# Patient Record
Sex: FEMALE | Race: BLACK OR AFRICAN AMERICAN | Employment: OTHER | URBAN - METROPOLITAN AREA
[De-identification: names, ages, dates, MRNs, and addresses within clinical notes are randomized per-mention and may not be internally consistent; named-entity substitution may affect disease eponyms.]

---

## 2017-05-19 ENCOUNTER — HOSPITAL ENCOUNTER (EMERGENCY)
Age: 77
Discharge: HOME | End: 2017-05-19
Attending: EMERGENCY MEDICINE
Payer: MEDICARE

## 2017-05-19 ENCOUNTER — APPOINTMENT (OUTPATIENT)
Dept: GENERAL RADIOLOGY | Age: 77
End: 2017-05-19
Attending: EMERGENCY MEDICINE
Payer: MEDICARE

## 2017-05-19 VITALS
WEIGHT: 227 LBS | BODY MASS INDEX: 37.82 KG/M2 | OXYGEN SATURATION: 97 % | HEIGHT: 65 IN | SYSTOLIC BLOOD PRESSURE: 180 MMHG | DIASTOLIC BLOOD PRESSURE: 80 MMHG | HEART RATE: 89 BPM | RESPIRATION RATE: 16 BRPM | TEMPERATURE: 98.1 F

## 2017-05-19 DIAGNOSIS — M54.31 SCIATICA OF RIGHT SIDE: Primary | ICD-10-CM

## 2017-05-19 PROCEDURE — 73502 X-RAY EXAM HIP UNI 2-3 VIEWS: CPT

## 2017-05-19 PROCEDURE — 74011250637 HC RX REV CODE- 250/637: Performed by: EMERGENCY MEDICINE

## 2017-05-19 PROCEDURE — 72100 X-RAY EXAM L-S SPINE 2/3 VWS: CPT

## 2017-05-19 PROCEDURE — 99282 EMERGENCY DEPT VISIT SF MDM: CPT

## 2017-05-19 RX ORDER — TRAMADOL HYDROCHLORIDE 50 MG/1
50 TABLET ORAL
Qty: 20 TAB | Refills: 0 | Status: SHIPPED | OUTPATIENT
Start: 2017-05-19 | End: 2018-03-09

## 2017-05-19 RX ORDER — HYDROMORPHONE HYDROCHLORIDE 1 MG/ML
1 INJECTION, SOLUTION INTRAMUSCULAR; INTRAVENOUS; SUBCUTANEOUS
Status: DISCONTINUED | OUTPATIENT
Start: 2017-05-19 | End: 2017-05-19

## 2017-05-19 RX ORDER — OXYCODONE HYDROCHLORIDE 5 MG/1
5 TABLET ORAL
Status: COMPLETED | OUTPATIENT
Start: 2017-05-19 | End: 2017-05-19

## 2017-05-19 RX ADMIN — OXYCODONE HYDROCHLORIDE 5 MG: 5 TABLET ORAL at 20:03

## 2017-05-19 NOTE — ED PROVIDER NOTES
HPI Comments: 68 y.o. female with past medical history significant for DM and HTN who presents from home with chief complaint of hip pain. Pt reports 10/10 R hip pain for 2 days which radiates down her leg into her foot. She also c/o numbness in her R great toe. Pt notes she has been taking Tylenol with no relief. She also notes she previously had bilateral knee replacements. Pt denies previous hx of back problems. Pt denies recent falls, injuries, and heavy lifting. There are no other acute medical concerns at this time. PCP: Modesto Power MD    Note written by Iris Chatterjee, as dictated by Brenda Fontanez MD 6:45 PM    The history is provided by the patient. Past Medical History:   Diagnosis Date    Diabetes ([facility] [address]     Hypertension        Past Surgical History:   Procedure Laterality Date    HX KNEE REPLACEMENT Bilateral     HX ORTHOPAEDIC           History reviewed. No pertinent family history. Social History     Social History    Marital status:      Spouse name: N/A    Number of children: N/A    Years of education: N/A     Occupational History    Not on file. Social History Main Topics    Smoking status: Never Smoker    Smokeless tobacco: Not on file    Alcohol use No    Drug use: Not on file    Sexual activity: Not on file     Other Topics Concern    Not on file     Social History Narrative         ALLERGIES: Tylenol [acetaminophen]    Review of Systems   Musculoskeletal: Positive for arthralgias. Neurological: Positive for numbness. All other systems reviewed and are negative. Vitals:    05/19/17 1805   BP: 180/80   Pulse: 89   Resp: 16   Temp: 98.1 °F (36.7 °C)   SpO2: 97%   Weight: 103 kg (227 lb)   Height: 5' 5\" (1.651 m)            Physical Exam   Constitutional: She is oriented to person, place, and time. She appears well-developed and well-nourished. No distress. HENT:   Head: Normocephalic and atraumatic.    Eyes: Conjunctivae are normal. No scleral icterus. Neck: Neck supple. No tracheal deviation present. Cardiovascular: Normal rate, regular rhythm, normal heart sounds and intact distal pulses. Exam reveals no gallop and no friction rub. No murmur heard. Pulmonary/Chest: Effort normal and breath sounds normal. She has no wheezes. She has no rales. Abdominal: Soft. She exhibits no distension. There is no tenderness. There is no rebound and no guarding. Musculoskeletal: She exhibits no edema. No foot drop. Neurological: She is alert and oriented to person, place, and time. No motor or sensory deficits. (+) R straight leg raise at 15 degrees. DTRs 0/0 at the knees, 0/0 at ankles. Skin: Skin is warm and dry. No rash noted. Psychiatric: She has a normal mood and affect. Nursing note and vitals reviewed. Note written by Iris Hernandez, as dictated by Hernan Donovan MD 6:45 PM    [facility]  ED Course       Procedures      PROGRESS NOTE:  8:25 PM  Pt's XR reveals multilevel DJD. Pt's pain is likely sciatic pain. There are no hard neurological deficits. Will treat with tramadol with follow ups by PCP and spinal surgery.

## 2017-05-19 NOTE — ED TRIAGE NOTES
TRIAGE NOTE: \"I woke up and my right hip was bothering me. Maybe it was the way I was laying. \" Denies known injury. Ambulatory with cane; at baseline.

## 2017-05-20 NOTE — DISCHARGE INSTRUCTIONS
We hope that we have addressed all of your medical concerns. The examination and treatment you received in the Emergency Department were for an emergent problem and were not intended as complete care. It is important that you follow up with your healthcare provider(s) for ongoing care. If your symptoms worsen or do not improve as expected, and you are unable to reach your usual health care provider(s), you should return to the Emergency Department. Today's healthcare is undergoing tremendous change, and patient satisfaction surveys are one of the many tools to assess the quality of medical care. You may receive a survey from the [employer] regarding your experience in the Emergency Department. I hope that your experience has been completely positive, particularly the medical care that I provided. As such, please participate in the survey; anything less than excellent does not meet my expectations or intentions. [facility] and [employer] participate in nationally recognized quality of care measures. If your blood pressure is greater than 120/80, as reported below, we urge that you seek medical care to address the potential of high blood pressure, commonly known as hypertension. Hypertension can be hereditary or can be caused by certain medical conditions, pain, stress, or \"white coat syndrome. \"       Please make an appointment with your health care provider(s) for follow up of your Emergency Department visit. VITALS:   Patient Vitals for the past 8 hrs:   Temp Pulse Resp BP SpO2   05/19/17 1805 98.1 °F (36.7 °C) 89 16 180/80 97 %          Thank you for allowing us to provide you with medical care today. We realize that you have many choices for your emergency care needs. Please choose us in the future for any continued health care needs. Maryellen Amos, [address] [place] [address] [address]   Office: [phone]            No results found for this or any previous visit (from the past 24 hour(s)). Xr Spine Lumb 2 Or 3 V    Result Date: 5/19/2017  CLINICAL HISTORY: Low back pain INDICATION: Low back pain FINDINGS: Three views of the lumbar spine are obtained. Facet arthropathy multilevel. Multilevel disc desiccation. Vacuum disc phenomena. Canal and foraminal stenoses. SI joint degenerative changes well. Visualized osseous structures are without evidence of fracture-dislocation. There is no significant soft tissue abnormality identified. IMPRESSION: Degenerative changes. No fracture. Xr Hip Rt W Or Wo Pelv 2-3 Vws    Result Date: 5/19/2017  EXAM:  XR HIP RT W OR WO PELV 2-3 VWS INDICATION:   Right hip pain upon awakening. COMPARISON: None. FINDINGS: An AP view of the pelvis and a frogleg lateral view of the right hip demonstrate no fracture, dislocation or other acute abnormality. There are degenerative changes of lumbar spine and sacroiliac joints. IMPRESSION:  No acute abnormality. Learning About How to Have a Healthy Back  What causes back pain? Back pain is often caused by overuse, strain, or injury. For example, people often hurt their backs playing sports or working in the yard, being jolted in a car accident, or lifting something too heavy. Aging plays a part too. Your bones and muscles tend to lose strength as you age, which makes injury more likely. The spongy discs between the bones of the spine (vertebrae) may suffer from wear and tear and no longer provide enough cushion between the bones. A disc that bulges or breaks open (herniated disc) can press on nerves, causing back pain. In some people, back pain is the result of arthritis, broken vertebrae caused by bone loss (osteoporosis), illness, or a spine problem. Although most people have back pain at one time or another, there are steps you can take to make it less likely. How can you have a healthy back?   Reduce stress on your back through good posture  Slumping or slouching alone may not cause low back pain. But after the back has been strained or injured, bad posture can make pain worse. · Sleep in a position that maintains your back's normal curves and on a mattress that feels comfortable. Sleep on your side with a pillow between your knees, or sleep on your back with a pillow under your knees. These positions can reduce strain on your back. · Stand and sit up straight. \"Good posture\" generally means your ears, shoulders, and hips are in a straight line. · If you must stand for a long time, put one foot on a stool, ledge, or box. Switch feet every now and then. · Sit in a chair that is low enough to let you place both feet flat on the floor with both knees nearly level with your hips. If your chair or desk is too high, use a footrest to raise your knees. Place a small pillow, a rolled-up towel, or a lumbar roll in the curve of your back if you need extra support. · Try a kneeling chair, which helps tilt your hips forward. This takes pressure off your lower back. · Try sitting on an exercise ball. It can rock from side to side, which helps keep your back loose. · When driving, keep your knees nearly level with your hips. Sit straight, and drive with both hands on the steering wheel. Your arms should be in a slightly bent position. Reduce stress on your back through careful lifting  · Squat down, bending at the hips and knees only. If you need to, put one knee to the floor and extend your other knee in front of you, bent at a right angle (half kneeling). · Press your chest straight forward. This helps keep your upper back straight while keeping a slight arch in your low back. · Hold the load as close to your body as possible, at the level of your belly button (navel). · Use your feet to change direction, taking small steps. · Lead with your hips as you change direction.  Keep your shoulders in line with your hips as you move. · Set down your load carefully, squatting with your knees and hips only. Exercise and stretch your back  · Do some exercise on most days of the week, if your doctor says it is okay. You can walk, run, swim, or cycle. · Stretch your back muscles. Here are a few exercises to try:  [place] on your back, and gently pull one bent knee to your chest. Put that foot back on the floor, and then pull the other knee to your chest.  ¨ Do pelvic tilts. Lie on your back with your knees bent. Tighten your stomach muscles. Pull your belly button (navel) in and up toward your ribs. You should feel like your back is pressing to the floor and your hips and pelvis are slightly lifting off the floor. Hold for 6 seconds while breathing smoothly. ¨ Sit with your back flat against a wall. · Keep your core muscles strong. The muscles of your back, belly (abdomen), and buttocks support your spine. ¨ Pull in your belly and imagine pulling your navel toward your spine. Hold this for 6 seconds, then relax. Remember to keep breathing normally as you tense your muscles. ¨ Do curl-ups. Always do them with your knees bent. Keep your low back on the floor, and curl your shoulders toward your knees using a smooth, slow motion. Keep your arms folded across your chest. If this bothers your neck, try putting your hands behind your neck (not your head), with your elbows spread apart. ¨ Lie on your back with your knees bent and your feet flat on the floor. Tighten your belly muscles, and then push with your feet and raise your buttocks up a few inches. Hold this position 6 seconds as you continue to breathe normally, then lower yourself slowly to the floor. Repeat 8 to 12 times. ¨ If you like group exercise, try Pilates or yoga. These classes have poses that strengthen the core muscles. Lead a healthy lifestyle  · Stay at a healthy weight to avoid strain on your back. · Do not smoke.  Smoking increases the risk of osteoporosis, which weakens the spine. If you need help quitting, talk to your doctor about stop-smoking programs and medicines. These can increase your chances of quitting for good. Where can you learn more? Go to http://kenrick-oswaldo.info/. Enter L315 in the search box to learn more about \"Learning About How to Have a Healthy Back. \"  Current as of: May 23, 2016  Content Version: 11.2  © 6920-5648 [employer]. Care instructions adapted under license by [employer] (which disclaims liability or warranty for this information). If you have questions about a medical condition or this instruction, always ask your healthcare professional. [address] any warranty or liability for your use of this information.

## 2017-05-22 NOTE — CALL BACK NOTE
[facility] Emergency Department Follow Up Call Record    Discharged to : Home/Family Home/Home Health/Skilled Facility/Rehab/Assisted Living/Other__home _____  1) Did you receive your discharge instructions? Yes        2) Do you understand them? Yes     Mrs. Kallie Mcelroy reports she is feeling better, still has some pain in her hip when walking. She will get her pain medication prescription filled. 3) Are you able to follow them? Yes          If NO, what can I clarify for you? 4) Do you understand your diagnosis? Yes         5) Do you know which symptoms should prompt you to call the doctor? Yes     6) Were you able to fill and  any medications that were prescribed? No     7) You were prescribed __tramadol_________for ___sciatic pain_________________. Common side effects of this medication are____________________. This is not a complete list so please review the forms given from the pharmacy for a complete list.  8) Drowsiness, rash    Are there any questions about your medications? Yes Mrs. Maldonado asked if she could take  Tylenol or Aleve with           The Tramadol. This writer stated she take the    Tramadol and then she could alternate the Aleve every six hours. Reminded her that her chart states she has allergy to Tylenol. She    Have you scheduled any recommended doctors appointments (specialty, PCP)reported she does not. * Chart needs to be updated. If NO, what barriers are you encountering (transportation/lost contact info/cost/  didnt think necessary/no PCP  9) If discharged with Home Health, has the agency contacted you to schedule visit? Not applicable  10) Is there anyone available to help you at home (meals, errands, transportation    monitoring) (adult children, neighbors, private duty companions) Yes Reports she has family to assist her.    6) Are you on a special diet? No         If YES, do you understand the requirements for this diet? Education provided?   12) If presented with cough, bronchitis, COPD, asthma, is it ok to ask that the   respiratory disease management educator call you? Not applicable      13)  A) If presented with fall, were you issued an assistive device in the ED    Are you using? Yes  Uses cane  B) If given RX for device, have you obtained? Yes       If NO, barriers? C) Therapist recommended: NO   Are you able to implement the suggestions? Yes        If NO, barriers to implementation? D) Are you having any difficulties with mobility inside your home?     (steps, bed, tub)Not applicable   If YES, ask if the [facility] PT can contact patient and good time and number?  14)  At the end of your discharge instructions, there is information about accessing [facility], have you had a chance to review those? No         Do you have any questions about signing up for this service? NO   We encourage our patients to be active participants in their healthcare and this site is one of the ways to do that. It will allow you to access parts of your medical record, email your doctors office, schedule appointments, and request medications refills . 15) Are there any other questions that I can answer for you regarding    your Emergency department visit? NO further questions.               Estimated Call Time:____2:17 PM  _______________ Date/Time:_______________

## 2017-08-08 ENCOUNTER — HOSPITAL ENCOUNTER (EMERGENCY)
Age: 77
Discharge: HOME | End: 2017-08-08
Attending: EMERGENCY MEDICINE | Admitting: EMERGENCY MEDICINE
Payer: MEDICARE

## 2017-08-08 ENCOUNTER — APPOINTMENT (OUTPATIENT)
Dept: GENERAL RADIOLOGY | Age: 77
End: 2017-08-08
Attending: EMERGENCY MEDICINE
Payer: MEDICARE

## 2017-08-08 ENCOUNTER — APPOINTMENT (OUTPATIENT)
Dept: GENERAL RADIOLOGY | Age: 77
End: 2017-08-08
Attending: NURSE PRACTITIONER
Payer: MEDICARE

## 2017-08-08 VITALS
BODY MASS INDEX: 37.32 KG/M2 | RESPIRATION RATE: 16 BRPM | TEMPERATURE: 98.3 F | OXYGEN SATURATION: 100 % | WEIGHT: 224 LBS | HEIGHT: 65 IN | HEART RATE: 72 BPM | DIASTOLIC BLOOD PRESSURE: 46 MMHG | SYSTOLIC BLOOD PRESSURE: 113 MMHG
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
## 2017-08-08 LAB
GLUCOSE BLD STRIP.AUTO-MCNC: 156 MG/DL (ref 65–100)
SERVICE CMNT-IMP: ABNORMAL

## 2017-08-08 PROCEDURE — 73502 X-RAY EXAM HIP UNI 2-3 VIEWS: CPT

## 2017-08-08 PROCEDURE — 99283 EMERGENCY DEPT VISIT LOW MDM: CPT

## 2017-08-08 PROCEDURE — 82962 GLUCOSE BLOOD TEST: CPT

## 2017-08-08 PROCEDURE — 73552 X-RAY EXAM OF FEMUR 2/>: CPT

## 2017-08-08 PROCEDURE — 73080 X-RAY EXAM OF ELBOW: CPT

## 2017-08-08 RX ORDER — METHOCARBAMOL 750 MG/1
750 TABLET, FILM COATED ORAL
Qty: 20 TAB | Refills: 0 | Status: SHIPPED | OUTPATIENT
Start: 2017-08-08 | End: 2017-08-13

## 2017-08-08 NOTE — ED PROVIDER NOTES
HPI Comments: Adina Saldana is a 68 y.o. female who presents ambulatory in triage to the ED with  c/o right hip and right \"thigh bone\" pain. Patient drove herself to the emergency room. Patient states she fell yesterday while trying to get something out of the car and landed on her right hip. Patient denies hitting her head, denies loss of consciousness, denies syncopal events. Grandchildren helped her up at that time. She came to the emergency room today to \"get checked out. \" Pain 7/10 currently in right hip. Patient denies use of anticoagulation. PCP: Farzana Ramirez MD    PMHx significant for: Past Medical History:  No date: Diabetes ([facility] [address]  No date: Hypertension    PSHx significant for: Past Surgical History:  No date: HX KNEE REPLACEMENT Bilateral  No date: HX ORTHOPAEDIC    Social Hx: Tobacco: none EtOH: none Illicit drug use: none    There are no further complaints or symptoms at this time. The history is provided by the patient. Past Medical History:   Diagnosis Date    Diabetes ([facility] [address]     Hypertension        Past Surgical History:   Procedure Laterality Date    HX KNEE REPLACEMENT Bilateral     HX ORTHOPAEDIC           History reviewed. No pertinent family history. Social History     Social History    Marital status:      Spouse name: N/A    Number of children: N/A    Years of education: N/A     Occupational History    Not on file. Social History Main Topics    Smoking status: Never Smoker    Smokeless tobacco: Not on file    Alcohol use No    Drug use: Not on file    Sexual activity: Not on file     Other Topics Concern    Not on file     Social History Narrative         ALLERGIES: Tylenol [acetaminophen]    Review of Systems   Constitutional: Negative for activity change, appetite change, chills, diaphoresis, fatigue and fever. HENT: Negative for congestion, ear pain, sinus pressure, sore throat, trouble swallowing and voice change.     Eyes: Negative for photophobia, pain, redness and visual disturbance. Respiratory: Negative for chest tightness, shortness of breath and wheezing. Cardiovascular: Negative for chest pain, palpitations and leg swelling. Gastrointestinal: Negative for abdominal distention, abdominal pain, nausea and vomiting. Endocrine: Negative. Genitourinary: Negative for difficulty urinating, flank pain, frequency and urgency. Musculoskeletal: Negative for back pain, gait problem, joint swelling, neck pain and neck stiffness. Skin: Positive for color change (bruise to right elbow. Denies pain). Negative for pallor, rash and wound. Allergic/Immunologic: Negative. Neurological: Negative for dizziness, speech difficulty, weakness and headaches. Hematological: Does not bruise/bleed easily. Psychiatric/Behavioral: Negative for behavioral problems and suicidal ideas. The patient is not nervous/anxious. Vitals:    08/08/17 0950   BP: 130/67   Pulse: 66   Resp: 16   Temp: 98 °F (36.7 °C)   SpO2: 99%   Weight: 101.6 kg (224 lb)   Height: 5' 5\" (1.651 m)            Physical Exam   Constitutional: She is oriented to person, place, and time. She appears well-developed and well-nourished. No distress. HENT:   Head: Normocephalic and atraumatic. Right Ear: External ear normal.   Left Ear: External ear normal.   Nose: Nose normal.   Mouth/Throat: Oropharynx is clear and moist.   Eyes: Conjunctivae and EOM are normal. Pupils are equal, round, and reactive to light. Right eye exhibits no discharge. Left eye exhibits no discharge. Neck: Normal range of motion. Neck supple. No JVD present. No tracheal deviation present. No pain on palpation to cervical spine. No step offs, no deformities. Full range of motion. Cardiovascular: Normal rate, regular rhythm, normal heart sounds and intact distal pulses. Exam reveals no gallop. No murmur heard. Pulmonary/Chest: Effort normal and breath sounds normal. No respiratory distress. She has no wheezes. She has no rales. She exhibits no tenderness. Abdominal: Soft. Bowel sounds are normal. She exhibits no distension. There is no tenderness. There is no rebound and no guarding. Genitourinary:   Genitourinary Comments: Negative. Sensation to both bowel and bladder. Musculoskeletal: Normal range of motion. She exhibits tenderness (right hip and right femur pain, positive palpable pulses). She exhibits no edema. Neurological: She is alert and oriented to person, place, and time. Skin: Skin is warm and dry. No rash noted. No erythema. No pallor. Psychiatric: She has a normal mood and affect. Her behavior is normal. Judgment and thought content normal.   Nursing note and vitals reviewed. MDM  Number of Diagnoses or Management Options  Diagnosis management comments: Plan:      ED Course   1020: Initial assessment as documented. 1004: hip films reviewed and negative  1055: femur film and elbow films reviewed and negative  1153:   Pt has been reexamined. Pt has no new complaints, changes or physical findings. Care plan outlined and precautions discussed. All available results were reviewed with pt. All medications were reviewed with pt. All of pt's questions and concerns were addressed. Pt agrees to F/U as instructed and agrees to return to ED upon further deterioration. Pt is ready to go home.   Anette Otto NP    Procedures

## 2017-08-08 NOTE — SENIOR SERVICES NOTE
Physical Therapy Screening:  A referral to physical therapy order is indicated when the patient is medically stable. A screening was performed on this patient's chart based on their entrance into the emergency department and potential need for physical therapy identified. The patients chart was reviewed and the patient interviewed/screened and found to be appropriate for a skilled therapy evaluation. Please consult for physical therapy if you would like an evaluation to be completed. Thank you.     Sheree Tello PT, DPT

## 2017-08-08 NOTE — DISCHARGE INSTRUCTIONS
We hope that we have addressed all of your medical concerns. The examination and treatment you received in the Emergency Department were for an emergent problem and were not intended as complete care. It is important that you follow up with your healthcare provider(s) for ongoing care. If your symptoms worsen or do not improve as expected, and you are unable to reach your usual health care provider(s), you should return to the Emergency Department. Today's healthcare is undergoing tremendous change, and patient satisfaction surveys are one of the many tools to assess the quality of medical care. You may receive a survey from the [facility] regarding your experience in the Emergency Department. I hope that your experience has been completely positive, particularly the medical care that I provided. As such, please participate in the survey; anything less than excellent does not meet my expectations or intentions. [facility] and [address] [place] [address] participate in nationally recognized quality of care measures. If your blood pressure is greater than 120/80, as reported below, we urge that you seek medical care to address the potential of high blood pressure, commonly known as hypertension. Hypertension can be hereditary or can be caused by certain medical conditions, pain, stress, or \"white coat syndrome. \"       Please make an appointment with your health care provider(s) for follow up of your Emergency Department visit. VITALS:   Patient Vitals for the past 8 hrs:   Temp Pulse Resp BP SpO2   08/08/17 1030 - - - 129/51 96 %   08/08/17 0950 98 °F (36.7 °C) 66 16 130/67 99 %          Thank you for allowing us to provide you with medical care today. We realize that you have many choices for your emergency care needs. Please choose us in the future for any continued health care needs. Mayra PINK/ [facility] [place]: [phone]            Recent Results (from the past 24 hour(s))   GLUCOSE, POC    Collection Time: 08/08/17 10:12 AM   Result Value Ref Range    Glucose (POC) 156 (H) 65 - 100 mg/dL    Performed by Naveen Ornelas        Xr Elbow Rt Min 3 V    Result Date: 8/8/2017  EXAM:  XR ELBOW RT MIN 3 V INDICATION: Right elbow pain after fall. COMPARISON: None. FINDINGS: Three views of the right elbow demonstrate no fracture, dislocation, effusion or other acute abnormality. IMPRESSION: Normal right elbow. Xr Hip Rt W Or Wo Pelv 2-3 Vws    Result Date: 8/8/2017  EXAM:  XR HIP RT W OR WO PELV 2-3 VWS INDICATION:   Right hip pain after fall. COMPARISON: None. FINDINGS: An AP view of the pelvis and a frogleg lateral view of the right hip demonstrate no fracture, dislocation or other acute abnormality. There are degenerative changes of lumbar spine and sacroiliac joints. IMPRESSION:  No acute abnormality. Xr Femur Rt 2 Vs    Result Date: 8/8/2017  EXAM:  XR FEMUR RT 2 VS INDICATION: Pain after fall. COMPARISON: None. FINDINGS: Four images which comprise AP and lateral views of the right femur demonstrate a total knee prosthesis. There is no fracture or other acute osseous or articular abnormality. The soft tissues are within normal limits. IMPRESSION: Right total knee arthroplasty. No acute abnormality. Preventing Falls: Care Instructions  Your Care Instructions  Getting around your home safely can be a challenge if you have injuries or health problems that make it easy for you to fall. Loose rugs and furniture in walkways are among the dangers for many older people who have problems walking or who have poor eyesight. People who have conditions such as arthritis, osteoporosis, or dementia also have to be careful not to fall. You can make your home safer with a few simple measures. Follow-up care is a key part of your treatment and safety.  Be sure to make and go to all appointments, and call your doctor if you are having problems. It's also a good idea to know your test results and keep a list of the medicines you take. How can you care for yourself at home? Taking care of yourself  · You may get dizzy if you do not drink enough water. To prevent dehydration, drink plenty of fluids, enough so that your urine is light yellow or clear like water. Choose water and other caffeine-free clear liquids. If you have kidney, heart, or liver disease and have to limit fluids, talk with your doctor before you increase the amount of fluids you drink. · Exercise regularly to improve your strength, muscle tone, and balance. Walk if you can. Swimming may be a good choice if you cannot walk easily. · Have your vision and hearing checked each year or any time you notice a change. If you have trouble seeing and hearing, you might not be able to avoid objects and could lose your balance. · Know the side effects of the medicines you take. Ask your doctor or pharmacist whether the medicines you take can affect your balance. Sleeping pills or sedatives can affect your balance. · Limit the amount of alcohol you drink. Alcohol can impair your balance and other senses. · Ask your doctor whether calluses or corns on your feet need to be removed. If you wear loose-fitting shoes because of calluses or corns, you can lose your balance and fall. · Talk to your doctor if you have numbness in your feet. Preventing falls at home  · Remove raised doorway thresholds, throw rugs, and clutter. Repair loose carpet or raised areas in the floor. · Move furniture and electrical cords to keep them out of walking paths. · Use nonskid floor wax, and wipe up spills right away, especially on ceramic tile floors. · If you use a walker or cane, put rubber tips on it. If you use crutches, clean the bottoms of them regularly with an abrasive pad, such as steel wool.   · Keep your house well lit, especially stairways, porches, and outside walkways. Use night-lights in areas such as hallways and bathrooms. Add extra light switches or use remote switches (such as switches that go on or off when you clap your hands) to make it easier to turn lights on if you have to get up during the night. · Install sturdy handrails on stairways. · Move items in your cabinets so that the things you use a lot are on the lower shelves (about waist level). · Keep a cordless phone and a flashlight with new batteries by your bed. If possible, put a phone in each of the main rooms of your house, or carry a cell phone in case you fall and cannot reach a phone. Or, you can wear a device around your neck or wrist. You push a button that sends a signal for help. · Wear low-heeled shoes that fit well and give your feet good support. Use footwear with nonskid soles. Check the heels and soles of your shoes for wear. Repair or replace worn heels or soles. · Do not wear socks without shoes on wood floors. · Walk on the grass when the sidewalks are slippery. If you live in an area that gets snow and ice in the winter, sprinkle salt on slippery steps and sidewalks. Preventing falls in the bath  · Install grab bars and nonskid mats inside and outside your shower or tub and near the toilet and sinks. · Use shower chairs and bath benches. · Use a hand-held shower head that will allow you to sit while showering. · Get into a tub or shower by putting the weaker leg in first. Get out of a tub or shower with your strong side first.  · Repair loose toilet seats and consider installing a raised toilet seat to make getting on and off the toilet easier. · Keep your bathroom door unlocked while you are in the shower. Where can you learn more? Go to http://kenrick-oswaldo.info/. Enter 0476 [id number] in the search box to learn more about \"Preventing Falls: Care Instructions. \"  Current as of: August 4, 2016  Content Version: 11.3  © 3375-2839 [employer]. Care instructions adapted under license by [employer] (which disclaims liability or warranty for this information). If you have questions about a medical condition or this instruction, always ask your healthcare professional. [place] [address] any warranty or liability for your use of this information.

## 2017-08-08 NOTE — ED TRIAGE NOTES
Drove self to ER after falling yesterday while trying to get something out the car, fell onto right hip. Grandchildren helped her get up. Denies hitting head or LOC. Ambulating in triage with cane. Denies dizziness.   Also would like her BG checked

## 2018-03-09 ENCOUNTER — HOSPITAL ENCOUNTER (EMERGENCY)
Age: 78
Discharge: HOME | End: 2018-03-09
Attending: STUDENT IN AN ORGANIZED HEALTH CARE EDUCATION/TRAINING PROGRAM
Payer: MEDICARE

## 2018-03-09 VITALS
DIASTOLIC BLOOD PRESSURE: 68 MMHG | HEIGHT: 65 IN | HEART RATE: 87 BPM | RESPIRATION RATE: 16 BRPM | WEIGHT: 232.25 LBS | BODY MASS INDEX: 38.7 KG/M2 | SYSTOLIC BLOOD PRESSURE: 110 MMHG | TEMPERATURE: 97.4 F | OXYGEN SATURATION: 96 %

## 2018-03-09 DIAGNOSIS — S46.819A STRAIN OF TRAPEZIUS MUSCLE, UNSPECIFIED LATERALITY, INITIAL ENCOUNTER: ICD-10-CM

## 2018-03-09 DIAGNOSIS — M54.2 NECK PAIN: Primary | ICD-10-CM

## 2018-03-09 PROCEDURE — 99282 EMERGENCY DEPT VISIT SF MDM: CPT

## 2018-03-09 RX ORDER — TRAMADOL HYDROCHLORIDE 50 MG/1
50 TABLET ORAL
Qty: 20 TAB | Refills: 0 | Status: SHIPPED | OUTPATIENT
Start: 2018-03-09 | End: 2019-03-27

## 2018-03-09 NOTE — ED PROVIDER NOTES
HPI Comments: 65 y/o female with PMHx of DM and HTN, presenting with complaint of neck pain. She has been having intermittent neck pain for the past 3 years, that has been worse over the past 2 weeks. She denies any recent falls or other traumatic injuries. She states that sometimes the pain is unilateral and sometimes it is bilateral, and is often worse on the right on the left. The pain is severe, starts at her inferior occiput and radiates toward her shoulders, and is made worse by movement. She has tried tylenol with minimal relief. She denies temporal or frontal headache, vision changes, weakness, numbness, nausea, vomiting, dysequilibrium, fever or chills. The history is provided by the patient. Past Medical History:   Diagnosis Date    Diabetes ([facility] [address]     Hypertension        Past Surgical History:   Procedure Laterality Date    HX KNEE REPLACEMENT Bilateral     HX ORTHOPAEDIC           History reviewed. No pertinent family history. Social History     Social History    Marital status:      Spouse name: N/A    Number of children: N/A    Years of education: N/A     Occupational History    Not on file. Social History Main Topics    Smoking status: Never Smoker    Smokeless tobacco: Not on file    Alcohol use No    Drug use: Not on file    Sexual activity: Not on file     Other Topics Concern    Not on file     Social History Narrative         ALLERGIES: Tylenol [acetaminophen]    Review of Systems   Constitutional: Negative for chills and fever. Eyes: Negative for visual disturbance. Respiratory: Positive for shortness of breath (intermittent, none recently). Cardiovascular: Negative for chest pain. Gastrointestinal: Negative for abdominal pain, nausea and vomiting. Musculoskeletal: Positive for neck pain. Negative for back pain. Skin: Negative for wound. Neurological: Positive for headaches (upper neck pain).  Negative for syncope, weakness, light-headedness and numbness. All other systems reviewed and are negative. Vitals:    03/09/18 0942   BP: 110/68   Pulse: 87   Resp: 16   Temp: 97.4 °F (36.3 °C)   SpO2: 96%   Weight: 105.3 kg (232 lb 4 oz)   Height: 5' 5\" (1.651 m)            Physical Exam   Constitutional: She is oriented to person, place, and time. She appears well-developed and well-nourished. No distress. HENT:   Head: Normocephalic and atraumatic. Eyes: Conjunctivae and EOM are normal. Pupils are equal, round, and reactive to light. Neck: Normal range of motion. Neck supple. Cardiovascular: Normal rate and regular rhythm. Murmur heard. Pulmonary/Chest: Effort normal and breath sounds normal.   Musculoskeletal:   No midline tenderness of cervical, thoracic or lumbar spine. Bilateral trapezius and cervical paraspinal muscle tenderness to palpation. Neurological: She is alert and oriented to person, place, and time. CN 2-12 intact. 5/5 strength of bilateral upper and lower extremities, no sensory deficits. Skin: Skin is warm and dry. She is not diaphoretic. Nursing note and vitals reviewed. MDM  Number of Diagnoses or Management Options  Neck pain:   Strain of trapezius muscle, unspecified laterality, initial encounter:      Amount and/or Complexity of Data Reviewed  Discuss the patient with other providers: yes (Dr. Edilma Higginbotham, ED attending)    Patient Progress  Patient progress: stable        ED Course       Procedures      65 y/o female with PMHx of DM and HTN, presenting with complaint of neck pain. History and exam most consistent with muscular pain. Intermittent pain x 3 years without any neuro deficits, N/V or vision changes - unlikely to reflect CVA/TIA, intracranial mass or hemorrhage, or carotid/vertebral artery aneurysm/dissection/occlusion. Low clinical suspicion for ACS, fractures, ligamentous injury, epidural abscess or acute cord compression. No emergent imaging indicated.  Safe for discharge home with Rx for tramadol and stretching exercises to do at home. Recommended prompt PCP follow up after the weekend. Strict ED return precautions discussed and provided in writing at time of discharge. The patient verbalized understanding and agreement with this plan. Patient care and plan discussed and shared with Dr. [place].

## 2018-03-09 NOTE — ED TRIAGE NOTES
Right sided neck pain radiating to shoulder and sometimes has pain behind her left ear radiating down, denies injury,only hurts to turn her neck , pain x 2 weeks

## 2018-04-13 ENCOUNTER — APPOINTMENT (OUTPATIENT)
Dept: CT IMAGING | Age: 78
End: 2018-04-13
Attending: NURSE PRACTITIONER
Payer: MEDICARE

## 2018-04-13 ENCOUNTER — APPOINTMENT (OUTPATIENT)
Dept: GENERAL RADIOLOGY | Age: 78
End: 2018-04-13
Attending: NURSE PRACTITIONER
Payer: MEDICARE

## 2018-04-13 ENCOUNTER — HOSPITAL ENCOUNTER (EMERGENCY)
Age: 78
Discharge: HOME | End: 2018-04-13
Attending: EMERGENCY MEDICINE
Payer: MEDICARE

## 2018-04-13 VITALS
BODY MASS INDEX: 39.82 KG/M2 | DIASTOLIC BLOOD PRESSURE: 94 MMHG | HEIGHT: 65 IN | OXYGEN SATURATION: 96 % | SYSTOLIC BLOOD PRESSURE: 153 MMHG | TEMPERATURE: 97.9 F | WEIGHT: 239 LBS | RESPIRATION RATE: 16 BRPM | HEART RATE: 91 BPM

## 2018-04-13 DIAGNOSIS — R51.9 ACUTE NONINTRACTABLE HEADACHE, UNSPECIFIED HEADACHE TYPE: ICD-10-CM

## 2018-04-13 DIAGNOSIS — R05.9 COUGH: Primary | ICD-10-CM

## 2018-04-13 DIAGNOSIS — M54.2 NECK PAIN: ICD-10-CM

## 2018-04-13 LAB
ALBUMIN SERPL-MCNC: 3.2 G/DL (ref 3.5–5)
ALBUMIN/GLOB SERPL: 0.7 {RATIO} (ref 1.1–2.2)
ALP SERPL-CCNC: 73 U/L (ref 45–117)
ALT SERPL-CCNC: 26 U/L (ref 12–78)
ANION GAP SERPL CALC-SCNC: 5 MMOL/L (ref 5–15)
AST SERPL-CCNC: 22 U/L (ref 15–37)
BASOPHILS # BLD: 0.1 K/UL (ref 0–0.1)
BASOPHILS NFR BLD: 1 % (ref 0–1)
BILIRUB SERPL-MCNC: 0.5 MG/DL (ref 0.2–1)
BUN SERPL-MCNC: 17 MG/DL (ref 6–20)
BUN/CREAT SERPL: 16 (ref 12–20)
CALCIUM SERPL-MCNC: 10 MG/DL (ref 8.5–10.1)
CHLORIDE SERPL-SCNC: 109 MMOL/L (ref 97–108)
CO2 SERPL-SCNC: 29 MMOL/L (ref 21–32)
CREAT SERPL-MCNC: 1.08 MG/DL (ref 0.55–1.02)
DIFFERENTIAL METHOD BLD: ABNORMAL
EOSINOPHIL # BLD: 0.3 K/UL (ref 0–0.4)
EOSINOPHIL NFR BLD: 3 % (ref 0–7)
ERYTHROCYTE [DISTWIDTH] IN BLOOD BY AUTOMATED COUNT: 14.6 % (ref 11.5–14.5)
GLOBULIN SER CALC-MCNC: 4.6 G/DL (ref 2–4)
GLUCOSE SERPL-MCNC: 124 MG/DL (ref 65–100)
HCT VFR BLD AUTO: 34.1 % (ref 35–47)
HGB BLD-MCNC: 10.9 G/DL (ref 11.5–16)
IMM GRANULOCYTES # BLD: 0 K/UL (ref 0–0.04)
IMM GRANULOCYTES NFR BLD AUTO: 0 % (ref 0–0.5)
LYMPHOCYTES # BLD: 3 K/UL (ref 0.8–3.5)
LYMPHOCYTES NFR BLD: 31 % (ref 12–49)
MCH RBC QN AUTO: 28.2 PG (ref 26–34)
MCHC RBC AUTO-ENTMCNC: 32 G/DL (ref 30–36.5)
MCV RBC AUTO: 88.1 FL (ref 80–99)
MONOCYTES # BLD: 0.7 K/UL (ref 0–1)
MONOCYTES NFR BLD: 8 % (ref 5–13)
NEUTS SEG # BLD: 5.4 K/UL (ref 1.8–8)
NEUTS SEG NFR BLD: 57 % (ref 32–75)
NRBC # BLD: 0 K/UL (ref 0–0.01)
NRBC BLD-RTO: 0 PER 100 WBC
PLATELET # BLD AUTO: 281 K/UL (ref 150–400)
PMV BLD AUTO: 10.4 FL (ref 8.9–12.9)
POTASSIUM SERPL-SCNC: 4.6 MMOL/L (ref 3.5–5.1)
PROT SERPL-MCNC: 7.8 G/DL (ref 6.4–8.2)
RBC # BLD AUTO: 3.87 M/UL (ref 3.8–5.2)
SODIUM SERPL-SCNC: 143 MMOL/L (ref 136–145)
WBC # BLD AUTO: 9.5 K/UL (ref 3.6–11)

## 2018-04-13 PROCEDURE — 99281 EMR DPT VST MAYX REQ PHY/QHP: CPT

## 2018-04-13 PROCEDURE — 71046 X-RAY EXAM CHEST 2 VIEWS: CPT

## 2018-04-13 PROCEDURE — 72050 X-RAY EXAM NECK SPINE 4/5VWS: CPT

## 2018-04-13 PROCEDURE — 70450 CT HEAD/BRAIN W/O DYE: CPT

## 2018-04-13 PROCEDURE — 36415 COLL VENOUS BLD VENIPUNCTURE: CPT | Performed by: NURSE PRACTITIONER

## 2018-04-13 PROCEDURE — 80053 COMPREHEN METABOLIC PANEL: CPT | Performed by: NURSE PRACTITIONER

## 2018-04-13 PROCEDURE — 85025 COMPLETE CBC W/AUTO DIFF WBC: CPT | Performed by: NURSE PRACTITIONER

## 2018-04-13 RX ORDER — AZITHROMYCIN 250 MG/1
TABLET, FILM COATED ORAL
Qty: 6 TAB | Refills: 0 | Status: SHIPPED | OUTPATIENT
Start: 2018-04-13 | End: 2018-04-18

## 2018-04-13 RX ORDER — BENZONATATE 100 MG/1
100 CAPSULE ORAL
Qty: 30 CAP | Refills: 0 | Status: SHIPPED | OUTPATIENT
Start: 2018-04-13 | End: 2018-04-20

## 2018-04-13 NOTE — DISCHARGE INSTRUCTIONS
Cough: Care Instructions  Your Care Instructions    A cough is your body's response to something that bothers your throat or airways. Many things can cause a cough. You might cough because of a cold or the flu, bronchitis, or asthma. Smoking, postnasal drip, allergies, and stomach acid that backs up into your throat also can cause coughs. A cough is a symptom, not a disease. Most coughs stop when the cause, such as a cold, goes away. You can take a few steps at home to cough less and feel better. Follow-up care is a key part of your treatment and safety. Be sure to make and go to all appointments, and call your doctor if you are having problems. It's also a good idea to know your test results and keep a list of the medicines you take. How can you care for yourself at home? · Drink lots of water and other fluids. This helps thin the mucus and soothes a dry or sore throat. Honey or lemon juice in hot water or tea may ease a dry cough. · Take cough medicine as directed by your doctor. · Prop up your head on pillows to help you breathe and ease a dry cough. · Try cough drops to soothe a dry or sore throat. Cough drops don't stop a cough. Medicine-flavored cough drops are no better than candy-flavored drops or hard candy. · Do not smoke. Avoid secondhand smoke. If you need help quitting, talk to your doctor about stop-smoking programs and medicines. These can increase your chances of quitting for good. When should you call for help? Call 911 anytime you think you may need emergency care. For example, call if:  ? · You have severe trouble breathing. ?Call your doctor now or seek immediate medical care if:  ? · You cough up blood. ? · You have new or worse trouble breathing. ? · You have a new or higher fever. ? · You have a new rash. ? Watch closely for changes in your health, and be sure to contact your doctor if:  ? · You cough more deeply or more often, especially if you notice more mucus or a change in the color of your mucus. ? · You have new symptoms, such as a sore throat, an earache, or sinus pain. ? · You do not get better as expected. Where can you learn more? Go to http://kenrick-oswaldo.info/. Enter D279 in the search box to learn more about \"Cough: Care Instructions. \"  Current as of: May 12, 2017  Content Version: 11.4  © 3986-5834 [employer]. Care instructions adapted under license by [employer] (which disclaims liability or warranty for this information). If you have questions about a medical condition or this instruction, always ask your healthcare professional. [place] [address] any warranty or liability for your use of this information. Neck Pain: Care Instructions  Your Care Instructions    You can have neck pain anywhere from the bottom of your head to the top of your shoulders. It can spread to the upper back or arms. Injuries, painting a ceiling, sleeping with your neck twisted, staying in one position for too long, and many other activities can cause neck pain. Most neck pain gets better with home care. Your doctor may recommend medicine to relieve pain or relax your muscles. He or she may suggest exercise and physical therapy to increase flexibility and relieve stress. You may need to wear a special (cervical) collar to support your neck for a day or two. Follow-up care is a key part of your treatment and safety. Be sure to make and go to all appointments, and call your doctor if you are having problems. It's also a good idea to know your test results and keep a list of the medicines you take. How can you care for yourself at home? · Try using a heating pad on a low or medium setting for 15 to 20 minutes every 2 or 3 hours. Try a warm shower in place of one session with the heating pad. · You can also try an ice pack for 10 to 15 minutes every 2 to 3 hours.  Put a thin cloth between the ice and your skin.  · Take pain medicines exactly as directed. ¨ If the doctor gave you a prescription medicine for pain, take it as prescribed. ¨ If you are not taking a prescription pain medicine, ask your doctor if you can take an over-the-counter medicine. · If your doctor recommends a cervical collar, wear it exactly as directed. When should you call for help? Call your doctor now or seek immediate medical care if:  ? · You have new or worsening numbness in your arms, buttocks or legs. ? · You have new or worsening weakness in your arms or legs. (This could make it hard to stand up.)   ? · You lose control of your bladder or bowels. ? Watch closely for changes in your health, and be sure to contact your doctor if:  ? · Your neck pain is getting worse. ? · You are not getting better after 1 week. ? · You do not get better as expected. Where can you learn more? Go to http://kenrick-oswaldo.info/. Enter [id number] in the search box to learn more about \"Neck Pain: Care Instructions. \"  Current as of: March 21, 2017  Content Version: 11.4  © 5782-8928 [employer]. Care instructions adapted under license by [employer] (which disclaims liability or warranty for this information). If you have questions about a medical condition or this instruction, always ask your healthcare professional. [address] any warranty or liability for your use of this information.

## 2018-04-13 NOTE — ED PROVIDER NOTES
HPI Comments: The patient is a 66 y.o. female with past medical history significant for DM & HTN who presents from home ambulatory with chief complaint of persistent cold symptoms, cough, headache & neck pain. Patient states that she has had the symptoms for the last 3 weeks. She \"wants us to get rid of her cold symptoms\". She also complains of persistent neck pain and headache with a cough and shortness of breath. She \"wants my head x-rayed to see what is going on\". She was in the ED several weeks ago for the same. Patient denies: chills, nausea, vomiting, diarrhea, constipation, loss of bowel or bladder, chest pain. Nothing makes the symptoms better or worse. She has tried hot tea and diabetic cough medication without any help. Additional symptoms include subjective fevers and shortness of breath without dyspnea or orthopnea. There are no other acute medical concerns at this time. PCP: Kennith Halsted, MD      The history is provided by the patient. Past Medical History:   Diagnosis Date    Diabetes ([facility] [address]     Hypertension        Past Surgical History:   Procedure Laterality Date    HX KNEE REPLACEMENT Bilateral     HX ORTHOPAEDIC           History reviewed. No pertinent family history. Social History     Social History    Marital status:      Spouse name: N/A    Number of children: N/A    Years of education: N/A     Occupational History    Not on file. Social History Main Topics    Smoking status: Never Smoker    Smokeless tobacco: Not on file    Alcohol use No    Drug use: Not on file    Sexual activity: Not on file     Other Topics Concern    Not on file     Social History Narrative         ALLERGIES: Tylenol [acetaminophen]    Review of Systems   Constitutional: Positive for fever. Negative for appetite change and chills. HENT: Negative. Respiratory: Positive for cough and shortness of breath. Negative for wheezing.     Cardiovascular: Negative for chest pain, palpitations and leg swelling. Gastrointestinal: Negative for abdominal pain, constipation, diarrhea, nausea and vomiting. Genitourinary: Negative for dysuria and urgency. Musculoskeletal: Negative for back pain. Skin: Negative for color change and rash. Neurological: Positive for dizziness and headaches. Psychiatric/Behavioral: Negative. All other systems reviewed and are negative. Vitals:    04/13/18 1219   BP: 175/81   Pulse: 96   Resp: 16   Temp: 98 °F (36.7 °C)   SpO2: 96%   Weight: 108.4 kg (239 lb)   Height: 5' 5\" (1.651 m)            Physical Exam   Constitutional: She is oriented to person, place, and time. She appears well-developed and well-nourished. HENT:   Head: Normocephalic and atraumatic. Neck: Normal range of motion. Neck supple. Cardiovascular: Normal rate, regular rhythm, normal heart sounds and intact distal pulses. Pulses:       Radial pulses are 2+ on the right side, and 2+ on the left side. Pulmonary/Chest: Effort normal and breath sounds normal. No respiratory distress. She has no decreased breath sounds. She has no wheezes. She has no rhonchi. She has no rales. She exhibits no tenderness. Abdominal: Soft. Bowel sounds are normal. There is no tenderness. There is no guarding. Musculoskeletal: Normal range of motion. Neurological: She is alert and oriented to person, place, and time. Skin: Skin is warm and dry. No erythema. Psychiatric: She has a normal mood and affect. Her behavior is normal. Judgment and thought content normal.   Nursing note and vitals reviewed.        [facility]      ED Course     Assessment & Plan:     Orders Placed This Encounter    XR CHEST PA LAT    CT HEAD WO CONT    XR SPINE CERV 4 OR 5 V    CBC WITH AUTOMATED DIFF    METABOLIC PANEL, COMPREHENSIVE       Discussed with Niyah Aldana MD,ED Provider    Avel Montoya NP  04/13/18  12:22 PM    Head CT, CXR & Neck films negative for acute findings    Will Dc home with Azithromycin & tessalon Pearls for cough    Findings discussed with Dr. Neena Roman MD.     Procedures    4:05 PM  The patient has been reevaluated. The patient is ready for discharge. The patient's signs, symptoms, diagnosis, and discharge instructions have been discussed and the patient/ family has conveyed their understanding. The patient is to follow up as recommended or return to the ED should their symptoms worsen. Plan has been discussed and the patient is in agreement. LABORATORY TESTS:  Labs Reviewed   CBC WITH AUTOMATED DIFF - Abnormal; Notable for the following:        Result Value    HGB 10.9 (*)     HCT 34.1 (*)     RDW 14.6 (*)     All other components within normal limits   METABOLIC PANEL, COMPREHENSIVE - Abnormal; Notable for the following:     Chloride 109 (*)     Glucose 124 (*)     Creatinine 1.08 (*)     GFR est AA 59 (*)     GFR est non-AA 49 (*)     Albumin 3.2 (*)     Globulin 4.6 (*)     A-G Ratio 0.7 (*)     All other components within normal limits   SAMPLES BEING HELD       IMAGING RESULTS:  Xr Chest Pa Lat    Result Date: 4/13/2018  Exam:  2 view chest Indication: Persistent cough Comparison to 10/9/2012. PA and lateral views demonstrate normal heart size. There is no acute process in the lung fields. The osseous structures are unremarkable. Impression: No acute process or change compared to the prior exam.     Xr Spine Cerv 4 Or 5 V    Result Date: 4/13/2018  Indication: Right-sided neck pain Comparison to 11/17/2010 Five views of the cervical spine demonstrate normal alignment without evidence of acute fracture, subluxation, or prevertebral edema. Multilevel spondylosis is again noted, greatest at C5-6 and C6-7. Bilateral neural foraminal narrowing is noted. Impression: Multilevel spondylosis without acute abnormality. Ct Head Wo Cont    Result Date: 4/13/2018  EXAM:  CT HEAD WO CONT INDICATION:   headache and neck pain COMPARISON: 2016. CONTRAST:  None.  TECHNIQUE: Unenhanced CT of the head was performed using 5 mm images. Brain and bone windows were generated. CT dose reduction was achieved through use of a standardized protocol tailored for this examination and automatic exposure control for dose modulation. FINDINGS: The ventricles and sulci are normal in size, shape and configuration and midline. There is no significant white matter disease. There is no intracranial hemorrhage, extra-axial collection, mass, mass effect or midline shift. The basilar cisterns are open. No acute infarct is identified. The bone windows demonstrate no abnormalities. The visualized portions of the paranasal sinuses and mastoid air cells are clear. IMPRESSION: No acute abnormality identified. MEDICATIONS GIVEN:  Medications - No data to display    IMPRESSION:  1. Cough    2. Acute nonintractable headache, unspecified headache type    3. Neck pain        PLAN:  1. Current Discharge Medication List      START taking these medications    Details   azithromycin (ZITHROMAX Z-ROBERT) 250 mg tablet 2 tabs PO when you  the packet. Then 1 tab every 24 hours for remaining 4 days. Indications: bronchitis  Qty: 6 Tab, Refills: 0      benzonatate (TESSALON PERLES) 100 mg capsule Take 1 Cap by mouth three (3) times daily as needed for Cough for up to 7 days. Indications: Cough  Qty: 30 Cap, Refills: 0         CONTINUE these medications which have NOT CHANGED    Details   traMADol (ULTRAM) 50 mg tablet Take 1 Tab by mouth every eight (8) hours as needed for Pain. Max Daily Amount: 150 mg.  Qty: 20 Tab, Refills: 0    Associated Diagnoses: Neck pain      metFORMIN (GLUCOPHAGE) 500 mg tablet Take 500 mg by mouth two (2) times daily (with meals). glimepiride (AMARYL) 1 mg tablet Take 1 mg by mouth Daily (before breakfast). furosemide (LASIX) 40 mg tablet Take 40 mg by mouth daily. solifenacin (VESICARE) 10 mg tablet Take 10 mg by mouth daily.       LORazepam (ATIVAN) 0.5 mg tablet Take 0.5 mg by mouth every four (4) hours as needed for Anxiety. potassium chloride (K-DUR, KLOR-CON) 10 mEq tablet Take 10 mEq by mouth two (2) times a day. simvastatin (ZOCOR) 20 mg tablet Take 20 mg by mouth nightly. multivit-min-FA-lycopen-lutein 0.4-300-250 mg-mcg-mcg tab Take  by mouth daily. amLODIPine (NORVASC) 5 mg tablet Take 5 mg by mouth daily. calcitRIOL (ROCALTROL) 0.5 mcg capsule Take 0.5 mcg by mouth daily. escitalopram (LEXAPRO) 10 mg tablet Take 10 mg by mouth daily. aspirin 81 mg tablet Take 81 mg by mouth. 2.   Follow-up Information     Follow up With Details Comments Contact Luis F Mallory MD Schedule an appointment as soon as possible for a visit As needed [address] [facility]      [address], [facility] DEP  As needed, If symptoms worsen [address] [facility]  [phone]        3.      Return to ED for new or worsening symptoms       Abraham Cerrato NP

## 2018-04-13 NOTE — ED TRIAGE NOTES
C/o right posterior headache x 3 weeks and nasal congestion x 3 weeks. Bilateral neck pain x 2 weeks. Reports taking cough syrup with no relief.     Seen here on 3/9/18 for same complaints

## 2019-03-01 ENCOUNTER — APPOINTMENT (OUTPATIENT)
Dept: GENERAL RADIOLOGY | Age: 79
DRG: 871 | End: 2019-03-01
Attending: EMERGENCY MEDICINE
Payer: MEDICARE

## 2019-03-01 ENCOUNTER — HOSPITAL ENCOUNTER (INPATIENT)
Age: 79
LOS: 10 days | Discharge: HOME HEALTH | DRG: 871 | End: 2019-03-11
Attending: EMERGENCY MEDICINE | Admitting: INTERNAL MEDICINE
Payer: MEDICARE

## 2019-03-01 ENCOUNTER — APPOINTMENT (OUTPATIENT)
Dept: CT IMAGING | Age: 79
DRG: 871 | End: 2019-03-01
Attending: EMERGENCY MEDICINE
Payer: MEDICARE

## 2019-03-01 DIAGNOSIS — I21.4 NSTEMI (NON-ST ELEVATED MYOCARDIAL INFARCTION) (HCC): ICD-10-CM

## 2019-03-01 DIAGNOSIS — J18.9 COMMUNITY ACQUIRED PNEUMONIA, UNSPECIFIED LATERALITY: ICD-10-CM

## 2019-03-01 DIAGNOSIS — A41.9 SEPSIS, DUE TO UNSPECIFIED ORGANISM: Primary | ICD-10-CM

## 2019-03-01 DIAGNOSIS — R77.8 TROPONIN LEVEL ELEVATED: ICD-10-CM

## 2019-03-01 LAB
ALBUMIN SERPL-MCNC: 2.9 G/DL (ref 3.5–5)
ALBUMIN/GLOB SERPL: 0.6 {RATIO} (ref 1.1–2.2)
ALP SERPL-CCNC: 133 U/L (ref 45–117)
ALT SERPL-CCNC: 49 U/L (ref 12–78)
AMYLASE SERPL-CCNC: 29 U/L (ref 25–115)
ANION GAP SERPL CALC-SCNC: 9 MMOL/L (ref 5–15)
APPEARANCE UR: CLEAR
APTT PPP: 28.4 SEC (ref 22.1–32)
AST SERPL-CCNC: 62 U/L (ref 15–37)
ATRIAL RATE: 115 BPM
ATRIAL RATE: 89 BPM
BACTERIA URNS QL MICRO: NEGATIVE /HPF
BASOPHILS # BLD: 0.1 K/UL (ref 0–0.1)
BASOPHILS NFR BLD: 1 % (ref 0–1)
BILIRUB SERPL-MCNC: 1.2 MG/DL (ref 0.2–1)
BILIRUB UR QL: NEGATIVE
BNP SERPL-MCNC: 5214 PG/ML (ref 0–450)
BUN SERPL-MCNC: 11 MG/DL (ref 6–20)
BUN/CREAT SERPL: 10 (ref 12–20)
CALCIUM SERPL-MCNC: 8.6 MG/DL (ref 8.5–10.1)
CALCULATED P AXIS, ECG09: 80 DEGREES
CALCULATED P AXIS, ECG09: 84 DEGREES
CALCULATED R AXIS, ECG10: 31 DEGREES
CALCULATED R AXIS, ECG10: 47 DEGREES
CALCULATED T AXIS, ECG11: 37 DEGREES
CALCULATED T AXIS, ECG11: 9 DEGREES
CHLORIDE SERPL-SCNC: 100 MMOL/L (ref 97–108)
CK SERPL-CCNC: 85 U/L (ref 26–192)
CO2 SERPL-SCNC: 26 MMOL/L (ref 21–32)
COLOR UR: ABNORMAL
COMMENT, HOLDF: NORMAL
CREAT SERPL-MCNC: 1.05 MG/DL (ref 0.55–1.02)
DIAGNOSIS, 93000: NORMAL
DIAGNOSIS, 93000: NORMAL
DIFFERENTIAL METHOD BLD: ABNORMAL
EOSINOPHIL # BLD: 0.1 K/UL (ref 0–0.4)
EOSINOPHIL NFR BLD: 0 % (ref 0–7)
EPITH CASTS URNS QL MICRO: ABNORMAL /LPF
ERYTHROCYTE [DISTWIDTH] IN BLOOD BY AUTOMATED COUNT: 13.8 % (ref 11.5–14.5)
FLUAV AG NPH QL IA: NEGATIVE
FLUBV AG NOSE QL IA: NEGATIVE
GLOBULIN SER CALC-MCNC: 4.5 G/DL (ref 2–4)
GLUCOSE BLD STRIP.AUTO-MCNC: 198 MG/DL (ref 65–100)
GLUCOSE BLD STRIP.AUTO-MCNC: 266 MG/DL (ref 65–100)
GLUCOSE SERPL-MCNC: 218 MG/DL (ref 65–100)
GLUCOSE UR STRIP.AUTO-MCNC: 100 MG/DL
HCT VFR BLD AUTO: 35.3 % (ref 35–47)
HGB BLD-MCNC: 10.6 G/DL (ref 11.5–16)
HGB UR QL STRIP: NEGATIVE
HYALINE CASTS URNS QL MICRO: ABNORMAL /LPF (ref 0–5)
IMM GRANULOCYTES # BLD AUTO: 0.1 K/UL (ref 0–0.04)
IMM GRANULOCYTES NFR BLD AUTO: 1 % (ref 0–0.5)
KETONES UR QL STRIP.AUTO: 15 MG/DL
LACTATE BLD-SCNC: 1.97 MMOL/L (ref 0.4–2)
LEUKOCYTE ESTERASE UR QL STRIP.AUTO: NEGATIVE
LIPASE SERPL-CCNC: 37 U/L (ref 73–393)
LYMPHOCYTES # BLD: 2.7 K/UL (ref 0.8–3.5)
LYMPHOCYTES NFR BLD: 16 % (ref 12–49)
MAGNESIUM SERPL-MCNC: 1.3 MG/DL (ref 1.6–2.4)
MAGNESIUM SERPL-MCNC: 1.9 MG/DL (ref 1.6–2.4)
MCH RBC QN AUTO: 26.9 PG (ref 26–34)
MCHC RBC AUTO-ENTMCNC: 30 G/DL (ref 30–36.5)
MCV RBC AUTO: 89.6 FL (ref 80–99)
MONOCYTES # BLD: 1.4 K/UL (ref 0–1)
MONOCYTES NFR BLD: 9 % (ref 5–13)
NEUTS SEG # BLD: 12.5 K/UL (ref 1.8–8)
NEUTS SEG NFR BLD: 74 % (ref 32–75)
NITRITE UR QL STRIP.AUTO: NEGATIVE
NRBC # BLD: 0 K/UL (ref 0–0.01)
NRBC BLD-RTO: 0 PER 100 WBC
P-R INTERVAL, ECG05: 152 MS
P-R INTERVAL, ECG05: 160 MS
PH UR STRIP: 5 [PH] (ref 5–8)
PHOSPHATE SERPL-MCNC: 3.2 MG/DL (ref 2.6–4.7)
PLATELET # BLD AUTO: 258 K/UL (ref 150–400)
PMV BLD AUTO: 10.9 FL (ref 8.9–12.9)
POTASSIUM SERPL-SCNC: 4.4 MMOL/L (ref 3.5–5.1)
PROT SERPL-MCNC: 7.4 G/DL (ref 6.4–8.2)
PROT UR STRIP-MCNC: 30 MG/DL
Q-T INTERVAL, ECG07: 306 MS
Q-T INTERVAL, ECG07: 374 MS
QRS DURATION, ECG06: 66 MS
QRS DURATION, ECG06: 68 MS
QTC CALCULATION (BEZET), ECG08: 423 MS
QTC CALCULATION (BEZET), ECG08: 455 MS
RBC # BLD AUTO: 3.94 M/UL (ref 3.8–5.2)
RBC #/AREA URNS HPF: ABNORMAL /HPF (ref 0–5)
SAMPLES BEING HELD,HOLD: NORMAL
SERVICE CMNT-IMP: ABNORMAL
SERVICE CMNT-IMP: ABNORMAL
SODIUM SERPL-SCNC: 135 MMOL/L (ref 136–145)
SP GR UR REFRACTOMETRY: 1.02 (ref 1–1.03)
THERAPEUTIC RANGE,PTTT: NORMAL SECS (ref 58–77)
TROPONIN I SERPL-MCNC: 0.52 NG/ML
TROPONIN I SERPL-MCNC: 1.31 NG/ML
TSH SERPL DL<=0.05 MIU/L-ACNC: 0.74 UIU/ML (ref 0.36–3.74)
UR CULT HOLD, URHOLD: NORMAL
UROBILINOGEN UR QL STRIP.AUTO: 1 EU/DL (ref 0.2–1)
VENTRICULAR RATE, ECG03: 115 BPM
VENTRICULAR RATE, ECG03: 89 BPM
WBC # BLD AUTO: 16.9 K/UL (ref 3.6–11)
WBC URNS QL MICRO: ABNORMAL /HPF (ref 0–4)

## 2019-03-01 PROCEDURE — 82550 ASSAY OF CK (CPK): CPT

## 2019-03-01 PROCEDURE — 36415 COLL VENOUS BLD VENIPUNCTURE: CPT

## 2019-03-01 PROCEDURE — 83605 ASSAY OF LACTIC ACID: CPT

## 2019-03-01 PROCEDURE — 83735 ASSAY OF MAGNESIUM: CPT

## 2019-03-01 PROCEDURE — 74011000250 HC RX REV CODE- 250: Performed by: INTERNAL MEDICINE

## 2019-03-01 PROCEDURE — 74011000258 HC RX REV CODE- 258: Performed by: RADIOLOGY

## 2019-03-01 PROCEDURE — 83880 ASSAY OF NATRIURETIC PEPTIDE: CPT

## 2019-03-01 PROCEDURE — 82962 GLUCOSE BLOOD TEST: CPT

## 2019-03-01 PROCEDURE — 85730 THROMBOPLASTIN TIME PARTIAL: CPT

## 2019-03-01 PROCEDURE — 74011636637 HC RX REV CODE- 636/637: Performed by: INTERNAL MEDICINE

## 2019-03-01 PROCEDURE — 82150 ASSAY OF AMYLASE: CPT

## 2019-03-01 PROCEDURE — 77030038269 HC DRN EXT URIN PURWCK BARD -A

## 2019-03-01 PROCEDURE — 84100 ASSAY OF PHOSPHORUS: CPT

## 2019-03-01 PROCEDURE — 71275 CT ANGIOGRAPHY CHEST: CPT

## 2019-03-01 PROCEDURE — 96365 THER/PROPH/DIAG IV INF INIT: CPT

## 2019-03-01 PROCEDURE — 82803 BLOOD GASES ANY COMBINATION: CPT

## 2019-03-01 PROCEDURE — 85025 COMPLETE CBC W/AUTO DIFF WBC: CPT

## 2019-03-01 PROCEDURE — 81001 URINALYSIS AUTO W/SCOPE: CPT

## 2019-03-01 PROCEDURE — 71045 X-RAY EXAM CHEST 1 VIEW: CPT

## 2019-03-01 PROCEDURE — 74011250637 HC RX REV CODE- 250/637: Performed by: INTERNAL MEDICINE

## 2019-03-01 PROCEDURE — 84443 ASSAY THYROID STIM HORMONE: CPT

## 2019-03-01 PROCEDURE — 80053 COMPREHEN METABOLIC PANEL: CPT

## 2019-03-01 PROCEDURE — 93005 ELECTROCARDIOGRAM TRACING: CPT

## 2019-03-01 PROCEDURE — 74011000258 HC RX REV CODE- 258: Performed by: EMERGENCY MEDICINE

## 2019-03-01 PROCEDURE — 5A09357 ASSISTANCE WITH RESPIRATORY VENTILATION, LESS THAN 24 CONSECUTIVE HOURS, CONTINUOUS POSITIVE AIRWAY PRESSURE: ICD-10-PCS | Performed by: INTERNAL MEDICINE

## 2019-03-01 PROCEDURE — 84484 ASSAY OF TROPONIN QUANT: CPT

## 2019-03-01 PROCEDURE — 94760 N-INVAS EAR/PLS OXIMETRY 1: CPT

## 2019-03-01 PROCEDURE — 74011250637 HC RX REV CODE- 250/637: Performed by: EMERGENCY MEDICINE

## 2019-03-01 PROCEDURE — 96361 HYDRATE IV INFUSION ADD-ON: CPT

## 2019-03-01 PROCEDURE — 99285 EMERGENCY DEPT VISIT HI MDM: CPT

## 2019-03-01 PROCEDURE — 83690 ASSAY OF LIPASE: CPT

## 2019-03-01 PROCEDURE — 87040 BLOOD CULTURE FOR BACTERIA: CPT

## 2019-03-01 PROCEDURE — 74011000258 HC RX REV CODE- 258: Performed by: INTERNAL MEDICINE

## 2019-03-01 PROCEDURE — 74011250636 HC RX REV CODE- 250/636: Performed by: INTERNAL MEDICINE

## 2019-03-01 PROCEDURE — 74011636320 HC RX REV CODE- 636/320: Performed by: RADIOLOGY

## 2019-03-01 PROCEDURE — 65660000000 HC RM CCU STEPDOWN

## 2019-03-01 PROCEDURE — 87804 INFLUENZA ASSAY W/OPTIC: CPT

## 2019-03-01 PROCEDURE — 74011250636 HC RX REV CODE- 250/636: Performed by: EMERGENCY MEDICINE

## 2019-03-01 RX ORDER — FUROSEMIDE 10 MG/ML
80 INJECTION INTRAMUSCULAR; INTRAVENOUS ONCE
Status: COMPLETED | OUTPATIENT
Start: 2019-03-01 | End: 2019-03-01

## 2019-03-01 RX ORDER — VANCOMYCIN 2 GRAM/500 ML IN 0.9 % SODIUM CHLORIDE INTRAVENOUS
2000 ONCE
Status: COMPLETED | OUTPATIENT
Start: 2019-03-01 | End: 2019-03-01

## 2019-03-01 RX ORDER — SIMVASTATIN 20 MG/1
20 TABLET, FILM COATED ORAL
Status: DISCONTINUED | OUTPATIENT
Start: 2019-03-01 | End: 2019-03-02

## 2019-03-01 RX ORDER — METOPROLOL SUCCINATE 25 MG/1
12.5 TABLET, EXTENDED RELEASE ORAL DAILY
COMMUNITY
End: 2019-03-11

## 2019-03-01 RX ORDER — GUAIFENESIN 100 MG/5ML
81 LIQUID (ML) ORAL DAILY
Status: DISCONTINUED | OUTPATIENT
Start: 2019-03-02 | End: 2019-03-03

## 2019-03-01 RX ORDER — METOPROLOL SUCCINATE 25 MG/1
12.5 TABLET, EXTENDED RELEASE ORAL DAILY
Status: DISCONTINUED | OUTPATIENT
Start: 2019-03-02 | End: 2019-03-01

## 2019-03-01 RX ORDER — INSULIN LISPRO 100 [IU]/ML
INJECTION, SOLUTION INTRAVENOUS; SUBCUTANEOUS
Status: DISCONTINUED | OUTPATIENT
Start: 2019-03-01 | End: 2019-03-06 | Stop reason: SDUPTHER

## 2019-03-01 RX ORDER — FUROSEMIDE 40 MG/1
40 TABLET ORAL DAILY
Status: DISCONTINUED | OUTPATIENT
Start: 2019-03-02 | End: 2019-03-03

## 2019-03-01 RX ORDER — SODIUM CHLORIDE, SODIUM LACTATE, POTASSIUM CHLORIDE, CALCIUM CHLORIDE 600; 310; 30; 20 MG/100ML; MG/100ML; MG/100ML; MG/100ML
100 INJECTION, SOLUTION INTRAVENOUS CONTINUOUS
Status: DISCONTINUED | OUTPATIENT
Start: 2019-03-01 | End: 2019-03-01

## 2019-03-01 RX ORDER — OSELTAMIVIR PHOSPHATE 75 MG/1
75 CAPSULE ORAL
Status: COMPLETED | OUTPATIENT
Start: 2019-03-01 | End: 2019-03-01

## 2019-03-01 RX ORDER — VANCOMYCIN 1.75 GRAM/500 ML IN 0.9 % SODIUM CHLORIDE INTRAVENOUS
1750 EVERY 24 HOURS
Status: DISCONTINUED | OUTPATIENT
Start: 2019-03-02 | End: 2019-03-02

## 2019-03-01 RX ORDER — SODIUM CHLORIDE 0.9 % (FLUSH) 0.9 %
5-40 SYRINGE (ML) INJECTION EVERY 8 HOURS
Status: DISCONTINUED | OUTPATIENT
Start: 2019-03-01 | End: 2019-03-11 | Stop reason: HOSPADM

## 2019-03-01 RX ORDER — VENLAFAXINE 37.5 MG/1
37.5 TABLET ORAL 2 TIMES DAILY
COMMUNITY

## 2019-03-01 RX ORDER — MORPHINE SULFATE 2 MG/ML
4 INJECTION, SOLUTION INTRAMUSCULAR; INTRAVENOUS
Status: DISCONTINUED | OUTPATIENT
Start: 2019-03-01 | End: 2019-03-11 | Stop reason: HOSPADM

## 2019-03-01 RX ORDER — CARVEDILOL 6.25 MG/1
6.25 TABLET ORAL 2 TIMES DAILY WITH MEALS
Status: DISCONTINUED | OUTPATIENT
Start: 2019-03-01 | End: 2019-03-11 | Stop reason: HOSPADM

## 2019-03-01 RX ORDER — ACETAMINOPHEN 500 MG
1000 TABLET ORAL
Status: COMPLETED | OUTPATIENT
Start: 2019-03-01 | End: 2019-03-01

## 2019-03-01 RX ORDER — MAGNESIUM SULFATE 100 %
4 CRYSTALS MISCELLANEOUS AS NEEDED
Status: DISCONTINUED | OUTPATIENT
Start: 2019-03-01 | End: 2019-03-11 | Stop reason: HOSPADM

## 2019-03-01 RX ORDER — HEPARIN SODIUM 5000 [USP'U]/ML
4000 INJECTION, SOLUTION INTRAVENOUS; SUBCUTANEOUS ONCE
Status: COMPLETED | OUTPATIENT
Start: 2019-03-01 | End: 2019-03-01

## 2019-03-01 RX ORDER — OXYBUTYNIN CHLORIDE 15 MG/1
15 TABLET, EXTENDED RELEASE ORAL DAILY
COMMUNITY

## 2019-03-01 RX ORDER — METFORMIN HYDROCHLORIDE 1000 MG/1
1000 TABLET ORAL 2 TIMES DAILY WITH MEALS
COMMUNITY
End: 2019-03-11

## 2019-03-01 RX ORDER — OXYBUTYNIN CHLORIDE 5 MG/1
15 TABLET, EXTENDED RELEASE ORAL DAILY
Status: DISCONTINUED | OUTPATIENT
Start: 2019-03-02 | End: 2019-03-11 | Stop reason: HOSPADM

## 2019-03-01 RX ORDER — SODIUM CHLORIDE 0.9 % (FLUSH) 0.9 %
5-40 SYRINGE (ML) INJECTION AS NEEDED
Status: DISCONTINUED | OUTPATIENT
Start: 2019-03-01 | End: 2019-03-11 | Stop reason: HOSPADM

## 2019-03-01 RX ORDER — ENOXAPARIN SODIUM 100 MG/ML
40 INJECTION SUBCUTANEOUS EVERY 24 HOURS
Status: DISCONTINUED | OUTPATIENT
Start: 2019-03-01 | End: 2019-03-02

## 2019-03-01 RX ORDER — ONDANSETRON 2 MG/ML
4 INJECTION INTRAMUSCULAR; INTRAVENOUS
Status: DISCONTINUED | OUTPATIENT
Start: 2019-03-01 | End: 2019-03-11 | Stop reason: HOSPADM

## 2019-03-01 RX ORDER — GABAPENTIN 300 MG/1
300 CAPSULE ORAL
Status: DISCONTINUED | OUTPATIENT
Start: 2019-03-01 | End: 2019-03-11 | Stop reason: HOSPADM

## 2019-03-01 RX ORDER — TRAMADOL HYDROCHLORIDE 50 MG/1
50 TABLET ORAL
Status: DISCONTINUED | OUTPATIENT
Start: 2019-03-01 | End: 2019-03-11 | Stop reason: HOSPADM

## 2019-03-01 RX ORDER — VENLAFAXINE 37.5 MG/1
37.5 TABLET ORAL 2 TIMES DAILY
Status: DISCONTINUED | OUTPATIENT
Start: 2019-03-02 | End: 2019-03-11 | Stop reason: HOSPADM

## 2019-03-01 RX ORDER — CALCITRIOL 0.25 UG/1
0.5 CAPSULE ORAL DAILY
Status: DISCONTINUED | OUTPATIENT
Start: 2019-03-02 | End: 2019-03-09

## 2019-03-01 RX ORDER — SODIUM CHLORIDE 0.9 % (FLUSH) 0.9 %
10 SYRINGE (ML) INJECTION
Status: COMPLETED | OUTPATIENT
Start: 2019-03-01 | End: 2019-03-01

## 2019-03-01 RX ORDER — NITROGLYCERIN 20 MG/100ML
0-200 INJECTION INTRAVENOUS
Status: DISCONTINUED | OUTPATIENT
Start: 2019-03-01 | End: 2019-03-02

## 2019-03-01 RX ORDER — MAGNESIUM SULFATE HEPTAHYDRATE 40 MG/ML
2 INJECTION, SOLUTION INTRAVENOUS ONCE
Status: COMPLETED | OUTPATIENT
Start: 2019-03-01 | End: 2019-03-01

## 2019-03-01 RX ORDER — THERA TABS 400 MCG
1 TAB ORAL DAILY
COMMUNITY

## 2019-03-01 RX ORDER — ASPIRIN 325 MG
325 TABLET ORAL
Status: COMPLETED | OUTPATIENT
Start: 2019-03-01 | End: 2019-03-01

## 2019-03-01 RX ORDER — THERA TABS 400 MCG
1 TAB ORAL DAILY
Status: DISCONTINUED | OUTPATIENT
Start: 2019-03-02 | End: 2019-03-11 | Stop reason: HOSPADM

## 2019-03-01 RX ORDER — BISACODYL 5 MG
5 TABLET, DELAYED RELEASE (ENTERIC COATED) ORAL DAILY PRN
Status: DISCONTINUED | OUTPATIENT
Start: 2019-03-01 | End: 2019-03-11 | Stop reason: HOSPADM

## 2019-03-01 RX ORDER — LORAZEPAM 0.5 MG/1
0.5 TABLET ORAL
Status: DISCONTINUED | OUTPATIENT
Start: 2019-03-01 | End: 2019-03-11 | Stop reason: HOSPADM

## 2019-03-01 RX ORDER — DEXTROSE 50 % IN WATER (D50W) INTRAVENOUS SYRINGE
12.5-25 AS NEEDED
Status: DISCONTINUED | OUTPATIENT
Start: 2019-03-01 | End: 2019-03-11 | Stop reason: HOSPADM

## 2019-03-01 RX ORDER — MAGNESIUM SULFATE HEPTAHYDRATE 40 MG/ML
2 INJECTION, SOLUTION INTRAVENOUS ONCE
Status: ACTIVE | OUTPATIENT
Start: 2019-03-01 | End: 2019-03-02

## 2019-03-01 RX ORDER — HEPARIN SODIUM 10000 [USP'U]/100ML
8-25 INJECTION, SOLUTION INTRAVENOUS
Status: DISCONTINUED | OUTPATIENT
Start: 2019-03-01 | End: 2019-03-01

## 2019-03-01 RX ORDER — GABAPENTIN 300 MG/1
300 CAPSULE ORAL
COMMUNITY
End: 2019-03-27

## 2019-03-01 RX ADMIN — MAGNESIUM SULFATE HEPTAHYDRATE 2 G: 40 INJECTION, SOLUTION INTRAVENOUS at 10:36

## 2019-03-01 RX ADMIN — ASPIRIN 325 MG: 325 TABLET ORAL at 10:32

## 2019-03-01 RX ADMIN — INSULIN LISPRO 3 UNITS: 100 INJECTION, SOLUTION INTRAVENOUS; SUBCUTANEOUS at 22:19

## 2019-03-01 RX ADMIN — FUROSEMIDE 80 MG: 10 INJECTION, SOLUTION INTRAMUSCULAR; INTRAVENOUS at 18:43

## 2019-03-01 RX ADMIN — HEPARIN SODIUM AND DEXTROSE 8 UNITS/KG/HR: 10000; 5 INJECTION INTRAVENOUS at 15:38

## 2019-03-01 RX ADMIN — NITROGLYCERIN 1 INCH: 20 OINTMENT TOPICAL at 18:43

## 2019-03-01 RX ADMIN — VANCOMYCIN HYDROCHLORIDE 2000 MG: 10 INJECTION, POWDER, LYOPHILIZED, FOR SOLUTION INTRAVENOUS at 15:40

## 2019-03-01 RX ADMIN — SODIUM CHLORIDE, SODIUM LACTATE, POTASSIUM CHLORIDE, AND CALCIUM CHLORIDE 1000 ML: 600; 310; 30; 20 INJECTION, SOLUTION INTRAVENOUS at 09:29

## 2019-03-01 RX ADMIN — CARVEDILOL 6.25 MG: 6.25 TABLET, FILM COATED ORAL at 18:20

## 2019-03-01 RX ADMIN — SODIUM CHLORIDE 100 ML: 900 INJECTION, SOLUTION INTRAVENOUS at 11:25

## 2019-03-01 RX ADMIN — PIPERACILLIN SODIUM AND TAZOBACTAM SODIUM 3.38 G: 3; .375 INJECTION, POWDER, LYOPHILIZED, FOR SOLUTION INTRAVENOUS at 22:55

## 2019-03-01 RX ADMIN — IOPAMIDOL 80 ML: 755 INJECTION, SOLUTION INTRAVENOUS at 11:25

## 2019-03-01 RX ADMIN — MORPHINE SULFATE 2 MG: 2 INJECTION, SOLUTION INTRAMUSCULAR; INTRAVENOUS at 23:20

## 2019-03-01 RX ADMIN — Medication 10 ML: at 18:20

## 2019-03-01 RX ADMIN — HEPARIN SODIUM 4000 UNITS: 5000 INJECTION INTRAVENOUS; SUBCUTANEOUS at 15:37

## 2019-03-01 RX ADMIN — OSELTAMIVIR PHOSPHATE 75 MG: 75 CAPSULE ORAL at 10:36

## 2019-03-01 RX ADMIN — NITROGLYCERIN 10 MCG/MIN: 20 INJECTION INTRAVENOUS at 23:23

## 2019-03-01 RX ADMIN — CEFTRIAXONE 1 G: 1 INJECTION, POWDER, FOR SOLUTION INTRAMUSCULAR; INTRAVENOUS at 10:33

## 2019-03-01 RX ADMIN — AZITHROMYCIN MONOHYDRATE 500 MG: 500 INJECTION, POWDER, LYOPHILIZED, FOR SOLUTION INTRAVENOUS at 10:41

## 2019-03-01 RX ADMIN — PIPERACILLIN SODIUM AND TAZOBACTAM SODIUM 3.38 G: 3; .375 INJECTION, POWDER, LYOPHILIZED, FOR SOLUTION INTRAVENOUS at 14:41

## 2019-03-01 RX ADMIN — ENOXAPARIN SODIUM 40 MG: 40 INJECTION SUBCUTANEOUS at 19:03

## 2019-03-01 RX ADMIN — ACETAMINOPHEN 1000 MG: 500 TABLET ORAL at 09:29

## 2019-03-01 RX ADMIN — Medication 10 ML: at 11:25

## 2019-03-01 RX ADMIN — SODIUM CHLORIDE, SODIUM LACTATE, POTASSIUM CHLORIDE, AND CALCIUM CHLORIDE 100 ML/HR: 600; 310; 30; 20 INJECTION, SOLUTION INTRAVENOUS at 14:08

## 2019-03-01 NOTE — PROGRESS NOTES
TRANSFER - IN REPORT: 
 
Verbal report received from [address] Louise Vogt RN(name) on Albert Sampson  being received from ED(unit) for routine progression of care Report consisted of patients Situation, Background, Assessment and  
Recommendations(SBAR). Information from the following report(s) SBAR, Kardex, ED Summary, MAR, Recent Results and Cardiac Rhythm NSR was reviewed with the receiving nurse. Opportunity for questions and clarification was provided. Assessment completed upon patients arrival to unit and care assumed. Primary Nurse Evie Morel and Anna Peña RN performed a dual skin assessment on this patient No impairment noted 1930: Bedside shift change report given to Sheryl Faith RN (oncoming nurse) by Paul Salas RN (offgoing nurse). Report included the following information SBAR, Kardex, Intake/Output, MAR, Recent Results and Cardiac Rhythm NSR/ST.

## 2019-03-01 NOTE — Clinical Note
Sheath #all: Dressed using transparent dressing. Site: clean, dry, & intact, no bleeding and no hematoma.

## 2019-03-01 NOTE — PROGRESS NOTES
Pharmacist Note - Vancomycin Dosing Consult provided for this 66 y.o. female for indication of CAP. Antibiotic regimen(s): vanc + Zosyn Recent Labs 19 
2468 WBC 16.9*  
CREA 1.05* BUN 11 Frequency of BMP: daily through 3/4 Height: 165.1 cm Weight: 116.1 kg Est CrCl: 56.2 ml/min; UO: not documented Temp (24hrs), Av.9 °F (37.7 °C), Min:98.6 °F (37 °C), Max:101.1 °F (38.4 °C) Cultures: 
3/1 blood: pending Goal trough = 15 - 20 mcg/mL Therapy will be initiated with a loading dose of 2000 mg IV x 1 to be followed by a maintenance dose of 1750 mg IV every 24 hours. Pharmacy to follow patient daily and order levels / make dose adjustments as appropriate.

## 2019-03-01 NOTE — H&P
[address] HISTORY AND PHYSICAL Name:  Belinda Abebe 
MR#:  [id number] :  1940 ACCOUNT #:  [de-identified] ADMIT DATE:  2019 PRIMARY CARE PHYSICIAN:  Santino Banks MD 
 
SOURCE OF INFORMATION:  Patient. CHIEF COMPLAINT:  Shortness of breath. HISTORY OF PRESENT ILLNESS:  This is a 75-year-old woman with a past medical history significant for type 2 diabetes, hypertension, was in her usual state of health until early this morning when the patient woke up with sudden onset of shortness of breath. The shortness of breath is progressive associated with fever, diaphoresis as well as chest discomfort. The patient denies history of congestive heart failure or asthma or COPD. When EMS arrived at the scene, the patient's oxygen saturation was 89% on room air. The patient was treated with DuoNeb with improvement in oxygen saturation. The shortness of breath associated with cough which is nonproductive. The patient also stated that she has gained couple of pounds in the last couple of days. The patient traveled to [place] 3 months ago and stated that she has been sick since then. The patient is taking Lasix. It is not clear why the patient is taking Lasix. Stated that she has no history of congestive heart failure. No record of prior admission to this hospital.  When the patient arrived at the emergency room, the patient was found to have fever, leukocytosis, clinical presentation symptoms triggered the Code Sepsis. The Code Sepsis protocol was initiated. The patient received antibiotics. The chest x-ray did not show any evidence of pneumonia but shows evidence of vascular congestion. The patient was subsequently referred to the hospitalist service for evaluation for admission. PAST MEDICAL HISTORY:  Type 2 diabetes, hypertension. ALLERGIES:  THE PATIENT IS ALLERGIC TO TYLENOL. MEDICATIONS:  Aspirin 81 mg daily, Rocaltrol 0.5 mcg daily, Lasix 40 mg daily, Neurontin 300 mg 3 times daily as needed, Ativan 0.5 mg every 8 hours as needed for anxiety, Glucophage 1000 mg twice daily, Toprol XL 12.5 mg daily, Ditropan XL 15 mg daily, potassium chloride 10 mEq twice daily, Zocor 20 mg daily, Januvia 100 mg daily, multivitamin 1 tablet daily, tramadol 50 mg every 8 hours as needed for pain, Effexor 37.5 mg twice daily. FAMILY HISTORY:  This was reviewed. Mother had diabetes and hypertension. PAST SURGICAL HISTORY:  This is significant for bilateral knee replacements. SOCIAL HISTORY:  No history of alcohol or tobacco abuse. REVIEW OF SYSTEMS: 
HEAD, EYES, EARS, NOSE, AND THROAT:  No headache, no dizziness, no blurring of vision, no photophobia. RESPIRATORY SYSTEM:  This is positive for cough and shortness of breath. No hemoptysis. CARDIOVASCULAR SYSTEM:  No chest pain, no orthopnea, no palpitations. GASTROINTESTINAL SYSTEM:  No nausea or vomiting. No diarrhea. No constipation. GENITOURINARY SYSTEM:  No dysuria, no urgency, no frequency. All other systems are reviewed and they are negative. PHYSICAL EXAMINATION: 
GENERAL APPEARANCE:  The patient appeared ill, in moderate distress. VITAL SIGNS:  On arrival at the emergency room, temperature 98.6, but the rectal temperature was 101.1, pulse 124, respiratory rate 26, blood pressure 151/76, oxygen saturation 90% on room air. HEAD:  Normocephalic, atraumatic. EYES:  Normal eye movements. No redness, no drainage, no discharge. EARS:  Normal external ears with no evidence of drainage. NOSE:  No deformity, no drainage. MOUTH AND THROAT:  No visible oral lesion. Dry oral mucosa. NECK:  Neck is supple. No JVD. No thyromegaly. CHEST:  Few expiratory wheezing and bilateral basilar crackles. HEART:  Normal S1 and S2, regular. No clinically appreciable murmur. ABDOMEN:  Soft, obese, nontender. Normal bowel sounds. CNS:  Alert and oriented x3. No gross focal neurological deficit. EXTREMITIES:  No edema. Pulses 2+ bilaterally. MUSCULOSKELETAL SYSTEM:  No obvious joint deformity and swelling. SKIN:  No active skin lesions seen in the exposed part of the body. PSYCHIATRIC:  Normal mood and affect. LYMPHATIC SYSTEM:  No cervical lymphadenopathy. DIAGNOSTIC DATA:  Chest x-ray shows mild acute pulmonary edema with small right pleural effusion. EKG shows sinus tachycardia and nonspecific ST and T-wave abnormalities. LABORATORY DATA:  Hematology, WBC 16.9, hemoglobin 10.6, hematocrit 35.3, platelets 218. ProBNP 5214. Cardiac profile, troponin 0.52. Chemistry, sodium 135, potassium 4.4, chloride 100, CO2 of 26, glucose 218, BUN 11, creatinine 1.05, calcium 8.6, bilirubin total 1.2, ALT 49, AST 62, alkaline phosphatase 133, total protein 7.4, albumin level 2.9, globulin 4.5, magnesium 1.3. Lactic acid level 1.97. Influenza A antigen negative, influenza B antigen negative. ASSESSMENT: 
1. Suspected sepsis. 2.  Type 2 diabetes. 3.  Hypertension. 4.  Dyslipidemia. 5.  Suspected non-ST-elevation myocardial infarction. 6.  Hypomagnesemia. 7.  Elevated BNP level. PLAN: 
1. Suspected sepsis. We will admit the patient for further evaluation and treatment. The diagnosis of sepsis is supported by fever, tachycardia, leukocytosis. The source of the sepsis is not clear at this time. Pneumonia is suspected but the chest x-ray is negative. We will cover the patient with vancomycin and Zosyn. We will obtain CT scan of chest, abdomen, and pelvis to evaluate the patient for possible sources of sepsis. We will check urinalysis, amylase, and lipase level. We will await blood culture result if done in the emergency room. 2.  Type 2 diabetes. The patient will be placed on sliding scale with insulin coverage. We will check hemoglobin A1c level.   We will hold oral agents to the time of discharge from the hospital. 
 3.  Hypertension. We will continue with preadmission medication and monitor the patient's blood pressure closely. 4.  Dyslipidemia. We will resume home medication. 5.  Suspected non-ST-elevation myocardial infarction. We will start the patient on aspirin, beta-blocker, full-dose heparin. We will obtain echocardiogram.  Cardiology consult will be requested to assist in further evaluation and treatment of suspected non-ST-elevation myocardial infarction. We will trend troponin level. 6.  Hypomagnesemia. We will replace magnesium and repeat magnesium level. 7.  Elevated BNP level. We will await results of echocardiogram to determine whether this is due to congestive heart failure. 8.  Other issues. Code status, the patient is full code. The patient will be placed on full-dose heparin for treatment of non-ST-elevation myocardial infarction. Because of that there is no need for DVT prophylaxis. 9.  Functional status. Functional status prior to admission, the patient is ambulatory with a walker. Sepsis reassessment completed. The patient has normal capillary refill, normal cardiopulmonary examination and improved mucosal membrane. Cathy Upton MD 
 
 
[id number] 
D:  03/01/2019 11:09 
T:  03/01/2019 15:57 JOB #:  [id number] CC:  Moises Barboza MD

## 2019-03-01 NOTE — ED NOTES
PTT still remains in process at this time, after lab re-collect. External catheter placed. Patient placed in clean adult brief in addition to external cath. Patient informed of ordered meal tray. 3:30 PM 
Patient's Granddaughter, Gurpreet Cummings, updated on patient's condition and plan of care. Patient's sons on their way to hospital at this time. 3:41 PM 
Patient's sons at bedside. Heparin bolus administered, heparin drip begun. LR paused in order to administer Vancomycin due to shortage of IV pump channels in ER. Meal tray ordered and should arrive shortly after 1600. No signs of distress noted.

## 2019-03-01 NOTE — ROUTINE PROCESS
TRANSFER - OUT REPORT: 
 
Verbal report given to [employer] (name) on [address]  being transferred to [facility] (unit) for routine progression of care Report consisted of patients Situation, Background, Assessment and  
Recommendations(SBAR). Information from the following report(s) SBAR, ED Summary, MAR, Recent Results and Cardiac Rhythm NSR was reviewed with the receiving nurse. Lines:  
Peripheral IV 03/01/19 Left Antecubital (Active) Site Assessment Clean, dry, & intact 3/1/2019  8:03 AM  
Phlebitis Assessment 0 3/1/2019  8:03 AM  
Infiltration Assessment 0 3/1/2019  8:03 AM  
Dressing Status Clean, dry, & intact 3/1/2019  8:03 AM  
Dressing Type Transparent 3/1/2019  8:03 AM  
Hub Color/Line Status Green 3/1/2019  8:03 AM  
   
Peripheral IV 03/01/19 Right;Posterior Forearm (Active) Site Assessment Clean, dry, & intact 3/1/2019  8:25 AM  
Phlebitis Assessment 0 3/1/2019  8:25 AM  
Infiltration Assessment 0 3/1/2019  8:25 AM  
Dressing Status Clean, dry, & intact 3/1/2019  8:25 AM  
Dressing Type Transparent 3/1/2019  8:25 AM  
Hub Color/Line Status Pink 3/1/2019  8:25 AM  
   
Peripheral IV 03/01/19 Right Antecubital (Active) Opportunity for questions and clarification was provided. Patient transported with cardiac monitor and RN.

## 2019-03-01 NOTE — PROGRESS NOTES
Admission Medication Reconciliation: 
 
Information obtained from: Patient, RX bottles, RX query Significant PMH/Disease States:  
Past Medical History:  
Diagnosis Date  Diabetes ([facility] [address]  Hypertension Chief Complaint for this Admission:  SOB Allergies:  Tylenol [acetaminophen] Prior to Admission Medications:  
Prior to Admission Medications Prescriptions Last Dose Informant Patient Reported? Taking? LORazepam (ATIVAN) 0.5 mg tablet   Yes Yes Sig: Take 0.5 mg by mouth every eight (8) hours as needed for Anxiety. SITagliptin (JANUVIA) 100 mg tablet 2/28/2019  Yes Yes Sig: Take 100 mg by mouth daily. aspirin 81 mg tablet 2/28/2019  Yes Yes Sig: Take 81 mg by mouth daily. calcitRIOL (ROCALTROL) 0.5 mcg capsule 2/28/2019  Yes Yes Sig: Take 0.5 mcg by mouth daily. furosemide (LASIX) 40 mg tablet 2/28/2019  Yes Yes Sig: Take 40 mg by mouth daily. gabapentin (NEURONTIN) 300 mg capsule 2/28/2019  Yes Yes Sig: Take 300 mg by mouth three (3) times daily as needed. metFORMIN (GLUCOPHAGE) 1,000 mg tablet 2/28/2019  Yes Yes Sig: Take 1,000 mg by mouth two (2) times daily (with meals). metoprolol succinate (TOPROL XL) 25 mg XL tablet 2/28/2019  Yes Yes Sig: Take 12.5 mg by mouth daily. oxybutynin chloride XL (DITROPAN XL) 15 mg CR tablet 2/28/2019  Yes Yes Sig: Take 15 mg by mouth daily. potassium chloride (K-DUR, KLOR-CON) 10 mEq tablet 2/28/2019  Yes Yes Sig: Take 10 mEq by mouth two (2) times a day. simvastatin (ZOCOR) 20 mg tablet 2/28/2019  Yes Yes Sig: Take 20 mg by mouth nightly. therapeutic multivitamin [facility]) tablet 2/28/2019  Yes Yes Sig: Take 1 Tab by mouth daily. traMADol (ULTRAM) 50 mg tablet   No Yes Sig: Take 1 Tab by mouth every eight (8) hours as needed for Pain. Max Daily Amount: 150 mg.  
venlafaxine (EFFEXOR) 37.5 mg tablet 2/28/2019  Yes Yes Sig: Take 37.5 mg by mouth two (2) times a day. Facility-Administered Medications: None Comments/Recommendations: Patient was a good historian. 1. Removed amlodipine, glimepiride, Vesicare, Lexapro 2. Changed metformin 500mg BID to 1000mg BID 3. Added Toprol, gabapentin, oxybutynin, Januvia, Effexor

## 2019-03-01 NOTE — ED NOTES
Stool occurrence noted upon arrival. Adult brief saturated. Incontinence care performed, pericare performed. New adult brief and chux placed.

## 2019-03-01 NOTE — ED PROVIDER NOTES
66 y.o. female with past medical history significant for Diabetes and Hypertension who presents via EMS from private residence with chief complaint of SOB. Patient states onset upon waking this morning of SOB. Per EMS, en route to [facility] ED patient was febrile (101.9F) tympanic, diaphoretic, and pale with initial O2 sat 89% on RA, and was placed on duoneb which improved to 100%, had blood glucose 252,  bpm, blood pressure 210/120, and was given 500 cc NS. Patient presents to [facility] ED today with persisting SOB, fever, diaphoresis, and increased WOB. Patient reports accompanying intermittent nonproductive cough, chills, fatigue, and weight gain. Patient states she traveled to [place], [place] three months ago, and has been \"sick\" ever since. Patient endorses taking Lasix daily which she states she has been taking regularly with no missed doses. Patient ambulates with a walker at baseline. Pt denies congestion, chest pain, abdominal pain, nausea, vomiting, diarrhea, difficulty with urination or dysuria. There are no other acute medical concerns at this time. PCP: Charline Cheatham MD 
 
Note written by Iris Caba, as dictated by Aster England MD 8:10 AM 
 
 
 
The history is provided by the patient and the EMS personnel. Past Medical History:  
Diagnosis Date  Diabetes ([address]  Hypertension Past Surgical History:  
Procedure Laterality Date  HX KNEE REPLACEMENT Bilateral   
 HX ORTHOPAEDIC History reviewed. No pertinent family history. Social History Socioeconomic History  Marital status:  Spouse name: Not on file  Number of children: Not on file  Years of education: Not on file  Highest education level: Not on file Social Needs  Financial resource strain: Not on file  Food insecurity - worry: Not on file  Food insecurity - inability: Not on file  Transportation needs - medical: Not on file  Transportation needs - non-medical: Not on file Occupational History  Not on file Tobacco Use  Smoking status: Never Smoker Substance and Sexual Activity  Alcohol use: No  
 Drug use: Not on file  Sexual activity: Not on file Other Topics Concern  Not on file Social History Narrative  Not on file ALLERGIES: Tylenol [acetaminophen] Review of Systems Constitutional: Positive for chills, diaphoresis, fatigue, fever and unexpected weight change. HENT: Negative for congestion. Respiratory: Positive for cough and shortness of breath. Cardiovascular: Negative for chest pain. Gastrointestinal: Negative for abdominal pain, diarrhea, nausea and vomiting. Genitourinary: Negative for difficulty urinating and dysuria. Musculoskeletal: Negative for gait problem. Skin: Positive for pallor. All other systems reviewed and are negative. Vitals:  
 03/01/19 6295 03/01/19 0818 03/01/19 7118 BP:  151/76 Pulse:  (!) 124 Resp:  26 Temp: 98.6 °F (37 °C) (!) 101.1 °F (38.4 °C) SpO2:  90% 96% Physical Exam  
Constitutional: She appears well-developed and well-nourished. No distress. HENT:  
Head: Normocephalic and atraumatic. Eyes: Conjunctivae are normal.  
Neck: Neck supple. Cardiovascular: Regular rhythm. Tachycardia present. Pulmonary/Chest: Tachypnea noted. She has no wheezes. subcostal retractions, bibasilar rales, increased WOB, decreased air movement Abdominal: She exhibits no distension. Musculoskeletal: Normal range of motion. She exhibits no deformity. Neurological: She is alert. No cranial nerve deficit. Skin: Skin is warm. She is diaphoretic. Psychiatric: Her behavior is normal.  
Nursing note and vitals reviewed.  
Note written by Iris Graf, as dictated by Telma Lanier MD 8:10 AM  
 
MDM 
  
66 y.o. female presents with sudden onset of shortness of breath and fever with increased work of breathing on arrival. Diffuse body aches and lower extremity pain. CXR with mild edema, she had rectal temp of 101, tachycardia, tachypnea and WBC elevation as well as troponin elevation. Suspect she has influenza so I'm treating empirically with tamiflu and covering for CAP with secondary organ dysfunction. Gave her ASA to cover ACS and ordered CT for PE rule out. I had considered putting her on BiPap for work of breathing on arrival but on 3L of oxygen her WOB improved now resting comfortably. Her granddaughter tells me she was on a medication \"possibly for some kind of infection\" that is at her house. Hospitalist was consulted for admission and will see the patient in the emergency department. Procedures PROGRESS NOTE: 
8:46 AM Provider speaking with patient and family. Patient's daughters state the patient has been taking an unknown medication to treat an infection of some sort. Daughters state medications are currently at the patient's home and will attempt to figure out the name. ED EKG interpretation: 
Rhythm: sinus tachycardia with PACs; and regular . Rate (approx.): 115 bpm; ST/T wave: Nonspecific precordial ST abnormality. Note written by Iris Crum, as dictated by Agustin Bello MD 9:30 AM 
 
Hospitalist Demarcus for Admission 9:39 AM 
 
ED Room Number: YJ56/54 Patient Name and age:  Fadia Santos 66 y.o.  female Working Diagnosis: 1. Sepsis, due to unspecified organism ([facility] 2. Community acquired pneumonia, unspecified laterality 3. Troponin level elevated Readmission: no 
Isolation Requirements:  yes Recommended Level of Care:  step down Code Status:  Full CONSULT NOTE: 
9:39 AM Agustin Bello MD communicated with Merna Camargo for Hospitalist via [facility] Text. Discussed available diagnostic tests and clinical findings. Dr. Miriam Mendoza will evaluate patient for possible admission. 9:51 AM Dr. Miriam Mendoza will admit patient.

## 2019-03-01 NOTE — Clinical Note
6 FR PRECISION SHEATH REMOVED FROM RIGHT FEMORAL ARTERY OVER THE WIRE AND EXCHANGED FOR .0.038 6FR SHEATH

## 2019-03-01 NOTE — ED NOTES
Patient reporting sudden onset left sided CP. EKG done stat and shown to Denise Law MD. Denise Law MD informed of patient's CP. Cardiology consult called by ER . No new orders received at this time from Denise Law MD. 
 
Fransisco Darling in ER at time of troponin result. CK with CKMB added on at this time.

## 2019-03-01 NOTE — ED TRIAGE NOTES
Patient arrives via EMS from private residence with cc of SOB upon waking this AM.  EMS reports patient appeared very warm, diaphoretic, and pale upon arrival. 
 
RA spO2 89%, 100% on duoneb. Patient given 1 duoneb en route by EMS. EMS reports 101.9 tympanic temp, , 120HR sinus tach, /120. Patient able to ambulate with assistance, patient normally ambulates with walker.

## 2019-03-01 NOTE — Clinical Note
TRANSFER - OUT REPORT:  
 
Verbal report given to: John Gee. Report consisted of patient's Situation, Background, Assessment and  
Recommendations(SBAR). Opportunity for questions and clarification was provided. Patient transported with a Registered Nurse, [address] [place] [address] / Patient Care Tech, Monitor and Oxygen. Oxygen used for patient = nasal cannula, @ 2 - 6 Liters. Patient transported to: [address] [place] [address].

## 2019-03-02 ENCOUNTER — APPOINTMENT (OUTPATIENT)
Dept: NON INVASIVE DIAGNOSTICS | Age: 79
DRG: 871 | End: 2019-03-02
Attending: INTERNAL MEDICINE
Payer: MEDICARE

## 2019-03-02 PROBLEM — E11.42 DIABETIC PERIPHERAL NEUROPATHY (HCC): Status: ACTIVE | Noted: 2019-03-02

## 2019-03-02 PROBLEM — I21.4 NSTEMI (NON-ST ELEVATED MYOCARDIAL INFARCTION) (HCC): Status: ACTIVE | Noted: 2019-03-02

## 2019-03-02 PROBLEM — I10 BENIGN ESSENTIAL HYPERTENSION: Status: ACTIVE | Noted: 2019-03-02

## 2019-03-02 PROBLEM — E78.5 HYPERLIPIDEMIA: Status: ACTIVE | Noted: 2019-03-02

## 2019-03-02 PROBLEM — I50.9 CHF (CONGESTIVE HEART FAILURE) (HCC): Status: ACTIVE | Noted: 2019-03-02

## 2019-03-02 LAB
ALBUMIN SERPL-MCNC: 2.3 G/DL (ref 3.5–5)
ALBUMIN/GLOB SERPL: 0.5 {RATIO} (ref 1.1–2.2)
ALP SERPL-CCNC: 108 U/L (ref 45–117)
ALT SERPL-CCNC: 38 U/L (ref 12–78)
ANION GAP SERPL CALC-SCNC: 8 MMOL/L (ref 5–15)
AST SERPL-CCNC: 35 U/L (ref 15–37)
ATRIAL RATE: 337 BPM
ATRIAL RATE: 97 BPM
BASOPHILS # BLD: 0 K/UL (ref 0–0.1)
BASOPHILS NFR BLD: 0 % (ref 0–1)
BILIRUB SERPL-MCNC: 1.2 MG/DL (ref 0.2–1)
BUN SERPL-MCNC: 13 MG/DL (ref 6–20)
BUN/CREAT SERPL: 11 (ref 12–20)
CALCIUM SERPL-MCNC: 9 MG/DL (ref 8.5–10.1)
CALCULATED P AXIS, ECG09: 88 DEGREES
CALCULATED P AXIS, ECG09: 91 DEGREES
CALCULATED R AXIS, ECG10: 56 DEGREES
CALCULATED R AXIS, ECG10: 68 DEGREES
CALCULATED T AXIS, ECG11: 132 DEGREES
CALCULATED T AXIS, ECG11: 44 DEGREES
CHLORIDE SERPL-SCNC: 100 MMOL/L (ref 97–108)
CK MB CFR SERPL CALC: 2.1 % (ref 0–2.5)
CK MB SERPL-MCNC: 1.5 NG/ML (ref 5–25)
CK SERPL-CCNC: 73 U/L (ref 26–192)
CO2 SERPL-SCNC: 30 MMOL/L (ref 21–32)
CRD SYSTOLIC BP: 95
CREAT SERPL-MCNC: 1.22 MG/DL (ref 0.55–1.02)
DIAGNOSIS, 93000: NORMAL
DIAGNOSIS, 93000: NORMAL
DIFFERENTIAL METHOD BLD: ABNORMAL
ECHO AV AREA PEAK VELOCITY: 1 CM2
ECHO AV MEAN GRADIENT: 27.4 MMHG
ECHO AV PEAK GRADIENT: 28.4 MMHG
ECHO AV PEAK VELOCITY: 266.53 CM/S
ECHO AV VTI: 60.53 CM
ECHO EST RA PRESSURE: 5 MMHG
ECHO LA AREA 4C: 28.1 CM2
ECHO LA MAJOR AXIS: 4.53 CM
ECHO LA VOL 2C: 53.74 ML (ref 22–52)
ECHO LA VOL 4C: 97.91 ML (ref 22–52)
ECHO LA VOL BP: 82.72 ML (ref 22–52)
ECHO LA VOL/BSA BIPLANE: 39.63 ML/M2 (ref 16–28)
ECHO LA VOLUME INDEX A2C: 25.74 ML/M2 (ref 16–28)
ECHO LA VOLUME INDEX A4C: 46.9 ML/M2 (ref 16–28)
ECHO LV E' LATERAL VELOCITY: 6.63 CM/S
ECHO LV E' SEPTAL VELOCITY: 5.18 CM/S
ECHO LV INTERNAL DIMENSION DIASTOLIC: 4.35 CM (ref 3.9–5.3)
ECHO LV INTERNAL DIMENSION SYSTOLIC: 2.66 CM
ECHO LV IVSD: 1.2 CM (ref 0.6–0.9)
ECHO LV MASS 2D: 214.3 G (ref 67–162)
ECHO LV MASS INDEX 2D: 102.7 G/M2 (ref 43–95)
ECHO LV POSTERIOR WALL DIASTOLIC: 1.15 CM (ref 0.6–0.9)
ECHO LVOT DIAM: 1.84 CM
ECHO LVOT PEAK GRADIENT: 4.1 MMHG
ECHO LVOT PEAK VELOCITY: 101.39 CM/S
ECHO MV E VELOCITY: 1.39 CM/S
ECHO MV E/E' LATERAL: 0.21
ECHO MV E/E' RATIO (AVERAGED): 0.24
ECHO MV E/E' SEPTAL: 0.27
ECHO PULMONARY ARTERY SYSTOLIC PRESSURE (PASP): 33.6 MMHG
ECHO PV MAX VELOCITY: 104.4 CM/S
ECHO PV PEAK GRADIENT: 4.4 MMHG
ECHO RIGHT VENTRICULAR SYSTOLIC PRESSURE (RVSP): 33.6 MMHG
ECHO RV INTERNAL DIMENSION: 2.8 CM
ECHO TV REGURGITANT MAX VELOCITY: 267.48 CM/S
ECHO TV REGURGITANT PEAK GRADIENT: 28.6 MMHG
END DIASTOLIC PRESSURE: 19
EOSINOPHIL # BLD: 0 K/UL (ref 0–0.4)
EOSINOPHIL NFR BLD: 0 % (ref 0–7)
ERYTHROCYTE [DISTWIDTH] IN BLOOD BY AUTOMATED COUNT: 13.8 % (ref 11.5–14.5)
GLOBULIN SER CALC-MCNC: 4.7 G/DL (ref 2–4)
GLUCOSE BLD STRIP.AUTO-MCNC: 181 MG/DL (ref 65–100)
GLUCOSE BLD STRIP.AUTO-MCNC: 226 MG/DL (ref 65–100)
GLUCOSE BLD STRIP.AUTO-MCNC: 247 MG/DL (ref 65–100)
GLUCOSE BLD STRIP.AUTO-MCNC: 280 MG/DL (ref 65–100)
GLUCOSE SERPL-MCNC: 226 MG/DL (ref 65–100)
HCT VFR BLD AUTO: 27.8 % (ref 35–47)
HGB BLD-MCNC: 8.6 G/DL (ref 11.5–16)
IMM GRANULOCYTES # BLD AUTO: 0.1 K/UL (ref 0–0.04)
IMM GRANULOCYTES NFR BLD AUTO: 1 % (ref 0–0.5)
LYMPHOCYTES # BLD: 1.5 K/UL (ref 0.8–3.5)
LYMPHOCYTES NFR BLD: 9 % (ref 12–49)
MCH RBC QN AUTO: 26.9 PG (ref 26–34)
MCHC RBC AUTO-ENTMCNC: 30.9 G/DL (ref 30–36.5)
MCV RBC AUTO: 86.9 FL (ref 80–99)
MONOCYTES # BLD: 1.5 K/UL (ref 0–1)
MONOCYTES NFR BLD: 9 % (ref 5–13)
NEUTS SEG # BLD: 13.5 K/UL (ref 1.8–8)
NEUTS SEG NFR BLD: 82 % (ref 32–75)
NRBC # BLD: 0 K/UL (ref 0–0.01)
NRBC BLD-RTO: 0 PER 100 WBC
P-R INTERVAL, ECG05: 142 MS
PLATELET # BLD AUTO: 258 K/UL (ref 150–400)
PMV BLD AUTO: 11 FL (ref 8.9–12.9)
POTASSIUM SERPL-SCNC: 4.2 MMOL/L (ref 3.5–5.1)
PROT SERPL-MCNC: 7 G/DL (ref 6.4–8.2)
Q-T INTERVAL, ECG07: 300 MS
Q-T INTERVAL, ECG07: 352 MS
QRS DURATION, ECG06: 68 MS
QRS DURATION, ECG06: 74 MS
QTC CALCULATION (BEZET), ECG08: 422 MS
QTC CALCULATION (BEZET), ECG08: 447 MS
RBC # BLD AUTO: 3.2 M/UL (ref 3.8–5.2)
SERVICE CMNT-IMP: ABNORMAL
SODIUM SERPL-SCNC: 138 MMOL/L (ref 136–145)
TROPONIN I SERPL-MCNC: 0.72 NG/ML
VENTRICULAR RATE, ECG03: 119 BPM
VENTRICULAR RATE, ECG03: 97 BPM
WBC # BLD AUTO: 16.6 K/UL (ref 3.6–11)

## 2019-03-02 PROCEDURE — 4A023N8 MEASUREMENT OF CARDIAC SAMPLING AND PRESSURE, BILATERAL, PERCUTANEOUS APPROACH: ICD-10-PCS | Performed by: INTERNAL MEDICINE

## 2019-03-02 PROCEDURE — 74011250637 HC RX REV CODE- 250/637: Performed by: INTERNAL MEDICINE

## 2019-03-02 PROCEDURE — 74011250636 HC RX REV CODE- 250/636: Performed by: INTERNAL MEDICINE

## 2019-03-02 PROCEDURE — 77030013744: Performed by: INTERNAL MEDICINE

## 2019-03-02 PROCEDURE — 74011000258 HC RX REV CODE- 258: Performed by: INTERNAL MEDICINE

## 2019-03-02 PROCEDURE — 93306 TTE W/DOPPLER COMPLETE: CPT

## 2019-03-02 PROCEDURE — 84484 ASSAY OF TROPONIN QUANT: CPT

## 2019-03-02 PROCEDURE — C1751 CATH, INF, PER/CENT/MIDLINE: HCPCS | Performed by: INTERNAL MEDICINE

## 2019-03-02 PROCEDURE — 85025 COMPLETE CBC W/AUTO DIFF WBC: CPT

## 2019-03-02 PROCEDURE — 93005 ELECTROCARDIOGRAM TRACING: CPT

## 2019-03-02 PROCEDURE — 74011636637 HC RX REV CODE- 636/637: Performed by: INTERNAL MEDICINE

## 2019-03-02 PROCEDURE — 99152 MOD SED SAME PHYS/QHP 5/>YRS: CPT | Performed by: INTERNAL MEDICINE

## 2019-03-02 PROCEDURE — B2111ZZ FLUOROSCOPY OF MULTIPLE CORONARY ARTERIES USING LOW OSMOLAR CONTRAST: ICD-10-PCS | Performed by: INTERNAL MEDICINE

## 2019-03-02 PROCEDURE — 65660000000 HC RM CCU STEPDOWN

## 2019-03-02 PROCEDURE — C1769 GUIDE WIRE: HCPCS | Performed by: INTERNAL MEDICINE

## 2019-03-02 PROCEDURE — 74011000250 HC RX REV CODE- 250: Performed by: INTERNAL MEDICINE

## 2019-03-02 PROCEDURE — C1894 INTRO/SHEATH, NON-LASER: HCPCS | Performed by: INTERNAL MEDICINE

## 2019-03-02 PROCEDURE — 36415 COLL VENOUS BLD VENIPUNCTURE: CPT

## 2019-03-02 PROCEDURE — 99153 MOD SED SAME PHYS/QHP EA: CPT | Performed by: INTERNAL MEDICINE

## 2019-03-02 PROCEDURE — 82550 ASSAY OF CK (CPK): CPT

## 2019-03-02 PROCEDURE — 77030038269 HC DRN EXT URIN PURWCK BARD -A

## 2019-03-02 PROCEDURE — 80053 COMPREHEN METABOLIC PANEL: CPT

## 2019-03-02 PROCEDURE — 77030004533 HC CATH ANGI DX IMP BSC -B: Performed by: INTERNAL MEDICINE

## 2019-03-02 PROCEDURE — 94003 VENT MGMT INPAT SUBQ DAY: CPT

## 2019-03-02 PROCEDURE — 85347 COAGULATION TIME ACTIVATED: CPT

## 2019-03-02 PROCEDURE — 82962 GLUCOSE BLOOD TEST: CPT

## 2019-03-02 PROCEDURE — 74011250636 HC RX REV CODE- 250/636

## 2019-03-02 PROCEDURE — 74011636320 HC RX REV CODE- 636/320: Performed by: INTERNAL MEDICINE

## 2019-03-02 PROCEDURE — 93460 R&L HRT ART/VENTRICLE ANGIO: CPT | Performed by: INTERNAL MEDICINE

## 2019-03-02 RX ORDER — VANCOMYCIN/0.9 % SOD CHLORIDE 1.5G/250ML
1500 PLASTIC BAG, INJECTION (ML) INTRAVENOUS EVERY 24 HOURS
Status: DISCONTINUED | OUTPATIENT
Start: 2019-03-02 | End: 2019-03-03

## 2019-03-02 RX ORDER — FENTANYL CITRATE 50 UG/ML
INJECTION, SOLUTION INTRAMUSCULAR; INTRAVENOUS AS NEEDED
Status: DISCONTINUED | OUTPATIENT
Start: 2019-03-02 | End: 2019-03-02 | Stop reason: HOSPADM

## 2019-03-02 RX ORDER — MIDAZOLAM HYDROCHLORIDE 1 MG/ML
INJECTION, SOLUTION INTRAMUSCULAR; INTRAVENOUS AS NEEDED
Status: DISCONTINUED | OUTPATIENT
Start: 2019-03-02 | End: 2019-03-02 | Stop reason: HOSPADM

## 2019-03-02 RX ORDER — GUAIFENESIN 100 MG/5ML
81 LIQUID (ML) ORAL DAILY
Status: DISCONTINUED | OUTPATIENT
Start: 2019-03-03 | End: 2019-03-11 | Stop reason: HOSPADM

## 2019-03-02 RX ORDER — HEPARIN SODIUM 10000 [USP'U]/100ML
800 INJECTION, SOLUTION INTRAVENOUS CONTINUOUS
Status: DISCONTINUED | OUTPATIENT
Start: 2019-03-02 | End: 2019-03-02

## 2019-03-02 RX ORDER — PHENYLEPHRINE HCL IN 0.9% NACL 1 MG/10 ML
SYRINGE (ML) INTRAVENOUS AS NEEDED
Status: DISCONTINUED | OUTPATIENT
Start: 2019-03-02 | End: 2019-03-02 | Stop reason: HOSPADM

## 2019-03-02 RX ORDER — SODIUM CHLORIDE 9 MG/ML
1.5 INJECTION, SOLUTION INTRAVENOUS CONTINUOUS
Status: DISCONTINUED | OUTPATIENT
Start: 2019-03-02 | End: 2019-03-02 | Stop reason: HOSPADM

## 2019-03-02 RX ORDER — SODIUM CHLORIDE 9 MG/ML
3 INJECTION, SOLUTION INTRAVENOUS CONTINUOUS
Status: DISPENSED | OUTPATIENT
Start: 2019-03-02 | End: 2019-03-02

## 2019-03-02 RX ORDER — FUROSEMIDE 40 MG/1
40 TABLET ORAL DAILY
Status: DISCONTINUED | OUTPATIENT
Start: 2019-03-03 | End: 2019-03-03

## 2019-03-02 RX ORDER — POTASSIUM CHLORIDE 750 MG/1
10 TABLET, FILM COATED, EXTENDED RELEASE ORAL 2 TIMES DAILY
Status: DISCONTINUED | OUTPATIENT
Start: 2019-03-02 | End: 2019-03-03

## 2019-03-02 RX ORDER — SODIUM CHLORIDE 0.9 % (FLUSH) 0.9 %
5-40 SYRINGE (ML) INJECTION EVERY 8 HOURS
Status: DISCONTINUED | OUTPATIENT
Start: 2019-03-02 | End: 2019-03-03

## 2019-03-02 RX ORDER — SODIUM CHLORIDE 0.9 % (FLUSH) 0.9 %
5-40 SYRINGE (ML) INJECTION AS NEEDED
Status: DISCONTINUED | OUTPATIENT
Start: 2019-03-02 | End: 2019-03-11 | Stop reason: HOSPADM

## 2019-03-02 RX ORDER — METFORMIN HYDROCHLORIDE 500 MG/1
1000 TABLET ORAL 2 TIMES DAILY WITH MEALS
Status: DISCONTINUED | OUTPATIENT
Start: 2019-03-02 | End: 2019-03-02 | Stop reason: SDUPTHER

## 2019-03-02 RX ORDER — SODIUM CHLORIDE 9 MG/ML
50 INJECTION, SOLUTION INTRAVENOUS CONTINUOUS
Status: DISCONTINUED | OUTPATIENT
Start: 2019-03-02 | End: 2019-03-02

## 2019-03-02 RX ORDER — HEPARIN SODIUM 5000 [USP'U]/ML
3000 INJECTION, SOLUTION INTRAVENOUS; SUBCUTANEOUS ONCE
Status: COMPLETED | OUTPATIENT
Start: 2019-03-02 | End: 2019-03-02

## 2019-03-02 RX ORDER — METFORMIN HYDROCHLORIDE 500 MG/1
1000 TABLET ORAL 2 TIMES DAILY WITH MEALS
Status: DISCONTINUED | OUTPATIENT
Start: 2019-03-04 | End: 2019-03-03

## 2019-03-02 RX ORDER — LIDOCAINE HYDROCHLORIDE 10 MG/ML
INJECTION INFILTRATION; PERINEURAL AS NEEDED
Status: DISCONTINUED | OUTPATIENT
Start: 2019-03-02 | End: 2019-03-02 | Stop reason: HOSPADM

## 2019-03-02 RX ORDER — HEPARIN SODIUM 200 [USP'U]/100ML
INJECTION, SOLUTION INTRAVENOUS
Status: COMPLETED | OUTPATIENT
Start: 2019-03-02 | End: 2019-03-02

## 2019-03-02 RX ORDER — ENOXAPARIN SODIUM 100 MG/ML
40 INJECTION SUBCUTANEOUS EVERY 24 HOURS
Status: DISCONTINUED | OUTPATIENT
Start: 2019-03-02 | End: 2019-03-03

## 2019-03-02 RX ADMIN — CALCITRIOL 0.5 MCG: 0.25 CAPSULE ORAL at 09:08

## 2019-03-02 RX ADMIN — HEPARIN SODIUM 3000 UNITS: 5000 INJECTION INTRAVENOUS; SUBCUTANEOUS at 09:00

## 2019-03-02 RX ADMIN — INSULIN LISPRO 5 UNITS: 100 INJECTION, SOLUTION INTRAVENOUS; SUBCUTANEOUS at 16:50

## 2019-03-02 RX ADMIN — FUROSEMIDE 40 MG: 40 TABLET ORAL at 09:06

## 2019-03-02 RX ADMIN — GABAPENTIN 300 MG: 300 CAPSULE ORAL at 18:22

## 2019-03-02 RX ADMIN — SODIUM CHLORIDE 50 ML/HR: 900 INJECTION, SOLUTION INTRAVENOUS at 08:55

## 2019-03-02 RX ADMIN — Medication 10 ML: at 13:17

## 2019-03-02 RX ADMIN — POTASSIUM CHLORIDE 10 MEQ: 750 TABLET, EXTENDED RELEASE ORAL at 17:30

## 2019-03-02 RX ADMIN — Medication 10 ML: at 22:40

## 2019-03-02 RX ADMIN — INSULIN LISPRO 2 UNITS: 100 INJECTION, SOLUTION INTRAVENOUS; SUBCUTANEOUS at 13:15

## 2019-03-02 RX ADMIN — INSULIN LISPRO 3 UNITS: 100 INJECTION, SOLUTION INTRAVENOUS; SUBCUTANEOUS at 06:57

## 2019-03-02 RX ADMIN — VANCOMYCIN HYDROCHLORIDE 1500 MG: 10 INJECTION, POWDER, LYOPHILIZED, FOR SOLUTION INTRAVENOUS at 15:04

## 2019-03-02 RX ADMIN — INSULIN LISPRO 2 UNITS: 100 INJECTION, SOLUTION INTRAVENOUS; SUBCUTANEOUS at 22:00

## 2019-03-02 RX ADMIN — SODIUM CHLORIDE 3 ML/KG/HR: 900 INJECTION, SOLUTION INTRAVENOUS at 11:32

## 2019-03-02 RX ADMIN — PIPERACILLIN SODIUM AND TAZOBACTAM SODIUM 3.38 G: 3; .375 INJECTION, POWDER, LYOPHILIZED, FOR SOLUTION INTRAVENOUS at 22:17

## 2019-03-02 RX ADMIN — NITROGLYCERIN 1 INCH: 20 OINTMENT TOPICAL at 18:22

## 2019-03-02 RX ADMIN — SODIUM CHLORIDE 1.5 ML/KG/HR: 900 INJECTION, SOLUTION INTRAVENOUS at 12:32

## 2019-03-02 RX ADMIN — OXYBUTYNIN CHLORIDE 15 MG: 5 TABLET, EXTENDED RELEASE ORAL at 09:07

## 2019-03-02 RX ADMIN — DILTIAZEM HYDROCHLORIDE 7.5 MG/HR: 5 INJECTION, SOLUTION INTRAVENOUS at 20:07

## 2019-03-02 RX ADMIN — HEPARIN SODIUM AND DEXTROSE 800 UNITS/HR: 10000; 5 INJECTION INTRAVENOUS at 09:03

## 2019-03-02 RX ADMIN — ENOXAPARIN SODIUM 40 MG: 40 INJECTION SUBCUTANEOUS at 13:15

## 2019-03-02 RX ADMIN — NITROGLYCERIN 1 INCH: 20 OINTMENT TOPICAL at 13:15

## 2019-03-02 RX ADMIN — Medication 1 CAPSULE: at 09:09

## 2019-03-02 RX ADMIN — DILTIAZEM HYDROCHLORIDE 2.5 MG/HR: 5 INJECTION, SOLUTION INTRAVENOUS at 08:55

## 2019-03-02 RX ADMIN — PIPERACILLIN SODIUM AND TAZOBACTAM SODIUM 3.38 G: 3; .375 INJECTION, POWDER, LYOPHILIZED, FOR SOLUTION INTRAVENOUS at 06:50

## 2019-03-02 RX ADMIN — VENLAFAXINE 37.5 MG: 37.5 TABLET ORAL at 17:30

## 2019-03-02 RX ADMIN — TRAMADOL HYDROCHLORIDE 50 MG: 50 TABLET, FILM COATED ORAL at 14:56

## 2019-03-02 RX ADMIN — THERA TABS 1 TABLET: TAB at 09:09

## 2019-03-02 RX ADMIN — PIPERACILLIN SODIUM AND TAZOBACTAM SODIUM 3.38 G: 3; .375 INJECTION, POWDER, LYOPHILIZED, FOR SOLUTION INTRAVENOUS at 14:56

## 2019-03-02 RX ADMIN — CARVEDILOL 6.25 MG: 6.25 TABLET, FILM COATED ORAL at 16:50

## 2019-03-02 RX ADMIN — ASPIRIN 81 MG CHEWABLE TABLET 81 MG: 81 TABLET CHEWABLE at 09:06

## 2019-03-02 RX ADMIN — VENLAFAXINE 37.5 MG: 37.5 TABLET ORAL at 09:07

## 2019-03-02 RX ADMIN — MORPHINE SULFATE 2 MG: 2 INJECTION, SOLUTION INTRAMUSCULAR; INTRAVENOUS at 08:07

## 2019-03-02 RX ADMIN — CARVEDILOL 6.25 MG: 6.25 TABLET, FILM COATED ORAL at 09:06

## 2019-03-02 NOTE — PROGRESS NOTES
Problem: Falls - Risk of 
Goal: *Absence of Falls Document Jey Dust Fall Risk and appropriate interventions in the flowsheet. Outcome: Progressing Towards Goal 
Fall Risk Interventions: 
Mobility Interventions: Assess mobility with egress test, Bed/chair exit alarm Medication Interventions: Assess postural VS orthostatic hypotension Problem: Pressure Injury - Risk of 
Goal: *Prevention of pressure injury Document Mohan Scale and appropriate interventions in the flowsheet. Outcome: Progressing Towards Goal 
Pressure Injury Interventions: 
  
 
Moisture Interventions: Absorbent underpads Activity Interventions: Pressure redistribution bed/mattress(bed type) Mobility Interventions: Assess need for specialty bed Nutrition Interventions: Document food/fluid/supplement intake Problem: Heart Failure: Day 1 Goal: Activity/Safety Outcome: Progressing Towards Goal 
Bed rest 
Goal: Consults, if ordered Outcome: Progressing Towards Goal 
Cardiology consult Goal: Diagnostic Test/Procedures Outcome: Progressing Towards Goal 
Echo ordered

## 2019-03-02 NOTE — PROGRESS NOTES
Problem: Falls - Risk of 
Goal: *Absence of Falls Document Barbi End Fall Risk and appropriate interventions in the flowsheet. Fall Risk Interventions: 
Mobility Interventions: (P) Assess mobility with egress test, Communicate number of staff needed for ambulation/transfer Mentation Interventions: (P) Adequate sleep, hydration, pain control, Door open when patient unattended, Evaluate medications/consider consulting pharmacy Medication Interventions: (P) Assess postural VS orthostatic hypotension, Evaluate medications/consider consulting pharmacy, Patient to call before getting OOB Elimination Interventions: (P) Call light in reach, Patient to call for help with toileting needs Problem: Pressure Injury - Risk of 
Goal: *Prevention of pressure injury Document Mohan Scale and appropriate interventions in the flowsheet. Outcome: Progressing Towards Goal 
Pressure Injury Interventions: 
Sensory Interventions: (P) Assess changes in LOC, Assess need for specialty bed, Float heels Moisture Interventions: (P) Apply protective barrier, creams and emollients, Assess need for specialty bed Activity Interventions: (P) Assess need for specialty bed, Increase time out of bed, Pressure redistribution bed/mattress(bed type), PT/OT evaluation Mobility Interventions: (P) Assess need for specialty bed, Float heels, HOB 30 degrees or less, Pressure redistribution bed/mattress (bed type) Nutrition Interventions: (P) Document food/fluid/supplement intake, Discuss nutritional consult with provider Friction and Shear Interventions: (P) Apply protective barrier, creams and emollients, Foam dressings/transparent film/skin sealants, HOB 30 degrees or less

## 2019-03-02 NOTE — PROGRESS NOTES
2230 - Pt complaining of chest pain (10/10), increased SOB (not able to complete full sentences), wheezing, anxious, BP (166/81) and RR (38-50) elevated. Cardiology paged. Rapid Response called. Orders to transfer to CCU, EKG, morphine, nitro gtt, and continuous BiPAP. Patient Vitals for the past 4 hrs: 
 Temp Pulse Resp BP SpO2  
03/02/19 0115  86 21 116/68 98 % 03/02/19 0100 99 °F (37.2 °C) 85 27 105/89 98 % 03/02/19 0049 99 °F (37.2 °C)      
03/02/19 0015 97.6 °F (36.4 °C) 83 (!) 31 113/59 97 % 03/02/19 0000 97.8 °F (36.6 °C) 84 30 116/64   
03/01/19 2345 97.5 °F (36.4 °C) 85 (!) 35 116/67 97 % 03/01/19 2330 97.5 °F (36.4 °C) 89 (!) 39 130/62 98 % 03/01/19 2308  94 (!) 31  99 % 03/01/19 2235 97.6 °F (36.4 °C) 94 (!) 47 163/83 96 % TRANSFER - OUT REPORT: 
 
Verbal report given to Tobin Wu (name) on [facility]  being transferred to CCU(unit) for routine progression of care Report consisted of patients Situation, Background, Assessment and  
Recommendations(SBAR). Information from the following report(s) SBAR, Kardex, ED Summary, Procedure Summary, Intake/Output, MAR, Accordion and Recent Results was reviewed with the receiving nurse. Lines:  
Peripheral IV 03/01/19 Left Antecubital (Active) Site Assessment Clean, dry, & intact 3/2/2019  1:00 AM  
Phlebitis Assessment 0 3/2/2019  1:00 AM  
Infiltration Assessment 0 3/2/2019  1:00 AM  
Dressing Status Clean, dry, & intact 3/2/2019  1:00 AM  
Dressing Type Transparent 3/2/2019  1:00 AM  
Hub Color/Line Status Green 3/2/2019  1:00 AM  
Action Taken Open ports on tubing capped 3/2/2019  1:00 AM  
Alcohol Cap Used Yes 3/2/2019  1:00 AM  
   
Peripheral IV 03/01/19 Right;Posterior Forearm (Active) Site Assessment Clean, dry, & intact 3/2/2019  1:00 AM  
Phlebitis Assessment 0 3/2/2019  1:00 AM  
Infiltration Assessment 0 3/2/2019  1:00 AM  
Dressing Status Clean, dry, & intact 3/2/2019  1:00 AM  
 Dressing Type Transparent 3/2/2019  1:00 AM  
Hub Color/Line Status Pink 3/2/2019  1:00 AM  
Action Taken Open ports on tubing capped 3/2/2019  1:00 AM  
Alcohol Cap Used Yes 3/2/2019  1:00 AM  
   
Peripheral IV 03/01/19 Right Antecubital (Active) Site Assessment Clean, dry, & intact 3/2/2019  1:00 AM  
Phlebitis Assessment 0 3/2/2019  1:00 AM  
Infiltration Assessment 0 3/2/2019  1:00 AM  
Dressing Status Clean, dry, & intact 3/2/2019  1:00 AM  
Dressing Type Transparent 3/2/2019  1:00 AM  
Hub Color/Line Status Pink 3/2/2019  1:00 AM  
Action Taken Open ports on tubing capped 3/2/2019  1:00 AM  
Alcohol Cap Used Yes 3/2/2019  1:00 AM  
  
 
Opportunity for questions and clarification was provided. Patient transported with: 
 Monitor O2 @ BiPAP liters Registered Nurse

## 2019-03-02 NOTE — PROGRESS NOTES
1115: TRANSFER - IN REPORT: 
 
Verbal report received from [facility] (name) on [address]  being received from CCU (unit) for ordered procedure Report consisted of patients Situation, Background, Assessment and  
Recommendations(SBAR). Information from the following report(s) SBAR was reviewed with the receiving nurse. Opportunity for questions and clarification was provided. Assessment completed upon patients arrival to unit and care assumed.

## 2019-03-02 NOTE — INTERDISCIPLINARY ROUNDS
IDR/SLIDR Summary Patient: Kunal Rao MRN: [id number]    Age: 66 y.o. YOB: 1940 Room/Bed: [address] [place] [address] Admit Diagnosis: Sepsis ([facility] [A41.9]  Principal Diagnosis: Sepsis ([facility] Goals: cath Readmission: NO  Quality Measure: CHF VTE Prophylaxis: Chemical 
Influenza Vaccine screening completed? YES Pneumococcal Vaccine screening completed? YES Mobility needs: Yes   Nutrition plan:Yes 
Consults:P.T, O.T., Respiratory and Case Management Financial concerns:Yes  Escalated to CM? YES 
RRAT Score:    [address] Testing due for pt today? YES 
LOS: 1 days Expected length of stay 3 days Discharge plan: home   PCP: Ad Anderson MD 
Transportation needs: Yes Days before discharge:two or more days before discharge Discharge disposition: Home Signed:  
 
Taty Vivas RN 
3/2/2019 
1:29 AM

## 2019-03-02 NOTE — CONSULTS
[address]    Name:  Andrez Kendall  MR#:  [id number]  :  1940  ACCOUNT #:  [de-identified]  DATE OF SERVICE:  2019    CARDIOLOGY CONSULTATION    HISTORY OF PRESENT ILLNESS:  This 60-year-old woman awoke with sudden onset of shortness of breath this morning who came to ER for evaluation. She has had some chest discomfort. No fevers, chills, sweats. No abdominal pain. She has had increasing edema. Has not been doing well in general without any specific symptoms. Chest x-ray was consistent with pulmonary edema. She remains very short of breath and uncomfortable. We are about to give her 80 of IV Lasix. She is currently on antibiotics, heparin, and we are ostensibly asked to see her because her troponin was 0.52, went up to 1.31. Her CPK was normal at 85, lipase was normal at 37. There is no prior history of known heart disease or active heart disease. She has seen Dr. Kofi Cole in the remote past without recent followup. There are no acute EKG changes. PAST MEDICAL HISTORY:  1. Hypertension. 2.  Diabetes. 3.  Total knee replacement, bilateral.    MEDICINES AT PRESENTATION:  1. Aspirin 81 mg a day. 2.  Rocaltrol 0.5 daily. 3.  Lasix 40 mg a day. 4.  Neurontin 300 three times a day as needed. 5.  Lorazepam 0.5 q.8 p.r.n.  6.  Metformin 1000 twice daily. 7.  Metoprolol 25 mg one-half a day. 8.  Ditropan XR 15 mg a day. 9.  Potassium 10 mEq twice a day. 10.  Simvastatin 20 at night. 11.  Januvia 100 mg daily. 12.  Theragran daily. 13.  Tramadol 50 q.8 p.r.n. 14.  Effexor 37.5 twice a day. ALLERGIES:  TYLENOL. FAMILY HISTORY:  Noncontributory. SOCIAL HISTORY:  Never smoked. No alcohol. , lives alone, but has attentive family. REVIEW OF SYSTEMS:  As per HPI, otherwise negative. PHYSICAL EXAMINATION:  GENERAL:  Ill-appearing, tachypneic, clammy. VITAL SIGNS:  Blood pressure 165/80, pulse 96 and regular, afebrile.   HEENT: There is mild jugular venous distention. Carotids are full without bruits. Pupils are equal, round, reactive to light and accommodation. Extraocular movements were with nystagmus. No adenopathy. LUNGS:  Showed distant breath sounds and scant crackles. HEART:  Distant tones. Regular rate and rhythm. No murmur. ABDOMEN:  Soft, nontender without masses or organomegaly. EXTREMITIES:  1+/2 edema. Pedal pulses are intact. SKIN:  Intact. No skeletal deformities. NEUROLOGIC:  Nonfocal.    LABORATORY DATA:  Troponin and CPK as indicated. Magnesium 1.9. Sodium 135, potassium 4.4, chloride 100, CO2 26, glucose 218, creatinine 1.05. Hemoglobin 10.6, hematocrit 35.3, white count 16,900, platelets 477,171. EKG sinus rhythm, essentially normal.    PROBLEMS:  1. Acute left heart failure, further etiology unclear. 2.  Hypertension. 3.  Diabetes. 4.  Question of bronchitis versus pneumonia. PLAN:  Repeat CPK and troponin in the morning. The CPK is normal, slightly elevated troponin may reflect the heart failure. Diurese, add nitrates, keep up with beta blockers, get an echocardiogram, thyroid function. We will follow with you.       Hannah Friedman MD [id number]  D:  03/01/2019 18:58  T:  03/02/2019 1:20  JOB #:  [id number]

## 2019-03-02 NOTE — PROGRESS NOTES
Cardiology Progress Note 3/2/2019     Admit Date: 3/1/2019 Admit Diagnosis: Sepsis ([facility] [A41.9]  CC: none currently Assessment:  
Principal Problem: 
  Sepsis ([facility] [address] (3/1/2019) Plan:  
Continued CP and SOB. Troponin coming down and no acute EKG changes. Negative cath in 2012 and granddaughter indicates that CP is a chronic issue. Nevertheless, +troponin and CHF on CXR. Now has PAF with RVR. Adjust meds and cath today. Volume status:euvolemic Renal function: stable For other plans, see orders. Subjective: Edmar Baptiste reports Chest Pain:  []   none,  consistent with  []   non-cardiac   []   atypical   [x]   angina []   none now    [x]      on-going Dyspnea: []   none  []   at rest  []   with exertion     []   improved   [x]   unchanged   []   worsening PND:       [x]   none  []   overnight Orthopnea: [x]   none  []   improved  []   unchanged  []   worsening Presyncope: [x]   none   []   improved    []   unchanged    []   worsening Ambulated in hallway without symptoms  []   Yes Ambulated in room without symptoms  []   Yes Objective:  
 Physical Exam: 
Overall VSSAF;   
Visit Vitals BP 91/62 Pulse (!) 159 Temp 98.9 °F (37.2 °C) Resp 26 Ht 5' 5\" (1.651 m) Wt 103 kg (227 lb) SpO2 97% BMI 37.77 kg/m² Temp (24hrs), Av.2 °F (36.8 °C), Min:97.5 °F (36.4 °C), Max:99 °F (37.2 °C) Patient Vitals for the past 8 hrs: 
 Pulse 19 1000 (!) 159  
19 0955 (!) 153  
19 0944 (!) 126  
19 0926 (!) 150  
19 0900 (!) 157  
19 0800 (!) 119  
19 0700 95  
19 0600 87  
19 0500 88  
19 0400 85  
19 0300 86 Patient Vitals for the past 8 hrs: 
 Resp  
19 1000 26  
19 0955 (!) 32  
19 0944 (!) 31  
19 0900 30  
19 0800 26  
19 0700 21  
19 0600 26  
19 0500 26  
19 0400 26  
19 0300 29 Patient Vitals for the past 8 hrs: BP 03/02/19 1040 91/62  
03/02/19 1000 91/62  
03/02/19 0955 107/65  
03/02/19 0944 104/59  
03/02/19 0926 (!) 89/64  
03/02/19 0900 98/70  
03/02/19 0800 104/61  
03/02/19 0700 107/57  
03/02/19 0600 111/58  
03/02/19 0500 98/57  
03/02/19 0422 101/56  
03/02/19 0400 101/56  
03/02/19 0300 96/55 Intake/Output Summary (Last 24 hours) at 3/2/2019 1042 Last data filed at 3/2/2019 0600 Gross per 24 hour Intake 754.89 ml Output 1650 ml Net -895.11 ml General Appearance: SOB  and anxious. Ears/Nose/Mouth/Throat:   Normal MM; anicteric. JVP: WNL Resp:   Lungs clear to auscultation bilaterally. Nl resp effort. Cardiovascular:  irreg, S1, S2 normal, no new murmur. No gallop or rub. Abdomen:   Soft, non-tender, bowel sounds are present. Extremities: No edema bilaterally. Skin: 
Neuro: Warm and dry. A/O x3, grossly nonfocal  
 []      cath site intact w/o hematoma or bruit; distal pulse unchanged. Data Review:    
Telemetry independently reviewed : []   sinus  []   chronic afib   [x]   par afib  []      NSVT   
ECG independently reviewed:  [x] PAF with NSSTTw'es  
[]   no new ECG provided for review Lab results reviewed as noted below. Current medications reviewed as noted below. No results for input(s): PH, PCO2, PO2 in the last 72 hours. Recent Labs 03/02/19 
3195 03/01/19 
1638 03/01/19 
4585 CPK 73  --   --   
CKMB 1.5  --   --   
TROIQ 0.72* 1.31* 0.52* Recent Labs 03/02/19 
2627 03/01/19 
1638 03/01/19 
9391   --  135* K 4.2  --  4.4   --  100 CO2 30  --  26 BUN 13  --  11  
CREA 1.22*  --  1.05* *  --  218* PHOS  --  3.2  --   
CA 9.0  --  8.6 ALB 2.3*  --  2.9* WBC 16.6*  --  16.9* HGB 8.6*  --  10.6* HCT 27.8*  --  35.3   --  258 Recent Labs 03/02/19 
2962 03/01/19 
1638 03/01/19 
1892 SGOT 35  --  62* ALT 38  --  49  
  --  133* TBILI 1.2*  --  1.2* TP 7.0  --  7.4 ALB 2.3*  --  2.9*  
GLOB 4.7*  --  4.5* AML  --  29  --   
LPSE  --  37*  --   
 
Recent Labs 03/01/19 
1413 APTT 28.4 No results for input(s): FE, TIBC, PSAT, FERR in the last 72 hours. Lab Results Component Value Date/Time Glucose (POC) 226 (H) 03/02/2019 06:52 AM  
 Glucose (POC) 266 (H) 03/01/2019 09:45 PM  
 Glucose (POC) 198 (H) 03/01/2019 06:00 PM  
 Glucose (POC) 156 (H) 08/08/2017 10:12 AM  
 Glucose (POC) 126 (H) 08/02/2016 10:59 AM  
 
 
Current Facility-Administered Medications Medication Dose Route Frequency  vancomycin (VANCOCIN) 1500 mg in  ml infusion  1,500 mg IntraVENous Q24H  
 0.9% sodium chloride infusion  50 mL/hr IntraVENous CONTINUOUS  
 heparin 25,000 units in D5W 250 ml infusion  800 Units/hr IntraVENous CONTINUOUS  
 dilTIAZem (CARDIZEM) 125 mg in dextrose 5% 125 mL infusion  0-15 mg/hr IntraVENous TITRATE  aspirin chewable tablet 81 mg  81 mg Oral DAILY  carvedilol (COREG) tablet 6.25 mg  6.25 mg Oral BID WITH MEALS  calcitRIOL (ROCALTROL) capsule 0.5 mcg  0.5 mcg Oral DAILY  gabapentin (NEURONTIN) capsule 300 mg  300 mg Oral TID PRN  
 LORazepam (ATIVAN) tablet 0.5 mg  0.5 mg Oral Q8H PRN  
 oxybutynin chloride XL (DITROPAN XL) tablet 15 mg  15 mg Oral DAILY  simvastatin (ZOCOR) tablet 20 mg  20 mg Oral QHS  therapeutic multivitamin (THERAGRAN) tablet 1 Tab  1 Tab Oral DAILY  traMADol (ULTRAM) tablet 50 mg  50 mg Oral Q8H PRN  
 venlafaxine (EFFEXOR) tablet 37.5 mg  37.5 mg Oral BID  sodium chloride (NS) flush 5-40 mL  5-40 mL IntraVENous Q8H  
 sodium chloride (NS) flush 5-40 mL  5-40 mL IntraVENous PRN  
 ondansetron (ZOFRAN) injection 4 mg  4 mg IntraVENous Q4H PRN  
 bisacodyl (DULCOLAX) tablet 5 mg  5 mg Oral DAILY PRN  
 lactobac ac& pc-s.therm-b.anim (JUVE Q/RISAQUAD)  1 Cap Oral DAILY  insulin lispro (HUMALOG) injection   SubCUTAneous AC&HS  
 glucose chewable tablet 16 g  4 Tab Oral PRN  
  dextrose (D50W) injection syrg 12.5-25 g  12.5-25 g IntraVENous PRN  
 glucagon (GLUCAGEN) injection 1 mg  1 mg IntraMUSCular PRN  piperacillin-tazobactam (ZOSYN) 3.375 g in 0.9% sodium chloride (MBP/ADV) 100 mL  3.375 g IntraVENous Q8H  Vancomycin - pharmacy to dose   Other Rx Dosing/Monitoring  nitroglycerin (NITROBID) 2 % ointment 1 Inch  1 Inch Topical Q6H  
 enoxaparin (LOVENOX) injection 40 mg  40 mg SubCUTAneous Q24H  
 furosemide (LASIX) tablet 40 mg  40 mg Oral DAILY  nitroglycerin (Tridil) 200 mcg/ml infusion  0-200 mcg/min IntraVENous TITRATE  morphine injection 4 mg  4 mg IntraVENous Q2H PRN Galilea Irby MD

## 2019-03-02 NOTE — PROGRESS NOTES
TRANSFER - IN REPORT: 
 
Verbal report received from [address] on [address]  being received from [facility] for change in patient condition(chest pain) Report consisted of patients Situation, Background, Assessment and  
Recommendations(SBAR). Information from the following report(s) SBAR, Kardex, ED Summary, Procedure Summary, Intake/Output, MAR, Recent Results, Med Rec Status and Cardiac Rhythm nsr was reviewed with the receiving nurse. Opportunity for questions and clarification was provided. Assessment completed upon patients arrival to unit and care assumed. Primary Nurse Cristobal Johnson, RN and mora lan RN performed a dual skin assessment on this patient No impairment noted Mohan score is 19 
 
0100-patient to CCU 20. Given CHG bath. Now  with chest pain 5/10-increased nitro drip from comfort. Patient in bipap. 
0315-SBP in the 90's-nitro drip paused 0500-patient reports no chest pain 
0600-patient reports no chest pain 
0700-patient reports no chest pain 
0800-840-patient HR-113 to 180's EKG performed patient in Afib with chest pain. Spoke to Dr. Mariza Rust new orders for fluid, cardizem infusion, to start heparin drip and keep patient NPO. Bedside  shift change report given to Reina Bernla (oncoming nurse) by Ronda Lockwood (offgoing nurse). Report included the following information SBAR, Kardex, ED Summary, Procedure Summary, Intake/Output, MAR, Recent Results, Med Rec Status, Cardiac Rhythm NSR and Alarm Parameters .

## 2019-03-02 NOTE — PROGRESS NOTES
Day #2 of Vancomycin Indication:  CAP Current regimen:  1750 mg IV q24h Abx regimen:  Vanc and Zosyn Recent Labs 19 
6456 19 
7615 WBC 16.6* 16.9*  
CREA 1.22* 1.05* BUN 13 11 Est CrCl: 45 ml/min; UO: >1 ml/kg/hr Temp (24hrs), Av.3 °F (36.8 °C), Min:97.5 °F (36.4 °C), Max:101.1 °F (38.4 °C) Cultures:  
3/1 blood - NGTD Goal trough = 15 - 20 mcg/mL Recent trough history: 
none Plan: Changed to 1500 mg IV q24h for predicted level 16.9 mcg/ml. Scr up a little today.

## 2019-03-02 NOTE — PROGRESS NOTES
Hospitalist Progress Note NAME: Melissa Aggarwal :  1940 MRN:  [id number] Assessment / Plan: 
 
Principal Problem: 
  [facility]epsis ([facility] (3/1/2019) Will continue broad spectrum abx, cultures sent, source of sepsis still not clear. NSTEMI (non-ST elevated myocardial infarction) ([facility] (3/2/2019) No acute  changes in ECG, patient also  with Afib, RVR, therapeutic  dose of heparin,  Cardizem, Dr. Carol Landeros for cath Afib RVR; Heparin, Cardizem, cath today CHF (congestive heart failure) ([facility] (3/2/2019) Continue lasix Benign essential hypertension (3/2/2019) BP stable continue home meds Diabetic peripheral neuropathy ([facility] (3/2/2019) 
  asymptmatic today continue home meds Type II diabetes mellitus, uncontrolled ([facility] (3/2/2019) Sliding scale insulin if hypergycemic Hyperlipidemia (3/2/2019) Continue statin 30.0 - 39.9 Obese / Body mass index is 37.77 kg/m². Code status: Full Prophylaxis: Heparin Recommended Disposition: TBD Subjective: Chief Complaint / Reason for Physician Visit \" still has chest pain, palpitation \". Discussed with RN events overnight. Review of Systems: 
Symptom Y/N Comments  Symptom Y/N Comments Fever/Chills    Chest Pain y Poor Appetite    Edema Cough    Abdominal Pain n   
Sputum    Joint Pain SOB/MARTINEZ y   Pruritis/Rash Nausea/vomit    Tolerating PT/OT Diarrhea    Tolerating Diet Constipation    Other Could NOT obtain due to:   
 
Objective: VITALS:  
Last 24hrs VS reviewed since prior progress note. Most recent are: 
Patient Vitals for the past 24 hrs: 
 Temp Pulse Resp BP SpO2  
19 1430  96 26 95/60 97 % 19 1400  93 23 95/56 96 % 19 1330  87 21 90/51 93 % 19 1315  98 20 92/51 97 % 19 1300  (!) 103 26 (!) 86/59 95 % 19 1245  (!) 106 30 (!) 86/54 96 % 19 1240 97.9 °F (36.6 °C) 100 30 90/57 97 % 03/02/19 1224  94 17 102/58 94 % 03/02/19 1219  95 20 91/54 93 % 03/02/19 1212  72 22 (!) 83/59 94 % 03/02/19 1040    91/62   
03/02/19 1000  (!) 159 26 91/62 97 % 03/02/19 0955  (!) 153 (!) 32 107/65 97 % 03/02/19 0944  (!) 126 (!) 31 104/59 98 % 03/02/19 0926  (!) 150  (!) 89/64 97 % 03/02/19 0900  (!) 157 30 98/70 97 % 03/02/19 0800 98.9 °F (37.2 °C) (!) 119 26 104/61 97 % 03/02/19 0700  95 21 107/57 97 % 03/02/19 0600  87 26 111/58 98 % 03/02/19 0500  88 26 98/57 98 % 03/02/19 0422    101/56   
03/02/19 0400 99 °F (37.2 °C) 85 26 101/56 98 % 03/02/19 0300  86 29 96/55 98 % 03/02/19 0230  83 29 103/63 98 % 03/02/19 0200  88 26 104/61 98 % 03/02/19 0145  84 26 104/62 98 % 03/02/19 0130  84 26 109/64 98 % 03/02/19 0118  84 26  99 % 03/02/19 0115  86 21 116/68 98 % 03/02/19 0100 99 °F (37.2 °C) 85 27 105/89 98 % 03/02/19 0049 99 °F (37.2 °C)      
03/02/19 0015 97.6 °F (36.4 °C) 83 (!) 31 113/59 97 % 03/02/19 0000 97.8 °F (36.6 °C) 84 30 116/64   
03/01/19 2345 97.5 °F (36.4 °C) 85 (!) 35 116/67 97 % 03/01/19 2330 97.5 °F (36.4 °C) 89 (!) 39 130/62 98 % 03/01/19 2308  94 (!) 31  99 % 03/01/19 2235 97.6 °F (36.4 °C) 94 (!) 47 163/83 96 % 03/01/19 1820  99  165/80   
03/01/19 1756 97.5 °F (36.4 °C) (!) 103 (!) 31 177/81 97 % 03/01/19 1730 98.2 °F (36.8 °C) 93 24 127/82 99 % 03/01/19 1700  88 24 144/83 100 % 03/01/19 1645  92 29 135/76 98 % 03/01/19 1630 98.2 °F (36.8 °C) 87 20 128/70 98 % 03/01/19 1615  86 23 107/78 98 % 03/01/19 1600  87 20 113/63 98 % 03/01/19 1545  88 20 127/67 98 % 03/01/19 1530  87 23 130/72 97 % Intake/Output Summary (Last 24 hours) at 3/2/2019 1515 Last data filed at 3/2/2019 1240 Gross per 24 hour Intake 428.83 ml Output 1650 ml Net -1221.17 ml PHYSICAL EXAM: 
General: WD, WN. Alert, cooperative, no acute distress   
EENT:  EOMI. Anicteric sclerae. MMM Resp: CTA bilaterally, no wheezing or rales. No accessory muscle use CV:  Irregularly irregular   rhythm,  No edema GI:  Soft, Non distended, Non tender.  +Bowel sounds Neurologic:  Alert and oriented X 3, normal speech, Psych:   Good insight. Not anxious nor agitated Skin:  No rashes. No jaundice Reviewed most current lab test results and cultures  YES Reviewed most current radiology test results   YES Review and summation of old records today    NO Reviewed patient's current orders and MAR    YES 
PMH/SH reviewed - no change compared to H&P 
________________________________________________________________________ Care Plan discussed with: 
  Comments Patient x Family RN x Care Manager Consultant Multidiciplinary team rounds were held today with , nursing, pharmacist and clinical coordinator. Patient's plan of care was discussed; medications were reviewed and discharge planning was addressed. ________________________________________________________________________ Total NON critical care TIME:  35 Minutes Total CRITICAL CARE TIME Spent:   Minutes non procedure based Comments >50% of visit spent in counseling and coordination of care    
________________________________________________________________________ Brock Yost MD  
 
Procedures: see electronic medical records for all procedures/Xrays and details which were not copied into this note but were reviewed prior to creation of Plan. LABS: 
I reviewed today's most current labs and imaging studies. Pertinent labs include: 
Recent Labs 03/02/19 
9668 03/01/19 
6012 WBC 16.6* 16.9* HGB 8.6* 10.6* HCT 27.8* 35.3  258 Recent Labs 03/02/19 
1315 03/01/19 
1638 03/01/19 
5011   --  135* K 4.2  --  4.4   --  100 CO2 30  --  26  
*  --  218* BUN 13  --  11  
CREA 1.22*  --  1.05* CA 9.0  --  8.6 MG  --  1.9 1.3*  
 PHOS  --  3.2  --   
ALB 2.3*  --  2.9* TBILI 1.2*  --  1.2* SGOT 35  --  62* ALT 38  --  49 Signed: Teodoro Jurado MD

## 2019-03-03 ENCOUNTER — APPOINTMENT (OUTPATIENT)
Dept: GENERAL RADIOLOGY | Age: 79
DRG: 871 | End: 2019-03-03
Attending: INTERNAL MEDICINE
Payer: MEDICARE

## 2019-03-03 LAB
ANION GAP SERPL CALC-SCNC: 9 MMOL/L (ref 5–15)
BASOPHILS # BLD: 0.1 K/UL (ref 0–0.1)
BASOPHILS NFR BLD: 1 % (ref 0–1)
BUN SERPL-MCNC: 21 MG/DL (ref 6–20)
BUN/CREAT SERPL: 8 (ref 12–20)
CALCIUM SERPL-MCNC: 8.4 MG/DL (ref 8.5–10.1)
CHLORIDE SERPL-SCNC: 99 MMOL/L (ref 97–108)
CK MB CFR SERPL CALC: NORMAL % (ref 0–2.5)
CK MB SERPL-MCNC: <1 NG/ML (ref 5–25)
CK SERPL-CCNC: 58 U/L (ref 26–192)
CO2 SERPL-SCNC: 27 MMOL/L (ref 21–32)
CREAT SERPL-MCNC: 2.52 MG/DL (ref 0.55–1.02)
DIFFERENTIAL METHOD BLD: ABNORMAL
EOSINOPHIL # BLD: 0.2 K/UL (ref 0–0.4)
EOSINOPHIL NFR BLD: 1 % (ref 0–7)
ERYTHROCYTE [DISTWIDTH] IN BLOOD BY AUTOMATED COUNT: 14.3 % (ref 11.5–14.5)
GLUCOSE BLD STRIP.AUTO-MCNC: 206 MG/DL (ref 65–100)
GLUCOSE BLD STRIP.AUTO-MCNC: 215 MG/DL (ref 65–100)
GLUCOSE BLD STRIP.AUTO-MCNC: 233 MG/DL (ref 65–100)
GLUCOSE BLD STRIP.AUTO-MCNC: 328 MG/DL (ref 65–100)
GLUCOSE SERPL-MCNC: 184 MG/DL (ref 65–100)
HCT VFR BLD AUTO: 27.2 % (ref 35–47)
HGB BLD-MCNC: 8.2 G/DL (ref 11.5–16)
IMM GRANULOCYTES # BLD AUTO: 0.1 K/UL (ref 0–0.04)
IMM GRANULOCYTES NFR BLD AUTO: 1 % (ref 0–0.5)
LYMPHOCYTES # BLD: 2.4 K/UL (ref 0.8–3.5)
LYMPHOCYTES NFR BLD: 18 % (ref 12–49)
MAGNESIUM SERPL-MCNC: 1.8 MG/DL (ref 1.6–2.4)
MCH RBC QN AUTO: 26.5 PG (ref 26–34)
MCHC RBC AUTO-ENTMCNC: 30.1 G/DL (ref 30–36.5)
MCV RBC AUTO: 88 FL (ref 80–99)
MONOCYTES # BLD: 1.4 K/UL (ref 0–1)
MONOCYTES NFR BLD: 10 % (ref 5–13)
NEUTS SEG # BLD: 9.7 K/UL (ref 1.8–8)
NEUTS SEG NFR BLD: 70 % (ref 32–75)
NRBC # BLD: 0 K/UL (ref 0–0.01)
NRBC BLD-RTO: 0 PER 100 WBC
PLATELET # BLD AUTO: 263 K/UL (ref 150–400)
PMV BLD AUTO: 10.8 FL (ref 8.9–12.9)
POTASSIUM SERPL-SCNC: 4 MMOL/L (ref 3.5–5.1)
RBC # BLD AUTO: 3.09 M/UL (ref 3.8–5.2)
SERVICE CMNT-IMP: ABNORMAL
SODIUM SERPL-SCNC: 135 MMOL/L (ref 136–145)
TROPONIN I SERPL-MCNC: 0.36 NG/ML
WBC # BLD AUTO: 13.9 K/UL (ref 3.6–11)

## 2019-03-03 PROCEDURE — 94760 N-INVAS EAR/PLS OXIMETRY 1: CPT

## 2019-03-03 PROCEDURE — 74011250636 HC RX REV CODE- 250/636: Performed by: INTERNAL MEDICINE

## 2019-03-03 PROCEDURE — 74011250637 HC RX REV CODE- 250/637: Performed by: INTERNAL MEDICINE

## 2019-03-03 PROCEDURE — 85025 COMPLETE CBC W/AUTO DIFF WBC: CPT

## 2019-03-03 PROCEDURE — 74011000250 HC RX REV CODE- 250: Performed by: HOSPITALIST

## 2019-03-03 PROCEDURE — 94664 DEMO&/EVAL PT USE INHALER: CPT

## 2019-03-03 PROCEDURE — 82962 GLUCOSE BLOOD TEST: CPT

## 2019-03-03 PROCEDURE — 74011636637 HC RX REV CODE- 636/637: Performed by: INTERNAL MEDICINE

## 2019-03-03 PROCEDURE — 83735 ASSAY OF MAGNESIUM: CPT

## 2019-03-03 PROCEDURE — 71045 X-RAY EXAM CHEST 1 VIEW: CPT

## 2019-03-03 PROCEDURE — 82550 ASSAY OF CK (CPK): CPT

## 2019-03-03 PROCEDURE — 80048 BASIC METABOLIC PNL TOTAL CA: CPT

## 2019-03-03 PROCEDURE — 77010033678 HC OXYGEN DAILY

## 2019-03-03 PROCEDURE — 94640 AIRWAY INHALATION TREATMENT: CPT

## 2019-03-03 PROCEDURE — 84484 ASSAY OF TROPONIN QUANT: CPT

## 2019-03-03 PROCEDURE — 74011000258 HC RX REV CODE- 258: Performed by: INTERNAL MEDICINE

## 2019-03-03 PROCEDURE — 36415 COLL VENOUS BLD VENIPUNCTURE: CPT

## 2019-03-03 PROCEDURE — 65660000000 HC RM CCU STEPDOWN

## 2019-03-03 RX ORDER — SODIUM CHLORIDE 0.9 % (FLUSH) 0.9 %
5-40 SYRINGE (ML) INJECTION EVERY 8 HOURS
Status: DISCONTINUED | OUTPATIENT
Start: 2019-03-03 | End: 2019-03-05 | Stop reason: HOSPADM

## 2019-03-03 RX ORDER — SODIUM CHLORIDE 0.9 % (FLUSH) 0.9 %
5-40 SYRINGE (ML) INJECTION AS NEEDED
Status: DISCONTINUED | OUTPATIENT
Start: 2019-03-03 | End: 2019-03-05 | Stop reason: HOSPADM

## 2019-03-03 RX ORDER — ENOXAPARIN SODIUM 100 MG/ML
30 INJECTION SUBCUTANEOUS EVERY 24 HOURS
Status: DISCONTINUED | OUTPATIENT
Start: 2019-03-03 | End: 2019-03-05

## 2019-03-03 RX ORDER — ALBUTEROL SULFATE 0.83 MG/ML
2.5 SOLUTION RESPIRATORY (INHALATION)
Status: DISCONTINUED | OUTPATIENT
Start: 2019-03-03 | End: 2019-03-11 | Stop reason: HOSPADM

## 2019-03-03 RX ORDER — IPRATROPIUM BROMIDE AND ALBUTEROL SULFATE 2.5; .5 MG/3ML; MG/3ML
3 SOLUTION RESPIRATORY (INHALATION)
Status: DISCONTINUED | OUTPATIENT
Start: 2019-03-03 | End: 2019-03-05

## 2019-03-03 RX ADMIN — POTASSIUM CHLORIDE 10 MEQ: 750 TABLET, EXTENDED RELEASE ORAL at 09:18

## 2019-03-03 RX ADMIN — THERA TABS 1 TABLET: TAB at 09:19

## 2019-03-03 RX ADMIN — INSULIN LISPRO 2 UNITS: 100 INJECTION, SOLUTION INTRAVENOUS; SUBCUTANEOUS at 17:33

## 2019-03-03 RX ADMIN — Medication 10 ML: at 21:56

## 2019-03-03 RX ADMIN — CARVEDILOL 6.25 MG: 6.25 TABLET, FILM COATED ORAL at 09:19

## 2019-03-03 RX ADMIN — Medication 10 ML: at 17:05

## 2019-03-03 RX ADMIN — IPRATROPIUM BROMIDE AND ALBUTEROL SULFATE 3 ML: .5; 3 SOLUTION RESPIRATORY (INHALATION) at 21:49

## 2019-03-03 RX ADMIN — VENLAFAXINE 37.5 MG: 37.5 TABLET ORAL at 17:34

## 2019-03-03 RX ADMIN — Medication 10 ML: at 06:40

## 2019-03-03 RX ADMIN — PIPERACILLIN SODIUM AND TAZOBACTAM SODIUM 3.38 G: 3; .375 INJECTION, POWDER, LYOPHILIZED, FOR SOLUTION INTRAVENOUS at 06:40

## 2019-03-03 RX ADMIN — FUROSEMIDE 40 MG: 40 TABLET ORAL at 09:19

## 2019-03-03 RX ADMIN — ASPIRIN 81 MG CHEWABLE TABLET 81 MG: 81 TABLET CHEWABLE at 09:18

## 2019-03-03 RX ADMIN — PIPERACILLIN SODIUM AND TAZOBACTAM SODIUM 3.38 G: 3; .375 INJECTION, POWDER, LYOPHILIZED, FOR SOLUTION INTRAVENOUS at 22:59

## 2019-03-03 RX ADMIN — PIPERACILLIN SODIUM AND TAZOBACTAM SODIUM 3.38 G: 3; .375 INJECTION, POWDER, LYOPHILIZED, FOR SOLUTION INTRAVENOUS at 17:03

## 2019-03-03 RX ADMIN — GABAPENTIN 300 MG: 300 CAPSULE ORAL at 13:27

## 2019-03-03 RX ADMIN — SITAGLIPTIN 100 MG: 100 TABLET, FILM COATED ORAL at 09:19

## 2019-03-03 RX ADMIN — INSULIN LISPRO 3 UNITS: 100 INJECTION, SOLUTION INTRAVENOUS; SUBCUTANEOUS at 06:50

## 2019-03-03 RX ADMIN — CALCITRIOL 0.5 MCG: 0.25 CAPSULE ORAL at 09:18

## 2019-03-03 RX ADMIN — VENLAFAXINE 37.5 MG: 37.5 TABLET ORAL at 09:18

## 2019-03-03 RX ADMIN — INSULIN LISPRO 2 UNITS: 100 INJECTION, SOLUTION INTRAVENOUS; SUBCUTANEOUS at 22:01

## 2019-03-03 RX ADMIN — INSULIN LISPRO 7 UNITS: 100 INJECTION, SOLUTION INTRAVENOUS; SUBCUTANEOUS at 12:43

## 2019-03-03 RX ADMIN — OXYBUTYNIN CHLORIDE 15 MG: 5 TABLET, EXTENDED RELEASE ORAL at 09:19

## 2019-03-03 RX ADMIN — ENOXAPARIN SODIUM 30 MG: 40 INJECTION SUBCUTANEOUS at 13:21

## 2019-03-03 RX ADMIN — Medication 1 CAPSULE: at 09:19

## 2019-03-03 NOTE — PROGRESS NOTES
Hospitalist Progress Note NAME: Melissa Aggarwal :  1940 MRN:  [id number] Assessment / Plan: 
 
Principal Problem: 
  Sepsis ([facility] (3/1/2019) Will continue broad spectrum abx, cultures sent, source of sepsis still not clear. NSTEMI (non-ST elevated [facility] infarction) [facility] (3/2/2019) No acute  changes in ECG, patient also  with Afib, RVR, therapeutic  dose of heparin,  Cardizem,S/P cath: No significant CAD with Cx dominant,Normal LV size with moderate antrolateral and apical hypokinesis in setting of elevated biomarkere c/w Takotsubo cardiomyopthy. Elevated Rt an Lt ventricular filling pressures Afib RVR; Heparin, Cardizem, cath today CHF (congestive heart failure) ([facility] (3/2/2019) Continue lasix Benign essential hypertension (3/2/2019) BP stable continue home meds Diabetic peripheral neuropathy ([facility] (3/2/2019) 
  asymptmatic today continue home meds Type II diabetes mellitus, uncontrolled ([facility] (3/2/2019) Sliding scale insulin if hypergycemic Hyperlipidemia (3/2/2019) Continue statin Acute renal failure: possible reaction to contrast, Hold metformin, continue monitoring, nephrology if not resolved 30.0 - 39.9 Obese / Body mass index is 39.25 kg/m². Code status: Full Prophylaxis: Heparin Recommended Disposition: TBD Subjective: Chief Complaint / Reason for Physician Visit Felling better today, no CHEST PAIN  \". Discussed with RN events overnight. Review of Systems: 
Symptom Y/N Comments  Symptom Y/N Comments Fever/Chills    Chest Pain y Poor Appetite    Edema Cough    Abdominal Pain n   
Sputum    Joint Pain SOB/MARTINEZ y   Pruritis/Rash Nausea/vomit    Tolerating PT/OT Diarrhea    Tolerating Diet Constipation    Other Could NOT obtain due to:   
 
Objective: VITALS:  
Last 24hrs VS reviewed since prior progress note. Most recent are: 
Patient Vitals for the past 24 hrs: Temp Pulse Resp BP SpO2  
03/03/19 1235 98.2 °F (36.8 °C) 79 20 99/55 97 % 03/03/19 0749 98.3 °F (36.8 °C) 92 18 (!) 88/55 96 % 03/03/19 0326 98.8 °F (37.1 °C) 85 18 96/48 99 % 03/03/19 0140  88  (!) 88/48 96 % 03/03/19 0024  82  (!) 72/43 97 % 03/03/19 0005  87  (!) 80/47 96 % 03/02/19 2335 98.3 °F (36.8 °C) 86 18 (!) 79/44 98 % 03/02/19 2001 98.8 °F (37.1 °C) 88 20 105/49 97 % 03/02/19 1801 99 °F (37.2 °C) 76 22 105/48 98 % 03/02/19 1700  85 (!) 34 (!) 89/55 97 % Intake/Output Summary (Last 24 hours) at 3/3/2019 5935 Last data filed at 3/3/2019 1055 Gross per 24 hour Intake 1080 ml Output 100 ml Net 980 ml PHYSICAL EXAM: 
General: WD, WN. Alert, cooperative, no acute distress   
EENT:  EOMI. Anicteric sclerae. MMM Resp:  CTA bilaterally, no wheezing or rales. No accessory muscle use CV:  Irregularly irregular   rhythm,  No edema GI:  Soft, Non distended, Non tender.  +Bowel sounds Neurologic:  Alert and oriented X 3, normal speech, Psych:   Good insight. Not anxious nor agitated Skin:  No rashes. No jaundice Reviewed most current lab test results and cultures  YES Reviewed most current radiology test results   YES Review and summation of old records today    NO Reviewed patient's current orders and MAR    YES 
PMH/SH reviewed - no change compared to H&P 
________________________________________________________________________ Care Plan discussed with: 
  Comments Patient x Family RN x Care Manager Consultant Multidiciplinary team rounds were held today with , nursing, pharmacist and clinical coordinator. Patient's plan of care was discussed; medications were reviewed and discharge planning was addressed. ________________________________________________________________________ Total NON critical care TIME:  35 Minutes Total CRITICAL CARE TIME Spent:   Minutes non procedure based Comments >50% of visit spent in counseling and coordination of care    
________________________________________________________________________ Nano Mackey MD  
 
Procedures: see electronic medical records for all procedures/Xrays and details which were not copied into this note but were reviewed prior to creation of Plan. LABS: 
I reviewed today's most current labs and imaging studies. Pertinent labs include: 
Recent Labs 03/03/19 
0036 03/02/19 
2461 03/01/19 
5961 WBC 13.9* 16.6* 16.9* HGB 8.2* 8.6* 10.6* HCT 27.2* 27.8* 35.3  258 258 Recent Labs 03/03/19 
1339 03/03/19 
0036 03/02/19 
1272 03/01/19 
1638 03/01/19 
2739 NA  --  135* 138  --  135* K  --  4.0 4.2  --  4.4  
CL  --  99 100  --  100 CO2  --  27 30  --  26  
GLU  --  184* 226*  --  218* BUN  --  21* 13  --  11  
CREA  --  2.52* 1.22*  --  1.05* CA  --  8.4* 9.0  --  8.6 MG 1.8  --   --  1.9 1.3*  
PHOS  --   --   --  3.2  --   
ALB  --   --  2.3*  --  2.9* TBILI  --   --  1.2*  --  1.2* SGOT  --   --  35  --  62* ALT  --   --  38  --  49 Signed: Nano Mackey MD

## 2019-03-03 NOTE — PROGRESS NOTES
Cardiology Progress Note 3/3/2019     Admit Date: 3/1/2019 Admit Diagnosis: [facility]epsis [facility] [A41.9]  CC: none currently Assessment:  
Principal Problem: 
  Sepsis ([facility] (3/1/2019) Active Problems: 
  NSTEMI (non-ST elevated myocardial infarction) ([facility] (3/2/2019) CHF (congestive heart failure) ([facility] (3/2/2019) Benign essential hypertension (3/2/2019) Diabetic peripheral neuropathy ([facility] (3/2/2019) Type II diabetes mellitus, uncontrolled ([facility] (3/2/2019) Hyperlipidemia (3/2/2019) Plan:  
Feels much better. Minimal chest discomfort and not SOB. Enzymes coming down and in SR. But creatinine is elevated, likely due to contrast reaction. Adjust meds and ambulate. Follow labs. Volume status:euvolemic Renal function: stable For other plans, see orders. Subjective: Angella Lucia reports Chest Pain:  []   none,  consistent with  []   non-cardiac   [x]   atypical   []   angina [x]   none now    []      on-going Dyspnea: []   none  []   at rest  []   with exertion     [x]   improved   []   unchanged   []   worsening PND:       [x]   none  []   overnight Orthopnea: [x]   none  []   improved  []   unchanged  []   worsening Presyncope: [x]   none   []   improved    []   unchanged    []   worsening Ambulated in hallway without symptoms  []   Yes Ambulated in room without symptoms  []   Yes Objective:  
 Physical Exam: 
Overall VSSAF;   
Visit Vitals BP (!) 88/55 (BP 1 Location: Left arm, BP Patient Position: Sitting) Pulse 92 Temp 98.3 °F (36.8 °C) Resp 18 Ht 5' 5\" (1.651 m) Wt 107 kg (235 lb 14.3 oz) SpO2 96% BMI 39.25 kg/m² Temp (24hrs), Av.5 °F (36.9 °C), Min:97.9 °F (36.6 °C), Max:99 °F (37.2 °C) Patient Vitals for the past 8 hrs: 
 Pulse 19 0749 92 Patient Vitals for the past 8 hrs: 
 Resp  
19 0749 18 Patient Vitals for the past 8 hrs: 
 BP  
19 0749 (!) 88/55 Intake/Output Summary (Last 24 hours) at 3/3/2019 1235 Last data filed at 3/3/2019 4436 Gross per 24 hour Intake 1107.59 ml Output 100 ml Net 1007.59 ml General Appearance: Well developed, well nourished, no acute distress. Ears/Nose/Mouth/Throat:   Normal MM; anicteric. JVP: WNL Resp:   Lungs clear to auscultation bilaterally. Nl resp effort. Cardiovascular:  RRR, S1, S2 normal, no new murmur. No gallop or rub. Abdomen:   Soft, non-tender, bowel sounds are present. Extremities: No edema bilaterally. Skin: 
Neuro: Warm and dry. A/O x3, grossly nonfocal  
 []      cath site intact w/o hematoma or bruit; distal pulse unchanged. Data Review:    
Telemetry independently reviewed : [x]   sinus  []   chronic afib   []   par afib  []      NSVT   
ECG independently reviewed:  []   NSR   []   no significant changes 
[]   no new ECG provided for review Lab results reviewed as noted below. Current medications reviewed as noted below. No results for input(s): PH, PCO2, PO2 in the last 72 hours. Recent Labs 03/03/19 
0036 03/02/19 
0876 03/01/19 
1638 CPK 58 73  --   
CKMB <1.0 1.5  --   
TROIQ 0.36* 0.72* 1.31* Recent Labs 03/03/19 
0036 03/02/19 
5802 03/01/19 
1638 03/01/19 
7252 * 138  --  135* K 4.0 4.2  --  4.4 CL 99 100  --  100 CO2 27 30  --  26 BUN 21* 13  --  11  
CREA 2.52* 1.22*  --  1.05* * 226*  --  218* PHOS  --   --  3.2  --   
CA 8.4* 9.0  --  8.6 ALB  --  2.3*  --  2.9* WBC 13.9* 16.6*  --  16.9* HGB 8.2* 8.6*  --  10.6* HCT 27.2* 27.8*  --  35.3  258  --  258 Recent Labs 03/02/19 
6211 03/01/19 
1638 03/01/19 
8893 SGOT 35  --  62* ALT 38  --  49  
  --  133* TBILI 1.2*  --  1.2* TP 7.0  --  7.4 ALB 2.3*  --  2.9*  
GLOB 4.7*  --  4.5* AML  --  29  --   
LPSE  --  37*  --   
 
Recent Labs 03/01/19 
1413 APTT 28.4 No results for input(s): FE, TIBC, PSAT, FERR in the last 72 hours. Lab Results Component Value Date/Time Glucose (POC) 328 (H) 03/03/2019 12:08 PM  
 Glucose (POC) 215 (H) 03/03/2019 06:44 AM  
 Glucose (POC) 247 (H) 03/02/2019 10:16 PM  
 Glucose (POC) 280 (H) 03/02/2019 04:33 PM  
 Glucose (POC) 181 (H) 03/02/2019 01:09 PM  
 
 
Current Facility-Administered Medications Medication Dose Route Frequency  enoxaparin (LOVENOX) injection 30 mg  30 mg SubCUTAneous Q24H  
 aspirin chewable tablet 81 mg  81 mg Oral DAILY  SITagliptin (JANUVIA) tablet 100 mg  100 mg Oral DAILY  sodium chloride (NS) flush 5-40 mL  5-40 mL IntraVENous Q8H  
 sodium chloride (NS) flush 5-40 mL  5-40 mL IntraVENous PRN  
 carvedilol (COREG) tablet 6.25 mg  6.25 mg Oral BID WITH MEALS  calcitRIOL (ROCALTROL) capsule 0.5 mcg  0.5 mcg Oral DAILY  gabapentin (NEURONTIN) capsule 300 mg  300 mg Oral TID PRN  
 LORazepam (ATIVAN) tablet 0.5 mg  0.5 mg Oral Q8H PRN  
 oxybutynin chloride XL (DITROPAN XL) tablet 15 mg  15 mg Oral DAILY  therapeutic multivitamin (THERAGRAN) tablet 1 Tab  1 Tab Oral DAILY  traMADol (ULTRAM) tablet 50 mg  50 mg Oral Q8H PRN  
 venlafaxine (EFFEXOR) tablet 37.5 mg  37.5 mg Oral BID  sodium chloride (NS) flush 5-40 mL  5-40 mL IntraVENous Q8H  
 sodium chloride (NS) flush 5-40 mL  5-40 mL IntraVENous PRN  
 ondansetron (ZOFRAN) injection 4 mg  4 mg IntraVENous Q4H PRN  
 bisacodyl (DULCOLAX) tablet 5 mg  5 mg Oral DAILY PRN  
 lactobac ac& pc-s.therm-b.anim (JUVE Q/RISAQUAD)  1 Cap Oral DAILY  insulin lispro (HUMALOG) injection   SubCUTAneous AC&HS  
 glucose chewable tablet 16 g  4 Tab Oral PRN  
 dextrose (D50W) injection syrg 12.5-25 g  12.5-25 g IntraVENous PRN  
 glucagon (GLUCAGEN) injection 1 mg  1 mg IntraMUSCular PRN  piperacillin-tazobactam (ZOSYN) 3.375 g in 0.9% sodium chloride (MBP/ADV) 100 mL  3.375 g IntraVENous Q8H  
  morphine injection 4 mg  4 mg IntraVENous Q2H PRN Vickie Dumont MD

## 2019-03-03 NOTE — CDMP QUERY
#1 
 
Patient admitted with Sepsis, noted to have increased work of breathing If possible, please document in progress notes and d/c summary if you are evaluating and/or treating any of the following: 
 
=> Acute respiratory failure, unspecified whether with hypoxia or hypercapnia 
=> Acute Hypoxia 
=> Other explanation of clinical findings 
=> Clinically Undetermined (no explanation for clinical findings) The medical record reflects the following: 
   Risk Factors: sepsis Clinical Indicators: 3/2-nurses note 2230 - Pt complaining of chest pain (10/10), increased SOB (not able to complete full sentences), wheezing, anxious, BP (166/81) and RR (38-50) elevated. Cardiology paged. Rapid Response called. Orders to transfer to CCU, EKG, morphine, nitro gtt, and continuous BiPAP. Treatment: BiPap, lasix Thank you, 
       Ousmane Kraft [facility], [address] [facility]

## 2019-03-03 NOTE — CDMP QUERY
#2 
 
Patient admitted with Sepsis, noted to have elevated serum glucose noted as \"uncontrolled\". If possible, please document in progress notes and d/c summary if you are evaluating and/or treating any of the following: 
 
=> Type 2 diabetes mellitus with hyperglycemia 
=> Type 2 diabetes mellitus uncontrolled-unspecified 
=> Other explanation of clinical findings 
=> Clinically Undetermined (no explanation for clinical findings) The medical record reflects the following: 
   Risk Factors: DM/Sepsis Clinical Indicators: serum glucose 218--226--184 
3/2 progress note--Type II diabetes mellitus, uncontrolled ([facility] (3/2/2019) Treatment: sliding scale lispro insulin Thank you, 
       Magdaleno Freeze [address] [facility], [address] [facility]

## 2019-03-03 NOTE — PROGRESS NOTES
Patient with low urine output. Bladder scanned post void residual was 80 cc. Consulted nephrology and Dr. Mir Her called back.  He said he would refer her to his practice to be seen in am. Labs reviewed with MD

## 2019-03-03 NOTE — PROGRESS NOTES
Spiritual Care Partner Volunteer visited patient in Rm 466 on 3/3/19. Documented by: Chaplain Ledezma MDiv, MACE 
[phone] PRAY [phone])

## 2019-03-03 NOTE — PROGRESS NOTES
Lovenox Monitoring Indication: DVT Prophylaxis Recent Labs 03/03/19 
0036 03/02/19 
2689 03/01/19 
5931 HGB 8.2* 8.6* 10.6*  258 258 CREA 2.52* 1.22* 1.05* Current Weight: 107 kg Est. CrCl = 22 ml/min Current Dose: 40 mg subcutaneously every 24 hours. Plan: Change to 30 mg q24h per protocol

## 2019-03-04 ENCOUNTER — APPOINTMENT (OUTPATIENT)
Dept: ULTRASOUND IMAGING | Age: 79
DRG: 871 | End: 2019-03-04
Attending: INTERNAL MEDICINE
Payer: MEDICARE

## 2019-03-04 LAB
ACT BLD: 158 SECS (ref 79–138)
ANION GAP SERPL CALC-SCNC: 12 MMOL/L (ref 5–15)
BASOPHILS # BLD: 0.1 K/UL (ref 0–0.1)
BASOPHILS NFR BLD: 1 % (ref 0–1)
BUN SERPL-MCNC: 34 MG/DL (ref 6–20)
BUN/CREAT SERPL: 7 (ref 12–20)
CALCIUM SERPL-MCNC: 8.7 MG/DL (ref 8.5–10.1)
CHLORIDE SERPL-SCNC: 96 MMOL/L (ref 97–108)
CO2 SERPL-SCNC: 25 MMOL/L (ref 21–32)
CREAT SERPL-MCNC: 4.94 MG/DL (ref 0.55–1.02)
DIFFERENTIAL METHOD BLD: ABNORMAL
EOSINOPHIL # BLD: 0.5 K/UL (ref 0–0.4)
EOSINOPHIL NFR BLD: 4 % (ref 0–7)
ERYTHROCYTE [DISTWIDTH] IN BLOOD BY AUTOMATED COUNT: 14.5 % (ref 11.5–14.5)
GLUCOSE BLD STRIP.AUTO-MCNC: 190 MG/DL (ref 65–100)
GLUCOSE BLD STRIP.AUTO-MCNC: 207 MG/DL (ref 65–100)
GLUCOSE BLD STRIP.AUTO-MCNC: 267 MG/DL (ref 65–100)
GLUCOSE BLD STRIP.AUTO-MCNC: 268 MG/DL (ref 65–100)
GLUCOSE BLD STRIP.AUTO-MCNC: 300 MG/DL (ref 65–100)
GLUCOSE BLD STRIP.AUTO-MCNC: 571 MG/DL (ref 65–100)
GLUCOSE SERPL-MCNC: 194 MG/DL (ref 65–100)
HCT VFR BLD AUTO: 25.1 % (ref 35–47)
HGB BLD-MCNC: 7.7 G/DL (ref 11.5–16)
IMM GRANULOCYTES # BLD AUTO: 0.1 K/UL (ref 0–0.04)
IMM GRANULOCYTES NFR BLD AUTO: 1 % (ref 0–0.5)
LYMPHOCYTES # BLD: 2 K/UL (ref 0.8–3.5)
LYMPHOCYTES NFR BLD: 17 % (ref 12–49)
MCH RBC QN AUTO: 27 PG (ref 26–34)
MCHC RBC AUTO-ENTMCNC: 30.7 G/DL (ref 30–36.5)
MCV RBC AUTO: 88.1 FL (ref 80–99)
MONOCYTES # BLD: 1.3 K/UL (ref 0–1)
MONOCYTES NFR BLD: 11 % (ref 5–13)
NEUTS SEG # BLD: 8 K/UL (ref 1.8–8)
NEUTS SEG NFR BLD: 67 % (ref 32–75)
NRBC # BLD: 0 K/UL (ref 0–0.01)
NRBC BLD-RTO: 0 PER 100 WBC
PLATELET # BLD AUTO: 277 K/UL (ref 150–400)
PMV BLD AUTO: 10.9 FL (ref 8.9–12.9)
POTASSIUM SERPL-SCNC: 4.5 MMOL/L (ref 3.5–5.1)
RBC # BLD AUTO: 2.85 M/UL (ref 3.8–5.2)
SERVICE CMNT-IMP: ABNORMAL
SODIUM SERPL-SCNC: 133 MMOL/L (ref 136–145)
TROPONIN I SERPL-MCNC: 0.17 NG/ML
WBC # BLD AUTO: 11.9 K/UL (ref 3.6–11)

## 2019-03-04 PROCEDURE — 77030027138 HC INCENT SPIROMETER -A

## 2019-03-04 PROCEDURE — 74011250636 HC RX REV CODE- 250/636: Performed by: INTERNAL MEDICINE

## 2019-03-04 PROCEDURE — 65660000000 HC RM CCU STEPDOWN

## 2019-03-04 PROCEDURE — 94640 AIRWAY INHALATION TREATMENT: CPT

## 2019-03-04 PROCEDURE — 85025 COMPLETE CBC W/AUTO DIFF WBC: CPT

## 2019-03-04 PROCEDURE — 74011000258 HC RX REV CODE- 258: Performed by: INTERNAL MEDICINE

## 2019-03-04 PROCEDURE — 76770 US EXAM ABDO BACK WALL COMP: CPT

## 2019-03-04 PROCEDURE — 74011250637 HC RX REV CODE- 250/637: Performed by: INTERNAL MEDICINE

## 2019-03-04 PROCEDURE — 84484 ASSAY OF TROPONIN QUANT: CPT

## 2019-03-04 PROCEDURE — 74011000250 HC RX REV CODE- 250: Performed by: HOSPITALIST

## 2019-03-04 PROCEDURE — 74011636637 HC RX REV CODE- 636/637: Performed by: INTERNAL MEDICINE

## 2019-03-04 PROCEDURE — 77010033678 HC OXYGEN DAILY

## 2019-03-04 PROCEDURE — 80048 BASIC METABOLIC PNL TOTAL CA: CPT

## 2019-03-04 PROCEDURE — 82962 GLUCOSE BLOOD TEST: CPT

## 2019-03-04 PROCEDURE — 36415 COLL VENOUS BLD VENIPUNCTURE: CPT

## 2019-03-04 PROCEDURE — 94660 CPAP INITIATION&MGMT: CPT

## 2019-03-04 RX ORDER — SODIUM CHLORIDE 9 MG/ML
50 INJECTION, SOLUTION INTRAVENOUS CONTINUOUS
Status: DISCONTINUED | OUTPATIENT
Start: 2019-03-04 | End: 2019-03-05

## 2019-03-04 RX ADMIN — IPRATROPIUM BROMIDE AND ALBUTEROL SULFATE 3 ML: .5; 3 SOLUTION RESPIRATORY (INHALATION) at 07:37

## 2019-03-04 RX ADMIN — Medication 10 ML: at 21:51

## 2019-03-04 RX ADMIN — ENOXAPARIN SODIUM 30 MG: 40 INJECTION SUBCUTANEOUS at 14:30

## 2019-03-04 RX ADMIN — OXYBUTYNIN CHLORIDE 15 MG: 5 TABLET, EXTENDED RELEASE ORAL at 09:05

## 2019-03-04 RX ADMIN — CARVEDILOL 6.25 MG: 6.25 TABLET, FILM COATED ORAL at 09:05

## 2019-03-04 RX ADMIN — VENLAFAXINE 37.5 MG: 37.5 TABLET ORAL at 16:55

## 2019-03-04 RX ADMIN — SITAGLIPTIN 100 MG: 100 TABLET, FILM COATED ORAL at 09:05

## 2019-03-04 RX ADMIN — Medication 10 ML: at 06:00

## 2019-03-04 RX ADMIN — INSULIN LISPRO 5 UNITS: 100 INJECTION, SOLUTION INTRAVENOUS; SUBCUTANEOUS at 11:27

## 2019-03-04 RX ADMIN — IPRATROPIUM BROMIDE AND ALBUTEROL SULFATE 3 ML: .5; 3 SOLUTION RESPIRATORY (INHALATION) at 19:35

## 2019-03-04 RX ADMIN — IPRATROPIUM BROMIDE AND ALBUTEROL SULFATE 3 ML: .5; 3 SOLUTION RESPIRATORY (INHALATION) at 12:56

## 2019-03-04 RX ADMIN — Medication 1 CAPSULE: at 09:05

## 2019-03-04 RX ADMIN — THERA TABS 1 TABLET: TAB at 09:06

## 2019-03-04 RX ADMIN — SODIUM CHLORIDE 125 ML/HR: 900 INJECTION, SOLUTION INTRAVENOUS at 09:04

## 2019-03-04 RX ADMIN — INSULIN LISPRO 5 UNITS: 100 INJECTION, SOLUTION INTRAVENOUS; SUBCUTANEOUS at 18:07

## 2019-03-04 RX ADMIN — CALCITRIOL 0.5 MCG: 0.25 CAPSULE ORAL at 09:06

## 2019-03-04 RX ADMIN — Medication 10 ML: at 14:31

## 2019-03-04 RX ADMIN — PIPERACILLIN SODIUM AND TAZOBACTAM SODIUM 3.38 G: 3; .375 INJECTION, POWDER, LYOPHILIZED, FOR SOLUTION INTRAVENOUS at 07:40

## 2019-03-04 RX ADMIN — INSULIN LISPRO 3 UNITS: 100 INJECTION, SOLUTION INTRAVENOUS; SUBCUTANEOUS at 10:35

## 2019-03-04 RX ADMIN — VENLAFAXINE 37.5 MG: 37.5 TABLET ORAL at 09:05

## 2019-03-04 RX ADMIN — CARVEDILOL 6.25 MG: 6.25 TABLET, FILM COATED ORAL at 16:55

## 2019-03-04 RX ADMIN — ASPIRIN 81 MG CHEWABLE TABLET 81 MG: 81 TABLET CHEWABLE at 09:05

## 2019-03-04 RX ADMIN — Medication 10 ML: at 07:40

## 2019-03-04 RX ADMIN — SODIUM CHLORIDE 50 ML/HR: 900 INJECTION, SOLUTION INTRAVENOUS at 18:14

## 2019-03-04 NOTE — PROGRESS NOTES
SPEECH THERAPY SCREENING: 
SERVICES ARE NOT INDICATED AT THIS TIME [facility] for speech therapy based on results obtained during the nursing admission assessment. The patients chart was reviewed and the patient is not appropriate for a skilled therapy evaluation at this time. Please consult speech therapy if any therapy needs arise. Thank you. Edy Ty, SLP

## 2019-03-04 NOTE — NURSE NAVIGATOR
Chart reviewed by Heart Failure Nurse Navigator. Heart Failure database completed. EF:  61/65% ACEi/ARB/ARNi: not indicated BB: coreg 6.25 mg twice daily Aldosterone Antagonist: not indicated Obstructive Sleep Apnea Screening: STOP-BANG score: 
 Referred to Sleep Medicine: CRT not indicated (ef 61/76%). NYHA Functional Class documentation requested Heart Failure Teach Back in Patient Education. Heart Failure Avoiding Triggers on Discharge Instructions. Cardiologist: Dr. Susi Johnson ([facility]) Post discharge follow up phone call to be made within 48-72 hours of discharge.

## 2019-03-04 NOTE — PROGRESS NOTES
Day #4 of Zosyn Indication: sepsis Current regimen:  3.375 gm Q 8 hr Recent Labs 19 
6417 19 
0036 19 
4351 WBC 11.9* 13.9* 16.6*  
CREA 4.94* 2.52* 1.22* BUN 34* 21* 13  
 
Est CrCl:< 20  ml/min; Temp (24hrs), Av.1 °F (36.7 °C), Min:97.5 °F (36.4 °C), Max:99.1 °F (37.3 °C) Cultures: 3/1 blood NGTD Plan: Change to 3.375 Gm Q 12hr per renal dosing protocol

## 2019-03-04 NOTE — PROGRESS NOTES
Cardiology Progress Note 3/4/2019     Admit Date: 3/1/2019 Admit Diagnosis: Sepsis ([facility] [A41.9]  CC: none currently Assessment:  
Principal Problem: 
  Sepsis ([facility] (3/1/2019) Active Problems: 
  NSTEMI (non-ST elevated myocardial infarction) ([facility] (3/2/2019) CHF (congestive heart failure) ([facility] (3/2/2019) Benign essential hypertension (3/2/2019) Diabetic peripheral neuropathy ([facility] (3/2/2019) Type II diabetes mellitus, uncontrolled ([facility] (3/2/2019) Hyperlipidemia (3/2/2019) Plan:  
Feels OK and no CP/SOB. But creatinine is markedly elevated from baseline to 4.94. Suspect contrast reaction. CXR shows improved pulmonary edema. Renal consult and adjust meds. Volume status:euvolemic Renal function: stable For other plans, see orders. Subjective: Colin Hammans reports Chest Pain:  [x]   none,  consistent with  []   non-cardiac   []   atypical   []   angina [x]   none now    []      on-going Dyspnea: [x]   none  []   at rest  []   with exertion     []   improved   []   unchanged   []   worsening PND:       [x]   none  []   overnight Orthopnea: [x]   none  []   improved  []   unchanged  []   worsening Presyncope: [x]   none   []   improved    []   unchanged    []   worsening Ambulated in hallway without symptoms  []   Yes Ambulated in room without symptoms  []   Yes Objective:  
 Physical Exam: 
Overall VSSAF;   
Visit Vitals /70 Pulse 80 Temp 97.7 °F (36.5 °C) Resp 18 Ht 5' 5\" (1.651 m) Wt 108.4 kg (238 lb 15.7 oz) SpO2 93% BMI 39.77 kg/m² Temp (24hrs), Av.3 °F (36.8 °C), Min:97.5 °F (36.4 °C), Max:99.4 °F (37.4 °C) Patient Vitals for the past 8 hrs: 
 Pulse 19 1655 80  
19 1621 80  
19 1154 80 Patient Vitals for the past 8 hrs: 
 Resp  
19 1621 18  
19 1154 18 Patient Vitals for the past 8 hrs: 
 BP  
19 1655 143/70  
19 1621 143/70 03/04/19 1154 106/48 Intake/Output Summary (Last 24 hours) at 3/4/2019 1727 Last data filed at 3/4/2019 0142 Gross per 24 hour Intake 1000 ml Output 230 ml Net 770 ml General Appearance: Well developed, well nourished, no acute distress. Ears/Nose/Mouth/Throat:   Normal MM; anicteric. JVP: WNL Resp:   Lungs clear to auscultation bilaterally. Nl resp effort. Cardiovascular:  RRR, S1, S2 normal, no new murmur. No gallop or rub. Abdomen:   Soft, non-tender, bowel sounds are present. Extremities: No edema bilaterally. Skin: 
Neuro: Warm and dry. A/O x3, grossly nonfocal  
 []      cath site intact w/o hematoma or bruit; distal pulse unchanged. Recent Labs 03/04/19 
6742 03/03/19 
0036 03/02/19 
9275 CPK  --  58 73 CKMB  --  <1.0 1.5  
TROIQ 0.17* 0.36* 0.72* Recent Labs 03/04/19 
4278 03/03/19 
0036 03/02/19 
8503 * 135* 138  
K 4.5 4.0 4.2 CL 96* 99 100 CO2 25 27 30 BUN 34* 21* 13  
CREA 4.94* 2.52* 1.22* * 184* 226* CA 8.7 8.4* 9.0 ALB  --   --  2.3* WBC 11.9* 13.9* 16.6* HGB 7.7* 8.2* 8.6* HCT 25.1* 27.2* 27.8*  263 258 Recent Labs 03/02/19 
7391 SGOT 35 ALT 38  TBILI 1.2* TP 7.0 ALB 2.3*  
GLOB 4.7* No results for input(s): INR, PTP, APTT in the last 72 hours. No lab exists for component: INREXT No results for input(s): FE, TIBC, PSAT, FERR in the last 72 hours. Lab Results Component Value Date/Time Glucose (POC) 267 (H) 03/04/2019 11:14 AM  
 Glucose (POC) 207 (H) 03/04/2019 07:42 AM  
 Glucose (POC) 233 (H) 03/03/2019 09:55 PM  
 Glucose (POC) 206 (H) 03/03/2019 05:17 PM  
 Glucose (POC) 328 (H) 03/03/2019 12:08 PM  
 
 
Current Facility-Administered Medications Medication Dose Route Frequency  0.9% sodium chloride infusion  125 mL/hr IntraVENous CONTINUOUS  
 sodium chloride (NS) flush 5-40 mL  5-40 mL IntraVENous Q8H  
  sodium chloride (NS) flush 5-40 mL  5-40 mL IntraVENous PRN  
 enoxaparin (LOVENOX) injection 30 mg  30 mg SubCUTAneous Q24H  
 albuterol-ipratropium (DUO-NEB) 2.5 MG-0.5 MG/3 ML  3 mL Nebulization Q6H RT  
 albuterol (PROVENTIL VENTOLIN) nebulizer solution 2.5 mg  2.5 mg Nebulization Q4H PRN  
 aspirin chewable tablet 81 mg  81 mg Oral DAILY  SITagliptin (JANUVIA) tablet 100 mg  100 mg Oral DAILY  sodium chloride (NS) flush 5-40 mL  5-40 mL IntraVENous PRN  
 carvedilol (COREG) tablet 6.25 mg  6.25 mg Oral BID WITH MEALS  calcitRIOL (ROCALTROL) capsule 0.5 mcg  0.5 mcg Oral DAILY  gabapentin (NEURONTIN) capsule 300 mg  300 mg Oral TID PRN  
 LORazepam (ATIVAN) tablet 0.5 mg  0.5 mg Oral Q8H PRN  
 oxybutynin chloride XL (DITROPAN XL) tablet 15 mg  15 mg Oral DAILY  therapeutic multivitamin (THERAGRAN) tablet 1 Tab  1 Tab Oral DAILY  traMADol (ULTRAM) tablet 50 mg  50 mg Oral Q8H PRN  
 venlafaxine (EFFEXOR) tablet 37.5 mg  37.5 mg Oral BID  sodium chloride (NS) flush 5-40 mL  5-40 mL IntraVENous Q8H  
 sodium chloride (NS) flush 5-40 mL  5-40 mL IntraVENous PRN  
 ondansetron (ZOFRAN) injection 4 mg  4 mg IntraVENous Q4H PRN  
 bisacodyl (DULCOLAX) tablet 5 mg  5 mg Oral DAILY PRN  
 lactobac ac& pc-s.therm-b.anim (JUVE Q/RISAQUAD)  1 Cap Oral DAILY  insulin lispro (HUMALOG) injection   SubCUTAneous AC&HS  
 glucose chewable tablet 16 g  4 Tab Oral PRN  
 dextrose (D50W) injection syrg 12.5-25 g  12.5-25 g IntraVENous PRN  
 glucagon (GLUCAGEN) injection 1 mg  1 mg IntraMUSCular PRN  
 morphine injection 4 mg  4 mg IntraVENous Q2H PRN Rachele López MD

## 2019-03-04 NOTE — PROGRESS NOTES
Hospitalist Progress Note Amairani Looney MD 
Answering service: [phone] from in house phone Cell: [phone] Date of Service:  3/4/2019 NAME:  Pascual Arellano :  1940 MRN:  [id number] Admission Summary: This is a 55-year-old woman with a past medical history significant for type 2 diabetes, hypertension, was in her usual state of health until early this morning when the patient woke up with sudden onset of shortness of breath. The shortness of breath is progressive associated with fever, diaphoresis as well as chest discomfort. The patient denies history of congestive heart failure or asthma or COPD. When EMS arrived at the scene, the patient's oxygen saturation was 89% on room air. The patient was treated with DuoNeb with improvement in oxygen saturation. The shortness of breath associated with cough which is nonproductive. The patient also stated that she has gained couple of pounds in the last couple of days. The patient traveled to [place] 3 months ago and stated that she has been sick since then. The patient is taking Lasix. It is not clear why the patient is taking Lasix. Stated that she has no history of congestive heart failure. No record of prior admission to this hospital.  When the patient arrived at the emergency room, the patient was found to have fever, leukocytosis, clinical presentation symptoms triggered the Code Sepsis. The Code Sepsis protocol was initiated. The patient received antibiotics. The chest x-ray did not show any evidence of pneumonia but shows evidence of vascular congestion. The patient was subsequently referred to the hospitalist service for evaluation for admission. Interval history / Subjective:  
  F/u SOB Assessment & Plan:  
 
Sepsis ([facility] (3/1/2019) 
-source? 
-Urinalysis 3/1 unremarkable -CTA chest 3/1 No evidence of acute pulmonary thromboembolism. Mild pulmonary edema and small bilateral pleural effusions 
-On Zosyn, don't think the patient needs antibiotics. Also, in light of worsening renal function will stop 3/4 
-Follow cultures, no growth so far 
-Leukocytosis improving NSTEMI (non-ST elevated myocardial infarction) ([facility] (3/2/2019) -No acute  changes in ECG 
-S/P cath 3/2: No significant CAD with Cx dominant,Normal LV size with moderate antrolateral and apical hypokinesis in setting of elevated biomarkere c/w Takotsubo cardiomyopthy. -Elevated Rt an Lt ventricular filling pressures 
-Appreciate Cardiology Afib RVR 
-Echo with EF 61-65%. No RWMA 
-Now in normal sinus 
-Was on heparin/Cardizem, now off both 
-on Coreg, continue Pulmonary edema with pleural effusion 
-sec to volume overload 
-Was on lasix, currently on hold Probable CHF (congestive heart failure) -Not sure if the patient has CHF Anemia 
-likely chronic 
-Follow work up Benign essential hypertension (3/2/2019) -Continue home meds Diabetic peripheral neuropathy ([facility] (3/2/2019) -stable on home meds DM type 2 
-SSI ASUNCION 
-sec to likely dye load 
-US renal No hydronephrosis. A 2.9 cm simple cyst in the right kidney 
-Appreciate discussion with Nephrology, started on IV fluids -Monitor for volume overload Anxiety/Depression 
-on Effexor, continue Obesity 
-Counseled Cardiac diet Code status: Full Prophylaxis: Lovenox Care Plan discussed with: Patient/Family Disposition: TBD Hospital Problems  Date Reviewed: 3/2/2019 Codes Class Noted POA  
 NSTEMI (non-ST elevated myocardial infarction) ([facility] ICD-10-CM: I21.4 ICD-9-CM: 410.70  3/2/2019 Yes  
   
 CHF (congestive heart failure) (HCC) ICD-10-CM: I50.9 ICD-9-CM: 428.0  3/2/2019 Yes Benign essential hypertension ICD-10-CM: I10 
ICD-9-CM: 401.1  3/2/2019 Yes Diabetic peripheral neuropathy (HCC) ICD-10-CM: E11.42 
ICD-9-CM: 250.60, 357.2  3/2/2019 Yes Type II diabetes mellitus, uncontrolled ([facility] ICD-10-CM: E11.65 ICD-9-CM: 250.02  3/2/2019 Yes Hyperlipidemia ICD-10-CM: E78.5 ICD-9-CM: 272.4  3/2/2019 Yes * (Principal) Sepsis ([facility] ICD-10-CM: A41.9 ICD-9-CM: 038.9, 995.91  3/1/2019 Yes Review of Systems: A comprehensive review of systems was negative except for that written in the HPI. Vital Signs:  
 Last 24hrs VS reviewed since prior progress note. Most recent are: 
Visit Vitals /48 (BP 1 Location: Left arm, BP Patient Position: Sitting) Pulse 80 Temp 99.4 °F (37.4 °C) Resp 18 Ht 5' 5\" (1.651 m) Wt 108.4 kg (238 lb 15.7 oz) SpO2 98% BMI 39.77 kg/m² Intake/Output Summary (Last 24 hours) at 3/4/2019 1341 Last data filed at 3/4/2019 5579 Gross per 24 hour Intake 1000 ml Output 231 ml Net 769 ml Physical Examination:  
 
 
     
Constitutional:  No acute distress, cooperative, pleasant   
ENT:  Oral mucous moist, oropharynx benign. Neck supple, Resp:  CTA bilaterally. No wheezing/rhonchi/rales. No accessory muscle use CV:  Regular rhythm, normal rate, no murmurs, gallops, rubs GI:  Soft, non distended, non tender. normoactive bowel sounds, no hepatosplenomegaly Musculoskeletal:  No edema, warm, 2+ pulses throughout Neurologic:  Moves all extremities. AAOx3, CN II-XII reviewed Skin:  Good turgor, no rashes or ulcers Data Review:  
 Review and/or order of clinical lab test 
 
 
Labs:  
 
Recent Labs 03/04/19 
9120 03/03/19 
0036 WBC 11.9* 13.9* HGB 7.7* 8.2* HCT 25.1* 27.2*  
 263 Recent Labs 03/04/19 
9653 03/03/19 
1339 03/03/19 
0036 03/02/19 
0429 03/01/19 
1638 *  --  135* 138  --   
K 4.5  --  4.0 4.2  --   
CL 96*  --  99 100  --   
CO2 25  --  27 30  --   
BUN 34*  --  21* 13  --   
CREA 4.94*  --  2.52* 1.22*  --   
 *  --  184* 226*  --   
CA 8.7  --  8.4* 9.0  --   
MG  --  1.8  --   --  1.9 PHOS  --   --   --   --  3.2 Recent Labs 03/02/19 
0429 03/01/19 
1638 SGOT 35  --   
ALT 38  --   
  --   
TBILI 1.2*  --   
TP 7.0  --   
ALB 2.3*  --   
GLOB 4.7*  -- AML  --  29  
LPSE  --  37* Recent Labs 03/01/19 
1413 APTT 28.4 No results for input(s): FE, TIBC, PSAT, FERR in the last 72 hours. No results found for: FOL, RBCF No results for input(s): PH, PCO2, PO2 in the last 72 hours. Recent Labs 03/04/19 
7795 03/03/19 
0036 03/02/19 
2651 CPK  --  58 73 CKNDX  --  Cannot be calculated 2.1 TROIQ 0.17* 0.36* 0.72* No results found for: CHOL, CHOLX, CHLST, CHOLV, HDL, LDL, LDLC, DLDLP, TGLX, TRIGL, TRIGP, CHHD, CHHDX Lab Results Component Value Date/Time Glucose (POC) 267 (H) 03/04/2019 11:14 AM  
 Glucose (POC) 207 (H) 03/04/2019 07:42 AM  
 Glucose (POC) 233 (H) 03/03/2019 09:55 PM  
 Glucose (POC) 206 (H) 03/03/2019 05:17 PM  
 Glucose (POC) 328 (H) 03/03/2019 12:08 PM  
 
Lab Results Component Value Date/Time Color YELLOW/STRAW 03/01/2019 01:09 PM  
 Appearance CLEAR 03/01/2019 01:09 PM  
 Specific gravity 1.023 03/01/2019 01:09 PM  
 pH (UA) 5.0 03/01/2019 01:09 PM  
 Protein 30 (A) 03/01/2019 01:09 PM  
 Glucose 100 (A) 03/01/2019 01:09 PM  
 Ketone 15 (A) 03/01/2019 01:09 PM  
 Bilirubin NEGATIVE  03/01/2019 01:09 PM  
 Urobilinogen 1.0 03/01/2019 01:09 PM  
 Nitrites NEGATIVE  03/01/2019 01:09 PM  
 Leukocyte Esterase NEGATIVE  03/01/2019 01:09 PM  
 Epithelial cells FEW 03/01/2019 01:09 PM  
 Bacteria NEGATIVE  03/01/2019 01:09 PM  
 WBC 0-4 03/01/2019 01:09 PM  
 RBC 0-5 03/01/2019 01:09 PM  
 
 
 
Medications Reviewed:  
 
Current Facility-Administered Medications Medication Dose Route Frequency  0.9% sodium chloride infusion  125 mL/hr IntraVENous CONTINUOUS  piperacillin-tazobactam (ZOSYN) 3.375 g in 0.9% sodium chloride (MBP/ADV) 100 mL  3.375 g IntraVENous Q12H  
 sodium chloride (NS) flush 5-40 mL  5-40 mL IntraVENous Q8H  
 sodium chloride (NS) flush 5-40 mL  5-40 mL IntraVENous PRN  
 enoxaparin (LOVENOX) injection 30 mg  30 mg SubCUTAneous Q24H  
 albuterol-ipratropium (DUO-NEB) 2.5 MG-0.5 MG/3 ML  3 mL Nebulization Q6H RT  
 albuterol (PROVENTIL VENTOLIN) nebulizer solution 2.5 mg  2.5 mg Nebulization Q4H PRN  
 aspirin chewable tablet 81 mg  81 mg Oral DAILY  SITagliptin (JANUVIA) tablet 100 mg  100 mg Oral DAILY  sodium chloride (NS) flush 5-40 mL  5-40 mL IntraVENous PRN  
 carvedilol (COREG) tablet 6.25 mg  6.25 mg Oral BID WITH MEALS  calcitRIOL (ROCALTROL) capsule 0.5 mcg  0.5 mcg Oral DAILY  gabapentin (NEURONTIN) capsule 300 mg  300 mg Oral TID PRN  
 LORazepam (ATIVAN) tablet 0.5 mg  0.5 mg Oral Q8H PRN  
 oxybutynin chloride XL (DITROPAN XL) tablet 15 mg  15 mg Oral DAILY  therapeutic multivitamin (THERAGRAN) tablet 1 Tab  1 Tab Oral DAILY  traMADol (ULTRAM) tablet 50 mg  50 mg Oral Q8H PRN  
 venlafaxine (EFFEXOR) tablet 37.5 mg  37.5 mg Oral BID  sodium chloride (NS) flush 5-40 mL  5-40 mL IntraVENous Q8H  
 sodium chloride (NS) flush 5-40 mL  5-40 mL IntraVENous PRN  
 ondansetron (ZOFRAN) injection 4 mg  4 mg IntraVENous Q4H PRN  
 bisacodyl (DULCOLAX) tablet 5 mg  5 mg Oral DAILY PRN  
 lactobac ac& pc-s.therm-b.anim (JUVE Q/RISAQUAD)  1 Cap Oral DAILY  insulin lispro (HUMALOG) injection   SubCUTAneous AC&HS  
 glucose chewable tablet 16 g  4 Tab Oral PRN  
 dextrose (D50W) injection syrg 12.5-25 g  12.5-25 g IntraVENous PRN  
 glucagon (GLUCAGEN) injection 1 mg  1 mg IntraMUSCular PRN  
 morphine injection 4 mg  4 mg IntraVENous Q2H PRN  
 
______________________________________________________________________ EXPECTED LENGTH OF STAY: 4d 19h ACTUAL LENGTH OF STAY:          3 Colt Thomas MD

## 2019-03-04 NOTE — CONSULTS
[facility]   [address] [facility], [address] [place] [address], [facility]– [place]  Phone: [phone] NOTE     Patient: Fredi Davila MRN: [id number]  PCP: Mariza Horvath MD   :     1940  Age:   78 y.o. Sex:  female      Referring physician: Dar Quintana MD  Reason for consultation: 78 y.o. female with Sepsis ([address] [V10.4] complicated by ASUNCION   Admission Date: 3/1/2019  7:52 AM  LOS: 3 days      ASSESSMENT and PLAN :   ASUNCION on CKD :  · Contrast nephropathy ( 2 contrast loads in less than 24 hrs) in the setting of CHF w/ depressed EF (45%) , Metformin use, Hypotension , IV vancomycin loading , diuretic use   · Non oliguric w/ rapidly rising Creatinine   · Off vancomycin   · Consider stopping Zosyn as well. Check urine Eosinophil count   · Ordered stat renal US to r/o obstruction   · Hold all potential nephrotoxins   · Started IVF for Hypotension and watch for CHF  · Discussed the risks for needing dialysis in the next 24 hrs   · Serial labs     CKD Stage II :  · Baseline ~ 1mg/dl   · May have moderate CKD from DM, HTN     Hypotension    NSTEMI:   - Cath : Non occlusiVe CAD   - Suspected Takatsubos CMP w/ EF 45% on admission     DM-II       Care Plan discussed with:  pt       Thank you for consulting [facility] in the care of your patient. Subjective:   HPI: Fredi Davila is a 78 y.o.  female who has been admitted to the hospital for worsening SOB, was found to have elevated Trops w/ peak Troponin level of 1.31. She had Cardiac cath and non occlusive CAD was found. Earlier on the admission , she had CTA  And PE was ruled out. She was diuresed w/ several doses of lasix for a wedge pressure of 19. She was loaded w/ IV vancomycin and Zosyn for possible sepsis. She has remained hypotensive for last 36 hrs and there is a anika rise in Creatinine from 1.0 on admission to 4.5 in less than 3 days and reports frequent urination.  No prior renal issues     Past Medical Hx:   Past Medical History:   Diagnosis Date    Diabetes ([address]     Hypertension         Past Surgical Hx:     Past Surgical History:   Procedure Laterality Date    HX KNEE REPLACEMENT Bilateral     HX ORTHOPAEDIC           Allergies   Allergen Reactions    Tylenol [Acetaminophen] Other (comments)     Pt told by MD not to take, pt unsure why       Social Hx:  reports that  has never smoked. She does not have any smokeless tobacco history on file. She reports that she does not drink alcohol. History reviewed. No pertinent family history. Review of Systems:  A thorough twelve point review of system was performed today. Pertinent positives and negatives are mentioned in the HPI. The reminder of the ROS is negative and noncontributory. Objective:    Vitals:    Vitals:    03/04/19 0027 03/04/19 0318 03/04/19 0738 03/04/19 0746   BP: (!) 87/47 94/46  102/45   Pulse: 80 79  82   Resp: 22 20  20   Temp: 98.4 °F (36.9 °C) 97.5 °F (36.4 °C)  99.1 °F (37.3 °C)   SpO2: 100% 99% 92% 97%   Weight:       Height:         I&O's:  03/03 0701 - 03/04 0700  In: 1000 [P.O.:1000]  Out: 81 [Urine:81]  Visit Vitals  /45 (BP 1 Location: Right arm, BP Patient Position: At rest)   Pulse 82   Temp 99.1 °F (37.3 °C)   Resp 20   Ht 5' 5\" (1.651 m)   Wt 107 kg (235 lb 14.3 oz)   SpO2 97%   BMI 39.25 kg/m²       Physical Exam:  General:  No apparent Distress. Obese   HEENT: PERRL,  No Pallor , No Icterus  Neck: Supple,no mass palpable  Lungs : CTA  CVS: RRR, S1 S2 normal, No murmur   Abdomen: Soft, NT, BS +  Extremities: Edema  Skin: No rash or lesions.   MS: No joint swelling, erythema, warmth  Neurologic: non focal, AAO x 3  Psych: normal affect    Laboratory Results:    Recent Labs     03/04/19  0337 03/03/19  1339 03/03/19  0036 03/02/19  0429 03/01/19  1638 03/01/19  0822   *  --  135* 138  --  135*   K 4.5  --  4.0 4.2  --  4.4   CL 96*  --  99 100  --  100   CO2 25  --  27 30  -- 26   *  --  184* 226*  --  218*   BUN 34*  --  21* 13  --  11   CREA 4.94*  --  2.52* 1.22*  --  1.05*   CA 8.7  --  8.4* 9.0  --  8.6   MG  --  1.8  --   --  1.9 1.3*   PHOS  --   --   --   --  3.2  --    ALB  --   --   --  2.3*  --  2.9*   SGOT  --   --   --  35  --  62*   ALT  --   --   --  38  --  49     Recent Labs     03/04/19  0337 03/03/19  0036 03/02/19  0429   WBC 11.9* 13.9* 16.6*   HGB 7.7* 8.2* 8.6*   HCT 25.1* 27.2* 27.8*    263 258     No results found for: SDES  Lab Results   Component Value Date/Time    Culture result: NO GROWTH 3 DAYS 03/01/2019 08:22 AM     Recent Results (from the past 24 hour(s))   GLUCOSE, POC    Collection Time: 03/03/19 12:08 PM   Result Value Ref Range    Glucose (POC) 328 (H) 65 - 100 mg/dL    Performed by Dhruv Kimball (CON)    MAGNESIUM    Collection Time: 03/03/19  1:39 PM   Result Value Ref Range    Magnesium 1.8 1.6 - 2.4 mg/dL   GLUCOSE, POC    Collection Time: 03/03/19  5:17 PM   Result Value Ref Range    Glucose (POC) 206 (H) 65 - 100 mg/dL    Performed by Rohan Silva (CON)    GLUCOSE, POC    Collection Time: 03/03/19  9:55 PM   Result Value Ref Range    Glucose (POC) 233 (H) 65 - 100 mg/dL    Performed by Lilli Avila    TROPONIN I    Collection Time: 03/04/19  3:37 AM   Result Value Ref Range    Troponin-I, Qt. 0.17 (H) <0.05 ng/mL   CBC WITH AUTOMATED DIFF    Collection Time: 03/04/19  3:37 AM   Result Value Ref Range    WBC 11.9 (H) 3.6 - 11.0 K/uL    RBC 2.85 (L) 3.80 - 5.20 M/uL    HGB 7.7 (L) 11.5 - 16.0 g/dL    HCT 25.1 (L) 35.0 - 47.0 %    MCV 88.1 80.0 - 99.0 FL    MCH 27.0 26.0 - 34.0 PG    MCHC 30.7 30.0 - 36.5 g/dL    RDW 14.5 11.5 - 14.5 %    PLATELET 699 441 - 740 K/uL    MPV 10.9 8.9 - 12.9 FL    NRBC 0.0 0  WBC    ABSOLUTE NRBC 0.00 0.00 - 0.01 K/uL    NEUTROPHILS 67 32 - 75 %    LYMPHOCYTES 17 12 - 49 %    MONOCYTES 11 5 - 13 %    EOSINOPHILS 4 0 - 7 %    BASOPHILS 1 0 - 1 %    IMMATURE GRANULOCYTES 1 (H) 0.0 - 0.5 % ABS. NEUTROPHILS 8.0 1.8 - 8.0 K/UL    ABS. LYMPHOCYTES 2.0 0.8 - 3.5 K/UL    ABS. MONOCYTES 1.3 (H) 0.0 - 1.0 K/UL    ABS. EOSINOPHILS 0.5 (H) 0.0 - 0.4 K/UL    ABS. BASOPHILS 0.1 0.0 - 0.1 K/UL    ABS. IMM. GRANS. 0.1 (H) 0.00 - 0.04 K/UL    DF AUTOMATED     METABOLIC PANEL, BASIC    Collection Time: 03/04/19  3:37 AM   Result Value Ref Range    Sodium 133 (L) 136 - 145 mmol/L    Potassium 4.5 3.5 - 5.1 mmol/L    Chloride 96 (L) 97 - 108 mmol/L    CO2 25 21 - 32 mmol/L    Anion gap 12 5 - 15 mmol/L    Glucose 194 (H) 65 - 100 mg/dL    BUN 34 (H) 6 - 20 MG/DL    Creatinine 4.94 (H) 0.55 - 1.02 MG/DL    BUN/Creatinine ratio 7 (L) 12 - 20      GFR est AA 10 (L) >60 ml/min/1.73m2    GFR est non-AA 8 (L) >60 ml/min/1.73m2    Calcium 8.7 8.5 - 10.1 MG/DL   GLUCOSE, POC    Collection Time: 03/04/19  7:42 AM   Result Value Ref Range    Glucose (POC) 207 (H) 65 - 100 mg/dL    Performed by Gissel Morel          Urine dipstick:   Lab Results   Component Value Date/Time    Color YELLOW/STRAW 03/01/2019 01:09 PM    Appearance CLEAR 03/01/2019 01:09 PM    Specific gravity 1.023 03/01/2019 01:09 PM    pH (UA) 5.0 03/01/2019 01:09 PM    Protein 30 (A) 03/01/2019 01:09 PM    Glucose 100 (A) 03/01/2019 01:09 PM    Ketone 15 (A) 03/01/2019 01:09 PM    Bilirubin NEGATIVE  03/01/2019 01:09 PM    Urobilinogen 1.0 03/01/2019 01:09 PM    Nitrites NEGATIVE  03/01/2019 01:09 PM    Leukocyte Esterase NEGATIVE  03/01/2019 01:09 PM    Epithelial cells FEW 03/01/2019 01:09 PM    Bacteria NEGATIVE  03/01/2019 01:09 PM    WBC 0-4 03/01/2019 01:09 PM    RBC 0-5 03/01/2019 01:09 PM       I have reviewed the following: All pertinent labs, microbiology data, radiology imaging for my assessment     Medications list Personally Reviewed   [x]      Yes     []               No       Medications:  Prior to Admission medications    Medication Sig Start Date End Date Taking?  Authorizing Provider   oxybutynin chloride XL (DITROPAN XL) 15 mg CR tablet Take 15 mg by mouth daily. Yes Provider, Historical   metFORMIN (GLUCOPHAGE) 1,000 mg tablet Take 1,000 mg by mouth two (2) times daily (with meals). Yes Provider, Historical   therapeutic multivitamin (THERAGRAN) tablet Take 1 Tab by mouth daily. Yes Provider, Historical   gabapentin (NEURONTIN) 300 mg capsule Take 300 mg by mouth three (3) times daily as needed. Yes Provider, Historical   venlafaxine (EFFEXOR) 37.5 mg tablet Take 37.5 mg by mouth two (2) times a day. Yes Provider, Historical   metoprolol succinate (TOPROL XL) 25 mg XL tablet Take 12.5 mg by mouth daily. Yes Provider, Historical   SITagliptin (JANUVIA) 100 mg tablet Take 100 mg by mouth daily. Yes Provider, Historical   traMADol (ULTRAM) 50 mg tablet Take 1 Tab by mouth every eight (8) hours as needed for Pain. Max Daily Amount: 150 mg. 3/9/18  Yes Darwin Delatorre PA   furosemide (LASIX) 40 mg tablet Take 40 mg by mouth daily. Yes Provider, Historical   LORazepam (ATIVAN) 0.5 mg tablet Take 0.5 mg by mouth every eight (8) hours as needed for Anxiety. Yes Provider, Historical   potassium chloride (K-DUR, KLOR-CON) 10 mEq tablet Take 10 mEq by mouth two (2) times a day. Yes Provider, Historical   simvastatin (ZOCOR) 20 mg tablet Take 20 mg by mouth nightly. Yes Provider, Historical   calcitRIOL (ROCALTROL) 0.5 mcg capsule Take 0.5 mcg by mouth daily. Yes Provider, Historical   aspirin 81 mg tablet Take 81 mg by mouth daily. Yes Provider, Historical        Thank you for allowing us to participate in the care of this patient. We will follow patient. Please dont hesitate to call with any questions    Johnnie Willson MD  [facility]   [address] [facility] [place]  Phone - [phone]   Fax - [phone]  www. [email or web address]

## 2019-03-04 NOTE — PROGRESS NOTES
Physical Therapy Screening: An [facility] screening referral was triggered for physical therapy based on results obtained during the nursing admission assessment. The patients chart was reviewed and the patient is appropriate for a skilled therapy evaluation if there is a decline in functional mobility from baseline. Please order a consult for physical therapy if you are in agreement and would like an evaluation to be completed. Thank you.  
 
Daniela Wooten, PT

## 2019-03-04 NOTE — DIABETES MGMT
DTC Progress Note Recommendations/ Comments:Chart reviewed on [address] for hyperglycemia. Patient has required 12 units of correction in the last 24 hours. If appropriate, please consider: 1. Discontinuing Januvia due to renal status 2. Start Lantus 10 units Message sent to Dr Teena Solares regarding above Current hospital DM medication: Januvia, Lispro correction, normal sensitivity Patient is a 78 y.o. female with known Type 2 Diabetes on oral agents (dual therapy): metformin (generic), and Januvia at home. A1c:  
No results found for: HBA1C, HGBE8, [id number] Recent Glucose Results:  
Lab Results Component Value Date/Time  (H) 03/04/2019 03:37 AM  
 GLUCPOC 267 (H) 03/04/2019 11:14 AM  
 GLUCPOC 207 (H) 03/04/2019 07:42 AM  
 GLUCPOC 233 (H) 03/03/2019 09:55 PM  
  
 
Lab Results Component Value Date/Time Creatinine 4.94 (H) 03/04/2019 03:37 AM  
 
Estimated Creatinine Clearance: 11.3 mL/min (A) (based on SCr of 4.94 mg/dL (H)). Active Orders Diet DIET CARDIAC Regular; Consistent Carb 1500-1600kcal  
  
 
PO intake:  
Patient Vitals for the past 72 hrs: 
 % Diet Eaten 03/03/19 1733 100 % 03/03/19 1321 75 % 03/03/19 0907 80 % Will continue to follow as needed. Thank you Karena Ruiz MS, RN, CDE Time spent: 6 minutes

## 2019-03-04 NOTE — PROGRESS NOTES
Reason for Admission: The patient presented with shortness of breath, sepsis, CHF   
            
RRAT Score:  22 Resources/supports as identified by patient/family: Patient's son is present at bedside Top Challenges facing patient (as identified by patient/family and CM): Finances/Medication cost?  The patient denied having difficulty affording or accessing medications prior to hospitalizaiton Transportation? The patient's family will transport home upon discharge Support system or lack thereof? Patient's son is at bedside Living arrangements? Patient lives alone in own apartment Self-care/ADLs/Cognition? Patient is alert and oriented, endorses being independent with ADLs and mobilizes with cane at baseline Current Advanced Directive/Advance Care Plan: Full Code, none on file Plan for utilizing home health:  Patient may benefit from [facility] for CHF Likelihood of readmission: Moderate Transition of Care Plan: TBD The CM met with patient and son Osman García at bedside in order to introduce the role of CM and assess for patient needs. The patient lives at home alone in apartment- verified demographics and insurance information. The patient endorses being independent with ADLs prior to hospitalization, ambulates with cane at baseline. The patient denied difficulty affording or accessing medications prior to hospitalization. The patient may benefit from [facility] for CHF/NSTEMI upon discharge. The patient endorses her son will transport home, the patient denied use of DME in the home. CM will continue to follow as discharge needs arise. Terrilee Spatz, MSW Care Management Interventions PCP Verified by CM: Yes(Patient verified PCP as Dr. Julissa Ruiz) Mode of Transport at Discharge:  Other (see comment)(Family will transport home upon dishcarge) Transition of Care Consult (CM Consult): Discharge Planning MyChart Signup: No 
Discharge Durable Medical Equipment: No 
Health Maintenance Reviewed: Yes Physical Therapy Consult: No 
Occupational Therapy Consult: No 
Speech Therapy Consult: No 
Current Support Network: Own Home, Lives Alone, Family Lives [facility] Follow Up Transport: Family Plan discussed with Pt/Family/Caregiver: Yes [employer] Information Provided?: No 
Discharge Location Discharge Placement: Home

## 2019-03-05 ENCOUNTER — APPOINTMENT (OUTPATIENT)
Dept: INTERVENTIONAL RADIOLOGY/VASCULAR | Age: 79
DRG: 871 | End: 2019-03-05
Attending: INTERNAL MEDICINE
Payer: MEDICARE

## 2019-03-05 LAB
ANION GAP SERPL CALC-SCNC: 15 MMOL/L (ref 5–15)
BASOPHILS # BLD: 0.1 K/UL (ref 0–0.1)
BASOPHILS NFR BLD: 1 % (ref 0–1)
BUN SERPL-MCNC: 43 MG/DL (ref 6–20)
BUN/CREAT SERPL: 7 (ref 12–20)
CALCIUM SERPL-MCNC: 8.9 MG/DL (ref 8.5–10.1)
CHLORIDE SERPL-SCNC: 91 MMOL/L (ref 97–108)
CO2 SERPL-SCNC: 22 MMOL/L (ref 21–32)
CREAT SERPL-MCNC: 6.34 MG/DL (ref 0.55–1.02)
DIFFERENTIAL METHOD BLD: ABNORMAL
EOSINOPHIL # BLD: 0.5 K/UL (ref 0–0.4)
EOSINOPHIL NFR BLD: 5 % (ref 0–7)
ERYTHROCYTE [DISTWIDTH] IN BLOOD BY AUTOMATED COUNT: 14.6 % (ref 11.5–14.5)
EST. AVERAGE GLUCOSE BLD GHB EST-MCNC: 192 MG/DL
GLUCOSE BLD STRIP.AUTO-MCNC: 127 MG/DL (ref 65–100)
GLUCOSE BLD STRIP.AUTO-MCNC: 128 MG/DL (ref 65–100)
GLUCOSE BLD STRIP.AUTO-MCNC: 221 MG/DL (ref 65–100)
GLUCOSE BLD STRIP.AUTO-MCNC: 225 MG/DL (ref 65–100)
GLUCOSE SERPL-MCNC: 177 MG/DL (ref 65–100)
HBA1C MFR BLD: 8.3 % (ref 4.2–6.3)
HCT VFR BLD AUTO: 23.8 % (ref 35–47)
HGB BLD-MCNC: 7.3 G/DL (ref 11.5–16)
IMM GRANULOCYTES # BLD AUTO: 0.2 K/UL (ref 0–0.04)
IMM GRANULOCYTES NFR BLD AUTO: 2 % (ref 0–0.5)
IRON SATN MFR SERPL: 15 % (ref 20–50)
IRON SERPL-MCNC: 23 UG/DL (ref 35–150)
LYMPHOCYTES # BLD: 1.7 K/UL (ref 0.8–3.5)
LYMPHOCYTES NFR BLD: 17 % (ref 12–49)
MCH RBC QN AUTO: 26.4 PG (ref 26–34)
MCHC RBC AUTO-ENTMCNC: 30.7 G/DL (ref 30–36.5)
MCV RBC AUTO: 86.2 FL (ref 80–99)
MONOCYTES # BLD: 1.1 K/UL (ref 0–1)
MONOCYTES NFR BLD: 11 % (ref 5–13)
NEUTS SEG # BLD: 6.3 K/UL (ref 1.8–8)
NEUTS SEG NFR BLD: 64 % (ref 32–75)
NRBC # BLD: 0.02 K/UL (ref 0–0.01)
NRBC BLD-RTO: 0.2 PER 100 WBC
PLATELET # BLD AUTO: 280 K/UL (ref 150–400)
PMV BLD AUTO: 10.8 FL (ref 8.9–12.9)
POTASSIUM SERPL-SCNC: 4.7 MMOL/L (ref 3.5–5.1)
RBC # BLD AUTO: 2.76 M/UL (ref 3.8–5.2)
SERVICE CMNT-IMP: ABNORMAL
SODIUM SERPL-SCNC: 128 MMOL/L (ref 136–145)
TIBC SERPL-MCNC: 156 UG/DL (ref 250–450)
VIT B12 SERPL-MCNC: 1456 PG/ML (ref 193–986)
WBC # BLD AUTO: 9.9 K/UL (ref 3.6–11)

## 2019-03-05 PROCEDURE — 80074 ACUTE HEPATITIS PANEL: CPT

## 2019-03-05 PROCEDURE — 74011250636 HC RX REV CODE- 250/636: Performed by: INTERNAL MEDICINE

## 2019-03-05 PROCEDURE — 77010033678 HC OXYGEN DAILY

## 2019-03-05 PROCEDURE — 74011000250 HC RX REV CODE- 250: Performed by: HOSPITALIST

## 2019-03-05 PROCEDURE — 74011250637 HC RX REV CODE- 250/637: Performed by: INTERNAL MEDICINE

## 2019-03-05 PROCEDURE — 94760 N-INVAS EAR/PLS OXIMETRY 1: CPT

## 2019-03-05 PROCEDURE — 85025 COMPLETE CBC W/AUTO DIFF WBC: CPT

## 2019-03-05 PROCEDURE — 0JH63XZ INSERTION OF TUNNELED VASCULAR ACCESS DEVICE INTO CHEST SUBCUTANEOUS TISSUE AND FASCIA, PERCUTANEOUS APPROACH: ICD-10-PCS | Performed by: STUDENT IN AN ORGANIZED HEALTH CARE EDUCATION/TRAINING PROGRAM

## 2019-03-05 PROCEDURE — 36415 COLL VENOUS BLD VENIPUNCTURE: CPT

## 2019-03-05 PROCEDURE — 80048 BASIC METABOLIC PNL TOTAL CA: CPT

## 2019-03-05 PROCEDURE — 94660 CPAP INITIATION&MGMT: CPT

## 2019-03-05 PROCEDURE — 5A1D70Z PERFORMANCE OF URINARY FILTRATION, INTERMITTENT, LESS THAN 6 HOURS PER DAY: ICD-10-PCS | Performed by: INTERNAL MEDICINE

## 2019-03-05 PROCEDURE — 82607 VITAMIN B-12: CPT

## 2019-03-05 PROCEDURE — 74011636637 HC RX REV CODE- 636/637: Performed by: INTERNAL MEDICINE

## 2019-03-05 PROCEDURE — 02HV33Z INSERTION OF INFUSION DEVICE INTO SUPERIOR VENA CAVA, PERCUTANEOUS APPROACH: ICD-10-PCS | Performed by: STUDENT IN AN ORGANIZED HEALTH CARE EDUCATION/TRAINING PROGRAM

## 2019-03-05 PROCEDURE — 94640 AIRWAY INHALATION TREATMENT: CPT

## 2019-03-05 PROCEDURE — 83540 ASSAY OF IRON: CPT

## 2019-03-05 PROCEDURE — 82962 GLUCOSE BLOOD TEST: CPT

## 2019-03-05 PROCEDURE — 90935 HEMODIALYSIS ONE EVALUATION: CPT

## 2019-03-05 PROCEDURE — 74011250636 HC RX REV CODE- 250/636: Performed by: STUDENT IN AN ORGANIZED HEALTH CARE EDUCATION/TRAINING PROGRAM

## 2019-03-05 PROCEDURE — 36558 INSERT TUNNELED CV CATH: CPT

## 2019-03-05 PROCEDURE — 74011250636 HC RX REV CODE- 250/636

## 2019-03-05 PROCEDURE — 65660000000 HC RM CCU STEPDOWN

## 2019-03-05 PROCEDURE — 83036 HEMOGLOBIN GLYCOSYLATED A1C: CPT

## 2019-03-05 RX ORDER — IPRATROPIUM BROMIDE AND ALBUTEROL SULFATE 2.5; .5 MG/3ML; MG/3ML
3 SOLUTION RESPIRATORY (INHALATION)
Status: DISCONTINUED | OUTPATIENT
Start: 2019-03-06 | End: 2019-03-11 | Stop reason: HOSPADM

## 2019-03-05 RX ORDER — CEFAZOLIN SODIUM 1 G/3ML
INJECTION, POWDER, FOR SOLUTION INTRAMUSCULAR; INTRAVENOUS
Status: COMPLETED
Start: 2019-03-05 | End: 2019-03-05

## 2019-03-05 RX ORDER — CEFAZOLIN SODIUM 1 G/3ML
2 INJECTION, POWDER, FOR SOLUTION INTRAMUSCULAR; INTRAVENOUS ONCE
Status: COMPLETED | OUTPATIENT
Start: 2019-03-05 | End: 2019-03-05

## 2019-03-05 RX ORDER — CEFAZOLIN SODIUM/WATER 2 G/20 ML
2 SYRINGE (ML) INTRAVENOUS
Status: DISCONTINUED | OUTPATIENT
Start: 2019-03-05 | End: 2019-03-05

## 2019-03-05 RX ORDER — SODIUM CHLORIDE 9 MG/ML
25 INJECTION, SOLUTION INTRAVENOUS CONTINUOUS
Status: DISCONTINUED | OUTPATIENT
Start: 2019-03-05 | End: 2019-03-05 | Stop reason: HOSPADM

## 2019-03-05 RX ORDER — HEPARIN SODIUM 1000 [USP'U]/ML
4300 INJECTION, SOLUTION INTRAVENOUS; SUBCUTANEOUS AS NEEDED
Status: DISCONTINUED | OUTPATIENT
Start: 2019-03-05 | End: 2019-03-11 | Stop reason: HOSPADM

## 2019-03-05 RX ORDER — FENTANYL CITRATE 50 UG/ML
200 INJECTION, SOLUTION INTRAMUSCULAR; INTRAVENOUS
Status: DISCONTINUED | OUTPATIENT
Start: 2019-03-05 | End: 2019-03-05 | Stop reason: HOSPADM

## 2019-03-05 RX ORDER — HEPARIN SODIUM 1000 [USP'U]/ML
INJECTION, SOLUTION INTRAVENOUS; SUBCUTANEOUS
Status: COMPLETED
Start: 2019-03-05 | End: 2019-03-05

## 2019-03-05 RX ORDER — LIDOCAINE HYDROCHLORIDE 20 MG/ML
INJECTION, SOLUTION INFILTRATION; PERINEURAL
Status: COMPLETED
Start: 2019-03-05 | End: 2019-03-05

## 2019-03-05 RX ORDER — SODIUM CHLORIDE 9 MG/ML
INJECTION INTRAMUSCULAR; INTRAVENOUS; SUBCUTANEOUS
Status: DISPENSED
Start: 2019-03-05 | End: 2019-03-05

## 2019-03-05 RX ORDER — MIDAZOLAM HYDROCHLORIDE 1 MG/ML
5 INJECTION, SOLUTION INTRAMUSCULAR; INTRAVENOUS
Status: DISCONTINUED | OUTPATIENT
Start: 2019-03-05 | End: 2019-03-05 | Stop reason: HOSPADM

## 2019-03-05 RX ORDER — HEPARIN SODIUM 5000 [USP'U]/ML
5000 INJECTION, SOLUTION INTRAVENOUS; SUBCUTANEOUS EVERY 8 HOURS
Status: DISCONTINUED | OUTPATIENT
Start: 2019-03-05 | End: 2019-03-11 | Stop reason: HOSPADM

## 2019-03-05 RX ADMIN — CEFAZOLIN SODIUM 2 G: 1 INJECTION, POWDER, FOR SOLUTION INTRAMUSCULAR; INTRAVENOUS at 09:39

## 2019-03-05 RX ADMIN — IPRATROPIUM BROMIDE AND ALBUTEROL SULFATE 3 ML: .5; 3 SOLUTION RESPIRATORY (INHALATION) at 20:33

## 2019-03-05 RX ADMIN — IPRATROPIUM BROMIDE AND ALBUTEROL SULFATE 3 ML: .5; 3 SOLUTION RESPIRATORY (INHALATION) at 08:25

## 2019-03-05 RX ADMIN — MIDAZOLAM HYDROCHLORIDE 0.5 MG: 1 INJECTION, SOLUTION INTRAMUSCULAR; INTRAVENOUS at 09:51

## 2019-03-05 RX ADMIN — FENTANYL CITRATE 25 MCG: 50 INJECTION, SOLUTION INTRAMUSCULAR; INTRAVENOUS at 09:52

## 2019-03-05 RX ADMIN — THERA TABS 1 TABLET: TAB at 17:07

## 2019-03-05 RX ADMIN — Medication 10 ML: at 21:30

## 2019-03-05 RX ADMIN — OXYBUTYNIN CHLORIDE 15 MG: 5 TABLET, EXTENDED RELEASE ORAL at 17:12

## 2019-03-05 RX ADMIN — IPRATROPIUM BROMIDE AND ALBUTEROL SULFATE 3 ML: .5; 3 SOLUTION RESPIRATORY (INHALATION) at 13:39

## 2019-03-05 RX ADMIN — Medication 10 ML: at 14:00

## 2019-03-05 RX ADMIN — HEPARIN SODIUM 3000 UNITS: 1000 INJECTION INTRAVENOUS; SUBCUTANEOUS at 10:03

## 2019-03-05 RX ADMIN — Medication 1 CAPSULE: at 17:06

## 2019-03-05 RX ADMIN — CALCITRIOL 0.5 MCG: 0.25 CAPSULE ORAL at 17:06

## 2019-03-05 RX ADMIN — INSULIN LISPRO 3 UNITS: 100 INJECTION, SOLUTION INTRAVENOUS; SUBCUTANEOUS at 07:16

## 2019-03-05 RX ADMIN — VENLAFAXINE 37.5 MG: 37.5 TABLET ORAL at 17:06

## 2019-03-05 RX ADMIN — SODIUM CHLORIDE 25 ML/HR: 900 INJECTION, SOLUTION INTRAVENOUS at 09:00

## 2019-03-05 RX ADMIN — LIDOCAINE HYDROCHLORIDE: 20 INJECTION, SOLUTION INFILTRATION; PERINEURAL at 09:58

## 2019-03-05 RX ADMIN — INSULIN LISPRO 3 UNITS: 100 INJECTION, SOLUTION INTRAVENOUS; SUBCUTANEOUS at 12:08

## 2019-03-05 RX ADMIN — HEPARIN SODIUM 4300 UNITS: 1000 INJECTION INTRAVENOUS; SUBCUTANEOUS at 22:15

## 2019-03-05 RX ADMIN — MIDAZOLAM HYDROCHLORIDE 0.5 MG: 1 INJECTION, SOLUTION INTRAMUSCULAR; INTRAVENOUS at 09:54

## 2019-03-05 RX ADMIN — IRON SUCROSE 300 MG: 20 INJECTION, SOLUTION INTRAVENOUS at 07:17

## 2019-03-05 RX ADMIN — CARVEDILOL 6.25 MG: 6.25 TABLET, FILM COATED ORAL at 17:07

## 2019-03-05 RX ADMIN — SITAGLIPTIN 100 MG: 100 TABLET, FILM COATED ORAL at 17:05

## 2019-03-05 RX ADMIN — ASPIRIN 81 MG CHEWABLE TABLET 81 MG: 81 TABLET CHEWABLE at 17:08

## 2019-03-05 RX ADMIN — HEPARIN SODIUM 5000 UNITS: 5000 INJECTION INTRAVENOUS; SUBCUTANEOUS at 21:29

## 2019-03-05 RX ADMIN — CARVEDILOL 6.25 MG: 6.25 TABLET, FILM COATED ORAL at 07:17

## 2019-03-05 RX ADMIN — Medication 10 ML: at 07:17

## 2019-03-05 NOTE — DIABETES MGMT
DTC Progress Note    Recommendations/ Comments:Chart reviewed on [address] for hyperglycemia. If appropriate, please consider:  1. Discontinuing Januvia due to renal status  2. Start Lantus 10 units  3. Change lispro correction to high sensitivity given GFR of 6    Current hospital DM medication: Januvia, Lispro correction, normal sensitivity    Patient is a 78 y.o. female with known Type 2 Diabetes on oral agents (dual therapy): metformin (generic), and Januvia at home. A1c:   Lab Results   Component Value Date/Time    Hemoglobin A1c 8.3 (H) 03/05/2019 04:45 AM       Recent Glucose Results:   Lab Results   Component Value Date/Time     (H) 03/05/2019 04:45 AM    GLUCPOC 225 (H) 03/05/2019 11:27 AM    GLUCPOC 221 (H) 03/05/2019 06:04 AM    GLUCPOC 190 (H) 03/04/2019 09:30 PM        Lab Results   Component Value Date/Time    Creatinine 6.34 (H) 03/05/2019 04:45 AM     Estimated Creatinine Clearance: 9 mL/min (A) (based on SCr of 6.34 mg/dL (H)). Active Orders   Diet    DIET NPO Except Meds, With Sips of Clear Fluids        PO intake:   Patient Vitals for the past 72 hrs:   % Diet Eaten   03/04/19 1814 50 %   03/03/19 1733 100 %   03/03/19 1321 75 %   03/03/19 0907 80 %       Will continue to follow as needed.     Thank you  Lilli Amezquita RD     Time spent: 4 minutes

## 2019-03-05 NOTE — PROCEDURES
[facility]  [phone]    Vitals   Pre   Post   Assessment   Pre   Post     Temp  Temp: 98 °F (36.7 °C) (03/05/19 1330) 98.2 LOC  A & O X 3 A & O X 3   HR   Pulse (Heart Rate): 79 (03/05/19 1330) 82 Lungs   Dim/wheezing BI lat upper/lower lobes Dim bi lat bases   B/P   BP: 151/77 (03/05/19 1330) 127/80 Cardiac   Normal sinus rhyth Normal sinus rhythm   Resp   Resp Rate: 16 (03/05/19 1330) 16 Skin   Warm & dry  Warm & dry   Pain level  Pain Intensity 1: 0 (03/05/19 0443) 0 Edema  +1 lower ext     Trace lower ext   Orders:    Duration:   Start:  1330 Procedure Start Time: 9868 End:  8833 Procedure End Time: 1004 Total:   3hrs   Dialyzer:   Dialyzer/Set Up Inspection: Revaclear (03/05/19 1330)   K Bath:   Dialysate K (mEq/L): 3 (03/05/19 1330)   Ca Bath:   Dialysate CA (mEq/L): 2.5 (03/05/19 1330)   Na/Bicarb:   Dialysate NA (mEq/L): 140 (03/05/19 1330)   Target Fluid Removal:   Goal/Amount of Fluid to Remove (mL): 1500 mL (03/05/19 1330)   Access  Perm cath   Type & Location:    Right sub clav   Labs     Obtained/Reviewed   Critical Results Called   Date when labs were drawn-  Hgb-    HGB   Date Value Ref Range Status   03/05/2019 7.3 (L) 11.5 - 16.0 g/dL Final     K-    Potassium   Date Value Ref Range Status   03/05/2019 4.7 3.5 - 5.1 mmol/L Final     Ca-   Calcium   Date Value Ref Range Status   03/05/2019 8.9 8.5 - 10.1 MG/DL Final     Bun-   BUN   Date Value Ref Range Status   03/05/2019 43 (H) 6 - 20 MG/DL Final     Creat-   Creatinine   Date Value Ref Range Status   03/05/2019 6.34 (H) 0.55 - 1.02 MG/DL Final        Medications/ Blood Products Given     Name   Dose   Route and Time     Heparin 2.1  arterial   Heparin 2.3 venous        Blood Volume Processed (BVP):    59.7 Net Fluid   Removed:  1500ml   Comments   Time Out Done: 1300  Primary Nurse Rpt Pre:Ney Baugh RN  Primary Nurse Rpt Post:  Pt Education:Access care  Care Plan: Continue HD  Tx Summary:Right perm cath CVC: Dressing CDI. No s/s of infection. Both lumens aspirate & flush well. Running well at . Pt arrived to HD suite A&Ox3. Consent signed & on file. SBAR received from Primary RN. 1330: Both lumens of Archie/permcath disinfected with Alcohol per policy. Each lumen aspirated for blood return and flushed with Normal Saline per policy. Labs drawn per request/ order. VSS. Dialysis Tx initiated. 1330: Pt resting quietly. 1400: pt. Resting   1430: Pt. Resting  1500: Pt. Talking with [facility] memeber    1630: Tx ended. VSS. All possible blood returned to patient. Central line catheter flushed with normal saline per policy. Ports disinfected with Alcohol per policy and lines disconnected and capped using aseptic technique. Bed locked and in the lowest position, call bell and belongings in reach. SBAR given to Primary, RN. Patient is stable at time of their/ my departure. All Dialysis related medications have been reviewed. Admiting Diagnosis: Sepsis  Pt's previous clinic- N/A  Consent signed - Informed Consent Verified: Yes (03/05/19 1330)  [facility] Consent - Confrimed  Hepatitis Status-  Unknown    Machine #- Machine Number: [id number] (03/05/19 1330)  Telemetry status- None  Pre-dialysis wt. -

## 2019-03-05 NOTE — CONSULTS
Pt seen yesterday at 6 am   Please refer to consult note       [facility]  Office :[phone]  Fax: [phone]

## 2019-03-05 NOTE — PROGRESS NOTES
Cardiology Progress Note  3/5/2019     Admit Date: 3/1/2019  Admit Diagnosis: Sepsis ([facility] [A41.9]  CC: none currently    Assessment:   Principal Problem:    Sepsis ([facility] (3/1/2019)    Active Problems:    NSTEMI (non-ST elevated myocardial infarction) ([facility] (3/2/2019)      CHF (congestive heart failure) ([facility] (3/2/2019)      Benign essential hypertension (3/2/2019)      Diabetic peripheral neuropathy ([facility] (3/2/2019)      Type II diabetes mellitus, uncontrolled ([facility] (3/2/2019)      Hyperlipidemia (3/2/2019)      Plan:   Ist dialysis today. No CP or SOB. Initial troponin 1.05. Will follow peripherally. Volume status:euvolemic  Renal function: stable    For other plans, see orders.   Subjective: Cam Face reports   Chest Pain:  [x]   none,  consistent with  []   non-cardiac   []   atypical   []   angina             [x]   none now    []      on-going  Dyspnea: [x]   none  []   at rest  []   with exertion     []   improved   []   unchanged   []   worsening  PND:       [x]   none  []   overnight    Orthopnea: [x]   none  []   improved  []   unchanged  []   worsening  Presyncope: [x]   none   []   improved    []   unchanged    []   worsening  Ambulated in hallway without symptoms  []   Yes  Ambulated in room without symptoms  []   Yes    Objective:    Physical Exam:  Overall VSSAF;    Visit Vitals  /76 (BP 1 Location: Left arm, BP Patient Position: At rest)   Pulse 85   Temp 98.3 °F (36.8 °C)   Resp 14   Ht 5' 5\" (1.651 m)   Wt 112.9 kg (248 lb 12.8 oz)   SpO2 96%   BMI 41.40 kg/m²     Temp (24hrs), Av.9 °F (36.6 °C), Min:97.6 °F (36.4 °C), Max:98.3 °F (36.8 °C)    Patient Vitals for the past 8 hrs:   Pulse   19 1704 85   19 1630 81   19 1615 78   19 1600 78   19 1545 75   19 1530 77   19 1515 80   19 1500 79   19 1445 77   19 1430 76   19 1415 77   19 1330 79   19 1135 76   19 1055 74   19 1040 75 03/05/19 1023 76   03/05/19 1010 75   03/05/19 1005 77   03/05/19 1000 79   03/05/19 0955 81   03/05/19 0950 84    Patient Vitals for the past 8 hrs:   Resp   03/05/19 1704 14   03/05/19 1630 16   03/05/19 1615 16   03/05/19 1600 16   03/05/19 1545 18   03/05/19 1530 16   03/05/19 1515 16   03/05/19 1500 16   03/05/19 1445 16   03/05/19 1430 16   03/05/19 1415 16   03/05/19 1330 16   03/05/19 1135 12   03/05/19 1055 23   03/05/19 1040 24   03/05/19 1023 23   03/05/19 1010 24   03/05/19 1005 23   03/05/19 1000 22   03/05/19 0955 27   03/05/19 0950 (!) 32    Patient Vitals for the past 8 hrs:   BP   03/05/19 1704 159/76   03/05/19 1630 159/80   03/05/19 1615 147/72   03/05/19 1600 175/84   03/05/19 1545 152/78   03/05/19 1530 156/77   03/05/19 1515 158/70   03/05/19 1500 138/70   03/05/19 1445 153/67   03/05/19 1430 148/72   03/05/19 1415 144/78   03/05/19 1330 151/77   03/05/19 1135 144/80   03/05/19 1055 140/62   03/05/19 1040 131/59   03/05/19 1023 127/60   03/05/19 1010 116/65   03/05/19 1005 124/62   03/05/19 1000 117/76   03/05/19 0955 121/88   03/05/19 0950 139/83          Intake/Output Summary (Last 24 hours) at 3/5/2019 1723  Last data filed at 3/4/2019 2038  Gross per 24 hour   Intake 850 ml   Output    Net 850 ml       General Appearance: No acute distress. Ears/Nose/Mouth/Throat:   Normal MM; anicteric. JVP: WNL   Resp:   Lungs clear to auscultation bilaterally. Nl resp effort. Cardiovascular:  RRR, S1, S2 normal, no new murmur. No gallop or rub. Abdomen:   Soft, non-tender, bowel sounds are present. Extremities: No edema bilaterally. Skin:  Neuro: Warm and dry. A/O x3, grossly nonfocal    []      cath site intact w/o hematoma or bruit; distal pulse unchanged. DNo results for input(s): PH, PCO2, PO2 in the last 72 hours.   Recent Labs     03/04/19 0337 03/03/19 0036   CPK  --  58   CKMB  --  <1.0   TROIQ 0.17* 0.36*     Recent Labs     03/05/19  0445 03/04/19 0337 03/03/19  0036   NA 128* 133* 135*   K 4.7 4.5 4.0   CL 91* 96* 99   CO2 22 25 27   BUN 43* 34* 21*   CREA 6.34* 4.94* 2.52*   * 194* 184*   CA 8.9 8.7 8.4*   WBC 9.9 11.9* 13.9*   HGB 7.3* 7.7* 8.2*   HCT 23.8* 25.1* 27.2*    277 263     No results for input(s): SGOT, GPT, ALT, AP, TBIL, TBILI, TP, ALB, GLOB, GGT, AML, LPSE in the last 72 hours. No lab exists for component: AMYP, HLPSE  No results for input(s): INR, PTP, APTT in the last 72 hours.     No lab exists for component: INREXT   Recent Labs     03/05/19  0445   TIBC 156*   PSAT 15*      Lab Results   Component Value Date/Time    Glucose (POC) 225 (H) 03/05/2019 11:27 AM    Glucose (POC) 221 (H) 03/05/2019 06:04 AM    Glucose (POC) 190 (H) 03/04/2019 09:30 PM    Glucose (POC) 268 (H) 03/04/2019 06:01 PM    Glucose (POC) 300 (H) 03/04/2019 06:00 PM       Current Facility-Administered Medications   Medication Dose Route Frequency    iron sucrose (VENOFER) 300 mg in 0.9% sodium chloride 250 mL IVPB  300 mg IntraVENous Q24H    sodium chloride 0.9% injection        heparin (porcine) injection 5,000 Units  5,000 Units SubCUTAneous Q8H    heparin (porcine) 1,000 unit/mL injection 4,300 Units  4,300 Units InterCATHeter PRN    albuterol-ipratropium (DUO-NEB) 2.5 MG-0.5 MG/3 ML  3 mL Nebulization Q6H RT    albuterol (PROVENTIL VENTOLIN) nebulizer solution 2.5 mg  2.5 mg Nebulization Q4H PRN    aspirin chewable tablet 81 mg  81 mg Oral DAILY    SITagliptin (JANUVIA) tablet 100 mg  100 mg Oral DAILY    sodium chloride (NS) flush 5-40 mL  5-40 mL IntraVENous PRN    carvedilol (COREG) tablet 6.25 mg  6.25 mg Oral BID WITH MEALS    calcitRIOL (ROCALTROL) capsule 0.5 mcg  0.5 mcg Oral DAILY    gabapentin (NEURONTIN) capsule 300 mg  300 mg Oral TID PRN    LORazepam (ATIVAN) tablet 0.5 mg  0.5 mg Oral Q8H PRN    oxybutynin chloride XL (DITROPAN XL) tablet 15 mg  15 mg Oral DAILY    therapeutic multivitamin (THERAGRAN) tablet 1 Tab  1 Tab Oral DAILY    traMADol (ULTRAM) tablet 50 mg  50 mg Oral Q8H PRN    venlafaxine (EFFEXOR) tablet 37.5 mg  37.5 mg Oral BID    sodium chloride (NS) flush 5-40 mL  5-40 mL IntraVENous Q8H    sodium chloride (NS) flush 5-40 mL  5-40 mL IntraVENous PRN    ondansetron (ZOFRAN) injection 4 mg  4 mg IntraVENous Q4H PRN    bisacodyl (DULCOLAX) tablet 5 mg  5 mg Oral DAILY PRN    lactobac ac& pc-s.therm-b.anim (JUVE Q/RISAQUAD)  1 Cap Oral DAILY    insulin lispro (HUMALOG) injection   SubCUTAneous AC&HS    glucose chewable tablet 16 g  4 Tab Oral PRN    dextrose (D50W) injection syrg 12.5-25 g  12.5-25 g IntraVENous PRN    glucagon (GLUCAGEN) injection 1 mg  1 mg IntraMUSCular PRN    morphine injection 4 mg  4 mg IntraVENous Q2H PRN        Blake Mauro MD

## 2019-03-05 NOTE — PROGRESS NOTES
Updated son - Meghna Buitrago about ARF and need for dialysis   Consented for Dialysis access and HD       [facility]  Office :[phone]  Fax: [phone]

## 2019-03-05 NOTE — PROGRESS NOTES
Hospitalist Progress Note  Samantha Baeza MD  Answering service: [phone] OR 36 from in house phone  Cell: [phone]      Date of Service:  3/5/2019  NAME:  Adina Saldana  :  1940  MRN:  [id number]      Admission Summary: This is a 24-year-old woman with a past medical history significant for type 2 diabetes, hypertension, was in her usual state of health until early this morning when the patient woke up with sudden onset of shortness of breath. The shortness of breath is progressive associated with fever, diaphoresis as well as chest discomfort. The patient denies history of congestive heart failure or asthma or COPD. When EMS arrived at the scene, the patient's oxygen saturation was 89% on room air. The patient was treated with DuoNeb with improvement in oxygen saturation. The shortness of breath associated with cough which is nonproductive. The patient also stated that she has gained couple of pounds in the last couple of days. The patient traveled to [place] 3 months ago and stated that she has been sick since then. The patient is taking Lasix. It is not clear why the patient is taking Lasix. Stated that she has no history of congestive heart failure. No record of prior admission to this hospital.  When the patient arrived at the emergency room, the patient was found to have fever, leukocytosis, clinical presentation symptoms triggered the Code Sepsis. The Code Sepsis protocol was initiated. The patient received antibiotics. The chest x-ray did not show any evidence of pneumonia but shows evidence of vascular congestion. The patient was subsequently referred to the hospitalist service for evaluation for admission. Interval history / Subjective:       3/5/2019 :  Cr up to 6; for HD today.       Assessment & Plan:     Sepsis ([facility] (3/1/2019)  -source?  -Urinalysis 3/1 unremarkable  -CTA chest 3/1 No evidence of acute pulmonary thromboembolism. Mild pulmonary edema and small bilateral pleural effusions  -On Zosyn, don't think the patient needs antibiotics. Also, in light of worsening renal function will stop 3/4  -Follow cultures, no growth so far  -Leukocytosis improving  -resolved     NSTEMI (non-ST elevated myocardial infarction) ([facility] (3/2/2019)   -No acute  changes in ECG  -S/P cath 3/2: No significant CAD with Cx dominant,Normal LV size with moderate antrolateral and apical hypokinesis in setting of elevated biomarkere c/w Takotsubo cardiomyopthy. -Elevated Rt an Lt ventricular filling pressures       Afib RVR  -Echo with EF 61-65%. No RWMA  -Now in normal sinus  -Was on heparin/Cardizem, now off both  -on Coreg, continue    Pulmonary edema with pleural effusion  -sec to volume overload  -Was on lasix, currently on hold    Probable CHF (congestive heart failure)  -Not sure if the patient has CHF    Anemia  -likely chronic  -Follow work up   Lab Results   Component Value Date/Time    HGB 7.3 (L) 03/05/2019 04:45 AM        Benign essential hypertension (3/2/2019)  -Continue home meds   BP Readings from Last 1 Encounters:   03/05/19 144/80        Diabetic peripheral neuropathy ([facility] (3/2/2019)  -stable on home meds     DM type 2  -SSI  Lab Results   Component Value Date/Time    Glucose 177 (H) 03/05/2019 04:45 AM    Glucose (POC) 225 (H) 03/05/2019 11:27 AM        ASUNCION  -sec to likely dye load  -US renal No hydronephrosis. A 2.9 cm simple cyst in the right kidney  -Appreciate discussion with Nephrology, started on IV fluids  -Monitor for volume overload  Lab Results   Component Value Date/Time    Creatinine 6.34 (H) 03/05/2019 04:45 AM        Anxiety/Depression  -on Effexor, continue    Morbid obesity  -Counseled  Body mass index is 41.4 kg/m².      Cardiac diet    Code status: Full  Prophylaxis: Lovenox    Care Plan discussed with: Patient/Family  Disposition: TBD     Hospital Problems  Date Reviewed: 3/2/2019 Codes Class Noted POA    NSTEMI (non-ST elevated myocardial infarction) ([facility] ICD-10-CM: I21.4  ICD-9-CM: 410.70  3/2/2019 Yes        CHF (congestive heart failure) ([facility] ICD-10-CM: I50.9  ICD-9-CM: 428.0  3/2/2019 Yes        Benign essential hypertension ICD-10-CM: I10  ICD-9-CM: 401.1  3/2/2019 Yes        Diabetic peripheral neuropathy ([facility] ICD-10-CM: E11.42  ICD-9-CM: 250.60, 357.2  3/2/2019 Yes        Type II diabetes mellitus, uncontrolled ([facility] ICD-10-CM: E11.65  ICD-9-CM: 250.02  3/2/2019 Yes        Hyperlipidemia ICD-10-CM: E78.5  ICD-9-CM: 272.4  3/2/2019 Yes        * (Principal) Sepsis ([facility] ICD-10-CM: A41.9  ICD-9-CM: 038.9, 995.91  3/1/2019 Yes                Review of Systems:    no cp no sob no n/v/d/f/chills. Vital Signs:    Last 24hrs VS reviewed since prior progress note. Most recent are:  Visit Vitals  /80 (BP Patient Position: Head of bed elevated (Comment degrees))   Pulse 76   Temp 98 °F (36.7 °C)   Resp 12   Ht 5' 5\" (1.651 m)   Wt 112.9 kg (248 lb 12.8 oz)   SpO2 96%   BMI 41.40 kg/m²         Intake/Output Summary (Last 24 hours) at 3/5/2019 1203  Last data filed at 3/4/2019 2038  Gross per 24 hour   Intake 850 ml   Output    Net 850 ml        Physical Examination:             Constitutional:  No acute distress, cooperative, pleasant    ENT:  Oral mucous moist, oropharynx benign. Neck supple,    Resp:  CTA bilaterally. No wheezing/rhonchi/rales. No accessory muscle use   CV:  Regular rhythm, normal rate, no murmurs, gallops, rubs    GI:  Soft, non distended, non tender. normoactive bowel sounds, no hepatosplenomegaly     Musculoskeletal:  No edema, warm, 2+ pulses throughout    Neurologic:  Moves all extremities.   AAOx3, CN II-XII reviewed     Skin:  Good turgor, no rashes or ulcers       Data Review:    Review and/or order of clinical lab test      Labs:     Recent Labs     03/05/19  0445 03/04/19  0337   WBC 9.9 11.9*   HGB 7.3* 7.7*   HCT 23.8* 25.1*    277     Recent Labs     03/05/19  0445 03/04/19 0337 03/03/19  1339 03/03/19  0036   * 133*  --  135*   K 4.7 4.5  --  4.0   CL 91* 96*  --  99   CO2 22 25  --  27   BUN 43* 34*  --  21*   CREA 6.34* 4.94*  --  2.52*   * 194*  --  184*   CA 8.9 8.7  --  8.4*   MG  --   --  1.8  --      No results for input(s): SGOT, GPT, ALT, AP, TBIL, TBILI, TP, ALB, GLOB, GGT, AML, LPSE in the last 72 hours. No lab exists for component: AMYP, HLPSE  No results for input(s): INR, PTP, APTT in the last 72 hours. No lab exists for component: INREXT, INREXT   Recent Labs     03/05/19 0445   TIBC 156*   PSAT 15*      No results found for: FOL, RBCF   No results for input(s): PH, PCO2, PO2 in the last 72 hours.   Recent Labs     03/04/19 0337 03/03/19 0036   CPK  --  58   CKNDX  --  Cannot be calculated   TROIQ 0.17* 0.36*     No results found for: CHOL, CHOLX, CHLST, CHOLV, HDL, LDL, LDLC, DLDLP, TGLX, TRIGL, TRIGP, CHHD, CHHDX  Lab Results   Component Value Date/Time    Glucose (POC) 225 (H) 03/05/2019 11:27 AM    Glucose (POC) 221 (H) 03/05/2019 06:04 AM    Glucose (POC) 190 (H) 03/04/2019 09:30 PM    Glucose (POC) 268 (H) 03/04/2019 06:01 PM    Glucose (POC) 300 (H) 03/04/2019 06:00 PM     Lab Results   Component Value Date/Time    Color YELLOW/STRAW 03/01/2019 01:09 PM    Appearance CLEAR 03/01/2019 01:09 PM    Specific gravity 1.023 03/01/2019 01:09 PM    pH (UA) 5.0 03/01/2019 01:09 PM    Protein 30 (A) 03/01/2019 01:09 PM    Glucose 100 (A) 03/01/2019 01:09 PM    Ketone 15 (A) 03/01/2019 01:09 PM    Bilirubin NEGATIVE  03/01/2019 01:09 PM    Urobilinogen 1.0 03/01/2019 01:09 PM    Nitrites NEGATIVE  03/01/2019 01:09 PM    Leukocyte Esterase NEGATIVE  03/01/2019 01:09 PM    Epithelial cells FEW 03/01/2019 01:09 PM    Bacteria NEGATIVE  03/01/2019 01:09 PM    WBC 0-4 03/01/2019 01:09 PM    RBC 0-5 03/01/2019 01:09 PM         Medications Reviewed:     Current Facility-Administered Medications   Medication Dose Route Frequency    sodium chloride 0.9% injection        iron sucrose (VENOFER) 300 mg in 0.9% sodium chloride 250 mL IVPB  300 mg IntraVENous Q24H    enoxaparin (LOVENOX) injection 30 mg  30 mg SubCUTAneous Q24H    albuterol-ipratropium (DUO-NEB) 2.5 MG-0.5 MG/3 ML  3 mL Nebulization Q6H RT    albuterol (PROVENTIL VENTOLIN) nebulizer solution 2.5 mg  2.5 mg Nebulization Q4H PRN    aspirin chewable tablet 81 mg  81 mg Oral DAILY    SITagliptin (JANUVIA) tablet 100 mg  100 mg Oral DAILY    sodium chloride (NS) flush 5-40 mL  5-40 mL IntraVENous PRN    carvedilol (COREG) tablet 6.25 mg  6.25 mg Oral BID WITH MEALS    calcitRIOL (ROCALTROL) capsule 0.5 mcg  0.5 mcg Oral DAILY    gabapentin (NEURONTIN) capsule 300 mg  300 mg Oral TID PRN    LORazepam (ATIVAN) tablet 0.5 mg  0.5 mg Oral Q8H PRN    oxybutynin chloride XL (DITROPAN XL) tablet 15 mg  15 mg Oral DAILY    therapeutic multivitamin (THERAGRAN) tablet 1 Tab  1 Tab Oral DAILY    traMADol (ULTRAM) tablet 50 mg  50 mg Oral Q8H PRN    venlafaxine (EFFEXOR) tablet 37.5 mg  37.5 mg Oral BID    sodium chloride (NS) flush 5-40 mL  5-40 mL IntraVENous Q8H    sodium chloride (NS) flush 5-40 mL  5-40 mL IntraVENous PRN    ondansetron (ZOFRAN) injection 4 mg  4 mg IntraVENous Q4H PRN    bisacodyl (DULCOLAX) tablet 5 mg  5 mg Oral DAILY PRN    lactobac ac& pc-s.therm-b.anim (JUVE Q/RISAQUAD)  1 Cap Oral DAILY    insulin lispro (HUMALOG) injection   SubCUTAneous AC&HS    glucose chewable tablet 16 g  4 Tab Oral PRN    dextrose (D50W) injection syrg 12.5-25 g  12.5-25 g IntraVENous PRN    glucagon (GLUCAGEN) injection 1 mg  1 mg IntraMUSCular PRN    morphine injection 4 mg  4 mg IntraVENous Q2H PRN     ______________________________________________________________________  EXPECTED LENGTH OF STAY: 4d 19h  ACTUAL LENGTH OF STAY:          4                 Ej Guillaume MD

## 2019-03-05 NOTE — PROGRESS NOTES
Bedside shift change report given to Ceci Delgado (oncoming nurse) by Melany Hernandez (offgoing nurse). Report included the following information SBAR, Kardex, Intake/Output, MAR and Recent Results.

## 2019-03-05 NOTE — PROGRESS NOTES
TRANSFER - OUT REPORT:    Verbal report given to Ney(name) on [address]  being transferred to (unit) for routine progression of care       Report consisted of patients Situation, Background, Assessment and   Recommendations(SBAR). Information from the following report(s) Procedure Summary was reviewed with the receiving nurse. Lines:   Peripheral IV 03/01/19 Right;Posterior Forearm (Active)   Site Assessment Clean, dry, & intact 3/5/2019  4:43 AM   Phlebitis Assessment 0 3/5/2019  4:43 AM   Infiltration Assessment 0 3/5/2019  4:43 AM   Dressing Status Clean, dry, & intact 3/5/2019  4:43 AM   Dressing Type Transparent 3/5/2019  4:43 AM   Hub Color/Line Status Pink;Capped 3/5/2019  4:43 AM   Action Taken Open ports on tubing capped 3/4/2019  8:45 AM   Alcohol Cap Used Yes 3/5/2019  4:43 AM       Peripheral IV 03/01/19 Right Antecubital (Active)   Site Assessment Clean, dry, & intact 3/5/2019  4:43 AM   Phlebitis Assessment 0 3/5/2019  4:43 AM   Infiltration Assessment 0 3/5/2019  4:43 AM   Dressing Status Clean, dry, & intact 3/5/2019  4:43 AM   Dressing Type Transparent 3/5/2019  4:43 AM   Hub Color/Line Status Infusing 3/5/2019  4:43 AM   Action Taken Open ports on tubing capped 3/4/2019  8:45 AM   Alcohol Cap Used Yes 3/5/2019  4:43 AM        Opportunity for questions and clarification was provided.       Patient transported with:   Jonathan Raza

## 2019-03-05 NOTE — PROGRESS NOTES
[employer]   [address] [facility], [facility] [address] [facility], [facility]  Phone: [phone]   [phone]       Nephrology Progress Note  Day Valenzuela     1940     [id number]  Date of Admission : 3/1/2019  03/05/19    CC:  Follow up for ASUNCION       Assessment and Plan   ASUNCION on CKD :  · Multifactorial. Behaving like contrast Induced Nephropathy w/ ATN   · Oliguric w/ worsening fluid overload   · Discussed w/ pt and son(Chapo) at length this morning. Consented for dialysis catheter and HD. Anticipate needing HD for upto 2-3 weeks   · HD today and tomorrow      CKD Stage II :  · Baseline ~ 1mg/dl   · May have moderate CKD from DM, HTN   · Renal US : no obstruction, 2.9 cm Cyst noted on R Kidney      Hypotension  · Resolved     Iron def Anemia :  · Ordered IV venofer      NSTEMI:   - Cath : Non occlusiVe CAD   - Suspected Takatsubos CMP w/ EF 45% on admission      DM-II      Interval History:  Seen and examined   Very little UOP yesterday despite improvement in BP   Na down, Volume up, SOB about the same     Review of Systems: A comprehensive review of systems was negative.     Current Medications:   Current Facility-Administered Medications   Medication Dose Route Frequency    iron sucrose (VENOFER) 300 mg in 0.9% sodium chloride 250 mL IVPB  300 mg IntraVENous Q24H    sodium chloride (NS) flush 5-40 mL  5-40 mL IntraVENous Q8H    sodium chloride (NS) flush 5-40 mL  5-40 mL IntraVENous PRN    enoxaparin (LOVENOX) injection 30 mg  30 mg SubCUTAneous Q24H    albuterol-ipratropium (DUO-NEB) 2.5 MG-0.5 MG/3 ML  3 mL Nebulization Q6H RT    albuterol (PROVENTIL VENTOLIN) nebulizer solution 2.5 mg  2.5 mg Nebulization Q4H PRN    aspirin chewable tablet 81 mg  81 mg Oral DAILY    SITagliptin (JANUVIA) tablet 100 mg  100 mg Oral DAILY    sodium chloride (NS) flush 5-40 mL  5-40 mL IntraVENous PRN    carvedilol (COREG) tablet 6.25 mg  6.25 mg Oral BID WITH MEALS    calcitRIOL (ROCALTROL) capsule 0.5 mcg  0.5 mcg Oral DAILY    gabapentin (NEURONTIN) capsule 300 mg  300 mg Oral TID PRN    LORazepam (ATIVAN) tablet 0.5 mg  0.5 mg Oral Q8H PRN    oxybutynin chloride XL (DITROPAN XL) tablet 15 mg  15 mg Oral DAILY    therapeutic multivitamin (THERAGRAN) tablet 1 Tab  1 Tab Oral DAILY    traMADol (ULTRAM) tablet 50 mg  50 mg Oral Q8H PRN    venlafaxine (EFFEXOR) tablet 37.5 mg  37.5 mg Oral BID    sodium chloride (NS) flush 5-40 mL  5-40 mL IntraVENous Q8H    sodium chloride (NS) flush 5-40 mL  5-40 mL IntraVENous PRN    ondansetron (ZOFRAN) injection 4 mg  4 mg IntraVENous Q4H PRN    bisacodyl (DULCOLAX) tablet 5 mg  5 mg Oral DAILY PRN    lactobac ac& pc-s.therm-b.anim (JUVE Q/RISAQUAD)  1 Cap Oral DAILY    insulin lispro (HUMALOG) injection   SubCUTAneous AC&HS    glucose chewable tablet 16 g  4 Tab Oral PRN    dextrose (D50W) injection syrg 12.5-25 g  12.5-25 g IntraVENous PRN    glucagon (GLUCAGEN) injection 1 mg  1 mg IntraMUSCular PRN    morphine injection 4 mg  4 mg IntraVENous Q2H PRN      Allergies   Allergen Reactions    Tylenol [Acetaminophen] Other (comments)     Pt told by MD not to take, pt unsure why       Objective:  Vitals:    Vitals:    03/05/19 0024 03/05/19 0443 03/05/19 0716 03/05/19 0734   BP:  131/76 150/84    Pulse: 88 73 75    Resp: 18 18     Temp:  97.6 °F (36.4 °C)     SpO2: 97% 97% 96%    Weight:    112.9 kg (248 lb 12.8 oz)   Height:         Intake and Output:  No intake/output data recorded.   03/03 1901 - 03/05 0700  In: 1850 [P.O.:1850]  Out: 150 [Urine:150]    Physical Examination:    General: Obese,   Neck:  JVD +  Resp:  Rales B/L   CV:  RRR,  no murmur or rub, + LE edema  GI:  Soft, NT, + BS  Neurologic:  Non focal  Psych:             depressed  Skin:  No Rash  :  No cartagena     []    High complexity decision making was performed  []    Patient is at high-risk of decompensation with multiple organ involvement    Lab Data Personally Reviewed: I have reviewed all the pertinent labs, microbiology data and radiology studies during assessment.     Recent Labs     03/05/19 0445 03/04/19 0337 03/03/19 1339 03/03/19 0036   * 133*  --  135*   K 4.7 4.5  --  4.0   CL 91* 96*  --  99   CO2 22 25  --  27   * 194*  --  184*   BUN 43* 34*  --  21*   CREA 6.34* 4.94*  --  2.52*   CA 8.9 8.7  --  8.4*   MG  --   --  1.8  --      Recent Labs     03/05/19 0445 03/04/19 0337 03/03/19 0036   WBC 9.9 11.9* 13.9*   HGB 7.3* 7.7* 8.2*   HCT 23.8* 25.1* 27.2*    277 263     No results found for: SDES  Lab Results   Component Value Date/Time    Culture result: NO GROWTH 4 DAYS 03/01/2019 08:22 AM     Recent Results (from the past 24 hour(s))   GLUCOSE, POC    Collection Time: 03/04/19 11:14 AM   Result Value Ref Range    Glucose (POC) 267 (H) 65 - 100 mg/dL    Performed by HORTENCIA FELICIANO    GLUCOSE, POC    Collection Time: 03/04/19  5:55 PM   Result Value Ref Range    Glucose (POC) 571 (H) 65 - 100 mg/dL    Performed by Honor Barthel    GLUCOSE, POC    Collection Time: 03/04/19  6:00 PM   Result Value Ref Range    Glucose (POC) 300 (H) 65 - 100 mg/dL    Performed by Patrice, POC    Collection Time: 03/04/19  6:01 PM   Result Value Ref Range    Glucose (POC) 268 (H) 65 - 100 mg/dL    Performed by Patrice, POC    Collection Time: 03/04/19  9:30 PM   Result Value Ref Range    Glucose (POC) 190 (H) 65 - 100 mg/dL    Performed by Raymond Patel    HEMOGLOBIN A1C WITH EAG    Collection Time: 03/05/19  4:45 AM   Result Value Ref Range    Hemoglobin A1c 8.3 (H) 4.2 - 6.3 %    Est. average glucose 192 mg/dL   CBC WITH AUTOMATED DIFF    Collection Time: 03/05/19  4:45 AM   Result Value Ref Range    WBC 9.9 3.6 - 11.0 K/uL    RBC 2.76 (L) 3.80 - 5.20 M/uL    HGB 7.3 (L) 11.5 - 16.0 g/dL    HCT 23.8 (L) 35.0 - 47.0 %    MCV 86.2 80.0 - 99.0 FL    MCH 26.4 26.0 - 34.0 PG    MCHC 30.7 30.0 - 36.5 g/dL    RDW 14.6 (H) 11.5 - 14.5 % PLATELET 245 062 - 615 K/uL    MPV 10.8 8.9 - 12.9 FL    NRBC 0.2 (H) 0  WBC    ABSOLUTE NRBC 0.02 (H) 0.00 - 0.01 K/uL    NEUTROPHILS 64 32 - 75 %    LYMPHOCYTES 17 12 - 49 %    MONOCYTES 11 5 - 13 %    EOSINOPHILS 5 0 - 7 %    BASOPHILS 1 0 - 1 %    IMMATURE GRANULOCYTES 2 (H) 0.0 - 0.5 %    ABS. NEUTROPHILS 6.3 1.8 - 8.0 K/UL    ABS. LYMPHOCYTES 1.7 0.8 - 3.5 K/UL    ABS. MONOCYTES 1.1 (H) 0.0 - 1.0 K/UL    ABS. EOSINOPHILS 0.5 (H) 0.0 - 0.4 K/UL    ABS. BASOPHILS 0.1 0.0 - 0.1 K/UL    ABS. IMM. GRANS. 0.2 (H) 0.00 - 0.04 K/UL    DF AUTOMATED     METABOLIC PANEL, BASIC    Collection Time: 03/05/19  4:45 AM   Result Value Ref Range    Sodium 128 (L) 136 - 145 mmol/L    Potassium 4.7 3.5 - 5.1 mmol/L    Chloride 91 (L) 97 - 108 mmol/L    CO2 22 21 - 32 mmol/L    Anion gap 15 5 - 15 mmol/L    Glucose 177 (H) 65 - 100 mg/dL    BUN 43 (H) 6 - 20 MG/DL    Creatinine 6.34 (H) 0.55 - 1.02 MG/DL    BUN/Creatinine ratio 7 (L) 12 - 20      GFR est AA 8 (L) >60 ml/min/1.73m2    GFR est non-AA 6 (L) >60 ml/min/1.73m2    Calcium 8.9 8.5 - 10.1 MG/DL   IRON PROFILE    Collection Time: 03/05/19  4:45 AM   Result Value Ref Range    Iron 23 (L) 35 - 150 ug/dL    TIBC 156 (L) 250 - 450 ug/dL    Iron % saturation 15 (L) 20 - 50 %   VITAMIN B12    Collection Time: 03/05/19  4:45 AM   Result Value Ref Range    Vitamin B12 1,456 (H) 193 - 986 pg/mL   GLUCOSE, POC    Collection Time: 03/05/19  6:04 AM   Result Value Ref Range    Glucose (POC) 221 (H) 65 - 100 mg/dL    Performed by Lulu Go        I have reviewed the flowsheets. Chart and Pertinent Notes have been reviewed. No change in PMH ,family and social history from Consult note.       Austyn Bennett MD

## 2019-03-06 ENCOUNTER — HOME HEALTH ADMISSION (OUTPATIENT)
Dept: HOME HEALTH SERVICES | Facility: HOME HEALTH | Age: 79
End: 2019-03-06

## 2019-03-06 LAB
ALBUMIN SERPL-MCNC: 1.9 G/DL (ref 3.5–5)
ANION GAP SERPL CALC-SCNC: 7 MMOL/L (ref 5–15)
BACTERIA SPEC CULT: NORMAL
BASOPHILS # BLD: 0 K/UL (ref 0–0.1)
BASOPHILS NFR BLD: 0 % (ref 0–1)
BUN SERPL-MCNC: 19 MG/DL (ref 6–20)
BUN/CREAT SERPL: 6 (ref 12–20)
CALCIUM SERPL-MCNC: 8.8 MG/DL (ref 8.5–10.1)
CHLORIDE SERPL-SCNC: 100 MMOL/L (ref 97–108)
CO2 SERPL-SCNC: 27 MMOL/L (ref 21–32)
CREAT SERPL-MCNC: 3.11 MG/DL (ref 0.55–1.02)
DIFFERENTIAL METHOD BLD: ABNORMAL
EOSINOPHIL # BLD: 0.5 K/UL (ref 0–0.4)
EOSINOPHIL NFR BLD: 5 % (ref 0–7)
ERYTHROCYTE [DISTWIDTH] IN BLOOD BY AUTOMATED COUNT: 14.6 % (ref 11.5–14.5)
GLUCOSE BLD STRIP.AUTO-MCNC: 138 MG/DL (ref 65–100)
GLUCOSE BLD STRIP.AUTO-MCNC: 141 MG/DL (ref 65–100)
GLUCOSE BLD STRIP.AUTO-MCNC: 193 MG/DL (ref 65–100)
GLUCOSE BLD STRIP.AUTO-MCNC: 195 MG/DL (ref 65–100)
GLUCOSE SERPL-MCNC: 212 MG/DL (ref 65–100)
HCT VFR BLD AUTO: 24.9 % (ref 35–47)
HGB BLD-MCNC: 7.7 G/DL (ref 11.5–16)
IMM GRANULOCYTES # BLD AUTO: 0 K/UL
IMM GRANULOCYTES NFR BLD AUTO: 0 %
LYMPHOCYTES # BLD: 1 K/UL (ref 0.8–3.5)
LYMPHOCYTES NFR BLD: 10 % (ref 12–49)
MCH RBC QN AUTO: 26.3 PG (ref 26–34)
MCHC RBC AUTO-ENTMCNC: 30.9 G/DL (ref 30–36.5)
MCV RBC AUTO: 85 FL (ref 80–99)
MONOCYTES # BLD: 0.8 K/UL (ref 0–1)
MONOCYTES NFR BLD: 8 % (ref 5–13)
NEUTS SEG # BLD: 8.1 K/UL (ref 1.8–8)
NEUTS SEG NFR BLD: 77 % (ref 32–75)
NRBC # BLD: 0 K/UL (ref 0–0.01)
NRBC BLD-RTO: 0 PER 100 WBC
PHOSPHATE SERPL-MCNC: 3 MG/DL (ref 2.6–4.7)
PLATELET # BLD AUTO: 320 K/UL (ref 150–400)
PMV BLD AUTO: 10.6 FL (ref 8.9–12.9)
POTASSIUM SERPL-SCNC: 4 MMOL/L (ref 3.5–5.1)
RBC # BLD AUTO: 2.93 M/UL (ref 3.8–5.2)
RBC MORPH BLD: ABNORMAL
SERVICE CMNT-IMP: ABNORMAL
SERVICE CMNT-IMP: NORMAL
SODIUM SERPL-SCNC: 134 MMOL/L (ref 136–145)
WBC # BLD AUTO: 10.4 K/UL (ref 3.6–11)

## 2019-03-06 PROCEDURE — 74011250636 HC RX REV CODE- 250/636: Performed by: INTERNAL MEDICINE

## 2019-03-06 PROCEDURE — 82962 GLUCOSE BLOOD TEST: CPT

## 2019-03-06 PROCEDURE — 94640 AIRWAY INHALATION TREATMENT: CPT

## 2019-03-06 PROCEDURE — 90935 HEMODIALYSIS ONE EVALUATION: CPT

## 2019-03-06 PROCEDURE — 74011250637 HC RX REV CODE- 250/637: Performed by: INTERNAL MEDICINE

## 2019-03-06 PROCEDURE — 36415 COLL VENOUS BLD VENIPUNCTURE: CPT

## 2019-03-06 PROCEDURE — 74011000250 HC RX REV CODE- 250: Performed by: HOSPITALIST

## 2019-03-06 PROCEDURE — 74011636637 HC RX REV CODE- 636/637: Performed by: INTERNAL MEDICINE

## 2019-03-06 PROCEDURE — 80069 RENAL FUNCTION PANEL: CPT

## 2019-03-06 PROCEDURE — 65270000029 HC RM PRIVATE

## 2019-03-06 PROCEDURE — 85025 COMPLETE CBC W/AUTO DIFF WBC: CPT

## 2019-03-06 RX ORDER — DEXTROSE 50 % IN WATER (D50W) INTRAVENOUS SYRINGE
12.5-25 AS NEEDED
Status: DISCONTINUED | OUTPATIENT
Start: 2019-03-06 | End: 2019-03-06 | Stop reason: SDUPTHER

## 2019-03-06 RX ORDER — INSULIN LISPRO 100 [IU]/ML
INJECTION, SOLUTION INTRAVENOUS; SUBCUTANEOUS
Status: DISCONTINUED | OUTPATIENT
Start: 2019-03-06 | End: 2019-03-11 | Stop reason: HOSPADM

## 2019-03-06 RX ORDER — MAGNESIUM SULFATE 100 %
4 CRYSTALS MISCELLANEOUS AS NEEDED
Status: DISCONTINUED | OUTPATIENT
Start: 2019-03-06 | End: 2019-03-06 | Stop reason: SDUPTHER

## 2019-03-06 RX ADMIN — Medication 1 CAPSULE: at 08:06

## 2019-03-06 RX ADMIN — CARVEDILOL 6.25 MG: 6.25 TABLET, FILM COATED ORAL at 17:00

## 2019-03-06 RX ADMIN — ASPIRIN 81 MG CHEWABLE TABLET 81 MG: 81 TABLET CHEWABLE at 08:05

## 2019-03-06 RX ADMIN — Medication 10 ML: at 14:00

## 2019-03-06 RX ADMIN — VENLAFAXINE 37.5 MG: 37.5 TABLET ORAL at 08:05

## 2019-03-06 RX ADMIN — HEPARIN SODIUM 5000 UNITS: 5000 INJECTION INTRAVENOUS; SUBCUTANEOUS at 06:14

## 2019-03-06 RX ADMIN — INSULIN LISPRO 2 UNITS: 100 INJECTION, SOLUTION INTRAVENOUS; SUBCUTANEOUS at 12:05

## 2019-03-06 RX ADMIN — Medication 10 ML: at 06:14

## 2019-03-06 RX ADMIN — CALCITRIOL 0.5 MCG: 0.25 CAPSULE ORAL at 08:05

## 2019-03-06 RX ADMIN — THERA TABS 1 TABLET: TAB at 08:03

## 2019-03-06 RX ADMIN — OXYBUTYNIN CHLORIDE 15 MG: 5 TABLET, EXTENDED RELEASE ORAL at 08:03

## 2019-03-06 RX ADMIN — HEPARIN SODIUM 5000 UNITS: 5000 INJECTION INTRAVENOUS; SUBCUTANEOUS at 21:52

## 2019-03-06 RX ADMIN — Medication 10 ML: at 21:53

## 2019-03-06 RX ADMIN — IRON SUCROSE 300 MG: 20 INJECTION, SOLUTION INTRAVENOUS at 07:00

## 2019-03-06 RX ADMIN — IPRATROPIUM BROMIDE AND ALBUTEROL SULFATE 3 ML: .5; 3 SOLUTION RESPIRATORY (INHALATION) at 21:45

## 2019-03-06 RX ADMIN — VENLAFAXINE 37.5 MG: 37.5 TABLET ORAL at 18:00

## 2019-03-06 RX ADMIN — INSULIN LISPRO 2 UNITS: 100 INJECTION, SOLUTION INTRAVENOUS; SUBCUTANEOUS at 07:04

## 2019-03-06 RX ADMIN — IPRATROPIUM BROMIDE AND ALBUTEROL SULFATE 3 ML: .5; 3 SOLUTION RESPIRATORY (INHALATION) at 09:03

## 2019-03-06 RX ADMIN — CARVEDILOL 6.25 MG: 6.25 TABLET, FILM COATED ORAL at 07:00

## 2019-03-06 RX ADMIN — SITAGLIPTIN 100 MG: 100 TABLET, FILM COATED ORAL at 08:03

## 2019-03-06 NOTE — PROCEDURES
[facility]  [phone]    Vitals   Pre   Post   Assessment   Pre   Post     Temp  Temp: 98.5 °F (36.9 °C) (03/06/19 1345)  98.3 LOC  A & O x 3 A & O x 3   HR   Pulse (Heart Rate): 82 (03/06/19 1345) 76 Lungs   Dim Bi lat bases   Dim Bi lat bases   B/P   BP: 131/53 (03/06/19 1345) 122/53 Cardiac   Normal sinus rhythm  Normal sinus rhythm   Resp   Resp Rate: 16 (03/06/19 1345) 18 Skin   Warm dry & intact Warm dry & intact    Pain level  Pain Intensity 1: 0 (03/05/19 2334) 0 Edema  Trace lower ext     Trace lower ext   Orders:    Duration:   Start:  2918   Procedure Start Time: 9876 End:  6821 Procedure End Time: 1004 Total:   3.5   Dialyzer:   Dialyzer/Set Up Inspection: Revaclear (03/06/19 1345)   K Bath:   Dialysate K (mEq/L): 3 (03/06/19 1345)   Ca Bath:   Dialysate CA (mEq/L): 2.5 (03/06/19 1345)   Na/Bicarb:   Dialysate NA (mEq/L): 140 (03/06/19 1345)   Target Fluid Removal:   Goal/Amount of Fluid to Remove (mL): 1000 mL (03/06/19 1345)   Access  Perm cath   Type & Location:   Right sub clav perm cath   Labs     Obtained/Reviewed   Critical Results Called   Date when labs were drawn-  Hgb-    HGB   Date Value Ref Range Status   03/05/2019 7.3 (L) 11.5 - 16.0 g/dL Final     K-    Potassium   Date Value Ref Range Status   03/05/2019 4.7 3.5 - 5.1 mmol/L Final     Ca-   Calcium   Date Value Ref Range Status   03/05/2019 8.9 8.5 - 10.1 MG/DL Final     Bun-   BUN   Date Value Ref Range Status   03/05/2019 43 (H) 6 - 20 MG/DL Final     Creat-   Creatinine   Date Value Ref Range Status   03/05/2019 6.34 (H) 0.55 - 1.02 MG/DL Final        Medications/ Blood Products Given     Name   Dose   Route and Time     heparin 2.1ml  arterial   heparin 2.2ml venous        Blood Volume Processed (BVP):    72.0 Net Fluid   Removed:  1800ml   Comments   Time Out Done: 1315  Primary Nurse Rpt Pre: Ibeth Vizcaino RN  Primary Nurse Rpt Post: Ibeth Vizcaino RN  Pt Education: Access care  Care Plan: Continue HD  Tx Summary: Right sub clav  CVC: Dressing CDI. No s/s of infection. Both lumens aspirate & flush well. Running well at . Pt arrived to HD suite A&Ox3. Consent signed & on file. SBAR received from Primary RN. 1340 [facility] per policy. Dressing changed, previous bandage saturated with dry blood. No redness drainage to site. Each lumen aspirated for blood return and flushed with Normal Saline per policy. Labs drawn per request/ order. VSS. Dialysis Tx initiated. 1345: Pt resting quietly. 1415: pt. Resting well  1445: Pt. Talking in sleep, stable  1515: Pt. Stable, resting well  1545: Pt. Stable  1615: Pt. Stable, resting well    1130: Tx ended. VSS. All possible blood returned to patient. Central line catheter flushed with normal saline per policy. Ports disinfected with Alcohol per policy and lines disconnected and capped using aseptic technique. Bed locked and in the lowest position, call bell and belongings in reach. SBAR given to Primary, GUDELIA. Patient is stable at time of their/ my departure. All Dialysis related medications have been reviewed.     Admiting Diagnosis: sepsis  Pt's previous clinic- N/A  Consent signed - Informed Consent Verified: Yes (03/06/19 1345)  [facility] Consent - Confrimed   Hepatitis Status- Negative  Machine #- Machine Number: [id number] (03/06/19 1345)  Telemetry status- No  Pre-dialysis wt.-  111.3kg

## 2019-03-06 NOTE — PROGRESS NOTES
[facility]   [address] [facility], [facility] [address], [facility]  Phone: [phone]   [phone]       Nephrology Progress Note  Toño Cai     1940     [id number]  Date of Admission : 3/1/2019  03/06/19    CC:  Follow up for ASUNCION       Assessment and Plan   ASUNCION on CKD :  · Multifactorial.  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     Current Medications:   Current Facility-Administered Medications   Medication Dose Route Frequency    iron sucrose (VENOFER) 300 mg in 0.9% sodium chloride 250 mL IVPB  300 mg IntraVENous Q24H    heparin (porcine) injection 5,000 Units  5,000 Units SubCUTAneous Q8H    heparin (porcine) 1,000 unit/mL injection 4,300 Units  4,300 Units InterCATHeter PRN    albuterol-ipratropium (DUO-NEB) 2.5 MG-0.5 MG/3 ML  3 mL Nebulization TID RT    albuterol (PROVENTIL VENTOLIN) nebulizer solution 2.5 mg  2.5 mg Nebulization Q4H PRN    aspirin chewable tablet 81 mg  81 mg Oral DAILY    SITagliptin (JANUVIA) tablet 100 mg  100 mg Oral DAILY    sodium chloride (NS) flush 5-40 mL  5-40 mL IntraVENous PRN    carvedilol (COREG) tablet 6.25 mg  6.25 mg Oral BID WITH MEALS    calcitRIOL (ROCALTROL) capsule 0.5 mcg  0.5 mcg Oral DAILY    gabapentin (NEURONTIN) capsule 300 mg  300 mg Oral TID PRN    LORazepam (ATIVAN) tablet 0.5 mg  0.5 mg Oral Q8H PRN    oxybutynin chloride XL (DITROPAN XL) tablet 15 mg  15 mg Oral DAILY    therapeutic multivitamin (THERAGRAN) tablet 1 Tab  1 Tab Oral DAILY    traMADol (ULTRAM) tablet 50 mg  50 mg Oral Q8H PRN    venlafaxine (EFFEXOR) tablet 37.5 mg  37.5 mg Oral BID    sodium chloride (NS) flush 5-40 mL  5-40 mL IntraVENous Q8H    sodium chloride (NS) flush 5-40 mL  5-40 mL IntraVENous PRN    ondansetron (ZOFRAN) injection 4 mg  4 mg IntraVENous Q4H PRN    bisacodyl (DULCOLAX) tablet 5 mg  5 mg Oral DAILY PRN    lactobac ac& pc-s.therm-b.anim (JUVE Q/RISAQUAD)  1 Cap Oral DAILY    insulin lispro (HUMALOG) injection   SubCUTAneous AC&HS    glucose chewable tablet 16 g  4 Tab Oral PRN    dextrose (D50W) injection syrg 12.5-25 g  12.5-25 g IntraVENous PRN    glucagon (GLUCAGEN) injection 1 mg  1 mg IntraMUSCular PRN    morphine injection 4 mg  4 mg IntraVENous Q2H PRN      Allergies   Allergen Reactions    Tylenol [Acetaminophen] Other (comments)     Pt told by MD not to take, pt unsure why       Objective:  Vitals:    Vitals:    03/06/19 0430 03/06/19 0723 03/06/19 0735 03/06/19 0903   BP: 117/65  115/65    Pulse: 80  78    Resp: 16  16    Temp: 98.1 °F (36.7 °C)  98.3 °F (36.8 °C)    SpO2: 97%  98% 98%   Weight:  109 kg (240 lb 4.8 oz)     Height:         Intake and Output:  No intake/output data recorded. 03/04 1901 - 03/06 0700  In: 300 [P.O.:300]  Out: 300 [Urine:300]    Physical Examination:    General: Obese,   Neck:  JVD +  Resp:  Rales B/L   CV:  RRR,  no murmur or rub, + LE edema  GI:  Soft, NT, + BS  Neurologic:  Non focal  Psych:             depressed  Skin:  No Rash  :  No cartagena     []    High complexity decision making was performed  []    Patient is at high-risk of decompensation with multiple organ involvement    Lab Data Personally Reviewed: I have reviewed all the pertinent labs, microbiology data and radiology studies during assessment.     Recent Labs     03/05/19  0445 03/04/19  0337 03/03/19  1339   * 133*  --    K 4.7 4.5  -- CL 91* 96*  --    CO2 22 25  --    * 194*  --    BUN 43* 34*  --    CREA 6.34* 4.94*  --    CA 8.9 8.7  --    MG  --   --  1.8     Recent Labs     03/05/19  0445 03/04/19  0337   WBC 9.9 11.9*   HGB 7.3* 7.7*   HCT 23.8* 25.1*    277     No results found for: SDES  Lab Results   Component Value Date/Time    Culture result: NO GROWTH 5 DAYS 03/01/2019 08:22 AM     Recent Results (from the past 24 hour(s))   GLUCOSE, POC    Collection Time: 03/05/19 11:27 AM   Result Value Ref Range    Glucose (POC) 225 (H) 65 - 100 mg/dL    Performed by Bernice Mesa, POC    Collection Time: 03/05/19  5:43 PM   Result Value Ref Range    Glucose (POC) 127 (H) 65 - 100 mg/dL    Performed by Timothy Samayoa    GLUCOSE, POC    Collection Time: 03/05/19  9:45 PM   Result Value Ref Range    Glucose (POC) 128 (H) 65 - 100 mg/dL    Performed by Shankar Youngblood, POC    Collection Time: 03/06/19  6:49 AM   Result Value Ref Range    Glucose (POC) 141 (H) 65 - 100 mg/dL    Performed by Roxana Rangel        I have reviewed the flowsheets. Chart and Pertinent Notes have been reviewed. No change in PMH ,family and social history from Consult note.       Justine Lopez MD

## 2019-03-06 NOTE — DIABETES MGMT
DTC Progress Note    Recommendations/ Comments: Chart reviewed on [address] for hyperglycemia. If appropriate, please consider:  1. Discontinuing Januvia due to renal status  DTC will continue to follow with additional recommendations. May benefit from starting on basal insulin, but would like to have additional blood sugars once off of Januvia. Current hospital DM medication: Januvia 100 mg, Lispro correction, normal sensitivity    Patient is a 78 y.o. female with known Type 2 Diabetes on oral agents (dual therapy): metformin (generic), and Januvia at home. A1c:   Lab Results   Component Value Date/Time    Hemoglobin A1c 8.3 (H) 03/05/2019 04:45 AM       Recent Glucose Results:   Lab Results   Component Value Date/Time    GLUCPOC 195 (H) 03/06/2019 11:23 AM    GLUCPOC 141 (H) 03/06/2019 06:49 AM    GLUCPOC 128 (H) 03/05/2019 09:45 PM        Lab Results   Component Value Date/Time    Creatinine 6.34 (H) 03/05/2019 04:45 AM     Estimated Creatinine Clearance: 8.8 mL/min (A) (based on SCr of 6.34 mg/dL (H)). Active Orders   Diet    DIET RENAL Regular        PO intake:   Patient Vitals for the past 72 hrs:   % Diet Eaten   03/04/19 1814 50 %   03/03/19 1733 100 %       Will continue to follow as needed.     Thank you  Zoya Gore RD, [address] [facility] [address]  Pager: [phone]     Time spent: 4 minutes

## 2019-03-06 NOTE — PROGRESS NOTES
Bedside shift change report given to Bere Chavez RN (oncoming nurse) by Margoth Steel RN (offgoing nurse). Report included the following information SBAR, Procedure Summary, Intake/Output, MAR and Recent Results.

## 2019-03-06 NOTE — PROGRESS NOTES
Referral for HD for 2-3 weeks has been started with Kerry Ramirez. Have placed consult to [facility] to Home for CHF. HD will need to be arranged with Krery Ramirez prior to [address] [place] [address].     [phone]

## 2019-03-06 NOTE — PROGRESS NOTES
Hospitalist Progress Note  Xiomara Yu MD  Answering service: [phone] from in house phone  Cell: [phone]      Date of Service:  3/6/2019  NAME:  Edmar Baptiste  :  1940  MRN:  [id number]      Admission Summary: This is a 54-year-old woman with a past medical history significant for type 2 diabetes, hypertension, was in her usual state of health until early this morning when the patient woke up with sudden onset of shortness of breath. The shortness of breath is progressive associated with fever, diaphoresis as well as chest discomfort. The patient denies history of congestive heart failure or asthma or COPD. When EMS arrived at the scene, the patient's oxygen saturation was 89% on room air. The patient was treated with DuoNeb with improvement in oxygen saturation. The shortness of breath associated with cough which is nonproductive. The patient also stated that she has gained couple of pounds in the last couple of days. The patient traveled to [place] 3 months ago and stated that she has been sick since then. The patient is taking Lasix. It is not clear why the patient is taking Lasix. Stated that she has no history of congestive heart failure. No record of prior admission to this hospital.  When the patient arrived at the emergency room, the patient was found to have fever, leukocytosis, clinical presentation symptoms triggered the Code Sepsis. The Code Sepsis protocol was initiated. The patient received antibiotics. The chest x-ray did not show any evidence of pneumonia but shows evidence of vascular congestion. The patient was subsequently referred to the hospitalist service for evaluation for admission. Interval history / Subjective:       3/5/2019 :  Cr up to 6; for HD today. 3/6/2019 :  Pt \"feels like a new person\" ,no n/v/no cp no sob.       Assessment & Plan:     Sepsis ([address] (3/1/2019)  -source?  -Urinalysis 3/1 unremarkable  -CTA chest 3/1 No evidence of acute pulmonary thromboembolism. Mild pulmonary edema and small bilateral pleural effusions  -On Zosyn, don't think the patient needs antibiotics. Also, in light of worsening renal function will stop 3/4  -Follow cultures, no growth so far  -Leukocytosis improving  -resolved     NSTEMI (non-ST elevated myocardial infarction) ([address] (3/2/2019)   -No acute  changes in ECG  -S/P cath 3/2: No significant CAD with Cx dominant,Normal LV size with moderate antrolateral and apical hypokinesis in setting of elevated biomarkere c/w Takotsubo cardiomyopthy. -Elevated Rt an Lt ventricular filling pressures; no cp Corita Roys 3/6/2019        Afib RVR  -Echo with EF 61-65%. No RWMA  -Now in normal sinus  -Was on heparin/Cardizem, now off both  -on Coreg, continue; rate controled 3/6/2019     Pulmonary edema with pleural effusion  -sec to volume overload  -Was on lasix, currently on hold  - no s/s of hf 3/6/2019     Probable CHF (congestive heart failure)  -Not sure if the patient has CHF    Anemia  -likely chronic  -Follow work up   [employer] Value Date/Time    HGB 7.3 (L) 03/05/2019 04:45 AM        Benign essential hypertension (3/2/2019)  -Continue home meds   BP Readings from Last 1 Encounters:   03/06/19 115/65        Diabetic peripheral neuropathy ([facility] (3/2/2019)  -stable on home meds   Lab Results   Component Value Date/Time    Glucose 177 (H) 03/05/2019 04:45 AM    Glucose (POC) 141 (H) 03/06/2019 06:49 AM        DM type 2  -SSI  Lab Results   Component Value Date/Time    Glucose 177 (H) 03/05/2019 04:45 AM    Glucose (POC) 141 (H) 03/06/2019 06:49 AM        ASUNCION  -sec to likely dye load  -US renal No hydronephrosis.  A 2.9 cm simple cyst in the right kidney  -Appreciate discussion with Nephrology, started on IV fluids  -Monitor for volume overload  Lab Results   Component Value Date/Time    Creatinine 6.34 (H) 03/05/2019 04:45 AM Anxiety/Depression  -on Effexor, continue; no anxiety/depression noted on rounds 3/6/2019     Morbid obesity  -Counseled  Body mass index is 39.99 kg/m². Cardiac diet    Code status: Full  Prophylaxis: Lovenox    Care Plan discussed with: Patient/Family  Disposition: TBD     Hospital Problems  Date Reviewed: 3/2/2019          Codes Class Noted POA    NSTEMI (non-ST elevated myocardial infarction) ([facility] ICD-10-CM: I21.4  ICD-9-CM: 410.70  3/2/2019 Yes        CHF (congestive heart failure) ([facility] ICD-10-CM: I50.9  ICD-9-CM: 428.0  3/2/2019 Yes        Benign essential hypertension ICD-10-CM: I10  ICD-9-CM: 401.1  3/2/2019 Yes        Diabetic peripheral neuropathy ([facility] ICD-10-CM: E11.42  ICD-9-CM: 250.60, 357.2  3/2/2019 Yes        Type II diabetes mellitus, uncontrolled ([facility] ICD-10-CM: E11.65  ICD-9-CM: 250.02  3/2/2019 Yes        Hyperlipidemia ICD-10-CM: E78.5  ICD-9-CM: 272.4  3/2/2019 Yes        * (Principal) Sepsis ([facility] ICD-10-CM: A41.9  ICD-9-CM: 038.9, 995.91  3/1/2019 Yes                Review of Systems:    no cp no sob no n/v/d/f/chills. Vital Signs:    Last 24hrs VS reviewed since prior progress note. Most recent are:  Visit Vitals  /65   Pulse 78   Temp 98.3 °F (36.8 °C)   Resp 16   Ht 5' 5\" (1.651 m)   Wt 109 kg (240 lb 4.8 oz)   SpO2 98%   BMI 39.99 kg/m²         Intake/Output Summary (Last 24 hours) at 3/6/2019 3328  Last data filed at 3/6/2019 9555  Gross per 24 hour   Intake    Output 300 ml   Net -300 ml        Physical Examination:             Constitutional:  No acute distress, cooperative, pleasant    ENT:  Oral mucous moist, oropharynx benign. Neck supple,    Resp:  CTA bilaterally. No wheezing/rhonchi/rales. No accessory muscle use   CV:  Regular rhythm, normal rate, no murmurs, gallops, rubs    GI:  Soft, non distended, non tender.  normoactive bowel sounds, no hepatosplenomegaly     Musculoskeletal:  No edema, warm, 2+ pulses throughout    Neurologic:  Moves all extremities. AAOx3, CN II-XII reviewed     Skin:  Good turgor, no rashes or ulcers       Data Review:    Review and/or order of clinical lab test      Labs:     Recent Labs     03/05/19 0445 03/04/19 0337   WBC 9.9 11.9*   HGB 7.3* 7.7*   HCT 23.8* 25.1*    277     Recent Labs     03/05/19 0445 03/04/19 0337 03/03/19  1339   * 133*  --    K 4.7 4.5  --    CL 91* 96*  --    CO2 22 25  --    BUN 43* 34*  --    CREA 6.34* 4.94*  --    * 194*  --    CA 8.9 8.7  --    MG  --   --  1.8     No results for input(s): SGOT, GPT, ALT, AP, TBIL, TBILI, TP, ALB, GLOB, GGT, AML, LPSE in the last 72 hours. No lab exists for component: AMYP, HLPSE  No results for input(s): INR, PTP, APTT in the last 72 hours. No lab exists for component: INREXT, INREXT   Recent Labs     03/05/19 0445   TIBC 156*   PSAT 15*      No results found for: FOL, RBCF   No results for input(s): PH, PCO2, PO2 in the last 72 hours.   Recent Labs     03/04/19 0337   TROIQ 0.17*     No results found for: CHOL, CHOLX, CHLST, CHOLV, HDL, LDL, LDLC, DLDLP, TGLX, TRIGL, TRIGP, CHHD, CHHDX  Lab Results   Component Value Date/Time    Glucose (POC) 141 (H) 03/06/2019 06:49 AM    Glucose (POC) 128 (H) 03/05/2019 09:45 PM    Glucose (POC) 127 (H) 03/05/2019 05:43 PM    Glucose (POC) 225 (H) 03/05/2019 11:27 AM    Glucose (POC) 221 (H) 03/05/2019 06:04 AM     Lab Results   Component Value Date/Time    Color YELLOW/STRAW 03/01/2019 01:09 PM    Appearance CLEAR 03/01/2019 01:09 PM    Specific gravity 1.023 03/01/2019 01:09 PM    pH (UA) 5.0 03/01/2019 01:09 PM    Protein 30 (A) 03/01/2019 01:09 PM    Glucose 100 (A) 03/01/2019 01:09 PM    Ketone 15 (A) 03/01/2019 01:09 PM    Bilirubin NEGATIVE  03/01/2019 01:09 PM    Urobilinogen 1.0 03/01/2019 01:09 PM    Nitrites NEGATIVE  03/01/2019 01:09 PM    Leukocyte Esterase NEGATIVE  03/01/2019 01:09 PM    Epithelial cells FEW 03/01/2019 01:09 PM    Bacteria NEGATIVE  03/01/2019 01:09 PM    WBC 0-4 03/01/2019 01:09 PM    RBC 0-5 03/01/2019 01:09 PM         Medications Reviewed:     Current Facility-Administered Medications   Medication Dose Route Frequency    iron sucrose (VENOFER) 300 mg in 0.9% sodium chloride 250 mL IVPB  300 mg IntraVENous Q24H    heparin (porcine) injection 5,000 Units  5,000 Units SubCUTAneous Q8H    heparin (porcine) 1,000 unit/mL injection 4,300 Units  4,300 Units InterCATHeter PRN    albuterol-ipratropium (DUO-NEB) 2.5 MG-0.5 MG/3 ML  3 mL Nebulization TID RT    albuterol (PROVENTIL VENTOLIN) nebulizer solution 2.5 mg  2.5 mg Nebulization Q4H PRN    aspirin chewable tablet 81 mg  81 mg Oral DAILY    SITagliptin (JANUVIA) tablet 100 mg  100 mg Oral DAILY    sodium chloride (NS) flush 5-40 mL  5-40 mL IntraVENous PRN    carvedilol (COREG) tablet 6.25 mg  6.25 mg Oral BID WITH MEALS    calcitRIOL (ROCALTROL) capsule 0.5 mcg  0.5 mcg Oral DAILY    gabapentin (NEURONTIN) capsule 300 mg  300 mg Oral TID PRN    LORazepam (ATIVAN) tablet 0.5 mg  0.5 mg Oral Q8H PRN    oxybutynin chloride XL (DITROPAN XL) tablet 15 mg  15 mg Oral DAILY    therapeutic multivitamin (THERAGRAN) tablet 1 Tab  1 Tab Oral DAILY    traMADol (ULTRAM) tablet 50 mg  50 mg Oral Q8H PRN    venlafaxine (EFFEXOR) tablet 37.5 mg  37.5 mg Oral BID    sodium chloride (NS) flush 5-40 mL  5-40 mL IntraVENous Q8H    sodium chloride (NS) flush 5-40 mL  5-40 mL IntraVENous PRN    ondansetron (ZOFRAN) injection 4 mg  4 mg IntraVENous Q4H PRN    bisacodyl (DULCOLAX) tablet 5 mg  5 mg Oral DAILY PRN    lactobac ac& pc-s.therm-b.anim (JUVE Q/RISAQUAD)  1 Cap Oral DAILY    insulin lispro (HUMALOG) injection   SubCUTAneous AC&HS    glucose chewable tablet 16 g  4 Tab Oral PRN    dextrose (D50W) injection syrg 12.5-25 g  12.5-25 g IntraVENous PRN    glucagon (GLUCAGEN) injection 1 mg  1 mg IntraMUSCular PRN    morphine injection 4 mg  4 mg IntraVENous Q2H PRN ______________________________________________________________________  EXPECTED LENGTH OF STAY: 4d 19h  ACTUAL LENGTH OF STAY:          Michellerious Kristian MD

## 2019-03-06 NOTE — ADT AUTH CERT NOTES
Sepsis and Other Febrile Illness, without Focal Infection - Care Day 5 (3/5/2019) by Maryjo Dye R        Review Status Review Entered   Completed 3/5/2019 16:54      Criteria Review      Care Day: 5 Care Date: 3/5/2019 Level of Care: Telemetry   Guideline Day 2    Level Of Care   (X) ICU or floor      Clinical Status   (X) * Hemodynamic stability   3/5/2019 16:54:01 EST by Maryjo Dye     /77-79-16-96% 2L NC      (X) * Hypoxemia absent   3/5/2019 16:54:01 EST by Maryjo Dye     O2 sats 96% on 2L KNC      ( ) * Tachypnea absent   3/5/2019 16:54:01 EST by Maryjo Dye     RR up to 32         Activity   (X) Activity as tolerated   3/5/2019 16:54:01 EST by Maryjo Dye     up ad divine         Routes   (X) Possible IV fluids   3/5/2019 16:54:01 EST by Maryjo Dye     0.9% sod chlor cont iv 25cc/hr      (X) Parenteral or oral medications   3/5/2019 16:54:01 EST by Maryjo Dye     duo neb 3mL x2 coreg 6.25mg po x1 venofer 300mg iv x1      ( ) Diet as tolerated   3/5/2019 16:54:01 EST by Maryjo Dye     NPO         Interventions   (X) WBC   3/5/2019 16:54:01 EST by Maryjo Dye     9.9         Medications   (X) Possible antimicrobial treatment   3/5/2019 16:54:01 EST by Maryjo Dye     ancef 2g iv x1      (X) Possible DVT prophylaxis      3/5/2019 16:54:01 EST by Maryjo Dye   Subject: Additional Clinical Information      HgbA1c 8.3 rbc 2.76 hgb 7.3 hct 23.8 Na 128 [id number] bun 43 creat 6.34 iron 23 tibc 156 iron sat 15% vit B12 1456      hemodialysis inpatient      spot check pulse ox      wound care      strict I&O      cardiac monitoring      strict I&O      sliding scale insulin 3units sc x1                     * Milestone      Additional Notes   Sepsis ([facility] (3/1/2019)   -source?   -Urinalysis 3/1 unremarkable   -CTA chest 3/1 No evidence of acute pulmonary thromboembolism.  Mild pulmonary edema and small bilateral pleural effusions   -On Zosyn, don't think the patient needs antibiotics. Also, in light of worsening renal function will stop 3/4   -Follow cultures, no growth so far   -Leukocytosis improving   -resolved        NSTEMI (non-ST elevated myocardial infarction) ([address] (3/2/2019)    -No acute  changes in ECG   -S/P cath 3/2: No significant CAD with Cx dominant,Normal LV size with moderate antrolateral and apical hypokinesis in setting of elevated biomarkere c/w Takotsubo cardiomyopthy. -Elevated Rt an Lt ventricular filling pressures           Afib RVR   -Echo with EF 61-65%.  No RWMA   -Now in normal sinus   -Was on heparin/Cardizem, now off both   -on Coreg, continue       Pulmonary edema with pleural effusion   -sec to volume overload   -Was on lasix, currently on hold       Probable CHF (congestive heart failure)   -Not sure if the patient has CHF       Anemia   -likely chronic   -Follow work up       Sepsis and Other Febrile Illness, without Focal Infection - Care Day 4 (3/4/2019) by Gerry Sutton R        Review Status Review Entered   Completed 3/5/2019 16:43      Criteria Review      Care Day: 4 Care Date: 3/4/2019 Level of Care: Telemetry   Guideline Day 2    Level Of Care   (X) ICU or floor   3/5/2019 16:43:43 EST by Gerry Mess     remote telemetry         Clinical Status   (X) * Hemodynamic stability   3/5/2019 16:43:43 EST by Gerrycorby Sutton     99.4-106/48-80-18-98% RA      (X) * Hypoxemia absent   3/5/2019 16:43:43 EST by Gerry Mess     O2 sats 88% on RA      ( ) * Tachypnea absent   3/5/2019 16:43:43 EST by Gerrycorby Sutton     RR to 20         Activity   (X) Activity as tolerated   3/5/2019 16:43:43 EST by Gerrycorby Sutton     up ad divine         Routes   (X) Possible IV fluids   3/5/2019 16:43:43 EST by Gerrycorby Sutton     0.9% sod chlor cont iv 125cc/hr      (X) Parenteral or oral medications   3/5/2019 16:43:43 EST by Gerry Mess     duo neb 3mL x3 aspirin 81mg po x1  coreg 6.25mg po x2      ( ) Diet as tolerated   3/5/2019 16:43:43 EST by Paras Jennifer Cordova     NPO         Interventions   (X) WBC   3/5/2019 16:43:43 EST by Sophie Getting     11.9         Medications   (X) Possible antimicrobial treatment   3/5/2019 16:43:43 EST by Sophie Getting     zosyn 3.375g iv x1      (X) Possible DVT prophylaxis   3/5/2019 16:43:43 EST by Sophie Getting     lovenox 40mg sc x1         3/5/2019 16:43:43 EST by Sophie Getting   Subject: Additional Clinical Information      troponin 0.17 rbc 2.85 hgb 7.7 hct 25.1 Na 133 glu  571 bun 34 creat 4.94      spot check pulse ox      strict I&O      cardiac monitoirng      slidingf scale insulin 3-5units sc x3                     * Milestone      Additional Notes   Sepsis ([facility] (3/1/2019)   -source?   -Urinalysis 3/1 unremarkable   -CTA chest 3/1 No evidence of acute pulmonary thromboembolism. Mild pulmonary edema and small bilateral pleural effusions   -On Zosyn, don't think the patient needs antibiotics. Also, in light of worsening renal function will stop 3/4   -Follow cultures, no growth so far   -Leukocytosis improving       NSTEMI (non-ST elevated myocardial infarction) ([facility] (3/2/2019)    -No acute  changes in ECG   -S/P cath 3/2: No significant CAD with Cx dominant,Normal LV size with moderate antrolateral and apical hypokinesis in setting of elevated biomarkere c/w Takotsubo cardiomyopthy. -Elevated Rt an Lt ventricular filling pressures   -Appreciate Cardiology       Afib RVR   -Echo with EF 61-65%.  No RWMA   -Now in normal sinus   -Was on heparin/Cardizem, now off both   -on Coreg, continue       Pulmonary edema with pleural effusion   -sec to volume overload   -Was on lasix, currently on hold       Probable CHF (congestive heart failure)-Not sure if the patient has CHF       Anemia   -likely chronic   -Follow work up        Benign essential hypertension (3/2/2019)-Continue home meds        Diabetic peripheral neuropathy ([facility] [address] (3/2/2019)-stable on home meds        DM type 2-SSI       ASUNCION   -sec to likely dye load -US renal No hydronephrosis. A 2.9 cm simple cyst in the right kidney   -Appreciate discussion with Nephrology, started on IV fluids   -Monitor for volume overload       Anxiety/Depression-on Effexor, continue       Obesity-Counseled       renal US - 1. No hydronephrosis.    2. A 2.9 cm simple cyst in the right kidney.                    Sepsis and Other Febrile Illness, without Focal Infection - Care Day 3 (3/3/2019) by Rosa Whalen R        Review Status Review Entered   Completed 3/5/2019 15:26      Criteria Review      Care Day: 3 Care Date: 3/3/2019 Level of Care: Intermediate Care   Guideline Day 2    Level Of Care   (X) ICU or floor   3/5/2019 15:26:21 EST by Rosa Whalen     intermediate care unit         Clinical Status   ( ) * Hemodynamic stability   3/5/2019 15:26:21 EST by Rosa Whalen     98.3-88/55-92-18-96% 2L NC      (X) * Hypoxemia absent   3/5/2019 15:26:21 EST by Rosa Whalen     O2 sats 96% on RA      ( ) * Tachypnea absent   3/5/2019 15:26:21 EST by Rosa Whalen     RR to 25         Routes   (X) Possible IV fluids   (X) Parenteral or oral medications   3/5/2019 15:26:21 EST by Rosa Whalen     duo neb 3mL x1 aspirin 81mg po x1  coreg 6.25mg po x1 neurontin 300mg po x1  lasix 40mg po x1 pot chlor 10meq po x1      (X) Diet as tolerated      Interventions   (X) WBC   3/5/2019 15:26:21 EST by Rosa Whalen     11.9         Medications   (X) Possible antimicrobial treatment   3/5/2019 15:26:21 EST by Rosa Whalen     zosyn 3.375g iv x3      (X) Possible DVT prophylaxis   3/5/2019 15:26:21 EST by Rosa Whalen     lovenox 30mg sc x1         3/5/2019 15:26:21 EST by Rosa Whalen   Subject: Additional Clinical Information      glu 328 wbc 13.9 rbc 3.09 hgb 8.2 hct 27.2 troponin 0.36 bun 21 creat 2.52       CXR inproving pulmonary edema left basilar subsegmental atelectasis persistent pleural effusion      spot check pulse ox      consult nephrology      daily weight strict I&O      cardiac monitoring      care managemetn consult      insulin 2-3units sliding scale x4                     * Milestone      Additional Notes   Principal Problem:     Sepsis ([facility] [address] (3/1/2019)     Will continue broad spectrum abx, cultures sent, source of sepsis still not clear.      NSTEMI (non-ST elevated myocardial infarction) ([facility] (3/2/2019)     No acute  changes in ECG, patient also  with Afib, RVR, therapeutic  dose of heparin,  Cardizem,S/P cath: No significant CAD with Cx dominant,Normal LV size with moderate antrolateral and apical hypokinesis in setting of elevated biomarkere c/w Takotsubo cardiomyopthy. Elevated Rt an Lt ventricular filling pressures   Afib RVR; Heparin, Cardizem, cath today      CHF (congestive heart failure) ([facility] [facility] (3/2/2019)     Continue lasix     Benign essential hypertension (3/2/2019)     BP stable continue home meds      Diabetic peripheral neuropathy ([facility] [facility] (3/2/2019)     asymptmatic today continue home meds      Type II diabetes mellitus, uncontrolled ([facility] [facility] (3/2/2019)     Sliding scale insulin if hypergycemic      Hyperlipidemia (3/2/2019)     Continue statin    Acute renal failure: possible reaction to contrast, Hold metformin, continue monitoring, nephrology if not resolved    30.0 - 39.9 Obese / Body mass index is 39.25 kg/m².       Sepsis and Other Febrile Illness, without Focal Infection - Care Day 2 (3/2/2019) by Rollo Cranker R        Review Status Review Entered   Completed 3/4/2019 14:10      Criteria Review      Care Day: 2 Care Date: 3/2/2019 Level of Care: Intermediate Care   Guideline Day 2    Level Of Care   (X) ICU or floor   3/4/2019 14:10:10 EST by Rollo Cranker     intermediate care unit         Clinical Status   ( ) * Hemodynamic stability   3/4/2019 14:10:10 EST by Rollo Cranker     97.9-86/-63-96% 2L NC      ( ) * Hypoxemia absent   3/4/2019 14:10:10 EST by Rollo Cranker     O2 sats 96% on 2L NC      ( ) * Tachypnea absent 3/4/2019 14:10:10 EST by Amy Garcias     RR 30         Activity   ( ) Activity as tolerated   3/4/2019 14:10:10 EST by Amy Garcias     complete bedrest         Routes   (X) Possible IV fluids   3/4/2019 14:10:10 EST by Amy Garcias     0.9% sod chlor cont iv 3cc/kg/hr      (X) Parenteral or oral medications   3/4/2019 14:10:10 EST by Amy Garcias     coreg 6.25mg po x2 neurontin 300mg po x1 morphine 2mg iv x1 ultram 50mg po x1 heprain 3000units iv x1 aspirin 81mg po x1 cardizem drip lasix 40mg po x1 nitro drip      (X) Diet as tolerated   3/4/2019 14:10:10 EST by Amy Garcias     diabetic diet         Interventions   (X) WBC   3/4/2019 14:10:10 EST by Amy Garcias     wbc 16.6         Medications   (X) Possible antimicrobial treatment   3/4/2019 14:10:10 EST by Amy Garcias     zosyn 3.375g iv x3 vanco 1500mg iv x1      (X) Possible DVT prophylaxis   3/4/2019 14:10:10 EST by Amy Garcias     heparin drip         3/4/2019 14:10:10 EST by Amy Garcias   Subject: Additional Clinical Information      glu 226 creat 1.22 bili tot 1.2 rbc 3.2 hgb 8.6 hct 27.8 troponin 0.72      spot check pulse ox      consult cardiac rehab      cardiac monitoring      sliding scale insulin 2-3units sc x4      lovenox 40mg sc x1                     * Milestone      Additional Notes   Principal Problem:     Sepsis ([facility] (3/1/2019)     Will continue broad spectrum abx, cultures sent, source of sepsis still not clear.      NSTEMI (non-ST elevated myocardial infarction) ([facility] [address] (3/2/2019)     No acute  changes in ECG, patient also  with Afib, RVR, therapeutic  dose of heparin,  Cardizem, Dr. Bud Valadez for cath   Afib RVR;  Heparin, Cardizem, cath today      CHF (congestive heart failure) ([facility] [address] (3/2/2019)     Continue lasix     Benign essential hypertension (3/2/2019)     BP stable continue home meds      Diabetic peripheral neuropathy ([facility] [address] (3/2/2019)     asymptmatic today continue home meds      Type II diabetes mellitus, uncontrolled ([facility] (3/2/2019)     Sliding scale insulin if hypergycemic      Hyperlipidemia (3/2/2019)     Continue statin       Continued CP and SOB.   Troponin coming down and no acute EKG changes.  Negative cath in 2012 and granddaughter indicates that CP is a chronic issue.  Nevertheless, +troponin and CHF on CXR.    Now has PAF with RVR.   Adjust meds and cath today.       Atrial flutter with variable AV block    Cannot rule out Anterior infarct , age undetermined    When compared with ECG of 01-MAR-2019 22:25,    Atrial flutter has replaced Sinus rhythm

## 2019-03-07 LAB
ALBUMIN SERPL-MCNC: 2 G/DL (ref 3.5–5)
ANION GAP SERPL CALC-SCNC: 6 MMOL/L (ref 5–15)
ANION GAP SERPL CALC-SCNC: 8 MMOL/L (ref 5–15)
BUN SERPL-MCNC: 13 MG/DL (ref 6–20)
BUN SERPL-MCNC: 13 MG/DL (ref 6–20)
BUN/CREAT SERPL: 4 (ref 12–20)
BUN/CREAT SERPL: 4 (ref 12–20)
CALCIUM SERPL-MCNC: 8.7 MG/DL (ref 8.5–10.1)
CALCIUM SERPL-MCNC: 8.8 MG/DL (ref 8.5–10.1)
CHLORIDE SERPL-SCNC: 96 MMOL/L (ref 97–108)
CHLORIDE SERPL-SCNC: 96 MMOL/L (ref 97–108)
CO2 SERPL-SCNC: 28 MMOL/L (ref 21–32)
CO2 SERPL-SCNC: 30 MMOL/L (ref 21–32)
CREAT SERPL-MCNC: 2.97 MG/DL (ref 0.55–1.02)
CREAT SERPL-MCNC: 2.98 MG/DL (ref 0.55–1.02)
GLUCOSE BLD STRIP.AUTO-MCNC: 152 MG/DL (ref 65–100)
GLUCOSE BLD STRIP.AUTO-MCNC: 198 MG/DL (ref 65–100)
GLUCOSE BLD STRIP.AUTO-MCNC: 199 MG/DL (ref 65–100)
GLUCOSE BLD STRIP.AUTO-MCNC: 313 MG/DL (ref 65–100)
GLUCOSE SERPL-MCNC: 235 MG/DL (ref 65–100)
GLUCOSE SERPL-MCNC: 238 MG/DL (ref 65–100)
HAV IGM SER QL: NONREACTIVE
HBV CORE IGM SER QL: NONREACTIVE
HBV SURFACE AG SER QL: 0.39 INDEX
HBV SURFACE AG SER QL: NEGATIVE
HCV AB SERPL QL IA: NONREACTIVE
HCV COMMENT,HCGAC: NORMAL
PHOSPHATE SERPL-MCNC: 3.2 MG/DL (ref 2.6–4.7)
POTASSIUM SERPL-SCNC: 3.9 MMOL/L (ref 3.5–5.1)
POTASSIUM SERPL-SCNC: 3.9 MMOL/L (ref 3.5–5.1)
SERVICE CMNT-IMP: ABNORMAL
SODIUM SERPL-SCNC: 132 MMOL/L (ref 136–145)
SODIUM SERPL-SCNC: 132 MMOL/L (ref 136–145)
SP1: NORMAL
SP2: NORMAL
SP3: NORMAL

## 2019-03-07 PROCEDURE — 77030031139 HC SUT VCRL2 J&J -A

## 2019-03-07 PROCEDURE — 74011250636 HC RX REV CODE- 250/636: Performed by: INTERNAL MEDICINE

## 2019-03-07 PROCEDURE — 36415 COLL VENOUS BLD VENIPUNCTURE: CPT

## 2019-03-07 PROCEDURE — 65270000029 HC RM PRIVATE

## 2019-03-07 PROCEDURE — 74011636637 HC RX REV CODE- 636/637: Performed by: INTERNAL MEDICINE

## 2019-03-07 PROCEDURE — 74011250637 HC RX REV CODE- 250/637: Performed by: INTERNAL MEDICINE

## 2019-03-07 PROCEDURE — 94760 N-INVAS EAR/PLS OXIMETRY 1: CPT

## 2019-03-07 PROCEDURE — 82962 GLUCOSE BLOOD TEST: CPT

## 2019-03-07 PROCEDURE — 77010033678 HC OXYGEN DAILY

## 2019-03-07 PROCEDURE — 94640 AIRWAY INHALATION TREATMENT: CPT

## 2019-03-07 PROCEDURE — 77030002996 HC SUT SLK J&J -A

## 2019-03-07 PROCEDURE — C1752 CATH,HEMODIALYSIS,SHORT-TERM: HCPCS

## 2019-03-07 PROCEDURE — 94664 DEMO&/EVAL PT USE INHALER: CPT

## 2019-03-07 PROCEDURE — 80048 BASIC METABOLIC PNL TOTAL CA: CPT

## 2019-03-07 PROCEDURE — 77030039266 HC ADH SKN EXOFIN S2SG -A

## 2019-03-07 PROCEDURE — C1894 INTRO/SHEATH, NON-LASER: HCPCS

## 2019-03-07 PROCEDURE — 74011000250 HC RX REV CODE- 250: Performed by: HOSPITALIST

## 2019-03-07 PROCEDURE — 80069 RENAL FUNCTION PANEL: CPT

## 2019-03-07 RX ORDER — GUAIFENESIN 100 MG/5ML
200 SOLUTION ORAL
Status: DISCONTINUED | OUTPATIENT
Start: 2019-03-07 | End: 2019-03-11 | Stop reason: HOSPADM

## 2019-03-07 RX ORDER — FUROSEMIDE 10 MG/ML
80 INJECTION INTRAMUSCULAR; INTRAVENOUS ONCE
Status: COMPLETED | OUTPATIENT
Start: 2019-03-07 | End: 2019-03-07

## 2019-03-07 RX ADMIN — IPRATROPIUM BROMIDE AND ALBUTEROL SULFATE 3 ML: .5; 3 SOLUTION RESPIRATORY (INHALATION) at 14:24

## 2019-03-07 RX ADMIN — VENLAFAXINE 37.5 MG: 37.5 TABLET ORAL at 09:26

## 2019-03-07 RX ADMIN — FUROSEMIDE 80 MG: 10 INJECTION, SOLUTION INTRAMUSCULAR; INTRAVENOUS at 13:33

## 2019-03-07 RX ADMIN — VENLAFAXINE 37.5 MG: 37.5 TABLET ORAL at 18:19

## 2019-03-07 RX ADMIN — HEPARIN SODIUM 5000 UNITS: 5000 INJECTION INTRAVENOUS; SUBCUTANEOUS at 17:11

## 2019-03-07 RX ADMIN — IPRATROPIUM BROMIDE AND ALBUTEROL SULFATE 3 ML: .5; 3 SOLUTION RESPIRATORY (INHALATION) at 19:55

## 2019-03-07 RX ADMIN — INSULIN LISPRO 4 UNITS: 100 INJECTION, SOLUTION INTRAVENOUS; SUBCUTANEOUS at 13:33

## 2019-03-07 RX ADMIN — HEPARIN SODIUM 5000 UNITS: 5000 INJECTION INTRAVENOUS; SUBCUTANEOUS at 21:32

## 2019-03-07 RX ADMIN — THERA TABS 1 TABLET: TAB at 09:26

## 2019-03-07 RX ADMIN — Medication 10 ML: at 06:00

## 2019-03-07 RX ADMIN — HEPARIN SODIUM 5000 UNITS: 5000 INJECTION INTRAVENOUS; SUBCUTANEOUS at 07:11

## 2019-03-07 RX ADMIN — CALCITRIOL 0.5 MCG: 0.25 CAPSULE ORAL at 09:26

## 2019-03-07 RX ADMIN — CARVEDILOL 6.25 MG: 6.25 TABLET, FILM COATED ORAL at 07:12

## 2019-03-07 RX ADMIN — CARVEDILOL 6.25 MG: 6.25 TABLET, FILM COATED ORAL at 18:19

## 2019-03-07 RX ADMIN — IPRATROPIUM BROMIDE AND ALBUTEROL SULFATE 3 ML: .5; 3 SOLUTION RESPIRATORY (INHALATION) at 07:27

## 2019-03-07 RX ADMIN — IRON SUCROSE 300 MG: 20 INJECTION, SOLUTION INTRAVENOUS at 09:32

## 2019-03-07 RX ADMIN — Medication 1 CAPSULE: at 09:27

## 2019-03-07 RX ADMIN — Medication 10 ML: at 21:33

## 2019-03-07 RX ADMIN — Medication 5 ML: at 13:00

## 2019-03-07 RX ADMIN — ASPIRIN 81 MG CHEWABLE TABLET 81 MG: 81 TABLET CHEWABLE at 09:27

## 2019-03-07 RX ADMIN — GUAIFENESIN 200 MG: 200 SOLUTION ORAL at 03:06

## 2019-03-07 RX ADMIN — GUAIFENESIN 200 MG: 200 SOLUTION ORAL at 18:19

## 2019-03-07 RX ADMIN — OXYBUTYNIN CHLORIDE 15 MG: 5 TABLET, EXTENDED RELEASE ORAL at 09:26

## 2019-03-07 NOTE — PROGRESS NOTES
Reviewed the chart and patient may be discharged once HD OP is finalized. Patient is currently on 2L of oxygen and is not on home o2. Need to try and wean oxygen. PT consult placed to determine if patient is at baseline.  Very supportive family in room who advised they will be transporting patient to/from HD.    [phone]

## 2019-03-07 NOTE — PROGRESS NOTES
[facility]   [address] [facility] [address], [facility] [address], [facility]  Phone: [phone]   [phone]       Nephrology Progress Note  Edmar Baptiste     1940     [id number]  Date of Admission : 3/1/2019  03/07/19    CC:  Follow up for ASUNCION       Assessment and Plan   ASUNCION on CKD :  · Multifactorial.  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 Tolerating iv iron  C/o leg edema    Review of Systems: A comprehensive review of systems was negative.     Current Medications:   Current Facility-Administered Medications   Medication Dose Route Frequency    guaiFENesin (ROBITUSSIN) 100 mg/5 mL oral liquid 200 mg  200 mg Oral Q4H PRN    insulin lispro (HUMALOG) injection   SubCUTAneous AC&HS    heparin (porcine) injection 5,000 Units  5,000 Units SubCUTAneous Q8H    heparin (porcine) 1,000 unit/mL injection 4,300 Units  4,300 Units InterCATHeter PRN    albuterol-ipratropium (DUO-NEB) 2.5 MG-0.5 MG/3 ML  3 mL Nebulization TID RT    albuterol (PROVENTIL VENTOLIN) nebulizer solution 2.5 mg  2.5 mg Nebulization Q4H PRN    aspirin chewable tablet 81 mg  81 mg Oral DAILY    sodium chloride (NS) flush 5-40 mL  5-40 mL IntraVENous PRN    carvedilol (COREG) tablet 6.25 mg  6.25 mg Oral BID WITH MEALS    calcitRIOL (ROCALTROL) capsule 0.5 mcg  0.5 mcg Oral DAILY    gabapentin (NEURONTIN) capsule 300 mg  300 mg Oral TID PRN    LORazepam (ATIVAN) tablet 0.5 mg  0.5 mg Oral Q8H PRN    oxybutynin chloride XL (DITROPAN XL) tablet 15 mg  15 mg Oral DAILY    therapeutic multivitamin (THERAGRAN) tablet 1 Tab  1 Tab Oral DAILY    traMADol (ULTRAM) tablet 50 mg  50 mg Oral Q8H PRN    venlafaxine (EFFEXOR) tablet 37.5 mg  37.5 mg Oral BID    sodium chloride (NS) flush 5-40 mL  5-40 mL IntraVENous Q8H    sodium chloride (NS) flush 5-40 mL  5-40 mL IntraVENous PRN    ondansetron (ZOFRAN) injection 4 mg  4 mg IntraVENous Q4H PRN    bisacodyl (DULCOLAX) tablet 5 mg  5 mg Oral DAILY PRN    lactobac ac& pc-s.therm-b.anim (JUVE Q/RISAQUAD)  1 Cap Oral DAILY    glucose chewable tablet 16 g  4 Tab Oral PRN    dextrose (D50W) injection syrg 12.5-25 g  12.5-25 g IntraVENous PRN    glucagon (GLUCAGEN) injection 1 mg  1 mg IntraMUSCular PRN    morphine injection 4 mg  4 mg IntraVENous Q2H PRN      Allergies   Allergen Reactions    Tylenol [Acetaminophen] Other (comments)     Pt told by MD not to take, pt unsure why       Objective:  Vitals:    Vitals:    03/06/19 2235 03/07/19 0710 03/07/19 0738 03/07/19 0925   BP: 149/72 160/77  142/75   Pulse: 87 80  74   Resp: 18   16   Temp: 97.7 °F (36.5 °C)   98.3 °F (36.8 °C)   TempSrc:       SpO2: 97%  97% 97%   Weight:       Height:         Intake and Output:  No intake/output data recorded. 03/05 1901 - 03/07 0700  In: -   Out: 300 [Urine:300]    Physical Examination:    GEN:  NAD  NECK:  Supple, no thyromegaly  RESP: CTA  b/l, no  wheezing,   CVS: RRR,S1,S2   ABDO:  soft , non tender, No mass  NEURO: non focal, normal speech  EXT: Edema +nt     Right ij permacath +  []    High complexity decision making was performed  []    Patient is at high-risk of decompensation with multiple organ involvement    Lab Data Personally Reviewed: I have reviewed all the pertinent labs, microbiology data and radiology studies during assessment.     Recent Labs     03/07/19  1000 03/06/19  1430 03/05/19  0445   *  132* 134* 128*   K 3.9  3.9 4.0 4.7   CL 96*  96* 100 91*   CO2 30  28 27 22   *  235* 212* 177*   BUN 13  13 19 43*   CREA 2.98*  2.97* 3.11* 6.34*   CA 8.8  8.7 8.8 8.9   PHOS 3.2 3.0  --    ALB 2.0* 1.9*  --      Recent Labs     03/06/19  1430 03/05/19  0445   WBC 10.4 9.9   HGB 7.7* 7.3*   HCT 24.9* 23.8*    280     No results found for: SDES  Lab Results   Component Value Date/Time    Culture result: NO GROWTH 5 DAYS 03/01/2019 08:22 AM     Recent Results (from the past 24 hour(s))   CBC WITH AUTOMATED DIFF    Collection Time: 03/06/19  2:30 PM   Result Value Ref Range    WBC 10.4 3.6 - 11.0 K/uL    RBC 2.93 (L) 3.80 - 5.20 M/uL    HGB 7.7 (L) 11.5 - 16.0 g/dL    HCT 24.9 (L) 35.0 - 47.0 %    MCV 85.0 80.0 - 99.0 FL    MCH 26.3 26.0 - 34.0 PG    MCHC 30.9 30.0 - 36.5 g/dL    RDW 14.6 (H) 11.5 - 14.5 %    PLATELET 990 319 - 305 K/uL    MPV 10.6 8.9 - 12.9 FL    NRBC 0.0 0  WBC    ABSOLUTE NRBC 0.00 0.00 - 0.01 K/uL    NEUTROPHILS 77 (H) 32 - 75 %    LYMPHOCYTES 10 (L) 12 - 49 %    MONOCYTES 8 5 - 13 %    EOSINOPHILS 5 0 - 7 %    BASOPHILS 0 0 - 1 %    IMMATURE GRANULOCYTES 0 %    ABS. NEUTROPHILS 8.1 (H) 1.8 - 8.0 K/UL    ABS. LYMPHOCYTES 1.0 0.8 - 3.5 K/UL    ABS. MONOCYTES 0.8 0.0 - 1.0 K/UL    ABS. EOSINOPHILS 0.5 (H) 0.0 - 0.4 K/UL    ABS. BASOPHILS 0.0 0.0 - 0.1 K/UL    ABS. IMM.  GRANS. 0.0 K/UL    DF MANUAL      RBC COMMENTS NORMOCYTIC, NORMOCHROMIC     RENAL FUNCTION PANEL    Collection Time: 03/06/19  2:30 PM   Result Value Ref Range    Sodium 134 (L) 136 - 145 mmol/L    Potassium 4.0 3.5 - 5.1 mmol/L    Chloride 100 97 - 108 mmol/L    CO2 27 21 - 32 mmol/L    Anion gap 7 5 - 15 mmol/L    Glucose 212 (H) 65 - 100 mg/dL    BUN 19 6 - 20 MG/DL    Creatinine 3.11 (H) 0.55 - 1.02 MG/DL    BUN/Creatinine ratio 6 (L) 12 - 20      GFR est AA 18 (L) >60 ml/min/1.73m2    GFR est non-AA 14 (L) >60 ml/min/1.73m2    Calcium 8.8 8.5 - 10.1 MG/DL Phosphorus 3.0 2.6 - 4.7 MG/DL    Albumin 1.9 (L) 3.5 - 5.0 g/dL   GLUCOSE, POC    Collection Time: 03/06/19  6:06 PM   Result Value Ref Range    Glucose (POC) 138 (H) 65 - 100 mg/dL    Performed by Carey Su    GLUCOSE, POC    Collection Time: 03/06/19  8:55 PM   Result Value Ref Range    Glucose (POC) 193 (H) 65 - 100 mg/dL    Performed by Carey Su    GLUCOSE, POC    Collection Time: 03/07/19  6:02 AM   Result Value Ref Range    Glucose (POC) 152 (H) 65 - 100 mg/dL    Performed by Brandi buenrostro RN    RENAL FUNCTION PANEL    Collection Time: 03/07/19 10:00 AM   Result Value Ref Range    Sodium 132 (L) 136 - 145 mmol/L    Potassium 3.9 3.5 - 5.1 mmol/L    Chloride 96 (L) 97 - 108 mmol/L    CO2 28 21 - 32 mmol/L    Anion gap 8 5 - 15 mmol/L    Glucose 235 (H) 65 - 100 mg/dL    BUN 13 6 - 20 MG/DL    Creatinine 2.97 (H) 0.55 - 1.02 MG/DL    BUN/Creatinine ratio 4 (L) 12 - 20      GFR est AA 18 (L) >60 ml/min/1.73m2    GFR est non-AA 15 (L) >60 ml/min/1.73m2    Calcium 8.7 8.5 - 10.1 MG/DL    Phosphorus 3.2 2.6 - 4.7 MG/DL    Albumin 2.0 (L) 3.5 - 5.0 g/dL   METABOLIC PANEL, BASIC    Collection Time: 03/07/19 10:00 AM   Result Value Ref Range    Sodium 132 (L) 136 - 145 mmol/L    Potassium 3.9 3.5 - 5.1 mmol/L    Chloride 96 (L) 97 - 108 mmol/L    CO2 30 21 - 32 mmol/L    Anion gap 6 5 - 15 mmol/L    Glucose 238 (H) 65 - 100 mg/dL    BUN 13 6 - 20 MG/DL    Creatinine 2.98 (H) 0.55 - 1.02 MG/DL    BUN/Creatinine ratio 4 (L) 12 - 20      GFR est AA 18 (L) >60 ml/min/1.73m2    GFR est non-AA 15 (L) >60 ml/min/1.73m2    Calcium 8.8 8.5 - 10.1 MG/DL   GLUCOSE, POC    Collection Time: 03/07/19 11:22 AM   Result Value Ref Range    Glucose (POC) 313 (H) 65 - 100 mg/dL    Performed by Praveen Knight        I have reviewed the flowsheets. Chart and Pertinent Notes have been reviewed. No change in PMH ,family and social history from Consult note.       Shyanne Ozuna MD

## 2019-03-07 NOTE — PROGRESS NOTES
Hospitalist Progress Note  Sofía Jesus MD  Answering service: [phone] OR [phone] from in house phone  Cell: [phone]      Date of Service:  3/7/2019  NAME:  Melissa Aggarwal  :  1940  MRN:  [id number]      Admission Summary: This is a 26-year-old woman with a past medical history significant for type 2 diabetes, hypertension, was in her usual state of health until early this morning when the patient woke up with sudden onset of shortness of breath. The shortness of breath is progressive associated with fever, diaphoresis as well as chest discomfort. The patient denies history of congestive heart failure or asthma or COPD. When EMS arrived at the scene, the patient's oxygen saturation was 89% on room air. The patient was treated with DuoNeb with improvement in oxygen saturation. The shortness of breath associated with cough which is nonproductive. The patient also stated that she has gained couple of pounds in the last couple of days. The patient traveled to [place] 3 months ago and stated that she has been sick since then. The patient is taking Lasix. It is not clear why the patient is taking Lasix. Stated that she has no history of congestive heart failure. No record of prior admission to this hospital.  When the patient arrived at the emergency room, the patient was found to have fever, leukocytosis, clinical presentation symptoms triggered the Code Sepsis. The Code Sepsis protocol was initiated. The patient received antibiotics. The chest x-ray did not show any evidence of pneumonia but shows evidence of vascular congestion. The patient was subsequently referred to the hospitalist service for evaluation for admission. Interval history / Subjective:       3/5/2019 :  Cr up to 6; for HD today. 3/6/2019 :  Pt \"feels like a new person\" ,no n/v/no cp no sob.      3/7/2019 :   Medically cleared for dc, to out pt hd for 2-3 week . Case advised and aware per notes of needs. Assessment & Plan:     Sepsis ([address] (3/1/2019)  -source?  -Urinalysis 3/1 unremarkable  -CTA chest 3/1 No evidence of acute pulmonary thromboembolism. Mild pulmonary edema and small bilateral pleural effusions  -On Zosyn, don't think the patient needs antibiotics. Also, in light of worsening renal function will stop 3/4  -Follow cultures, no growth so far  -Leukocytosis improving  -resolved     Type 2 diabetes mellitus with hyperglycemia    Acute respiratory failure, unspecified whether with hypoxia or hypercapnia    NSTEMI (non-ST elevated myocardial infarction) ([address] (3/2/2019)   -No acute  changes in ECG  -S/P cath 3/2: No significant CAD with Cx dominant,Normal LV size with moderate antrolateral and apical hypokinesis in setting of elevated biomarkere c/w Takotsubo cardiomyopthy. -Elevated Rt an Lt ventricular filling pressures; no cp Delmon Toshia 3/6/2019 - 3/7/2019        Afib RVR  -Echo with EF 61-65%.  No RWMA  -Now in normal sinus  -Was on heparin/Cardizem, now off both  -on Coreg, continue; rate controled 3/6/2019 - 3/7/2019     Pulmonary edema with pleural effusion  -sec to volume overload  -Was on lasix, currently on hold  - no s/s of hf 3/6/2019 - 3/7/2019     Probable CHF (congestive heart failure)  -Not sure if the patient has CHF    Anemia  -likely chronic  -Follow work up   [employer] Value Date/Time    HGB 7.7 (L) 03/06/2019 02:30 PM        Benign essential hypertension (3/2/2019)  -Continue home meds   BP Readings from Last 1 Encounters:   03/07/19 160/77        Diabetic peripheral neuropathy ([facility] (3/2/2019)  -stable on home meds   Lab Results   Component Value Date/Time    Glucose 212 (H) 03/06/2019 02:30 PM    Glucose (POC) 152 (H) 03/07/2019 06:02 AM        DM type 2  -SSI  Lab Results   Component Value Date/Time    Glucose 212 (H) 03/06/2019 02:30 PM    Glucose (POC) 152 (H) 03/07/2019 06:02 AM        ASUNCION  -sec to likely dye load  -US renal No hydronephrosis. A 2.9 cm simple cyst in the right kidney  -Appreciate discussion with Nephrology, started on IV fluids  -Monitor for volume overload  Lab Results   Component Value Date/Time    Creatinine 3.11 (H) 03/06/2019 02:30 PM        Anxiety/Depression  -on Effexor, continue; no anxiety/depression noted on rounds 3/6/2019 - 3/7/2019     Morbid obesity  -Counseled  Body mass index is 39.99 kg/m². Cardiac diet    Code status: Full  Prophylaxis: Lovenox    Care Plan discussed with: Patient/Family  Disposition: TBD     Hospital Problems  Date Reviewed: 3/2/2019          Codes Class Noted POA    NSTEMI (non-ST elevated myocardial infarction) ([facility] ICD-10-CM: I21.4  ICD-9-CM: 410.70  3/2/2019 Yes        CHF (congestive heart failure) ([facility] ICD-10-CM: I50.9  ICD-9-CM: 428.0  3/2/2019 Yes        Benign essential hypertension ICD-10-CM: I10  ICD-9-CM: 401.1  3/2/2019 Yes        Diabetic peripheral neuropathy ([facility] ICD-10-CM: E11.42  ICD-9-CM: 250.60, 357.2  3/2/2019 Yes        Type II diabetes mellitus, uncontrolled ([facility] ICD-10-CM: E11.65  ICD-9-CM: 250.02  3/2/2019 Yes        Hyperlipidemia ICD-10-CM: E78.5  ICD-9-CM: 272.4  3/2/2019 Yes        * (Principal) Sepsis ([facility] ICD-10-CM: A41.9  ICD-9-CM: 038.9, 995.91  3/1/2019 Yes                Review of Systems:    no cp no sob no n/v/d/f/chills. Vital Signs:    Last 24hrs VS reviewed since prior progress note. Most recent are:  Visit Vitals  /77   Pulse 80   Temp 97.7 °F (36.5 °C)   Resp 18   Ht 5' 5\" (1.651 m)   Wt 109 kg (240 lb 4.8 oz)   SpO2 97%   BMI 39.99 kg/m²       No intake or output data in the 24 hours ending 03/07/19 6599     Physical Examination:             Constitutional:  No acute distress, cooperative, pleasant    ENT:  Oral mucous moist, oropharynx benign. Neck supple,    Resp:  CTA bilaterally. No wheezing/rhonchi/rales.  No accessory muscle use   CV:  Regular rhythm, normal rate, no murmurs, gallops, rubs    GI: Soft, non distended, non tender. normoactive bowel sounds, no hepatosplenomegaly     Musculoskeletal:  No edema, warm, 2+ pulses throughout    Neurologic:  Moves all extremities. AAOx3, CN II-XII reviewed     Skin:  Good turgor, no rashes or ulcers       Data Review:    Review and/or order of clinical lab test      Labs:     Recent Labs     03/06/19  1430 03/05/19  0445   WBC 10.4 9.9   HGB 7.7* 7.3*   HCT 24.9* 23.8*    280     Recent Labs     03/06/19  1430 03/05/19  0445   * 128*   K 4.0 4.7    91*   CO2 27 22   BUN 19 43*   CREA 3.11* 6.34*   * 177*   CA 8.8 8.9   PHOS 3.0  --      Recent Labs     03/06/19  1430   ALB 1.9*     No results for input(s): INR, PTP, APTT in the last 72 hours. No lab exists for component: INREXT, INREXT   Recent Labs     03/05/19  0445   TIBC 156*   PSAT 15*      No results found for: FOL, RBCF   No results for input(s): PH, PCO2, PO2 in the last 72 hours. No results for input(s): CPK, CKNDX, TROIQ in the last 72 hours.     No lab exists for component: CPKMB  No results found for: CHOL, CHOLX, CHLST, CHOLV, HDL, LDL, LDLC, DLDLP, TGLX, TRIGL, TRIGP, CHHD, CHHDX  Lab Results   Component Value Date/Time    Glucose (POC) 152 (H) 03/07/2019 06:02 AM    Glucose (POC) 193 (H) 03/06/2019 08:55 PM    Glucose (POC) 138 (H) 03/06/2019 06:06 PM    Glucose (POC) 195 (H) 03/06/2019 11:23 AM    Glucose (POC) 141 (H) 03/06/2019 06:49 AM     Lab Results   Component Value Date/Time    Color YELLOW/STRAW 03/01/2019 01:09 PM    Appearance CLEAR 03/01/2019 01:09 PM    Specific gravity 1.023 03/01/2019 01:09 PM    pH (UA) 5.0 03/01/2019 01:09 PM    Protein 30 (A) 03/01/2019 01:09 PM    Glucose 100 (A) 03/01/2019 01:09 PM    Ketone 15 (A) 03/01/2019 01:09 PM    Bilirubin NEGATIVE  03/01/2019 01:09 PM    Urobilinogen 1.0 03/01/2019 01:09 PM    Nitrites NEGATIVE  03/01/2019 01:09 PM    Leukocyte Esterase NEGATIVE  03/01/2019 01:09 PM    Epithelial cells FEW 03/01/2019 01:09 PM Bacteria NEGATIVE  03/01/2019 01:09 PM    WBC 0-4 03/01/2019 01:09 PM    RBC 0-5 03/01/2019 01:09 PM         Medications Reviewed:     Current Facility-Administered Medications   Medication Dose Route Frequency    guaiFENesin (ROBITUSSIN) 100 mg/5 mL oral liquid 200 mg  200 mg Oral Q4H PRN    insulin lispro (HUMALOG) injection   SubCUTAneous AC&HS    iron sucrose (VENOFER) 300 mg in 0.9% sodium chloride 250 mL IVPB  300 mg IntraVENous Q24H    heparin (porcine) injection 5,000 Units  5,000 Units SubCUTAneous Q8H    heparin (porcine) 1,000 unit/mL injection 4,300 Units  4,300 Units InterCATHeter PRN    albuterol-ipratropium (DUO-NEB) 2.5 MG-0.5 MG/3 ML  3 mL Nebulization TID RT    albuterol (PROVENTIL VENTOLIN) nebulizer solution 2.5 mg  2.5 mg Nebulization Q4H PRN    aspirin chewable tablet 81 mg  81 mg Oral DAILY    sodium chloride (NS) flush 5-40 mL  5-40 mL IntraVENous PRN    carvedilol (COREG) tablet 6.25 mg  6.25 mg Oral BID WITH MEALS    calcitRIOL (ROCALTROL) capsule 0.5 mcg  0.5 mcg Oral DAILY    gabapentin (NEURONTIN) capsule 300 mg  300 mg Oral TID PRN    LORazepam (ATIVAN) tablet 0.5 mg  0.5 mg Oral Q8H PRN    oxybutynin chloride XL (DITROPAN XL) tablet 15 mg  15 mg Oral DAILY    therapeutic multivitamin (THERAGRAN) tablet 1 Tab  1 Tab Oral DAILY    traMADol (ULTRAM) tablet 50 mg  50 mg Oral Q8H PRN    venlafaxine (EFFEXOR) tablet 37.5 mg  37.5 mg Oral BID    sodium chloride (NS) flush 5-40 mL  5-40 mL IntraVENous Q8H    sodium chloride (NS) flush 5-40 mL  5-40 mL IntraVENous PRN    ondansetron (ZOFRAN) injection 4 mg  4 mg IntraVENous Q4H PRN    bisacodyl (DULCOLAX) tablet 5 mg  5 mg Oral DAILY PRN    lactobac ac& pc-s.therm-b.anim (JUVE Q/RISAQUAD)  1 Cap Oral DAILY    glucose chewable tablet 16 g  4 Tab Oral PRN    dextrose (D50W) injection syrg 12.5-25 g  12.5-25 g IntraVENous PRN    glucagon (GLUCAGEN) injection 1 mg  1 mg IntraMUSCular PRN    morphine injection 4 mg  4 mg IntraVENous Q2H PRN     ______________________________________________________________________  EXPECTED LENGTH OF STAY: 5d 4h  ACTUAL LENGTH OF STAY:          Shady Knight MD

## 2019-03-07 NOTE — DIABETES MGMT
DTC Progress Note    Recommendations/ Comments: Chart reviewed on [address] for hyperglycemia. Blood sugars mainly >200. If appropriate, please consider   1. adding Lantus 20 units   2.  adding carb consistent to diet order  3. Changing correction scale to high sensitivity due to renal status. Current hospital DM medication: Lispro correction, normal sensitivity    Patient is a 78 y.o. female with known Type 2 Diabetes on oral agents (dual therapy): metformin (generic), and Januvia at home. A1c:   Lab Results   Component Value Date/Time    Hemoglobin A1c 8.3 (H) 03/05/2019 04:45 AM       Recent Glucose Results:   Lab Results   Component Value Date/Time     (H) 03/07/2019 10:00 AM     (H) 03/07/2019 10:00 AM    GLUCPOC 313 (H) 03/07/2019 11:22 AM    GLUCPOC 152 (H) 03/07/2019 06:02 AM    GLUCPOC 193 (H) 03/06/2019 08:55 PM        Lab Results   Component Value Date/Time    Creatinine 2.97 (H) 03/07/2019 10:00 AM    Creatinine 2.98 (H) 03/07/2019 10:00 AM     Estimated Creatinine Clearance: 18.9 mL/min (A) (based on SCr of 2.97 mg/dL (H)). Active Orders   Diet    DIET RENAL Regular        PO intake:   Patient Vitals for the past 72 hrs:   % Diet Eaten   03/04/19 1814 50 %       Will continue to follow as needed.     Thank you  Brittani Trimble RD, CDE       Time spent: 4 minutes

## 2019-03-08 LAB
ALBUMIN SERPL-MCNC: 2.1 G/DL (ref 3.5–5)
ANION GAP SERPL CALC-SCNC: 13 MMOL/L (ref 5–15)
BASOPHILS # BLD: 0 K/UL (ref 0–0.1)
BASOPHILS NFR BLD: 0 % (ref 0–1)
BUN SERPL-MCNC: 18 MG/DL (ref 6–20)
BUN/CREAT SERPL: 5 (ref 12–20)
CALCIUM SERPL-MCNC: 9 MG/DL (ref 8.5–10.1)
CHLORIDE SERPL-SCNC: 95 MMOL/L (ref 97–108)
CO2 SERPL-SCNC: 25 MMOL/L (ref 21–32)
CREAT SERPL-MCNC: 3.72 MG/DL (ref 0.55–1.02)
DIFFERENTIAL METHOD BLD: ABNORMAL
EOSINOPHIL # BLD: 0.3 K/UL (ref 0–0.4)
EOSINOPHIL NFR BLD: 2 % (ref 0–7)
ERYTHROCYTE [DISTWIDTH] IN BLOOD BY AUTOMATED COUNT: 14.4 % (ref 11.5–14.5)
GLUCOSE BLD STRIP.AUTO-MCNC: 187 MG/DL (ref 65–100)
GLUCOSE BLD STRIP.AUTO-MCNC: 304 MG/DL (ref 65–100)
GLUCOSE SERPL-MCNC: 166 MG/DL (ref 65–100)
HCT VFR BLD AUTO: 26 % (ref 35–47)
HGB BLD-MCNC: 8.1 G/DL (ref 11.5–16)
IMM GRANULOCYTES # BLD AUTO: 0 K/UL
IMM GRANULOCYTES NFR BLD AUTO: 0 %
LYMPHOCYTES # BLD: 2.3 K/UL (ref 0.8–3.5)
LYMPHOCYTES NFR BLD: 16 % (ref 12–49)
MCH RBC QN AUTO: 27 PG (ref 26–34)
MCHC RBC AUTO-ENTMCNC: 31.2 G/DL (ref 30–36.5)
MCV RBC AUTO: 86.7 FL (ref 80–99)
METAMYELOCYTES NFR BLD MANUAL: 5 %
MONOCYTES # BLD: 1.8 K/UL (ref 0–1)
MONOCYTES NFR BLD: 13 % (ref 5–13)
MYELOCYTES NFR BLD MANUAL: 4 %
NEUTS BAND NFR BLD MANUAL: 2 % (ref 0–6)
NEUTS SEG # BLD: 8.5 K/UL (ref 1.8–8)
NEUTS SEG NFR BLD: 58 % (ref 32–75)
NRBC # BLD: 0 K/UL (ref 0–0.01)
NRBC BLD-RTO: 0 PER 100 WBC
PHOSPHATE SERPL-MCNC: 3.6 MG/DL (ref 2.6–4.7)
PLATELET # BLD AUTO: 353 K/UL (ref 150–400)
PLATELET COMMENTS,PCOM: ABNORMAL
PMV BLD AUTO: 10.2 FL (ref 8.9–12.9)
POTASSIUM SERPL-SCNC: 3.7 MMOL/L (ref 3.5–5.1)
RBC # BLD AUTO: 3 M/UL (ref 3.8–5.2)
RBC MORPH BLD: ABNORMAL
SERVICE CMNT-IMP: ABNORMAL
SERVICE CMNT-IMP: ABNORMAL
SODIUM SERPL-SCNC: 133 MMOL/L (ref 136–145)
WBC # BLD AUTO: 14.2 K/UL (ref 3.6–11)

## 2019-03-08 PROCEDURE — 82962 GLUCOSE BLOOD TEST: CPT

## 2019-03-08 PROCEDURE — 77010033678 HC OXYGEN DAILY

## 2019-03-08 PROCEDURE — 97166 OT EVAL MOD COMPLEX 45 MIN: CPT

## 2019-03-08 PROCEDURE — 97116 GAIT TRAINING THERAPY: CPT

## 2019-03-08 PROCEDURE — 74011250637 HC RX REV CODE- 250/637: Performed by: INTERNAL MEDICINE

## 2019-03-08 PROCEDURE — 74011250637 HC RX REV CODE- 250/637: Performed by: FAMILY MEDICINE

## 2019-03-08 PROCEDURE — 36415 COLL VENOUS BLD VENIPUNCTURE: CPT

## 2019-03-08 PROCEDURE — 74011636637 HC RX REV CODE- 636/637: Performed by: INTERNAL MEDICINE

## 2019-03-08 PROCEDURE — 97535 SELF CARE MNGMENT TRAINING: CPT

## 2019-03-08 PROCEDURE — 94664 DEMO&/EVAL PT USE INHALER: CPT

## 2019-03-08 PROCEDURE — 74011000250 HC RX REV CODE- 250: Performed by: HOSPITALIST

## 2019-03-08 PROCEDURE — 80069 RENAL FUNCTION PANEL: CPT

## 2019-03-08 PROCEDURE — 65270000029 HC RM PRIVATE

## 2019-03-08 PROCEDURE — 94760 N-INVAS EAR/PLS OXIMETRY 1: CPT

## 2019-03-08 PROCEDURE — 94640 AIRWAY INHALATION TREATMENT: CPT

## 2019-03-08 PROCEDURE — 90935 HEMODIALYSIS ONE EVALUATION: CPT

## 2019-03-08 PROCEDURE — 74011250636 HC RX REV CODE- 250/636: Performed by: INTERNAL MEDICINE

## 2019-03-08 PROCEDURE — 97161 PT EVAL LOW COMPLEX 20 MIN: CPT

## 2019-03-08 PROCEDURE — 85025 COMPLETE CBC W/AUTO DIFF WBC: CPT

## 2019-03-08 RX ORDER — BUMETANIDE 1 MG/1
2 TABLET ORAL 2 TIMES DAILY
Status: DISCONTINUED | OUTPATIENT
Start: 2019-03-08 | End: 2019-03-11 | Stop reason: HOSPADM

## 2019-03-08 RX ORDER — BENZONATATE 100 MG/1
100 CAPSULE ORAL
Status: DISCONTINUED | OUTPATIENT
Start: 2019-03-08 | End: 2019-03-11 | Stop reason: HOSPADM

## 2019-03-08 RX ORDER — INSULIN GLARGINE 100 [IU]/ML
12 INJECTION, SOLUTION SUBCUTANEOUS
Status: DISCONTINUED | OUTPATIENT
Start: 2019-03-08 | End: 2019-03-11 | Stop reason: HOSPADM

## 2019-03-08 RX ADMIN — INSULIN LISPRO 4 UNITS: 100 INJECTION, SOLUTION INTRAVENOUS; SUBCUTANEOUS at 12:11

## 2019-03-08 RX ADMIN — INSULIN GLARGINE 12 UNITS: 100 INJECTION, SOLUTION SUBCUTANEOUS at 23:13

## 2019-03-08 RX ADMIN — Medication 10 ML: at 23:22

## 2019-03-08 RX ADMIN — Medication 1 CAPSULE: at 08:38

## 2019-03-08 RX ADMIN — HEPARIN SODIUM 5000 UNITS: 5000 INJECTION INTRAVENOUS; SUBCUTANEOUS at 13:12

## 2019-03-08 RX ADMIN — IPRATROPIUM BROMIDE AND ALBUTEROL SULFATE 3 ML: .5; 3 SOLUTION RESPIRATORY (INHALATION) at 13:51

## 2019-03-08 RX ADMIN — OXYBUTYNIN CHLORIDE 15 MG: 5 TABLET, EXTENDED RELEASE ORAL at 08:38

## 2019-03-08 RX ADMIN — IPRATROPIUM BROMIDE AND ALBUTEROL SULFATE 3 ML: .5; 3 SOLUTION RESPIRATORY (INHALATION) at 20:39

## 2019-03-08 RX ADMIN — BENZONATATE 100 MG: 100 CAPSULE ORAL at 23:12

## 2019-03-08 RX ADMIN — VENLAFAXINE 37.5 MG: 37.5 TABLET ORAL at 08:39

## 2019-03-08 RX ADMIN — GUAIFENESIN 200 MG: 200 SOLUTION ORAL at 00:19

## 2019-03-08 RX ADMIN — BENZONATATE 100 MG: 100 CAPSULE ORAL at 04:25

## 2019-03-08 RX ADMIN — HEPARIN SODIUM 5000 UNITS: 5000 INJECTION INTRAVENOUS; SUBCUTANEOUS at 07:18

## 2019-03-08 RX ADMIN — INSULIN LISPRO 2 UNITS: 100 INJECTION, SOLUTION INTRAVENOUS; SUBCUTANEOUS at 23:12

## 2019-03-08 RX ADMIN — THERA TABS 1 TABLET: TAB at 08:38

## 2019-03-08 RX ADMIN — Medication 10 ML: at 06:00

## 2019-03-08 RX ADMIN — ASPIRIN 81 MG CHEWABLE TABLET 81 MG: 81 TABLET CHEWABLE at 08:38

## 2019-03-08 RX ADMIN — CALCITRIOL 0.5 MCG: 0.25 CAPSULE ORAL at 08:38

## 2019-03-08 RX ADMIN — CARVEDILOL 6.25 MG: 6.25 TABLET, FILM COATED ORAL at 07:15

## 2019-03-08 RX ADMIN — HEPARIN SODIUM 5000 UNITS: 5000 INJECTION INTRAVENOUS; SUBCUTANEOUS at 23:12

## 2019-03-08 RX ADMIN — Medication 10 ML: at 13:12

## 2019-03-08 RX ADMIN — IPRATROPIUM BROMIDE AND ALBUTEROL SULFATE 3 ML: .5; 3 SOLUTION RESPIRATORY (INHALATION) at 08:54

## 2019-03-08 NOTE — DIABETES MGMT
DTC Progress Note    Recommendations/ Comments: Chart reviewed on [address] for hyperglycemia. If appropriate, please consider   1. adding Lantus 12 units daily  2.  adding carb consistent to diet order      Current hospital DM medication: Lispro correction, high sensitivity    Patient is a 78 y.o. female with known Type 2 Diabetes on oral agents (dual therapy): metformin (generic), and Januvia at home. A1c:   Lab Results   Component Value Date/Time    Hemoglobin A1c 8.3 (H) 03/05/2019 04:45 AM       Recent Glucose Results:   Lab Results   Component Value Date/Time     (H) 03/08/2019 01:35 AM    GLUCPOC 304 (H) 03/08/2019 11:12 AM    GLUCPOC 187 (H) 03/08/2019 06:44 AM    GLUCPOC 198 (H) 03/07/2019 09:36 PM        Lab Results   Component Value Date/Time    Creatinine 3.72 (H) 03/08/2019 01:35 AM     Estimated Creatinine Clearance: 15.1 mL/min (A) (based on SCr of 3.72 mg/dL (H)). Active Orders   Diet    DIET RENAL Regular        PO intake:   No data found. Will continue to follow as needed.     Thank you  Janneth Alejandro RD, [address]  Pager: [phone]        Time spent: 4 minutes

## 2019-03-08 NOTE — PROGRESS NOTES
Therapy mobilized and patient does not qualify for home oxygen as with activity her SAT's were 89 and SAT's would need to be 88 or below. At 3:30 called [facility] [phone] and patient has not been approved a chair time as  Yet. Unable to document in barriers as record was locked.     [phone]

## 2019-03-08 NOTE — PROGRESS NOTES
[facility]   [address] [address], [facility] [address], [facility]  Phone: [phone]   [phone]       Nephrology Progress Note  Moe Tracy     1940     [id number]  Date of Admission : 3/1/2019  03/08/19    CC:  Follow up for ASUNCION       Assessment and Plan   ASUNCION on CKD :  · Multifactorial.  contrast Induced Nephropathy w/ ATN   · S/p 1st HD 3/5, 3/6   · HD today   · Watch for recovery over the weekend   · Start Bumex 2 mg BID  · Out pt HD at [facility] if no recovery over the w/e    CKD Stage II :  · Baseline ~ 1mg/dl   · May have moderate CKD from DM, HTN   · Renal US : no obstruction, 2.9 cm Cyst noted on R Kidney      Iron def anemia:  · Continue iv iron  · On epogen    Hyponatremia :  · 2/2 volume overload     NSTEMI:   - Cath : Non occlusiVe CAD   - Suspected Takatsubos CMP w/ EF 45% on admission      DM-II      Interval History:  Seen and examined   Little bit more edematous   Denies any N/V/CP/SOB    Review of Systems: A comprehensive review of systems was negative.     Current Medications:   Current Facility-Administered Medications   Medication Dose Route Frequency    benzonatate (TESSALON) capsule 100 mg  100 mg Oral TID PRN    bumetanide (BUMEX) tablet 2 mg  2 mg Oral BID    guaiFENesin (ROBITUSSIN) 100 mg/5 mL oral liquid 200 mg  200 mg Oral Q4H PRN    epoetin benigno-epbx (RETACRIT) 12,000 Units combo injection  12,000 Units SubCUTAneous Q MON, WED & FRI    insulin lispro (HUMALOG) injection   SubCUTAneous AC&HS    heparin (porcine) injection 5,000 Units  5,000 Units SubCUTAneous Q8H    heparin (porcine) 1,000 unit/mL injection 4,300 Units  4,300 Units InterCATHeter PRN    albuterol-ipratropium (DUO-NEB) 2.5 MG-0.5 MG/3 ML  3 mL Nebulization TID RT    albuterol (PROVENTIL VENTOLIN) nebulizer solution 2.5 mg  2.5 mg Nebulization Q4H PRN    aspirin chewable tablet 81 mg  81 mg Oral DAILY    sodium chloride (NS) flush 5-40 mL  5-40 mL IntraVENous PRN    carvedilol (COREG) tablet 6.25 mg  6.25 mg Oral BID WITH MEALS    calcitRIOL (ROCALTROL) capsule 0.5 mcg  0.5 mcg Oral DAILY    gabapentin (NEURONTIN) capsule 300 mg  300 mg Oral TID PRN    LORazepam (ATIVAN) tablet 0.5 mg  0.5 mg Oral Q8H PRN    oxybutynin chloride XL (DITROPAN XL) tablet 15 mg  15 mg Oral DAILY    therapeutic multivitamin (THERAGRAN) tablet 1 Tab  1 Tab Oral DAILY    traMADol (ULTRAM) tablet 50 mg  50 mg Oral Q8H PRN    venlafaxine (EFFEXOR) tablet 37.5 mg  37.5 mg Oral BID    sodium chloride (NS) flush 5-40 mL  5-40 mL IntraVENous Q8H    sodium chloride (NS) flush 5-40 mL  5-40 mL IntraVENous PRN    ondansetron (ZOFRAN) injection 4 mg  4 mg IntraVENous Q4H PRN    bisacodyl (DULCOLAX) tablet 5 mg  5 mg Oral DAILY PRN    lactobac ac& pc-s.therm-b.anim (JUVE Q/RISAQUAD)  1 Cap Oral DAILY    glucose chewable tablet 16 g  4 Tab Oral PRN    dextrose (D50W) injection syrg 12.5-25 g  12.5-25 g IntraVENous PRN    glucagon (GLUCAGEN) injection 1 mg  1 mg IntraMUSCular PRN    morphine injection 4 mg  4 mg IntraVENous Q2H PRN      Allergies   Allergen Reactions    Tylenol [Acetaminophen] Other (comments)     Pt told by MD not to take, pt unsure why       Objective:  Vitals:    Vitals:    03/08/19 0713 03/08/19 0725 03/08/19 0839 03/08/19 0851   BP: 190/90  171/77    Pulse: 86  81    Resp:   18    Temp:   98.3 °F (36.8 °C)    TempSrc:       SpO2:   94% 94%   Weight:  109.3 kg (241 lb)     Height:         Intake and Output:  No intake/output data recorded. No intake/output data recorded.     Physical Examination:    GEN:  NAD  NECK:  Supple, no thyromegaly  RESP: CTA  b/l, no  wheezing,   CVS: RRR,S1,S2   ABDO:  soft , non tender, No mass  NEURO: non focal, normal speech  EXT: Edema +nt     Right ij permacath +  []    High complexity decision making was performed  []    Patient is at high-risk of decompensation with multiple organ involvement    Lab Data Personally Reviewed: I have reviewed all the pertinent labs, microbiology data and radiology studies during assessment.     Recent Labs     03/08/19  0135 03/07/19  1000 03/06/19  1430   * 132*  132* 134*   K 3.7 3.9  3.9 4.0   CL 95* 96*  96* 100   CO2 25 30  28 27   * 238*  235* 212*   BUN 18 13  13 19   CREA 3.72* 2.98*  2.97* 3.11*   CA 9.0 8.8  8.7 8.8   PHOS 3.6 3.2 3.0   ALB 2.1* 2.0* 1.9*     Recent Labs     03/08/19  0135 03/06/19  1430   WBC 14.2* 10.4   HGB 8.1* 7.7*   HCT 26.0* 24.9*    320     No results found for: SDES  Lab Results   Component Value Date/Time    Culture result: NO GROWTH 5 DAYS 03/01/2019 08:22 AM     Recent Results (from the past 24 hour(s))   GLUCOSE, POC    Collection Time: 03/07/19  4:44 PM   Result Value Ref Range    Glucose (POC) 199 (H) 65 - 100 mg/dL    Performed by Ari Hale    GLUCOSE, POC    Collection Time: 03/07/19  9:36 PM   Result Value Ref Range    Glucose (POC) 198 (H) 65 - 100 mg/dL    Performed by Memory Plush    RENAL FUNCTION PANEL    Collection Time: 03/08/19  1:35 AM   Result Value Ref Range    Sodium 133 (L) 136 - 145 mmol/L    Potassium 3.7 3.5 - 5.1 mmol/L    Chloride 95 (L) 97 - 108 mmol/L    CO2 25 21 - 32 mmol/L    Anion gap 13 5 - 15 mmol/L    Glucose 166 (H) 65 - 100 mg/dL    BUN 18 6 - 20 MG/DL    Creatinine 3.72 (H) 0.55 - 1.02 MG/DL    BUN/Creatinine ratio 5 (L) 12 - 20      GFR est AA 14 (L) >60 ml/min/1.73m2    GFR est non-AA 12 (L) >60 ml/min/1.73m2    Calcium 9.0 8.5 - 10.1 MG/DL    Phosphorus 3.6 2.6 - 4.7 MG/DL    Albumin 2.1 (L) 3.5 - 5.0 g/dL   CBC WITH AUTOMATED DIFF    Collection Time: 03/08/19  1:35 AM   Result Value Ref Range    WBC 14.2 (H) 3.6 - 11.0 K/uL    RBC 3.00 (L) 3.80 - 5.20 M/uL    HGB 8.1 (L) 11.5 - 16.0 g/dL    HCT 26.0 (L) 35.0 - 47.0 %    MCV 86.7 80.0 - 99.0 FL    MCH 27.0 26.0 - 34.0 PG    MCHC 31.2 30.0 - 36.5 g/dL    RDW 14.4 11.5 - 14.5 %    PLATELET 832 689 - 850 K/uL    MPV 10.2 8.9 - 12.9 FL    NRBC 0.0 0  WBC    ABSOLUTE NRBC 0.00 0.00 - 0.01 K/uL    NEUTROPHILS 58 32 - 75 %    BAND NEUTROPHILS 2 0 - 6 %    LYMPHOCYTES 16 12 - 49 %    MONOCYTES 13 5 - 13 %    EOSINOPHILS 2 0 - 7 %    BASOPHILS 0 0 - 1 %    METAMYELOCYTES 5 (H) 0 %    MYELOCYTES 4 (H) 0 %    IMMATURE GRANULOCYTES 0 %    ABS. NEUTROPHILS 8.5 (H) 1.8 - 8.0 K/UL    ABS. LYMPHOCYTES 2.3 0.8 - 3.5 K/UL    ABS. MONOCYTES 1.8 (H) 0.0 - 1.0 K/UL    ABS. EOSINOPHILS 0.3 0.0 - 0.4 K/UL    ABS. BASOPHILS 0.0 0.0 - 0.1 K/UL    ABS. IMM. GRANS. 0.0 K/UL    DF MANUAL      PLATELET COMMENTS Large Platelets      RBC COMMENTS ANISOCYTOSIS  1+       GLUCOSE, POC    Collection Time: 03/08/19  6:44 AM   Result Value Ref Range    Glucose (POC) 187 (H) 65 - 100 mg/dL    Performed by Lukasz Jean POC    Collection Time: 03/08/19 11:12 AM   Result Value Ref Range    Glucose (POC) 304 (H) 65 - 100 mg/dL    Performed by Irving Esqueda        I have reviewed the flowsheets. Chart and Pertinent Notes have been reviewed. No change in PMH ,family and social history from Consult note.       Rebeca Briseno MD

## 2019-03-08 NOTE — ADT AUTH CERT NOTES
Sepsis and Other Febrile Illness, without Focal Infection - Care Day 8 (3/8/2019) by Chrissy Amado A        Review Status Review Entered   Completed 3/8/2019 12:49      Criteria Review      Care Day: 8 Care Date: 3/8/2019 Level of Care: Telemetry   Guideline Day 2    Level Of Care   (X) ICU or floor      Clinical Status   (X) * Hemodynamic stability   3/8/2019 12:49:56 EST by Chrissy hermelinda     98.3, 81, 18, 171/77, 94% on 2 l/min      ( ) * Hypoxemia absent   3/8/2019 12:49:56 EST by Chrissy hermelinda     94% on 2 l/min      (X) * Tachypnea absent   3/8/2019 12:49:56 EST by Chrissy hermelinda     98.3, 81, 18, 171/77, 94% on 2 l/min         Activity   (X) Activity as tolerated   3/8/2019 12:49:56 EST by Chrissy hermelinda     OT/PT         Routes   (X) Parenteral or oral medications   3/8/2019 12:49:56 EST by Chrissy hermelinda     Meds: duo neb TID, ASA 81mg PO daily, Tessalon 100mg PO TID PRN, Coreg 6.25mg PO BID, Robitussin 200mg PO q4h PRN, Heparin 5,000units SC q8h, sliding scale insulin, Bumex 2mg PO BID, Retacrit 12,000units SC M, W, F      (X) Diet as tolerated   3/8/2019 12:49:56 EST by Chrissy hermelinda     Renal diet         Interventions   (X) WBC   3/8/2019 12:49:56 EST by Chrissy hermelinda     WBC 14.2         Medications   (X) Possible DVT prophylaxis   3/8/2019 12:49:56 EST by [facility]     Prophylaxis: Lovenox         3/8/2019 12:49:56 EST by [place] Ihhermelinda   Subject: Additional Clinical Information      3/8/19      Vitals: 98.3, 81, 18, 171/77, 94% on 2 l/min      Abnl labs: WBC 14.2, RBC 3.00, HGB 8.1, HCT 26.0, , Cl 95, , 187, 304, CR 3.72,       Meds: duo neb TID, ASA 81mg PO daily, Tessalon 100mg PO TID PRN, Coreg 6.25mg PO BID, Robitussin 200mg PO q4h PRN, Heparin 5,000units SC q8h, sliding scale insulin, Bumex 2mg PO BID, Retacrit 12,000units SC M, W, F      Plan: Renal diet, CM consult, CPAP qhs, OT/PT, oxygen 2l/min, POC GLU QID & HS, strict I&O      IM A/P: 3/8/2019 :      Main complaint is wheezing and cough though night, is on nebs/tesselon pearls, no adjustements, minimal wheezing noted 3/8/2019       Sepsis ([address] (3/1/2019)      -source?      -Urinalysis 3/1 unremarkable      -CTA chest 3/1 No evidence of acute pulmonary thromboembolism. Mild pulmonary edema and small bilateral pleural effusions      -On Zosyn, don't think the patient needs antibiotics. Also, in light of worsening renal function will stop 3/4      -Follow cultures, no growth so far      -Leukocytosis improving      -resolved               Type 2 diabetes mellitus with hyperglycemia              Acute respiratory failure, unspecified whether with hypoxia or hypercapnia              NSTEMI (non-ST elevated myocardial infarction) ([address] (3/2/2019)       -No acute   changes in ECG      -S/P cath 3/2: No significant CAD with Cx dominant,Normal LV size with moderate antrolateral and apical hypokinesis in setting of elevated biomarkere c/w Takotsubo cardiomyopthy. -Elevated Rt an Lt ventricular filling pressures; no cp Josseline Crea 3/6/2019 - 3/8/2019                      Afib RVR      -Echo with EF 61-65%. No RWMA      -Now in normal sinus      -Was on heparin/Cardizem, now off both      -on Coreg, continue; rate controled 3/6/2019 - 3/8/2019               Pulmonary edema with pleural effusion      -sec to volume overload      -Was on lasix, currently on hold      - no s/s of hf 3/6/2019 - 3/8/2019               Probable CHF (congestive heart failure)      -Not sure if the patient has CHF      Anemia      -likely chronic      -Follow work up       [address] [facility]      Benign essential hypertension (3/2/2019)      -Continue home meds       Pul wheezing, multifactorial, likely has a component of copd from age alone.  On duonebs,, continue consider LABA/pulmicort       Diabetic peripheral neuropathy ([address] (3/2/2019)      -stable on home meds       DM type 2      -SSI      ASUNCION      -sec to likely dye load      -US renal No hydronephrosis.  A 2.9 cm simple cyst in the right kidney      -Appreciate discussion with Nephrology, started on IV fluids      -Monitor for volume overload      Prophylaxis: Lovenox      Nephrology consult: Little bit more edematous       ASUNCION on CKD :   Multifactorial.   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   DM-II                       * Milestone         Sepsis and Other Febrile Illness, without Focal Infection - Care Day 7 (3/7/2019) by Fauzia GALLAGHER        Review Status Review Entered   Completed 3/8/2019 12:38      Criteria Review      Care Day: 7 Care Date: 3/7/2019 Level of Care: Telemetry   Guideline Day 2    Level Of Care   (X) ICU or floor      Clinical Status   (X) * Hemodynamic stability   3/8/2019 12:38:08 EST by Fauzia Banks     Vitals: 98.7, 84, 18, 173/91, 97% on 2 l/min      ( ) * Hypoxemia absent   3/8/2019 12:38:08 EST by Fauzia Banks     97% on 2 l/min      (X) * Tachypnea absent   3/8/2019 12:38:08 EST by Fauzia Banks     Vitals: 98.7, 84, 18, 173/91, 97% on 2 l/min         Activity   (X) Activity as tolerated   3/8/2019 12:38:08 EST by Fauzia Banks     OT/PT         Routes   (X) Parenteral or oral medications   (X) Diet as tolerated   3/8/2019 12:38:08 EST by Fauzia Banks     Renal diet         Medications   (X) Possible DVT prophylaxis   3/8/2019 12:38:08 EST by Fauzia Banks     Prophylaxis: Lovenox         3/8/2019 12:38:08 EST by Fauzia Banks   Subject: Additional Clinical Information      3/7/19 Vitals: 98.7, 84, 18, 173/91, 97% on 2 l/min      , 235, 238, 313, 199, 198      Abnl labs: , Cl 96, CR 2.97, 2.98      Meds: duo neb TID, ASA 81mg PO daily, Coreg 6.25mg PO BID, Robitussin 200mg PO q4h PRN, Heparin 5,000units SC q8h, sliding scale insulin, Lasix 80mg IV x1, Iron sucrose 300mg IV q24h      Plan: Renal diet, CM consult, CPAP qhs, OT/PT, oxygen 2l/min, POC GLU QID & HS, strict I&O, IV diuretic      IM A/P:       Sepsis ([facility] (3/1/2019)      -source?      -Urinalysis 3/1 unremarkable      -CTA chest 3/1 No evidence of acute pulmonary thromboembolism. Mild pulmonary edema and small bilateral pleural effusions      -On Zosyn, don't think the patient needs antibiotics. Also, in light of worsening renal function will stop 3/4      -Follow cultures, no growth so far      -Leukocytosis improving      -resolved               Type 2 diabetes mellitus with hyperglycemia              Acute respiratory failure, unspecified whether with hypoxia or hypercapnia              NSTEMI (non-ST elevated myocardial infarction) ([facility] (3/2/2019)       -No acute   changes in ECG      -S/P cath 3/2: No significant CAD with Cx dominant,Normal LV size with moderate antrolateral and apical hypokinesis in setting of elevated biomarkere c/w Takotsubo cardiomyopthy. -Elevated Rt an Lt ventricular filling pressures; no cp Ignacia Mckeon 3/6/2019 - 3/7/2019                      Afib RVR      -Echo with EF 61-65%. No RWMA      -Now in normal sinus      -Was on heparin/Cardizem, now off both      -on Coreg, continue; rate controled 3/6/2019 - 3/7/2019               Pulmonary edema with pleural effusion      -sec to volume overload      -Was on lasix, currently on hold      - no s/s of hf 3/6/2019 - 3/7/2019               Probable CHF (congestive heart failure)      -Not sure if the patient has CHF      Prophylaxis: Lovenox      Nephrology consult: S/p hd yesterday. Reports minimal urine out put. C/o leg edema.       ASUNCION on CKD : Multifactorial.   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 Patient is currently on 2L of oxygen and is not on home o2. Need to try and wean oxygen. PT consult placed to determine if patient is at baseline. Very supportive family in room who advised they will be transporting patient to/from HD.                      * Milestone         Sepsis and Other Febrile Illness, without Focal Infection - Care Day 6 (3/6/2019) by Abine R        Review Status Review Entered   Completed 3/7/2019 13:34      Criteria Review      Care Day: 6 Care Date: 3/6/2019 Level of Care: Telemetry   Guideline Day 2    Level Of Care   (X) ICU or floor      Clinical Status   (X) * Hemodynamic stability   3/7/2019 13:34:31 EST by Abine     98.3-115/65-78-16-98% 2L NC      ( ) * Hypoxemia absent   3/7/2019 13:34:31 EST by Abine     O2 sats 98% on 2l NC      (X) * Tachypnea absent   3/7/2019 13:34:31 EST by Abine     RR 16         Activity   (X) Activity as tolerated   3/7/2019 13:34:31 EST by Abine     up ad divine         Routes   (X) Possible IV fluids   (X) Parenteral or oral medications   3/7/2019 13:34:31 EST by Abine     duo nev 3mL x2 aspirin 81mg po x1 coreg 6.25mg po x2 heparin 5000units sc x2 venofer 300mg iv x1      (X) Diet as tolerated   3/7/2019 13:34:31 EST by Abine     renal diet         Interventions   (X) WBC   3/7/2019 13:34:31 EST by Pollo Ruiz Vivian     10.4         Medications   (X) Possible antimicrobial treatment   (X) Possible DVT prophylaxis      3/7/2019 13:34:31 EST by Марина Thompson   Subject: Additional Clinical Information      glu 195 rbc 2.93 hgb 7.7 hct 24.9 Na 134 creat 3.11       hemodialysis inpatient      care managemetn consult      daily weight      strict I&O      sliding scale insulin 2units sc x2                     * Milestone      Additional Notes   Sepsis ([facility] [address] (3/1/2019)   -source?   -Urinalysis 3/1 unremarkable   -CTA chest 3/1 No evidence of acute pulmonary thromboembolism. Mild pulmonary edema and small bilateral pleural effusions   -On Zosyn, don't think the patient needs antibiotics. Also, in light of worsening renal function will stop 3/4   -Follow cultures, no growth so far   -Leukocytosis improving   -resolved        NSTEMI (non-ST elevated myocardial infarction) ([facility] [address] (3/2/2019)    -No acute  changes in ECG   -S/P cath 3/2: No significant CAD with Cx dominant,Normal LV size with moderate antrolateral and apical hypokinesis in setting of elevated biomarkere c/w Takotsubo cardiomyopthy. -Elevated Rt an Lt ventricular filling pressures; no cp Ralearl Loera 3/6/2019            Afib RVR   -Echo with EF 61-65%.  No RWMA   -Now in normal sinus   -Was on heparin/Cardizem, now off both   -on Coreg, continue; rate controled 3/6/2019        Pulmonary edema with pleural effusion   -sec to volume overload   -Was on lasix, currently on hold   - no s/s of hf 3/6/2019        Probable CHF (congestive heart failure)   -Not sure if the patient has CHF       Anemia   -likely chronic   -Follow work up       ASUNCION on CKD :   Multifactorial.  contrast Induced Nephropathy w/ ATN    S/p 1st HD 3/5   HD again today    UOP picking up   CM to look for out pt unit placement as ASUNCION        CKD Stage II :   Baseline ~ 1mg/dl    May have moderate CKD from DM, HTN    Renal US : no obstruction, 2.9 cm Cyst noted on R Kidney        Iron def Anemia :Ordered IV venofer        NSTEMI:    Cath : Non occlusiVe CAD    Suspected Hermelindo CMP w/ EF 45% on admission

## 2019-03-08 NOTE — PROGRESS NOTES
Bedside shift change report given to Sophy N Alee Mendoza (oncoming nurse) by Kaleb Woodall RN (offgoing nurse). Report included the following information SBAR, Kardex, ED Summary, Intake/Output, MAR and Recent Results.

## 2019-03-08 NOTE — NURSE NAVIGATOR
In anticipation of potential weekend discharge, HF NN scheduled f/u cardiology appointment with Dr. Codi Cruz ([facility]) on Friday, 3/15/19 at 11:30 AM.  Details placed on discharge AVS.

## 2019-03-08 NOTE — DIALYSIS
[facility]  [phone]    Vitals   Pre   Post   Assessment   Pre   Post     Temp  Temp: 98.6 °F (37 °C) (03/08/19 1554)  98.6 LOC  aox4 No change   HR   Pulse (Heart Rate): 88 (03/08/19 1615) 92 Lungs   cta  no chnage   B/P   BP: (!) 181/92 (03/08/19 1615) 183/91 Cardiac   regular  no chnage   Resp   Resp Rate: 16 (03/08/19 1615) 16 Skin   Dry intact  no change   Pain level  Pain Intensity 1: 0 (03/08/19 0839) 0 Edema  none     No chnage   Orders:    Duration:   Start:   6907 End:   1935 Total:   3.5   Dialyzer:   Dialyzer/Set Up Inspection: Revaclear (03/08/19 1554)   K Bath:   Dialysate K (mEq/L): 3.5 (03/08/19 1554)   Ca Bath:   Dialysate CA (mEq/L): 2.5 (03/08/19 1554)   Na/Bicarb:   Dialysate NA (mEq/L): 140 (03/08/19 1554)   Target Fluid Removal:   Goal/Amount of Fluid to Remove (mL): 1000 mL (03/08/19 1554)   Access     Type & Location:   r chest hd cvc. Dressing dated for 3/7/19. Cdi. No s/s infection. +br, flushes with ease. Each catheter limb disinfected for 60 seconds per limb with alcohol swabs. Caps removed, dialysis CVC hub scrubbed with Prevantics for 5 seconds, followed by a 5 second dry time per Hospital P&P.        Labs     Obtained/Reviewed   Critical Results Called   Date when labs were drawn-  Hgb-    HGB   Date Value Ref Range Status   03/08/2019 8.1 (L) 11.5 - 16.0 g/dL Final     K-    Potassium   Date Value Ref Range Status   03/08/2019 3.7 3.5 - 5.1 mmol/L Final     Ca-   Calcium   Date Value Ref Range Status   03/08/2019 9.0 8.5 - 10.1 MG/DL Final     Bun-   BUN   Date Value Ref Range Status   03/08/2019 18 6 - 20 MG/DL Final     Creat-   Creatinine   Date Value Ref Range Status   03/08/2019 3.72 (H) 0.55 - 1.02 MG/DL Final        Medications/ Blood Products Given     Name   Dose   Route and Time           none          Blood Volume Processed (BVP):    70 Net Fluid   Removed:  1000   Comments   Time Out Done: 5588  Primary Nurse Rpt Pre:hilda duncan rn   Primary Nurse Rpt Post:hilda duncan rn   Pt Education:procedural  Care Plan:cont current hd poc   Tx Summary:  Patient tolerated hd well. No episodes of hypotension. uf goal met. All blood washed back. Each catheter limb disinfected for 60 seconds per limb with alcohol swabs. Dialysis CVC hubs scrubbed with Prevantics for 5 seconds, followed by a 5 second dry time per Hospital P&P, red and blue dialysis caps applied to ports. Admiting Diagnosis:pily  Pt's previous clinic-na  Consent signed - Informed Consent Verified: Yes (03/08/19 1554)  [facility] Consent - yes  Hepatitis Status- ag neg con care 3/5/19  Machine #- Machine Number: [id number] (03/08/19 1554)  Telemetry status-off   Pre-dialysis wt. -

## 2019-03-08 NOTE — PROGRESS NOTES
Hospitalist Progress Note  Samantha Allison MD  Answering service: [phone] from in house phone  Cell: [phone]      Date of Service:  3/8/2019  NAME:  Myles Perez  :  1940  MRN:  [id number]      Admission Summary: This is a 59-year-old woman with a past medical history significant for type 2 diabetes, hypertension, was in her usual state of health until early this morning when the patient woke up with sudden onset of shortness of breath. The shortness of breath is progressive associated with fever, diaphoresis as well as chest discomfort. The patient denies history of congestive heart failure or asthma or COPD. When EMS arrived at the scene, the patient's oxygen saturation was 89% on room air. The patient was treated with DuoNeb with improvement in oxygen saturation. The shortness of breath associated with cough which is nonproductive. The patient also stated that she has gained couple of pounds in the last couple of days. The patient traveled to [place] 3 months ago and stated that she has been sick since then. The patient is taking Lasix. It is not clear why the patient is taking Lasix. Stated that she has no history of congestive heart failure. No record of prior admission to this hospital.  When the patient arrived at the emergency room, the patient was found to have fever, leukocytosis, clinical presentation symptoms triggered the Code Sepsis. The Code Sepsis protocol was initiated. The patient received antibiotics. The chest x-ray did not show any evidence of pneumonia but shows evidence of vascular congestion. The patient was subsequently referred to the hospitalist service for evaluation for admission. Interval history / Subjective:       3/5/2019 :  Cr up to 6; for HD today. 3/6/2019 :  Pt \"feels like a new person\" ,no n/v/no cp no sob.      3/7/2019 :   Medically cleared for dc, to out pt hd for 2-3 week . Case advised and aware per notes of needs. 3/8/2019 :  Main complaint is wheezing and cough though night, is on nebs/tesselon pearls, no adjustements, minimal wheezing noted 3/8/2019      Assessment & Plan:     Sepsis ([facility] (3/1/2019)  -source?  -Urinalysis 3/1 unremarkable  -CTA chest 3/1 No evidence of acute pulmonary thromboembolism. Mild pulmonary edema and small bilateral pleural effusions  -On Zosyn, don't think the patient needs antibiotics. Also, in light of worsening renal function will stop 3/4  -Follow cultures, no growth so far  -Leukocytosis improving  -resolved     Type 2 diabetes mellitus with hyperglycemia    Acute respiratory failure, unspecified whether with hypoxia or hypercapnia    NSTEMI (non-ST elevated myocardial infarction) ([facility] (3/2/2019)   -No acute  changes in ECG  -S/P cath 3/2: No significant CAD with Cx dominant,Normal LV size with moderate antrolateral and apical hypokinesis in setting of elevated biomarkere c/w Takotsubo cardiomyopthy. -Elevated Rt an Lt ventricular filling pressures; no cp Kassie Galvez 3/6/2019 - 3/8/2019        Afib RVR  -Echo with EF 61-65%. No RWMA  -Now in normal sinus  -Was on heparin/Cardizem, now off both  -on Coreg, continue; rate controled 3/6/2019 - 3/8/2019     Pulmonary edema with pleural effusion  -sec to volume overload  -Was on lasix, currently on hold  - no s/s of hf 3/6/2019 - 3/8/2019     Probable CHF (congestive heart failure)  -Not sure if the patient has CHF    Anemia  -likely chronic  -Follow work up   [employer] Value Date/Time    HGB 8.1 (L) 03/08/2019 01:35 AM        Benign essential hypertension (3/2/2019)  -Continue home meds   BP Readings from Last 1 Encounters:   03/08/19 171/77      Pul wheezing, multifactorial, likely has a component of copd from age alone.  On duonebs,, continue consider LABA/pulmicort     Diabetic peripheral neuropathy ([facility] (3/2/2019)  -stable on home meds   Lab Results   Component Value Date/Time    Glucose 166 (H) 03/08/2019 01:35 AM    Glucose (POC) 187 (H) 03/08/2019 06:44 AM        DM type 2  -SSI  Lab Results   Component Value Date/Time    Glucose 166 (H) 03/08/2019 01:35 AM    Glucose (POC) 187 (H) 03/08/2019 06:44 AM        ASUNCION  -sec to likely dye load  -US renal No hydronephrosis. A 2.9 cm simple cyst in the right kidney  -Appreciate discussion with Nephrology, started on IV fluids  -Monitor for volume overload  Lab Results   Component Value Date/Time    Creatinine 3.72 (H) 03/08/2019 01:35 AM        Anxiety/Depression  -on Effexor, continue; no anxiety/depression noted on rounds 3/6/2019 - 3/8/2019     Morbid obesity  -Counseled  Body mass index is 40.1 kg/m². Cardiac diet    Code status: Full  Prophylaxis: Lovenox    Care Plan discussed with: Patient/Family  Disposition: case: \"Therapy mobilized and patient does not qualify for home oxygen as with activity her SAT's were 89 and SAT's would need to be 88 or below. At 3:30 called [place] [phone] and patient has not been approved a chair time as  Yet. Unable to document in barriers as record was locked. \"         Hospital Problems  Date Reviewed: 3/2/2019          Codes Class Noted POA    NSTEMI (non-ST elevated myocardial infarction) [facility]) ICD-10-CM: I21.4  ICD-9-CM: 410.70  3/2/2019 Yes        CHF (congestive heart failure) ([facility] ICD-10-CM: I50.9  ICD-9-CM: 428.0  3/2/2019 Yes        Benign essential hypertension ICD-10-CM: I10  ICD-9-CM: 401.1  3/2/2019 Yes        Diabetic peripheral neuropathy ([facility] ICD-10-CM: E11.42  ICD-9-CM: 250.60, 357.2  3/2/2019 Yes        Type II diabetes mellitus, uncontrolled ([facility] ICD-10-CM: E11.65  ICD-9-CM: 250.02  3/2/2019 Yes        Hyperlipidemia ICD-10-CM: E78.5  ICD-9-CM: 272.4  3/2/2019 Yes        * (Principal) Sepsis ([facility] ICD-10-CM: A41.9  ICD-9-CM: 038.9, 995.91  3/1/2019 Yes                Review of Systems:    no cp no sob no n/v/d/f/chills.   + wheeze/cough       Vital Signs:    Last 24hrs VS reviewed since prior progress note. Most recent are:  Visit Vitals  /77 (BP 1 Location: Left arm, BP Patient Position: Sitting)   Pulse 81   Temp 98.3 °F (36.8 °C)   Resp 18   Ht 5' 5\" (1.651 m)   Wt 109.3 kg (241 lb)   SpO2 94%   BMI 40.10 kg/m²       No intake or output data in the 24 hours ending 03/08/19 0857     Physical Examination:             Constitutional:  No acute distress, cooperative, pleasant    ENT:  Oral mucous moist, oropharynx benign. Neck supple,    Resp:  scattered wheeze  bilaterally. No rhonchi/rales. No accessory muscle use   CV:  Regular rhythm, normal rate, no murmurs, gallops, rubs    GI:  Soft, non distended, non tender. normoactive bowel sounds, no hepatosplenomegaly     Musculoskeletal:  No edema, warm, 2+ pulses throughout    Neurologic:  Moves all extremities. AAOx3, CN II-XII reviewed     Skin:  Good turgor, no rashes or ulcers       Data Review:    Review and/or order of clinical lab test      Labs:     Recent Labs     03/08/19 0135 03/06/19  1430   WBC 14.2* 10.4   HGB 8.1* 7.7*   HCT 26.0* 24.9*    320     Recent Labs     03/08/19 0135 03/07/19  1000 03/06/19  1430   * 132*  132* 134*   K 3.7 3.9  3.9 4.0   CL 95* 96*  96* 100   CO2 25 30  28 27   BUN 18 13  13 19   CREA 3.72* 2.98*  2.97* 3.11*   * 238*  235* 212*   CA 9.0 8.8  8.7 8.8   PHOS 3.6 3.2 3.0     Recent Labs     03/08/19 0135 03/07/19  1000 03/06/19  1430   ALB 2.1* 2.0* 1.9*     No results for input(s): INR, PTP, APTT in the last 72 hours. No lab exists for component: INREXT, INREXT   No results for input(s): FE, TIBC, PSAT, FERR in the last 72 hours. No results found for: FOL, RBCF   No results for input(s): PH, PCO2, PO2 in the last 72 hours. No results for input(s): CPK, CKNDX, TROIQ in the last 72 hours.     No lab exists for component: CPKMB  No results found for: CHOL, CHOLX, CHLST, CHOLV, HDL, LDL, LDLC, DLDLP, TGLX, TRIGL, TRIGP, CHHD, CHHDX  Lab Results Component Value Date/Time    Glucose (POC) 187 (H) 03/08/2019 06:44 AM    Glucose (POC) 198 (H) 03/07/2019 09:36 PM    Glucose (POC) 199 (H) 03/07/2019 04:44 PM    Glucose (POC) 313 (H) 03/07/2019 11:22 AM    Glucose (POC) 152 (H) 03/07/2019 06:02 AM     Lab Results   Component Value Date/Time    Color YELLOW/STRAW 03/01/2019 01:09 PM    Appearance CLEAR 03/01/2019 01:09 PM    Specific gravity 1.023 03/01/2019 01:09 PM    pH (UA) 5.0 03/01/2019 01:09 PM    Protein 30 (A) 03/01/2019 01:09 PM    Glucose 100 (A) 03/01/2019 01:09 PM    Ketone 15 (A) 03/01/2019 01:09 PM    Bilirubin NEGATIVE  03/01/2019 01:09 PM    Urobilinogen 1.0 03/01/2019 01:09 PM    Nitrites NEGATIVE  03/01/2019 01:09 PM    Leukocyte Esterase NEGATIVE  03/01/2019 01:09 PM    Epithelial cells FEW 03/01/2019 01:09 PM    Bacteria NEGATIVE  03/01/2019 01:09 PM    WBC 0-4 03/01/2019 01:09 PM    RBC 0-5 03/01/2019 01:09 PM         Medications Reviewed:     Current Facility-Administered Medications   Medication Dose Route Frequency    benzonatate (TESSALON) capsule 100 mg  100 mg Oral TID PRN    guaiFENesin (ROBITUSSIN) 100 mg/5 mL oral liquid 200 mg  200 mg Oral Q4H PRN    epoetin benigno-epbx (RETACRIT) 12,000 Units combo injection  12,000 Units SubCUTAneous Q MON, WED & FRI    insulin lispro (HUMALOG) injection   SubCUTAneous AC&HS    heparin (porcine) injection 5,000 Units  5,000 Units SubCUTAneous Q8H    heparin (porcine) 1,000 unit/mL injection 4,300 Units  4,300 Units InterCATHeter PRN    albuterol-ipratropium (DUO-NEB) 2.5 MG-0.5 MG/3 ML  3 mL Nebulization TID RT    albuterol (PROVENTIL VENTOLIN) nebulizer solution 2.5 mg  2.5 mg Nebulization Q4H PRN    aspirin chewable tablet 81 mg  81 mg Oral DAILY    sodium chloride (NS) flush 5-40 mL  5-40 mL IntraVENous PRN    carvedilol (COREG) tablet 6.25 mg  6.25 mg Oral BID WITH MEALS    calcitRIOL (ROCALTROL) capsule 0.5 mcg  0.5 mcg Oral DAILY    gabapentin (NEURONTIN) capsule 300 mg  300 mg Oral TID PRN    LORazepam (ATIVAN) tablet 0.5 mg  0.5 mg Oral Q8H PRN    oxybutynin chloride XL (DITROPAN XL) tablet 15 mg  15 mg Oral DAILY    therapeutic multivitamin (THERAGRAN) tablet 1 Tab  1 Tab Oral DAILY    traMADol (ULTRAM) tablet 50 mg  50 mg Oral Q8H PRN    venlafaxine (EFFEXOR) tablet 37.5 mg  37.5 mg Oral BID    sodium chloride (NS) flush 5-40 mL  5-40 mL IntraVENous Q8H    sodium chloride (NS) flush 5-40 mL  5-40 mL IntraVENous PRN    ondansetron (ZOFRAN) injection 4 mg  4 mg IntraVENous Q4H PRN    bisacodyl (DULCOLAX) tablet 5 mg  5 mg Oral DAILY PRN    lactobac ac& pc-s.therm-b.anim (JUVE Q/RISAQUAD)  1 Cap Oral DAILY    glucose chewable tablet 16 g  4 Tab Oral PRN    dextrose (D50W) injection syrg 12.5-25 g  12.5-25 g IntraVENous PRN    glucagon (GLUCAGEN) injection 1 mg  1 mg IntraMUSCular PRN    morphine injection 4 mg  4 mg IntraVENous Q2H PRN     ______________________________________________________________________  EXPECTED LENGTH OF STAY: 5d 4h  ACTUAL LENGTH OF STAY:          7                 Rcahel Bhagat MD

## 2019-03-08 NOTE — PROGRESS NOTES
Problem: Self Care Deficits Care Plan (Adult)  Goal: *Acute Goals and Plan of Care (Insert Text)  Occupational Therapy Goals  Initiated 3/8/2019  1. Patient will perform standing ADLs 5 mins with RW modified independence within 7 day(s). 2.  Patient will perform bathing with supervision/set-up within 7 day(s). 3.  Patient will perform lower body dressing with AE PRN modified independence within 7 day(s). 4.  Patient will perform toilet transfers with modified independence within 7 day(s). 5.  Patient will perform all aspects of toileting with modified independence within 7 day(s). 7.  Patient will perform gathering ADL items high and low 2/2 without LOB within 7 days      Occupational Therapy EVALUATION  Patient: Juliet Aceves (12 y.o. female)  Date: 3/8/2019  Primary Diagnosis: Sepsis ([facility] [A41.9]  Procedure(s) (LRB):  PERCUTANEOUS CORONARY INTERVENTION (N/A)  Left And Right Heart Cath / Coronary Angiography (N/A) 6 Days Post-Op   Precautions:   Fall    ASSESSMENT :  Based on the objective data described below, the patient presents with Minimum assistance upper body ADLs, Moderate Assistance lower body ADLs, and Minimum assistance assist functional mobility. The following are barriers to independence while in acute care:   - Cognitive and/or behavioral: orientation, attention to task, command following 2 step 100%, processing, sequencing, safety awareness, insight into deficits and short term memory loss  - Medical condition: ROM, functional reach, functional endurance, standing balance, pulmonary tolerance, girth and edema v. girth    - Other:       Patient will benefit from skilled acute intervention to address the above impairments.   Patients rehabilitation potential is considered to be Good    Discharge recommendations: Home health (to increase independence and safety)  24 supervision patient stating son does not work and can aid in the process: food, housekeeping, medication and financial management and driving her to and from dialysis. Patient would like to know about [facility] [place] services that can help off set some of the work on her son though. If above is not an option then recommend: Rehab at skilled nursing facility (SNF)    Barriers to discharging home, in addition to above listed impairments: lives alone  total assist driving to follow up medical appointment(s)/groceries/obtain medication  dialysis schedule, including transportation to and from  family availability for education/training to then follow up at home. Equipment recommendations for successful discharge (if) home: none noted     PLAN :  Recommendations and Planned Interventions: self care training, functional mobility training, therapeutic exercise and balance training and family training    Next session: vision testing    Frequency/Duration: Patient will be followed by occupational therapy 3 times a week to address goals. SUBJECTIVE:   Patient stated I sit when I need to.    OBJECTIVE DATA SUMMARY:   HISTORY:   Past Medical History:   Diagnosis Date    Diabetes ([facility]     Hypertension      Past Surgical History:   Procedure Laterality Date    HX KNEE REPLACEMENT Bilateral     HX ORTHOPAEDIC      IR INSERT TUNL CVC W/O PORT OVER 5 YR  3/5/2019       Prior Level of Function/Environment/Context: patient confusion v. pride limiting full picture of her capabilities and disabilities: modified independence increased time ADLs, sitting rest breaks when needed, sitting to shower, push cart when shopping and then driving cart when fatigued. Otherwise, patient stating she can get through full day without rest breaks.    Occupations in which the patient is/was successful, what are the barriers preventing that success:   Performance Patterns (routines, roles, habits, and rituals): housekeeping  Personal Interests and/or values:   Expanded or extensive additional review of patient history:     Home Situation  Home Environment: Apartment  One/Two Story Residence: One story  Living Alone: Yes  Support Systems: Child(jonn)  Patient Expects to be Discharged to[de-identified] Apartment  Current DME Used/Available at Home: Cane, straight, Grab bars, Shower chair, Walker, rollator, Walker, rolling  Tub or Shower Type: Shower    Hand dominance: Right    EXAMINATION OF PERFORMANCE DEFICITS:  Cognitive/Behavioral Status:  Neurologic State: Alert  Orientation Level: Oriented to person;Oriented to place;Oriented to situation;Disoriented to time  Cognition: Impaired decision making;Decreased command following;Decreased attention/concentration  Perception: Appears intact  Perseveration: No perseveration noted  Safety/Judgement: Decreased insight into deficits; Decreased awareness of need for safety    Skin: intact    Edema: increased wrists and ankles v. girth    Hearing: Auditory  Auditory Impairment: None    Vision/Perceptual:                           Acuity: Impaired near vision(states baseline; running into items left and right though)    Corrective Lenses: Reading glasses    Range of Motion:    AROM: Generally decreased, functional(shoulder flexion 120*)                         Strength:    Strength: Generally decreased, functional(shoulders-3/5)                Coordination:  Coordination: Within functional limits  Fine Motor Skills-Upper: Left Intact; Right Intact    Gross Motor Skills-Upper: Left Intact; Right Intact    Tone & Sensation:    Tone: Normal  Sensation: Intact(per patient report)                      Balance:  Sitting: Intact; Without support  Standing: Impaired; Without support(one hand on supportive surface, required support at all time)  Standing - Static: Fair  Standing - Dynamic : Fair    Functional Mobility and Transfers for ADLs:  Bed Mobility:  Supine to Sit: Supervision - patient states no A in and out of bed  Scooting: Supervision    Transfers:  Sit to Stand: Supervision(EOB, early descent, no A)  Stand to Sit: Supervision(no cues safety)  Bathroom Mobility: Minimum assistance(infer from functional mobility with PT 2* ran into walls on right and left side, A with RW management)  Toilet Transfer : Supervision  Shower Transfer: Total assistance(not safe at this time)    ADL Assessment:  Feeding: Independent    Oral Facial Hygiene/Grooming: Supervision(setup, sitting, fatigue from walking so unable to stand)    Bathing: Moderate assistance(infer A lower body from ROM and standing balance)    Upper Body Dressing: Minimum assistance(A around back side to don \"robe\" / hospital gown)    Lower Body Dressing: Moderate assistance(functional reach to knees, fair standing balance)    Toileting: Minimum assistance(standing balance A)      Patient received sitting EOB and PT about to work with her. ADL Intervention and task modifications:                                     Cognitive Retraining  Safety/Judgement: Decreased insight into deficits; Decreased awareness of need for safety    Therapeutic Exercise:     Functional Measure:  Barthel Index:    Bathin  Bladder: 10  Bowels: 10  Groomin  Dressin  Feeding: 10  Mobility: 5  Stairs: 0  Toilet Use: 5  Transfer (Bed to Chair and Back): 10  Total: 60/100        Percentage of impairment   0%   1-19%   20-39%   40-59%   60-79%   80-99%   100%   Barthel Score 0-100 100 99-80 79-60 59-40 20-39 1-19   0     The Barthel ADL Index: Guidelines  1. The index should be used as a record of what a patient does, not as a record of what a patient could do. 2. The main aim is to establish degree of independence from any help, physical or verbal, however minor and for whatever reason. 3. The need for supervision renders the patient not independent. 4. A patient's performance should be established using the best available evidence. Asking the patient, friends/relatives and nurses are the usual sources, but direct observation and common sense are also important.  However direct testing is not needed. 5. Usually the patient's performance over the preceding 24-48 hours is important, but occasionally longer periods will be relevant. 6. Middle categories imply that the patient supplies over 50 per cent of the effort. 7. Use of aids to be independent is allowed. Beverley Cortés., Barthel, D.W. ([id number]). Functional evaluation: the Barthel Index. [facility] (14)2. [place] Alcantara shane GALILEO Diaz, Keri Chinchilla., Lina Hagan., [place], [address] (1999). Measuring the change indisability after inpatient rehabilitation; comparison of the responsiveness of the Barthel Index and Functional [place] Measure. Journal of Neurology, Neurosurgery, and Psychiatry, 66(4), 390-955. KARYN Lerner, JAMISON Rachel, & Mercedez Rahman M.A. (2004.) Assessment of post-stroke quality of life in cost-effectiveness studies: The usefulness of the Barthel Index and the EuroQoL-5D. Quality of Life Research, 13, 707-45         Activity Tolerance:   Fair, desaturates with exertion (89% room air) and requires oxygen and requires frequent rest breaks  Please refer to the flowsheet for vital signs taken during this treatment. After treatment patient left:   Up in chair  Call light within reach   COMMUNICATION/EDUCATION:   The patients plan of care was discussed with: Physical Therapist.    Home safety education was provided and the patient/caregiver indicated understanding., Patient/family have participated as able in goal setting and plan of care. and Patient/family agree to work toward stated goals and plan of care. This patients plan of care is appropriate for delegation to [facility].     Thank you for this referral.  Farhan Crandall  Time Calculation: 26 mins

## 2019-03-08 NOTE — PROGRESS NOTES
Problem: Mobility Impaired (Adult and Pediatric)  Goal: *Acute Goals and Plan of Care (Insert Text)  Physical Therapy Goals  Initiated 3/8/2019  1. Patient will move from supine to sit and sit to supine  in bed with modified independence within 7 day(s). 2.  Patient will transfer from bed to chair and chair to bed with modified independence using the least restrictive device within 7 day(s). 3.  Patient will perform sit to stand with modified independence within 7 day(s). 4.  Patient will ambulate with modified independence for 150 feet with the least restrictive device within 7 day(s). physical Therapy EVALUATION  Patient: Nataly Jimenez (9 y.o. female)  Date: 3/8/2019  Primary Diagnosis: Sepsis ([facility] [address] [A41.9]  Procedure(s) (LRB):  PERCUTANEOUS CORONARY INTERVENTION (N/A)  Left And Right Heart Cath / Coronary Angiography (N/A) 6 Days Post-Op   Precautions:   Fall    ASSESSMENT :  Based on the objective data described below, the patient presents with decreased tolerance to activity s/p admission for sepsis and is now a new dialysis patient. Pt received seated EOB and agreeable to therapy. Pt completed sit<>stand with RW with standby assist and fair eccentric control lowering into chair likely due to fatigue. Pt ambulated ~65 feet with RW with CGA demonstrating wide RODNEY, decreased kiara, increased work of breathing. See below for O2 sats. Pt reported at baseline, she lives alone, but her son is very involved in her care. Pt ambulates with a cane or rollator, and not on home oxygen. Pt will benefit from HHPT and increased assistance from family. Documentation for home O2:     ROOM AIR    AT REST   O2 SATS  93 HR  81   ROOM AIR WITH ACTIVITY 02 SATS  89 HR  88   (1    )LITERS OF 02 PATIENT LEFT COMFORTABLY  SITTING/SUPINE 02 SATS  93 HR  85       . Patient will benefit from skilled intervention to address the above impairments.   Patients rehabilitation potential is considered to be Good  Factors which may influence rehabilitation potential include:   []         None noted  []         Mental ability/status  [x]         Medical condition  []         Home/family situation and support systems  []         Safety awareness  []         Pain tolerance/management  []         Other:      PLAN :  Recommendations and Planned Interventions:  [x]           Bed Mobility Training             [x]    Neuromuscular Re-Education  [x]           Transfer Training                   []    Orthotic/Prosthetic Training  [x]           Gait Training                         []    Modalities  [x]           Therapeutic Exercises           []    Edema Management/Control  [x]           Therapeutic Activities            [x]    Patient and Family Training/Education  []           Other (comment):    Frequency/Duration: Patient will be followed by physical therapy  5 times a week to address goals. Discharge Recommendations: Home Health  Further Equipment Recommendations for Discharge: none     SUBJECTIVE:   Patient stated I'm a little short winded.     OBJECTIVE DATA SUMMARY:   HISTORY:    Past Medical History:   Diagnosis Date    Diabetes ([facility] [address]     Hypertension      Past Surgical History:   Procedure Laterality Date    HX KNEE REPLACEMENT Bilateral     HX ORTHOPAEDIC      IR INSERT TUNL CVC W/O PORT OVER 5 YR  3/5/2019     Prior Level of Function/Home Situation: mod I, uses a cane or rollator for mobility, lives in a 1 story apartment  Personal factors and/or comorbidities impacting plan of care:     Home Situation  Home Environment: Apartment  # Steps to Enter: 0  One/Two Story Residence: One story  Living Alone: Yes  Support Systems: Child(jonn)  Patient Expects to be Discharged to[de-identified] Apartment  Current DME Used/Available at Home: Cane, straight, Grab bars, Shower chair, Walker, rollator, Walker, rolling  Tub or Shower Type: Shower    EXAMINATION/PRESENTATION/DECISION MAKING:   Critical Behavior:  Neurologic State: Alert  Orientation Level: Oriented to person, Oriented to place, Oriented to situation, Disoriented to time  Cognition: Impaired decision making, Decreased command following, Decreased attention/concentration  Safety/Judgement: Decreased insight into deficits, Decreased awareness of need for safety  Hearing: Auditory  Auditory Impairment: None  Skin:    Edema:   Range Of Motion:  AROM: Generally decreased, functional(shoulder flexion 120*)                       Strength:    Strength: Generally decreased, functional(shoulders-3/5)                    Tone & Sensation:   Tone: Normal              Sensation: Intact(per patient report)               Coordination:  Coordination: Within functional limits  Vision:   Acuity: Impaired near vision(states baseline; running into items left and right though)  Corrective Lenses: Reading glasses  Functional Mobility:  Bed Mobility:     Supine to Sit: Supervision     Scooting: Supervision  Transfers:  Sit to Stand: Supervision(EOB, early descent, no A)  Stand to Sit: Supervision(no cues safety)                       Balance:   Sitting: Intact; Without support  Standing: Impaired; Without support(one hand on supportive surface, required support at all time)  Standing - Static: Fair  Standing - Dynamic : Fair  Ambulation/Gait Training:  Distance (ft): 65 Feet (ft)  Assistive Device: Gait belt;Walker, rolling  Ambulation - Level of Assistance: Contact guard assistance        Gait Abnormalities: Decreased step clearance        Base of Support: Widened     Speed/Lucy: Slow  Step Length: Right shortened;Left shortened                     Stairs:               Therapeutic Exercises:       Functional Measure:  Barthel Index:    Bathin  Bladder: 10  Bowels: 10  Groomin  Dressin  Feeding: 10  Mobility: 5  Stairs: 0  Toilet Use: 5  Transfer (Bed to Chair and Back): 10  Total: 60/100       Percentage of impairment   0%   1-19%   20-39%   40-59%   60-79%   80-99%   100%   Barthel Score 0-100 100 99-80 79-60 59-40 20-39 1-19   0     The Barthel ADL Index: Guidelines  1. The index should be used as a record of what a patient does, not as a record of what a patient could do. 2. The main aim is to establish degree of independence from any help, physical or verbal, however minor and for whatever reason. 3. The need for supervision renders the patient not independent. 4. A patient's performance should be established using the best available evidence. Asking the patient, friends/relatives and nurses are the usual sources, but direct observation and common sense are also important. However direct testing is not needed. 5. Usually the patient's performance over the preceding 24-48 hours is important, but occasionally longer periods will be relevant. 6. Middle categories imply that the patient supplies over 50 per cent of the effort. 7. Use of aids to be independent is allowed. Amelia Randolph., Barthel, D.W. (3050). Functional evaluation: the Barthel Index. [address] [employer] (14)2. GALILEO Sung, Haylee Mccann., Crawford Sinks., [place], [address] [place] [address] (1999). Measuring the change indisability after inpatient rehabilitation; comparison of the responsiveness of the Barthel Index and Functional Schleicher Measure. Journal of Neurology, Neurosurgery, and Psychiatry, 66(4), 454-622. Atilio Arias N.J.ELLIOTT, JAMISON Rachel, & Cuauhtemoc Price, MFLORECITA. (2004.) Assessment of post-stroke quality of life in cost-effectiveness studies: The usefulness of the Barthel Index and the EuroQoL-5D. Quality of Life Research, 13, 146-18            Based on the above components, the patient evaluation is determined to be of the following complexity level: LOW     Pain:  Pain Scale 1: Numeric (0 - 10)  Pain Intensity 1: 0              Activity Tolerance:   Fair. Increased SOB and work of breathing on room air.  However spO2: 89% on room air during activity  Please refer to the flowsheet for vital signs taken during this treatment. After treatment:   [x]         Patient left in no apparent distress sitting up in chair  []         Patient left in no apparent distress in bed  [x]         Call bell left within reach  [x]         Nursing notified  []         Caregiver present  []         Bed alarm activated    COMMUNICATION/EDUCATION:   The patients plan of care was discussed with: Occupational Therapist and Registered Nurse. [x]         Fall prevention education was provided and the patient/caregiver indicated understanding. [x]         Patient/family have participated as able in goal setting and plan of care. [x]         Patient/family agree to work toward stated goals and plan of care. []         Patient understands intent and goals of therapy, but is neutral about his/her participation. []         Patient is unable to participate in goal setting and plan of care.     Thank you for this referral.  Keshawn Ellis, PT, DPT   Time Calculation: 19 mins

## 2019-03-08 NOTE — PROGRESS NOTES
Problem: Pressure Injury - Risk of  Goal: *Prevention of pressure injury  Document Mohan Scale and appropriate interventions in the flowsheet.   Outcome: Progressing Towards Goal  Pressure Injury Interventions:  Sensory Interventions: Keep linens dry and wrinkle-free, Float heels    Moisture Interventions: Assess need for specialty bed, Apply protective barrier, creams and emollients, Absorbent underpads    Activity Interventions: Increase time out of bed    Mobility Interventions: HOB 30 degrees or less    Nutrition Interventions: Offer support with meals,snacks and hydration, Discuss nutritional consult with provider, Document food/fluid/supplement intake    Friction and Shear Interventions: Apply protective barrier, creams and emollients, Feet elevated on foot rest, Foam dressings/transparent film/skin sealants, HOB 30 degrees or less

## 2019-03-08 NOTE — DIALYSIS
TRANSFER - IN REPORT:    Verbal report received from  [facility] on [address]  being received from [address]  for ordered procedure      Report consisted of patients Situation, Background, Assessment and   Recommendations(SBAR). Information from the following report(s) SBAR was reviewed with the receiving nurse. Opportunity for questions and clarification was provided. Assessment completed upon patients arrival to unit and care assumed.

## 2019-03-08 NOTE — PROGRESS NOTES
Rodrigo Cummings this AM advised Kev location is [facility] and should be able to confirm a chair by this afternoon. Therapy consults pending. Patient remains on 2L of oxygen. Need to record SAT's to determine if home o2 is needed.      SAT's at rest on RA ____%    SAT's on RA with activity _____%    SAT's with activity on __L of oxygen ___%    [phone]

## 2019-03-08 NOTE — PROGRESS NOTES
7547 Patient c/o coughing all night. Robitussin given without any relief per patient. Hospitalist paged. [phone] Second page sent.   18 Spoke with Dr. Sammy Yepez and he ordered Tessalon pealang po

## 2019-03-09 LAB
ALBUMIN SERPL-MCNC: 2.2 G/DL (ref 3.5–5)
ANION GAP SERPL CALC-SCNC: 10 MMOL/L (ref 5–15)
BUN SERPL-MCNC: 10 MG/DL (ref 6–20)
BUN/CREAT SERPL: 4 (ref 12–20)
CALCIUM SERPL-MCNC: 9.3 MG/DL (ref 8.5–10.1)
CHLORIDE SERPL-SCNC: 100 MMOL/L (ref 97–108)
CO2 SERPL-SCNC: 26 MMOL/L (ref 21–32)
CREAT SERPL-MCNC: 2.3 MG/DL (ref 0.55–1.02)
GLUCOSE BLD STRIP.AUTO-MCNC: 149 MG/DL (ref 65–100)
GLUCOSE BLD STRIP.AUTO-MCNC: 192 MG/DL (ref 65–100)
GLUCOSE BLD STRIP.AUTO-MCNC: 198 MG/DL (ref 65–100)
GLUCOSE BLD STRIP.AUTO-MCNC: 228 MG/DL (ref 65–100)
GLUCOSE SERPL-MCNC: 138 MG/DL (ref 65–100)
PHOSPHATE SERPL-MCNC: 2.6 MG/DL (ref 2.6–4.7)
POTASSIUM SERPL-SCNC: 3.8 MMOL/L (ref 3.5–5.1)
SERVICE CMNT-IMP: ABNORMAL
SODIUM SERPL-SCNC: 136 MMOL/L (ref 136–145)

## 2019-03-09 PROCEDURE — 94640 AIRWAY INHALATION TREATMENT: CPT

## 2019-03-09 PROCEDURE — 94760 N-INVAS EAR/PLS OXIMETRY 1: CPT

## 2019-03-09 PROCEDURE — 77010033678 HC OXYGEN DAILY

## 2019-03-09 PROCEDURE — 74011250636 HC RX REV CODE- 250/636: Performed by: INTERNAL MEDICINE

## 2019-03-09 PROCEDURE — 74011250637 HC RX REV CODE- 250/637: Performed by: FAMILY MEDICINE

## 2019-03-09 PROCEDURE — 80069 RENAL FUNCTION PANEL: CPT

## 2019-03-09 PROCEDURE — 36415 COLL VENOUS BLD VENIPUNCTURE: CPT

## 2019-03-09 PROCEDURE — 74011636637 HC RX REV CODE- 636/637: Performed by: INTERNAL MEDICINE

## 2019-03-09 PROCEDURE — 74011250637 HC RX REV CODE- 250/637: Performed by: INTERNAL MEDICINE

## 2019-03-09 PROCEDURE — 65270000029 HC RM PRIVATE

## 2019-03-09 PROCEDURE — 94664 DEMO&/EVAL PT USE INHALER: CPT

## 2019-03-09 PROCEDURE — 74011000250 HC RX REV CODE- 250: Performed by: INTERNAL MEDICINE

## 2019-03-09 PROCEDURE — 74011000250 HC RX REV CODE- 250: Performed by: HOSPITALIST

## 2019-03-09 PROCEDURE — 82962 GLUCOSE BLOOD TEST: CPT

## 2019-03-09 RX ORDER — METOLAZONE 5 MG/1
2.5 TABLET ORAL ONCE
Status: COMPLETED | OUTPATIENT
Start: 2019-03-09 | End: 2019-03-09

## 2019-03-09 RX ORDER — ARFORMOTEROL TARTRATE 15 UG/2ML
15 SOLUTION RESPIRATORY (INHALATION)
Status: DISCONTINUED | OUTPATIENT
Start: 2019-03-09 | End: 2019-03-11 | Stop reason: HOSPADM

## 2019-03-09 RX ORDER — SODIUM CHLORIDE 0.9 % (FLUSH) 0.9 %
SYRINGE (ML) INJECTION
Status: COMPLETED
Start: 2019-03-09 | End: 2019-03-09

## 2019-03-09 RX ORDER — BUDESONIDE 0.5 MG/2ML
500 INHALANT ORAL
Status: DISCONTINUED | OUTPATIENT
Start: 2019-03-09 | End: 2019-03-11 | Stop reason: HOSPADM

## 2019-03-09 RX ADMIN — BENZONATATE 100 MG: 100 CAPSULE ORAL at 23:18

## 2019-03-09 RX ADMIN — ASPIRIN 81 MG CHEWABLE TABLET 81 MG: 81 TABLET CHEWABLE at 08:48

## 2019-03-09 RX ADMIN — VENLAFAXINE 37.5 MG: 37.5 TABLET ORAL at 17:22

## 2019-03-09 RX ADMIN — Medication 10 ML: at 07:23

## 2019-03-09 RX ADMIN — BUDESONIDE 500 MCG: 0.5 INHALANT RESPIRATORY (INHALATION) at 21:56

## 2019-03-09 RX ADMIN — IPRATROPIUM BROMIDE AND ALBUTEROL SULFATE 3 ML: .5; 3 SOLUTION RESPIRATORY (INHALATION) at 09:03

## 2019-03-09 RX ADMIN — HEPARIN SODIUM 5000 UNITS: 5000 INJECTION INTRAVENOUS; SUBCUTANEOUS at 22:07

## 2019-03-09 RX ADMIN — BUMETANIDE 2 MG: 1 TABLET ORAL at 17:22

## 2019-03-09 RX ADMIN — CARVEDILOL 6.25 MG: 6.25 TABLET, FILM COATED ORAL at 17:22

## 2019-03-09 RX ADMIN — BUMETANIDE 2 MG: 1 TABLET ORAL at 00:04

## 2019-03-09 RX ADMIN — VENLAFAXINE 37.5 MG: 37.5 TABLET ORAL at 08:49

## 2019-03-09 RX ADMIN — THERA TABS 1 TABLET: TAB at 08:48

## 2019-03-09 RX ADMIN — Medication 10 ML: at 14:29

## 2019-03-09 RX ADMIN — IPRATROPIUM BROMIDE AND ALBUTEROL SULFATE 3 ML: .5; 3 SOLUTION RESPIRATORY (INHALATION) at 21:56

## 2019-03-09 RX ADMIN — Medication 10 ML: at 11:49

## 2019-03-09 RX ADMIN — CARVEDILOL 6.25 MG: 6.25 TABLET, FILM COATED ORAL at 07:22

## 2019-03-09 RX ADMIN — CARVEDILOL 6.25 MG: 6.25 TABLET, FILM COATED ORAL at 00:04

## 2019-03-09 RX ADMIN — BUMETANIDE 2 MG: 1 TABLET ORAL at 08:48

## 2019-03-09 RX ADMIN — HEPARIN SODIUM 5000 UNITS: 5000 INJECTION INTRAVENOUS; SUBCUTANEOUS at 07:22

## 2019-03-09 RX ADMIN — Medication 10 ML: at 22:09

## 2019-03-09 RX ADMIN — INSULIN GLARGINE 12 UNITS: 100 INJECTION, SOLUTION SUBCUTANEOUS at 22:08

## 2019-03-09 RX ADMIN — Medication 1 CAPSULE: at 08:48

## 2019-03-09 RX ADMIN — ARFORMOTEROL TARTRATE 15 MCG: 15 SOLUTION RESPIRATORY (INHALATION) at 21:56

## 2019-03-09 RX ADMIN — INSULIN LISPRO 1 UNITS: 100 INJECTION, SOLUTION INTRAVENOUS; SUBCUTANEOUS at 22:08

## 2019-03-09 RX ADMIN — IPRATROPIUM BROMIDE AND ALBUTEROL SULFATE 3 ML: .5; 3 SOLUTION RESPIRATORY (INHALATION) at 14:03

## 2019-03-09 RX ADMIN — METOLAZONE 2.5 MG: 5 TABLET ORAL at 11:46

## 2019-03-09 RX ADMIN — OXYBUTYNIN CHLORIDE 15 MG: 5 TABLET, EXTENDED RELEASE ORAL at 08:48

## 2019-03-09 RX ADMIN — HEPARIN SODIUM 5000 UNITS: 5000 INJECTION INTRAVENOUS; SUBCUTANEOUS at 14:34

## 2019-03-09 NOTE — PROGRESS NOTES
Bedside and Verbal shift change report given to Greyson Morgan RN (oncoming nurse) by Argenis Pablo RN (offgoing nurse). Report included the following information SBAR, Kardex, ED Summary, MAR, Recent Results and Med Rec Status.

## 2019-03-09 NOTE — PROGRESS NOTES
Hospitalist Progress Note  Rachel Walker MD  Answering service: [phone] from in house phone  Cell: [phone]      Date of Service:  3/9/2019  NAME:  Albert Sampson  :  1940  MRN:  [id number]      Admission Summary: This is a 77-year-old woman with a past medical history significant for type 2 diabetes, hypertension, was in her usual state of health until early this morning when the patient woke up with sudden onset of shortness of breath. The shortness of breath is progressive associated with fever, diaphoresis as well as chest discomfort. The patient denies history of congestive heart failure or asthma or COPD. When EMS arrived at the scene, the patient's oxygen saturation was 89% on room air. The patient was treated with DuoNeb with improvement in oxygen saturation. The shortness of breath associated with cough which is nonproductive. The patient also stated that she has gained couple of pounds in the last couple of days. The patient traveled to [place] 3 months ago and stated that she has been sick since then. The patient is taking Lasix. It is not clear why the patient is taking Lasix. Stated that she has no history of congestive heart failure. No record of prior admission to this hospital.  When the patient arrived at the emergency room, the patient was found to have fever, leukocytosis, clinical presentation symptoms triggered the Code Sepsis. The Code Sepsis protocol was initiated. The patient received antibiotics. The chest x-ray did not show any evidence of pneumonia but shows evidence of vascular congestion. The patient was subsequently referred to the hospitalist service for evaluation for admission. Interval history / Subjective:       3/5/2019 :  Cr up to 6; for HD today. 3/6/2019 :  Pt \"feels like a new person\" ,no n/v/no cp no sob.      3/7/2019 :   Medically cleared for dc, to out pt hd for 2-3 week . Case advised and aware per notes of needs. 3/8/2019 :  Main complaint is wheezing and cough though night, is on nebs/tesselon pearls, no adjustements, minimal wheezing noted 3/8/2019     3/9:   Cont cough/wheeze, adding LABA and neb steroid 3/9/2019      Assessment & Plan:     Sepsis ([facility] [address] (3/1/2019)  -source?  -Urinalysis 3/1 unremarkable  -CTA chest 3/1 No evidence of acute pulmonary thromboembolism. Mild pulmonary edema and small bilateral pleural effusions  -On Zosyn, don't think the patient needs antibiotics. Also, in light of worsening renal function will stop 3/4  -Follow cultures, no growth so far  -Leukocytosis improving  -resolved     Copd w ashtma, for nebs/laba/ Pulmicort; change duoneb to prn     Type 2 diabetes mellitus with hyperglycemia  Lab Results   Component Value Date/Time    Glucose 138 (H) 03/09/2019 04:50 AM    Glucose (POC) 149 (H) 03/09/2019 06:15 AM        Acute respiratory failure, unspecified whether with hypoxia or hypercapnia; see above copd/asthma     NSTEMI (non-ST elevated myocardial infarction) ([facility] [address] (3/2/2019)   -No acute  changes in ECG  -S/P cath 3/2: No significant CAD with Cx dominant,Normal LV size with moderate antrolateral and apical hypokinesis in setting of elevated biomarkere c/w Takotsubo cardiomyopthy. -Elevated Rt an Lt ventricular filling pressures; no cp Gwendolynn Pink 3/6/2019 - 3/9/2019        Afib RVR  -Echo with EF 61-65%.  No RWMA  -Now in normal sinus  -Was on heparin/Cardizem, now off both  -on Coreg, continue; rate controled 3/6/2019 - 3/9/2019     Pulmonary edema with pleural effusion  -sec to volume overload  -Was on lasix, currently on hold  - no s/s of hf 3/6/2019 - 3/9/2019     Probable CHF (congestive heart failure)  -Not sure if the patient has CHF    Anemia  -likely chronic  -Follow work up   Lab Results   Component Value Date/Time    HGB 8.1 (L) 03/08/2019 01:35 AM        Benign essential hypertension (3/2/2019)  -Continue home meds   BP Readings from Last 1 Encounters:   03/09/19 176/86      Pul wheezing, multifactorial, likely has a component of copd from age alone. On duonebs,, continue consider LABA/pulmicort     Diabetic peripheral neuropathy ([facility] [address] (3/2/2019)  -stable on home meds   Lab Results   Component Value Date/Time    Glucose 138 (H) 03/09/2019 04:50 AM    Glucose (POC) 149 (H) 03/09/2019 06:15 AM        DM type 2  -SSI  Lab Results   Component Value Date/Time    Glucose 138 (H) 03/09/2019 04:50 AM    Glucose (POC) 149 (H) 03/09/2019 06:15 AM        ASUNCION  -sec to likely dye load  -US renal No hydronephrosis. A 2.9 cm simple cyst in the right kidney  -Appreciate discussion with Nephrology, started on IV fluids  -Monitor for volume overload  Lab Results   Component Value Date/Time    Creatinine 2.30 (H) 03/09/2019 04:50 AM        Anxiety/Depression  -on Effexor, continue; no anxiety/depression noted on rounds 3/6/2019 - 3/9/2019     Morbid obesity  -Counseled  Body mass index is 40.35 kg/m². Cardiac diet    Code status: Full  Prophylaxis: Lovenox    Care Plan discussed with: Patient/Family  Disposition: case: \"Therapy mobilized and patient does not qualify for home oxygen as with activity her SAT's were 89 and SAT's would need to be 88 or below. At 3:30 called Rabia Rouse [phone] and patient has not been approved a chair time as  Yet. Unable to document in barriers as record was locked. \"         Hospital Problems  Date Reviewed: 3/2/2019          Codes Class Noted POA    NSTEMI (non-ST elevated myocardial infarction) [facility]) ICD-10-CM: I21.4  ICD-9-CM: 410.70  3/2/2019 Yes        CHF (congestive heart failure) ([facility] [address] ICD-10-CM: I50.9  ICD-9-CM: 428.0  3/2/2019 Yes        Benign essential hypertension ICD-10-CM: I10  ICD-9-CM: 401.1  3/2/2019 Yes        Diabetic peripheral neuropathy ([facility] [address] ICD-10-CM: E11.42  ICD-9-CM: 250.60, 357.2  3/2/2019 Yes        Type II diabetes mellitus, uncontrolled ([facility] ICD-10-CM: E11.65  ICD-9-CM: 250.02 3/2/2019 Yes        Hyperlipidemia ICD-10-CM: E78.5  ICD-9-CM: 272.4  3/2/2019 Yes        * (Principal) Sepsis ([facility] [address] ICD-10-CM: A41.9  ICD-9-CM: 038.9, 995.91  3/1/2019 Yes                Review of Systems:   Cough, wheezing, no sob, no n/v/d/f/chills, overall feel stable     Vital Signs:    Last 24hrs VS reviewed since prior progress note. Most recent are:  Visit Vitals  /86 (BP 1 Location: Left arm, BP Patient Position: At rest)   Pulse 88   Temp 98.3 °F (36.8 °C)   Resp 16   Ht 5' 5\" (1.651 m)   Wt 110 kg (242 lb 8 oz)   SpO2 98%   BMI 40.35 kg/m²         Intake/Output Summary (Last 24 hours) at 3/9/2019 1044  Last data filed at 3/9/2019 0933  Gross per 24 hour   Intake    Output 1200 ml   Net -1200 ml        Physical Examination:             Constitutional:  No acute distress, cooperative, pleasant    ENT:  Oral mucous moist, oropharynx benign. Neck supple,    Resp:  scattered wheeze  bilaterally. No rhonchi/rales. No accessory muscle use   CV:  Regular rhythm, normal rate, no murmurs, gallops, rubs    GI:  Soft, non distended, non tender. normoactive bowel sounds, no hepatosplenomegaly     Musculoskeletal:  No edema, warm, 2+ pulses throughout    Neurologic:  Moves all extremities. AAOx3, CN II-XII reviewed     Skin:  Good turgor, no rashes or ulcers       Data Review:    Review and/or order of clinical lab test      Labs:     Recent Labs     03/08/19  0135 03/06/19  1430   WBC 14.2* 10.4   HGB 8.1* 7.7*   HCT 26.0* 24.9*    320     Recent Labs     03/09/19  0450 03/08/19  0135 03/07/19  1000    133* 132*  132*   K 3.8 3.7 3.9  3.9    95* 96*  96*   CO2 26 25 30  28   BUN 10 18 13  13   CREA 2.30* 3.72* 2.98*  2.97*   * 166* 238*  235*   CA 9.3 9.0 8.8  8.7   PHOS 2.6 3.6 3.2     Recent Labs     03/09/19  0450 03/08/19  0135 03/07/19  1000   ALB 2.2* 2.1* 2.0*     No results for input(s): INR, PTP, APTT in the last 72 hours.     No lab exists for component: INREXT, INREXT   No results for input(s): FE, TIBC, PSAT, FERR in the last 72 hours. No results found for: FOL, RBCF   No results for input(s): PH, PCO2, PO2 in the last 72 hours. No results for input(s): CPK, CKNDX, TROIQ in the last 72 hours.     No lab exists for component: CPKMB  No results found for: CHOL, CHOLX, CHLST, CHOLV, HDL, LDL, LDLC, DLDLP, TGLX, TRIGL, TRIGP, CHHD, CHHDX  Lab Results   Component Value Date/Time    Glucose (POC) 149 (H) 03/09/2019 06:15 AM    Glucose (POC) 304 (H) 03/08/2019 11:12 AM    Glucose (POC) 187 (H) 03/08/2019 06:44 AM    Glucose (POC) 198 (H) 03/07/2019 09:36 PM    Glucose (POC) 199 (H) 03/07/2019 04:44 PM     Lab Results   Component Value Date/Time    Color YELLOW/STRAW 03/01/2019 01:09 PM    Appearance CLEAR 03/01/2019 01:09 PM    Specific gravity 1.023 03/01/2019 01:09 PM    pH (UA) 5.0 03/01/2019 01:09 PM    Protein 30 (A) 03/01/2019 01:09 PM    Glucose 100 (A) 03/01/2019 01:09 PM    Ketone 15 (A) 03/01/2019 01:09 PM    Bilirubin NEGATIVE  03/01/2019 01:09 PM    Urobilinogen 1.0 03/01/2019 01:09 PM    Nitrites NEGATIVE  03/01/2019 01:09 PM    Leukocyte Esterase NEGATIVE  03/01/2019 01:09 PM    Epithelial cells FEW 03/01/2019 01:09 PM    Bacteria NEGATIVE  03/01/2019 01:09 PM    WBC 0-4 03/01/2019 01:09 PM    RBC 0-5 03/01/2019 01:09 PM         Medications Reviewed:     Current Facility-Administered Medications   Medication Dose Route Frequency    sodium chloride (NS) flush        metOLazone (ZAROXOLYN) tablet 2.5 mg  2.5 mg Oral ONCE    benzonatate (TESSALON) capsule 100 mg  100 mg Oral TID PRN    bumetanide (BUMEX) tablet 2 mg  2 mg Oral BID    insulin glargine (LANTUS) injection 12 Units  12 Units SubCUTAneous QHS    guaiFENesin (ROBITUSSIN) 100 mg/5 mL oral liquid 200 mg  200 mg Oral Q4H PRN    epoetin ebnigno-epbx (RETACRIT) 12,000 Units combo injection  12,000 Units SubCUTAneous Q MON, WED & FRI    insulin lispro (HUMALOG) injection   SubCUTAneous AC&HS    heparin (porcine) injection 5,000 Units  5,000 Units SubCUTAneous Q8H    heparin (porcine) 1,000 unit/mL injection 4,300 Units  4,300 Units InterCATHeter PRN    albuterol-ipratropium (DUO-NEB) 2.5 MG-0.5 MG/3 ML  3 mL Nebulization TID RT    albuterol (PROVENTIL VENTOLIN) nebulizer solution 2.5 mg  2.5 mg Nebulization Q4H PRN    aspirin chewable tablet 81 mg  81 mg Oral DAILY    sodium chloride (NS) flush 5-40 mL  5-40 mL IntraVENous PRN    carvedilol (COREG) tablet 6.25 mg  6.25 mg Oral BID WITH MEALS    gabapentin (NEURONTIN) capsule 300 mg  300 mg Oral TID PRN    LORazepam (ATIVAN) tablet 0.5 mg  0.5 mg Oral Q8H PRN    oxybutynin chloride XL (DITROPAN XL) tablet 15 mg  15 mg Oral DAILY    therapeutic multivitamin (THERAGRAN) tablet 1 Tab  1 Tab Oral DAILY    traMADol (ULTRAM) tablet 50 mg  50 mg Oral Q8H PRN    venlafaxine (EFFEXOR) tablet 37.5 mg  37.5 mg Oral BID    sodium chloride (NS) flush 5-40 mL  5-40 mL IntraVENous Q8H    sodium chloride (NS) flush 5-40 mL  5-40 mL IntraVENous PRN    ondansetron (ZOFRAN) injection 4 mg  4 mg IntraVENous Q4H PRN    bisacodyl (DULCOLAX) tablet 5 mg  5 mg Oral DAILY PRN    lactobac ac& pc-s.therm-b.anim (JUVE Q/RISAQUAD)  1 Cap Oral DAILY    glucose chewable tablet 16 g  4 Tab Oral PRN    dextrose (D50W) injection syrg 12.5-25 g  12.5-25 g IntraVENous PRN    glucagon (GLUCAGEN) injection 1 mg  1 mg IntraMUSCular PRN    morphine injection 4 mg  4 mg IntraVENous Q2H PRN     ______________________________________________________________________  EXPECTED LENGTH OF STAY: 5d 4h  ACTUAL LENGTH OF STAY:          8                 Marya Bella MD

## 2019-03-09 NOTE — PROGRESS NOTES
[facility]   [address] [facility], [address] [place] [address] [address], [facility]  Phone: [phone]   [phone]       Nephrology Progress Note  Angella Lucia     1940     [id number]  Date of Admission : 3/1/2019  03/09/19    CC:  Follow up for ASUNCION       Assessment and Plan   ASUNCION on CKD :  · Multifactorial.  contrast Induced Nephropathy w/ ATN   · S/p 1st HD 3/5, 3/6, 3/8   · Watch for recovery over the weekend   · Bumex 2 mg BID. 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     Current Medications:   Current Facility-Administered Medications   Medication Dose Route Frequency    sodium chloride (NS) flush        metOLazone (ZAROXOLYN) tablet 2.5 mg  2.5 mg Oral ONCE    benzonatate (TESSALON) capsule 100 mg  100 mg Oral TID PRN    bumetanide (BUMEX) tablet 2 mg  2 mg Oral BID    insulin glargine (LANTUS) injection 12 Units  12 Units SubCUTAneous QHS    guaiFENesin (ROBITUSSIN) 100 mg/5 mL oral liquid 200 mg  200 mg Oral Q4H PRN    epoetin benigno-epbx (RETACRIT) 12,000 Units combo injection  12,000 Units SubCUTAneous Q MON, WED & FRI    insulin lispro (HUMALOG) injection   SubCUTAneous AC&HS    heparin (porcine) injection 5,000 Units  5,000 Units SubCUTAneous Q8H    heparin (porcine) 1,000 unit/mL injection 4,300 Units  4,300 Units InterCATHeter PRN    albuterol-ipratropium (DUO-NEB) 2.5 MG-0.5 MG/3 ML  3 mL Nebulization TID RT    albuterol (PROVENTIL VENTOLIN) nebulizer solution 2.5 mg  2.5 mg Nebulization Q4H PRN    aspirin chewable tablet 81 mg  81 mg Oral DAILY    sodium chloride (NS) flush 5-40 mL  5-40 mL IntraVENous PRN    carvedilol (COREG) tablet 6.25 mg  6.25 mg Oral BID WITH MEALS    gabapentin (NEURONTIN) capsule 300 mg  300 mg Oral TID PRN    LORazepam (ATIVAN) tablet 0.5 mg  0.5 mg Oral Q8H PRN    oxybutynin chloride XL (DITROPAN XL) tablet 15 mg  15 mg Oral DAILY    therapeutic multivitamin (THERAGRAN) tablet 1 Tab  1 Tab Oral DAILY    traMADol (ULTRAM) tablet 50 mg  50 mg Oral Q8H PRN    venlafaxine (EFFEXOR) tablet 37.5 mg  37.5 mg Oral BID    sodium chloride (NS) flush 5-40 mL  5-40 mL IntraVENous Q8H    sodium chloride (NS) flush 5-40 mL  5-40 mL IntraVENous PRN    ondansetron (ZOFRAN) injection 4 mg  4 mg IntraVENous Q4H PRN    bisacodyl (DULCOLAX) tablet 5 mg  5 mg Oral DAILY PRN    lactobac ac& pc-s.therm-b.anim (JUVE Q/RISAQUAD)  1 Cap Oral DAILY    glucose chewable tablet 16 g  4 Tab Oral PRN    dextrose (D50W) injection syrg 12.5-25 g  12.5-25 g IntraVENous PRN    glucagon (GLUCAGEN) injection 1 mg  1 mg IntraMUSCular PRN    morphine injection 4 mg  4 mg IntraVENous Q2H PRN      Allergies   Allergen Reactions    Tylenol [Acetaminophen] Other (comments)     Pt told by MD not to take, pt unsure why       Objective:  Vitals:    Vitals:    03/09/19 0433 03/09/19 0438 03/09/19 0722 03/09/19 0758   BP: 167/79  (!) 184/92 176/86   Pulse: 88   88   Resp: 18   16   Temp: 98.7 °F (37.1 °C)   98.3 °F (36.8 °C)   TempSrc:       SpO2: 98%   98%   Weight:  110 kg (242 lb 8 oz)     Height:         Intake and Output:  No intake/output data recorded.   03/07 1901 - 03/09 0700  In: -   Out: 1000     Physical Examination:    GEN:  NAD  NECK:  Supple, no thyromegaly  RESP: CTA  b/l, no  wheezing,   CVS: RRR,S1,S2   ABDO:  soft , non tender, No mass  NEURO: non focal, normal speech  EXT: Edema +nt     Right ij permacath +  []    High complexity decision making was performed  []    Patient is at high-risk of decompensation with multiple organ involvement    Lab Data Personally Reviewed: I have reviewed all the pertinent labs, microbiology data and radiology studies during assessment. Recent Labs     03/09/19  0450 03/08/19  0135 03/07/19  1000 03/06/19  1430    133* 132*  132* 134*   K 3.8 3.7 3.9  3.9 4.0    95* 96*  96* 100   CO2 26 25 30  28 27   * 166* 238*  235* 212*   BUN 10 18 13  13 19   CREA 2.30* 3.72* 2.98*  2.97* 3.11*   CA 9.3 9.0 8.8  8.7 8.8   PHOS 2.6 3.6 3.2 3.0   ALB 2.2* 2.1* 2.0* 1.9*     Recent Labs     03/08/19  0135 03/06/19  1430   WBC 14.2* 10.4   HGB 8.1* 7.7*   HCT 26.0* 24.9*    320     No results found for: SDES  Lab Results   Component Value Date/Time    Culture result: NO GROWTH 5 DAYS 03/01/2019 08:22 AM     Recent Results (from the past 24 hour(s))   GLUCOSE, POC    Collection Time: 03/08/19 11:12 AM   Result Value Ref Range    Glucose (POC) 304 (H) 65 - 100 mg/dL    Performed by Estefany Harden    RENAL FUNCTION PANEL    Collection Time: 03/09/19  4:50 AM   Result Value Ref Range    Sodium 136 136 - 145 mmol/L    Potassium 3.8 3.5 - 5.1 mmol/L    Chloride 100 97 - 108 mmol/L    CO2 26 21 - 32 mmol/L    Anion gap 10 5 - 15 mmol/L    Glucose 138 (H) 65 - 100 mg/dL    BUN 10 6 - 20 MG/DL    Creatinine 2.30 (H) 0.55 - 1.02 MG/DL    BUN/Creatinine ratio 4 (L) 12 - 20      GFR est AA 25 (L) >60 ml/min/1.73m2    GFR est non-AA 20 (L) >60 ml/min/1.73m2    Calcium 9.3 8.5 - 10.1 MG/DL    Phosphorus 2.6 2.6 - 4.7 MG/DL    Albumin 2.2 (L) 3.5 - 5.0 g/dL   GLUCOSE, POC    Collection Time: 03/09/19  6:15 AM   Result Value Ref Range    Glucose (POC) 149 (H) 65 - 100 mg/dL    Performed by Milagro Valera I have reviewed the flowsheets. Chart and Pertinent Notes have been reviewed. No change in PMH ,family and social history from Consult note.       Lopez LEIJA Amelia Velásquez MD

## 2019-03-09 NOTE — PROGRESS NOTES
Bedside shift change report given to GUDELIA Warner (oncoming nurse) by GUDELIA Guillen (offgoing nurse). Report included the following information SBAR, Intake/Output and MAR.

## 2019-03-09 NOTE — PROGRESS NOTES
Bedside shift change report given to Zafar Morales (oncoming nurse) by Jael Ware (offgoing nurse). Report included the following information SBAR.

## 2019-03-10 LAB
ALBUMIN SERPL-MCNC: 2.3 G/DL (ref 3.5–5)
ANION GAP SERPL CALC-SCNC: 7 MMOL/L (ref 5–15)
BUN SERPL-MCNC: 14 MG/DL (ref 6–20)
BUN/CREAT SERPL: 5 (ref 12–20)
CALCIUM SERPL-MCNC: 9.5 MG/DL (ref 8.5–10.1)
CHLORIDE SERPL-SCNC: 99 MMOL/L (ref 97–108)
CO2 SERPL-SCNC: 31 MMOL/L (ref 21–32)
CREAT SERPL-MCNC: 2.82 MG/DL (ref 0.55–1.02)
GLUCOSE BLD STRIP.AUTO-MCNC: 154 MG/DL (ref 65–100)
GLUCOSE BLD STRIP.AUTO-MCNC: 166 MG/DL (ref 65–100)
GLUCOSE BLD STRIP.AUTO-MCNC: 193 MG/DL (ref 65–100)
GLUCOSE BLD STRIP.AUTO-MCNC: 262 MG/DL (ref 65–100)
GLUCOSE SERPL-MCNC: 152 MG/DL (ref 65–100)
PHOSPHATE SERPL-MCNC: 3.1 MG/DL (ref 2.6–4.7)
POTASSIUM SERPL-SCNC: 3.7 MMOL/L (ref 3.5–5.1)
SERVICE CMNT-IMP: ABNORMAL
SODIUM SERPL-SCNC: 137 MMOL/L (ref 136–145)

## 2019-03-10 PROCEDURE — 74011000250 HC RX REV CODE- 250: Performed by: INTERNAL MEDICINE

## 2019-03-10 PROCEDURE — 74011636637 HC RX REV CODE- 636/637: Performed by: INTERNAL MEDICINE

## 2019-03-10 PROCEDURE — 77010033678 HC OXYGEN DAILY

## 2019-03-10 PROCEDURE — 82962 GLUCOSE BLOOD TEST: CPT

## 2019-03-10 PROCEDURE — 74011250637 HC RX REV CODE- 250/637: Performed by: INTERNAL MEDICINE

## 2019-03-10 PROCEDURE — 94640 AIRWAY INHALATION TREATMENT: CPT

## 2019-03-10 PROCEDURE — 74011250636 HC RX REV CODE- 250/636: Performed by: INTERNAL MEDICINE

## 2019-03-10 PROCEDURE — 74011000250 HC RX REV CODE- 250: Performed by: HOSPITALIST

## 2019-03-10 PROCEDURE — 65270000029 HC RM PRIVATE

## 2019-03-10 PROCEDURE — 36415 COLL VENOUS BLD VENIPUNCTURE: CPT

## 2019-03-10 PROCEDURE — 74011250637 HC RX REV CODE- 250/637: Performed by: FAMILY MEDICINE

## 2019-03-10 PROCEDURE — 94760 N-INVAS EAR/PLS OXIMETRY 1: CPT

## 2019-03-10 PROCEDURE — 80069 RENAL FUNCTION PANEL: CPT

## 2019-03-10 RX ADMIN — Medication 10 ML: at 23:20

## 2019-03-10 RX ADMIN — BUDESONIDE 500 MCG: 0.5 INHALANT RESPIRATORY (INHALATION) at 09:32

## 2019-03-10 RX ADMIN — IPRATROPIUM BROMIDE AND ALBUTEROL SULFATE 3 ML: .5; 3 SOLUTION RESPIRATORY (INHALATION) at 09:32

## 2019-03-10 RX ADMIN — BUMETANIDE 2 MG: 1 TABLET ORAL at 08:42

## 2019-03-10 RX ADMIN — OXYBUTYNIN CHLORIDE 15 MG: 5 TABLET, EXTENDED RELEASE ORAL at 08:42

## 2019-03-10 RX ADMIN — ASPIRIN 81 MG CHEWABLE TABLET 81 MG: 81 TABLET CHEWABLE at 08:42

## 2019-03-10 RX ADMIN — BENZONATATE 100 MG: 100 CAPSULE ORAL at 06:39

## 2019-03-10 RX ADMIN — Medication 1 CAPSULE: at 08:42

## 2019-03-10 RX ADMIN — HEPARIN SODIUM 5000 UNITS: 5000 INJECTION INTRAVENOUS; SUBCUTANEOUS at 07:30

## 2019-03-10 RX ADMIN — VENLAFAXINE 37.5 MG: 37.5 TABLET ORAL at 18:35

## 2019-03-10 RX ADMIN — INSULIN GLARGINE 12 UNITS: 100 INJECTION, SOLUTION SUBCUTANEOUS at 23:20

## 2019-03-10 RX ADMIN — Medication 10 ML: at 07:30

## 2019-03-10 RX ADMIN — BENZONATATE 100 MG: 100 CAPSULE ORAL at 23:20

## 2019-03-10 RX ADMIN — INSULIN LISPRO 3 UNITS: 100 INJECTION, SOLUTION INTRAVENOUS; SUBCUTANEOUS at 12:23

## 2019-03-10 RX ADMIN — HEPARIN SODIUM 5000 UNITS: 5000 INJECTION INTRAVENOUS; SUBCUTANEOUS at 23:19

## 2019-03-10 RX ADMIN — IPRATROPIUM BROMIDE AND ALBUTEROL SULFATE 3 ML: .5; 3 SOLUTION RESPIRATORY (INHALATION) at 23:09

## 2019-03-10 RX ADMIN — CARVEDILOL 6.25 MG: 6.25 TABLET, FILM COATED ORAL at 18:35

## 2019-03-10 RX ADMIN — IPRATROPIUM BROMIDE AND ALBUTEROL SULFATE 3 ML: .5; 3 SOLUTION RESPIRATORY (INHALATION) at 14:51

## 2019-03-10 RX ADMIN — THERA TABS 1 TABLET: TAB at 08:42

## 2019-03-10 RX ADMIN — VENLAFAXINE 37.5 MG: 37.5 TABLET ORAL at 08:42

## 2019-03-10 RX ADMIN — HEPARIN SODIUM 5000 UNITS: 5000 INJECTION INTRAVENOUS; SUBCUTANEOUS at 14:49

## 2019-03-10 RX ADMIN — BUDESONIDE 500 MCG: 0.5 INHALANT RESPIRATORY (INHALATION) at 23:09

## 2019-03-10 RX ADMIN — BUMETANIDE 2 MG: 1 TABLET ORAL at 18:35

## 2019-03-10 RX ADMIN — CARVEDILOL 6.25 MG: 6.25 TABLET, FILM COATED ORAL at 07:30

## 2019-03-10 NOTE — PROGRESS NOTES
Hospitalist Progress Note  Coral Randall MD  Answering service: [phone] from in house phone  Cell: [phone]      Date of Service:  3/10/2019  NAME:  Georgi Barrett  :  1940  MRN:  [id number]      Admission Summary: This is a 79-year-old woman with a past medical history significant for type 2 diabetes, hypertension, was in her usual state of health until early this morning when the patient woke up with sudden onset of shortness of breath. The shortness of breath is progressive associated with fever, diaphoresis as well as chest discomfort. The patient denies history of congestive heart failure or asthma or COPD. When EMS arrived at the scene, the patient's oxygen saturation was 89% on room air. The patient was treated with DuoNeb with improvement in oxygen saturation. The shortness of breath associated with cough which is nonproductive. The patient also stated that she has gained couple of pounds in the last couple of days. The patient traveled to [place] 3 months ago and stated that she has been sick since then. The patient is taking Lasix. It is not clear why the patient is taking Lasix. Stated that she has no history of congestive heart failure. No record of prior admission to this hospital.  When the patient arrived at the emergency room, the patient was found to have fever, leukocytosis, clinical presentation symptoms triggered the Code Sepsis. The Code Sepsis protocol was initiated. The patient received antibiotics. The chest x-ray did not show any evidence of pneumonia but shows evidence of vascular congestion. The patient was subsequently referred to the hospitalist service for evaluation for admission. Interval history / Subjective:       3/5/2019 :  Cr up to 6; for HD today. 3/6/2019 :  Pt \"feels like a new person\" ,no n/v/no cp no sob.      3/7/2019 :   Medically cleared for dc, to out pt hd for 2-3 week . Case advised and aware per notes of needs. 3/8/2019 :  Main complaint is wheezing and cough though night, is on nebs/tesselon pearls, no adjustements, minimal wheezing noted 3/8/2019     3/9:   Cont cough/wheeze, adding LABA and neb steroid 3/10/2019     3/10:  Lungs the clearest they have been with use of Pulmicort/LABA nebs,   As per NEphrology:   ASUNCION on CKD :  · Multifactorial.  contrast Induced Nephropathy w/ ATN   · S/p 1st HD 3/5, 3/6, 3/8   · Watch for recovery over the weekend   · Bumex 2 mg BID. · If Cr > 3 tomorrow, dialysis and d/c if out pt dialysis set up at [address]:     Sepsis ([facility] [address] (3/1/2019)  -source?  -Urinalysis 3/1 unremarkable  -CTA chest 3/1 No evidence of acute pulmonary thromboembolism. Mild pulmonary edema and small bilateral pleural effusions  -On Zosyn, don't think the patient needs antibiotics. Also, in light of worsening renal function will stop 3/4  -Follow cultures, no growth so far  -Leukocytosis improving  -resolved     Copd w ashtma, for nebs/laba/ Pulmicort; change duoneb to prn     Type 2 diabetes mellitus with hyperglycemia  Lab Results   Component Value Date/Time    Glucose 152 (H) 03/10/2019 02:45 AM    Glucose (POC) 262 (H) 03/10/2019 11:39 AM    incrased basal dose lantus     Acute respiratory failure, unspecified whether with hypoxia or hypercapnia; see above copd/asthma     NSTEMI (non-ST elevated myocardial infarction) ([facility] [address] (3/2/2019)   -No acute  changes in ECG  -S/P cath 3/2: No significant CAD with Cx dominant,Normal LV size with moderate antrolateral and apical hypokinesis in setting of elevated biomarkere c/w Takotsubo cardiomyopthy. -Elevated Rt an Lt ventricular filling pressures; no cp Frederic Slack 3/6/2019 - 3/10/2019        Afib RVR  -Echo with EF 61-65%.  No RWMA  -Now in normal sinus  -Was on heparin/Cardizem, now off both  -on Coreg, continue; rate controled 3/6/2019 - 3/10/2019     Pulmonary edema with pleural effusion  -sec to volume overload  -Was on lasix, currently on hold  - no s/s of hf 3/6/2019 - 3/10/2019     Probable CHF (congestive heart failure)  -Not sure if the patient has CHF    Anemia  -likely chronic  -Follow work up   Lab Results   Component Value Date/Time    HGB 8.1 (L) 03/08/2019 01:35 AM        Benign essential hypertension (3/2/2019)  -Continue home meds   BP Readings from Last 1 Encounters:   03/10/19 157/79      COPD: nebs adjsuted 3/9/=  Pul wheezing, multifactorial, likely has a component of copd from age alone. On duonebs,, continue consider LABA/pulmicort     Diabetic peripheral neuropathy ([facility] [address] (3/2/2019)  -stable on home meds   Lab Results   Component Value Date/Time    Glucose 152 (H) 03/10/2019 02:45 AM    Glucose (POC) 262 (H) 03/10/2019 11:39 AM        DM type 2  -SSI  Lab Results   Component Value Date/Time    Glucose 152 (H) 03/10/2019 02:45 AM    Glucose (POC) 262 (H) 03/10/2019 11:39 AM        ASUNCION  -sec to likely dye load  -US renal No hydronephrosis. A 2.9 cm simple cyst in the right kidney  -Appreciate discussion with Nephrology, started on IV fluids  -Monitor for volume overload  Lab Results   Component Value Date/Time    Creatinine 2.82 (H) 03/10/2019 02:45 AM        Anxiety/Depression  -on Effexor, continue; no anxiety/depression noted on rounds 3/6/2019 - 3/10/2019     Morbid obesity  -Counseled  Body mass index is 40.52 kg/m².      Cardiac diet    Code status: Full  Prophylaxis: Lovenox    Care Plan discussed with: Patient/Family  Disposition: case:per Nephrologoy:   ·    · If Cr > 3 tomorrow, dialysis and d/c if out pt dialysis set up at [address] [facility]  Date Reviewed: 3/2/2019          Codes Class Noted POA    NSTEMI (non-ST elevated myocardial infarction) ([facility] [address] ICD-10-CM: I21.4  ICD-9-CM: 410.70  3/2/2019 Yes        CHF (congestive heart failure) ([facility] [address] ICD-10-CM: I50.9  ICD-9-CM: 428.0  3/2/2019 Yes        Benign essential hypertension ICD-10-CM: I10  ICD-9-CM: 401.1  3/2/2019 Yes        Diabetic peripheral neuropathy (HCC) ICD-10-CM: E11.42  ICD-9-CM: 250.60, 357.2  3/2/2019 Yes        Type II diabetes mellitus, uncontrolled ([facility] ICD-10-CM: E11.65  ICD-9-CM: 250.02  3/2/2019 Yes        Hyperlipidemia ICD-10-CM: E78.5  ICD-9-CM: 272.4  3/2/2019 Yes        * (Principal) Sepsis ([facility] ICD-10-CM: A41.9  ICD-9-CM: 038.9, 995.91  3/1/2019 Yes                Review of Systems:   Cough, wheezing, no sob, no n/v/d/f/chills, overall feel stable     Vital Signs:    Last 24hrs VS reviewed since prior progress note. Most recent are:  Visit Vitals  /79   Pulse 95   Temp 98.4 °F (36.9 °C)   Resp 20   Ht 5' 5\" (1.651 m)   Wt 110.5 kg (243 lb 8 oz)   SpO2 94%   BMI 40.52 kg/m²         Intake/Output Summary (Last 24 hours) at 3/10/2019 1407  Last data filed at 3/10/2019 1251  Gross per 24 hour   Intake 60 ml   Output 300 ml   Net -240 ml        Physical Examination:             Constitutional:  No acute distress, cooperative, pleasant    ENT:  Oral mucous moist, oropharynx benign. Neck supple,    Resp:  CTA bilaterally. No rhonchi/rales. No accessory muscle use   CV:  Regular rhythm, normal rate, no murmurs, gallops, rubs    GI:  Soft, non distended, non tender. normoactive bowel sounds, no hepatosplenomegaly     Musculoskeletal:  No edema, warm, 2+ pulses throughout    Neurologic:  Moves all extremities.   AAOx3, CN II-XII reviewed     Skin:  Good turgor, no rashes or ulcers       Data Review:    Review and/or order of clinical lab test      Labs:     Recent Labs     03/08/19  0135   WBC 14.2*   HGB 8.1*   HCT 26.0*        Recent Labs     03/10/19  0245 03/09/19  0450 03/08/19  0135    136 133*   K 3.7 3.8 3.7   CL 99 100 95*   CO2 31 26 25   BUN 14 10 18   CREA 2.82* 2.30* 3.72*   * 138* 166*   CA 9.5 9.3 9.0   PHOS 3.1 2.6 3.6     Recent Labs     03/10/19  0245 03/09/19  0450 03/08/19  0135   ALB 2.3* 2.2* 2.1*     No results for input(s): INR, PTP, APTT in the last 72 hours. No lab exists for component: INREXT, INREXT   No results for input(s): FE, TIBC, PSAT, FERR in the last 72 hours. No results found for: FOL, RBCF   No results for input(s): PH, PCO2, PO2 in the last 72 hours. No results for input(s): CPK, CKNDX, TROIQ in the last 72 hours.     No lab exists for component: CPKMB  No results found for: CHOL, CHOLX, CHLST, CHOLV, HDL, LDL, LDLC, DLDLP, TGLX, TRIGL, TRIGP, CHHD, CHHDX  Lab Results   Component Value Date/Time    Glucose (POC) 262 (H) 03/10/2019 11:39 AM    Glucose (POC) 154 (H) 03/10/2019 06:41 AM    Glucose (POC) 228 (H) 03/09/2019 09:39 PM    Glucose (POC) 198 (H) 03/09/2019 04:14 PM    Glucose (POC) 192 (H) 03/09/2019 11:14 AM     Lab Results   Component Value Date/Time    Color YELLOW/STRAW 03/01/2019 01:09 PM    Appearance CLEAR 03/01/2019 01:09 PM    Specific gravity 1.023 03/01/2019 01:09 PM    pH (UA) 5.0 03/01/2019 01:09 PM    Protein 30 (A) 03/01/2019 01:09 PM    Glucose 100 (A) 03/01/2019 01:09 PM    Ketone 15 (A) 03/01/2019 01:09 PM    Bilirubin NEGATIVE  03/01/2019 01:09 PM    Urobilinogen 1.0 03/01/2019 01:09 PM    Nitrites NEGATIVE  03/01/2019 01:09 PM    Leukocyte Esterase NEGATIVE  03/01/2019 01:09 PM    Epithelial cells FEW 03/01/2019 01:09 PM    Bacteria NEGATIVE  03/01/2019 01:09 PM    WBC 0-4 03/01/2019 01:09 PM    RBC 0-5 03/01/2019 01:09 PM         Medications Reviewed:     Current Facility-Administered Medications   Medication Dose Route Frequency    arformoterol (BROVANA) neb solution 15 mcg  15 mcg Nebulization BID RT    And    budesonide (PULMICORT) 500 mcg/2 ml nebulizer suspension  500 mcg Nebulization BID RT    benzonatate (TESSALON) capsule 100 mg  100 mg Oral TID PRN    bumetanide (BUMEX) tablet 2 mg  2 mg Oral BID    insulin glargine (LANTUS) injection 12 Units  12 Units SubCUTAneous QHS    guaiFENesin (ROBITUSSIN) 100 mg/5 mL oral liquid 200 mg  200 mg Oral Q4H PRN    epoetin benigno-epbx (RETACRIT) 12,000 Units combo injection  12,000 Units SubCUTAneous Q MON, WED & FRI    insulin lispro (HUMALOG) injection   SubCUTAneous AC&HS    heparin (porcine) injection 5,000 Units  5,000 Units SubCUTAneous Q8H    heparin (porcine) 1,000 unit/mL injection 4,300 Units  4,300 Units InterCATHeter PRN    albuterol-ipratropium (DUO-NEB) 2.5 MG-0.5 MG/3 ML  3 mL Nebulization TID RT    albuterol (PROVENTIL VENTOLIN) nebulizer solution 2.5 mg  2.5 mg Nebulization Q4H PRN    aspirin chewable tablet 81 mg  81 mg Oral DAILY    sodium chloride (NS) flush 5-40 mL  5-40 mL IntraVENous PRN    carvedilol (COREG) tablet 6.25 mg  6.25 mg Oral BID WITH MEALS    gabapentin (NEURONTIN) capsule 300 mg  300 mg Oral TID PRN    LORazepam (ATIVAN) tablet 0.5 mg  0.5 mg Oral Q8H PRN    oxybutynin chloride XL (DITROPAN XL) tablet 15 mg  15 mg Oral DAILY    therapeutic multivitamin (THERAGRAN) tablet 1 Tab  1 Tab Oral DAILY    traMADol (ULTRAM) tablet 50 mg  50 mg Oral Q8H PRN    venlafaxine (EFFEXOR) tablet 37.5 mg  37.5 mg Oral BID    sodium chloride (NS) flush 5-40 mL  5-40 mL IntraVENous Q8H    sodium chloride (NS) flush 5-40 mL  5-40 mL IntraVENous PRN    ondansetron (ZOFRAN) injection 4 mg  4 mg IntraVENous Q4H PRN    bisacodyl (DULCOLAX) tablet 5 mg  5 mg Oral DAILY PRN    lactobac ac& pc-s.therm-b.anim (JUVE Q/RISAQUAD)  1 Cap Oral DAILY    glucose chewable tablet 16 g  4 Tab Oral PRN    dextrose (D50W) injection syrg 12.5-25 g  12.5-25 g IntraVENous PRN    glucagon (GLUCAGEN) injection 1 mg  1 mg IntraMUSCular PRN    morphine injection 4 mg  4 mg IntraVENous Q2H PRN     ______________________________________________________________________  EXPECTED LENGTH OF STAY: 5d 4h  ACTUAL LENGTH OF STAY:          9                 Rachel Bhagat MD

## 2019-03-10 NOTE — PROGRESS NOTES
Bedside shift change report given to Joseph Merritt 86 (oncoming nurse) by Michael Landon RN (offgoing nurse). Report included the following information SBAR, Kardex, Intake/Output, MAR and Recent Results.

## 2019-03-10 NOTE — PROGRESS NOTES
[facility]   [address] [facility], [address] [place] [address], [facility]  Phone: [phone]   [phone]       Nephrology Progress Note  Vernon Asencio     1940     [id number]  Date of Admission : 3/1/2019  03/10/19    CC:  Follow up for ASUNCION       Assessment and Plan   ASUNCION on CKD :  · Multifactorial.  contrast Induced Nephropathy w/ ATN   · S/p 1st HD 3/5, 3/6, 3/8   · Watch for recovery over the weekend   · Bumex 2 mg BID. 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     Current Medications:   Current Facility-Administered Medications   Medication Dose Route Frequency    arformoterol (BROVANA) neb solution 15 mcg  15 mcg Nebulization BID RT    And    budesonide (PULMICORT) 500 mcg/2 ml nebulizer suspension  500 mcg Nebulization BID RT    benzonatate (TESSALON) capsule 100 mg  100 mg Oral TID PRN    bumetanide (BUMEX) tablet 2 mg  2 mg Oral BID    insulin glargine (LANTUS) injection 12 Units  12 Units SubCUTAneous QHS    guaiFENesin (ROBITUSSIN) 100 mg/5 mL oral liquid 200 mg  200 mg Oral Q4H PRN    epoetin benigno-epbx (RETACRIT) 12,000 Units combo injection  12,000 Units SubCUTAneous Q MON, WED & FRI    insulin lispro (HUMALOG) injection   SubCUTAneous AC&HS    heparin (porcine) injection 5,000 Units  5,000 Units SubCUTAneous Q8H    heparin (porcine) 1,000 unit/mL injection 4,300 Units  4,300 Units InterCATHeter PRN    albuterol-ipratropium (DUO-NEB) 2.5 MG-0.5 MG/3 ML  3 mL Nebulization TID RT    albuterol (PROVENTIL VENTOLIN) nebulizer solution 2.5 mg  2.5 mg Nebulization Q4H PRN    aspirin chewable tablet 81 mg  81 mg Oral DAILY    sodium chloride (NS) flush 5-40 mL  5-40 mL IntraVENous PRN    carvedilol (COREG) tablet 6.25 mg  6.25 mg Oral BID WITH MEALS    gabapentin (NEURONTIN) capsule 300 mg  300 mg Oral TID PRN    LORazepam (ATIVAN) tablet 0.5 mg  0.5 mg Oral Q8H PRN    oxybutynin chloride XL (DITROPAN XL) tablet 15 mg  15 mg Oral DAILY    therapeutic multivitamin (THERAGRAN) tablet 1 Tab  1 Tab Oral DAILY    traMADol (ULTRAM) tablet 50 mg  50 mg Oral Q8H PRN    venlafaxine (EFFEXOR) tablet 37.5 mg  37.5 mg Oral BID    sodium chloride (NS) flush 5-40 mL  5-40 mL IntraVENous Q8H    sodium chloride (NS) flush 5-40 mL  5-40 mL IntraVENous PRN    ondansetron (ZOFRAN) injection 4 mg  4 mg IntraVENous Q4H PRN    bisacodyl (DULCOLAX) tablet 5 mg  5 mg Oral DAILY PRN    lactobac ac& pc-s.therm-b.anim (JUVE Q/RISAQUAD)  1 Cap Oral DAILY    glucose chewable tablet 16 g  4 Tab Oral PRN    dextrose (D50W) injection syrg 12.5-25 g  12.5-25 g IntraVENous PRN    glucagon (GLUCAGEN) injection 1 mg  1 mg IntraMUSCular PRN    morphine injection 4 mg  4 mg IntraVENous Q2H PRN      Allergies   Allergen Reactions    Tylenol [Acetaminophen] Other (comments)     Pt told by MD not to take, pt unsure why       Objective:  Vitals:    Vitals:    03/09/19 2318 03/10/19 0645 03/10/19 0646 03/10/19 0735   BP: 152/81  170/86 172/88   Pulse: 88   94   Resp: 18   16   Temp: 98.2 °F (36.8 °C)   98.5 °F (36.9 °C)   TempSrc:       SpO2: 96%   95%   Weight:  110.5 kg (243 lb 8 oz)     Height:         Intake and Output:  No intake/output data recorded.   03/08 1901 - 03/10 0700  In: -   Out: 1700 [Urine:700]    Physical Examination:    GEN:  NAD  NECK:  Supple, no thyromegaly  RESP: CTA  b/l, no  wheezing,   CVS: RRR,S1,S2   ABDO:  soft , non tender, No mass  NEURO: non focal, normal speech  EXT: Edema +nt     Right ij permacath +  []    High complexity decision making was performed  []    Patient is at high-risk of decompensation with multiple organ involvement    Lab Data Personally Reviewed: I have reviewed all the pertinent labs, microbiology data and radiology studies during assessment.     Recent Labs     03/10/19  0245 03/09/19  0450 03/08/19  0135 03/07/19  1000    136 133* 132*  132*   K 3.7 3.8 3.7 3.9  3.9   CL 99 100 95* 96*  96*   CO2 31 26 25 30  28   * 138* 166* 238*  235*   BUN 14 10 18 13  13   CREA 2.82* 2.30* 3.72* 2.98*  2.97*   CA 9.5 9.3 9.0 8.8  8.7   PHOS 3.1 2.6 3.6 3.2   ALB 2.3* 2.2* 2.1* 2.0*     Recent Labs     03/08/19  0135   WBC 14.2*   HGB 8.1*   HCT 26.0*        No results found for: SDES  Lab Results   Component Value Date/Time    Culture result: NO GROWTH 5 DAYS 03/01/2019 08:22 AM     Recent Results (from the past 24 hour(s))   GLUCOSE, POC    Collection Time: 03/09/19 11:14 AM   Result Value Ref Range    Glucose (POC) 192 (H) 65 - 100 mg/dL    Performed by LISS Scott 119, POC    Collection Time: 03/09/19  4:14 PM   Result Value Ref Range    Glucose (POC) 198 (H) 65 - 100 mg/dL    Performed by LISS Scott 119, POC    Collection Time: 03/09/19  9:39 PM   Result Value Ref Range    Glucose (POC) 228 (H) 65 - 100 mg/dL    Performed by Derry Lefort    RENAL FUNCTION PANEL    Collection Time: 03/10/19  2:45 AM   Result Value Ref Range    Sodium 137 136 - 145 mmol/L    Potassium 3.7 3.5 - 5.1 mmol/L    Chloride 99 97 - 108 mmol/L    CO2 31 21 - 32 mmol/L    Anion gap 7 5 - 15 mmol/L    Glucose 152 (H) 65 - 100 mg/dL    BUN 14 6 - 20 MG/DL    Creatinine 2.82 (H) 0.55 - 1.02 MG/DL    BUN/Creatinine ratio 5 (L) 12 - 20      GFR est AA 20 (L) >60 ml/min/1.73m2    GFR est non-AA 16 (L) >60 ml/min/1.73m2    Calcium 9.5 8.5 - 10.1 MG/DL    Phosphorus 3.1 2.6 - 4.7 MG/DL    Albumin 2.3 (L) 3.5 - 5.0 g/dL   GLUCOSE, POC    Collection Time: 03/10/19  6:41 AM   Result Value Ref Range    Glucose (POC) 154 (H) 65 - 100 mg/dL    Performed by Bertram Darling I have reviewed the flowsheets. Chart and Pertinent Notes have been reviewed. No change in PMH ,family and social history from Consult note.       Lindsay Swartz MD

## 2019-03-10 NOTE — PROGRESS NOTES
1241 Report received from offgoing nurse  EOS PENDING  Bedside and Verbal shift change report given to oncoming nurse. Report included the following information SBAR, Kardex and Intake/Output.

## 2019-03-11 VITALS
HEART RATE: 90 BPM | BODY MASS INDEX: 41.07 KG/M2 | OXYGEN SATURATION: 96 % | HEIGHT: 65 IN | RESPIRATION RATE: 16 BRPM | DIASTOLIC BLOOD PRESSURE: 81 MMHG | TEMPERATURE: 98.1 F | WEIGHT: 246.47 LBS | SYSTOLIC BLOOD PRESSURE: 181 MMHG

## 2019-03-11 PROBLEM — I50.9 CHF (CONGESTIVE HEART FAILURE) (HCC): Status: RESOLVED | Noted: 2019-03-02 | Resolved: 2019-03-11

## 2019-03-11 PROBLEM — A41.9 SEPSIS (HCC): Status: RESOLVED | Noted: 2019-03-01 | Resolved: 2019-03-11

## 2019-03-11 LAB
ALBUMIN SERPL-MCNC: 2.3 G/DL (ref 3.5–5)
ANION GAP SERPL CALC-SCNC: 7 MMOL/L (ref 5–15)
BUN SERPL-MCNC: 15 MG/DL (ref 6–20)
BUN/CREAT SERPL: 5 (ref 12–20)
CALCIUM SERPL-MCNC: 9.9 MG/DL (ref 8.5–10.1)
CHLORIDE SERPL-SCNC: 98 MMOL/L (ref 97–108)
CO2 SERPL-SCNC: 31 MMOL/L (ref 21–32)
CREAT SERPL-MCNC: 2.74 MG/DL (ref 0.55–1.02)
GLUCOSE BLD STRIP.AUTO-MCNC: 162 MG/DL (ref 65–100)
GLUCOSE BLD STRIP.AUTO-MCNC: 261 MG/DL (ref 65–100)
GLUCOSE SERPL-MCNC: 155 MG/DL (ref 65–100)
PHOSPHATE SERPL-MCNC: 3.4 MG/DL (ref 2.6–4.7)
POTASSIUM SERPL-SCNC: 3.4 MMOL/L (ref 3.5–5.1)
SERVICE CMNT-IMP: ABNORMAL
SERVICE CMNT-IMP: ABNORMAL
SODIUM SERPL-SCNC: 136 MMOL/L (ref 136–145)

## 2019-03-11 PROCEDURE — 80069 RENAL FUNCTION PANEL: CPT

## 2019-03-11 PROCEDURE — 94760 N-INVAS EAR/PLS OXIMETRY 1: CPT

## 2019-03-11 PROCEDURE — 77010033678 HC OXYGEN DAILY

## 2019-03-11 PROCEDURE — 36415 COLL VENOUS BLD VENIPUNCTURE: CPT

## 2019-03-11 PROCEDURE — 74011250637 HC RX REV CODE- 250/637: Performed by: INTERNAL MEDICINE

## 2019-03-11 PROCEDURE — 74011250636 HC RX REV CODE- 250/636: Performed by: INTERNAL MEDICINE

## 2019-03-11 PROCEDURE — 74011000250 HC RX REV CODE- 250: Performed by: HOSPITALIST

## 2019-03-11 PROCEDURE — 94640 AIRWAY INHALATION TREATMENT: CPT

## 2019-03-11 PROCEDURE — 74011000250 HC RX REV CODE- 250: Performed by: INTERNAL MEDICINE

## 2019-03-11 PROCEDURE — 82962 GLUCOSE BLOOD TEST: CPT

## 2019-03-11 RX ORDER — ALBUTEROL SULFATE 0.83 MG/ML
2.5 SOLUTION RESPIRATORY (INHALATION)
Qty: 60 EACH | Refills: 0 | Status: SHIPPED | OUTPATIENT
Start: 2019-03-11

## 2019-03-11 RX ORDER — INSULIN PUMP SYRINGE, 3 ML
EACH MISCELLANEOUS
Qty: 1 KIT | Refills: 12 | Status: SHIPPED | OUTPATIENT
Start: 2019-03-11 | End: 2019-03-18

## 2019-03-11 RX ORDER — BUDESONIDE 0.5 MG/2ML
500 INHALANT ORAL 2 TIMES DAILY
Qty: 60 EACH | Refills: 1 | Status: SHIPPED | OUTPATIENT
Start: 2019-03-11

## 2019-03-11 RX ORDER — ROSUVASTATIN CALCIUM 20 MG/1
20 TABLET, COATED ORAL
Qty: 30 TAB | Refills: 0 | Status: SHIPPED | OUTPATIENT
Start: 2019-03-11

## 2019-03-11 RX ORDER — CARVEDILOL 6.25 MG/1
6.25 TABLET ORAL 2 TIMES DAILY WITH MEALS
Qty: 60 TAB | Refills: 0 | Status: SHIPPED | OUTPATIENT
Start: 2019-03-11

## 2019-03-11 RX ORDER — ARFORMOTEROL TARTRATE 15 UG/2ML
15 SOLUTION RESPIRATORY (INHALATION) 2 TIMES DAILY
Qty: 60 VIAL | Refills: 0 | Status: ON HOLD | OUTPATIENT
Start: 2019-03-11 | End: 2020-10-13

## 2019-03-11 RX ORDER — INSULIN GLARGINE 100 [IU]/ML
INJECTION, SOLUTION SUBCUTANEOUS
Qty: 1 ADJUSTABLE DOSE PRE-FILLED PEN SYRINGE | Refills: 1 | Status: ON HOLD | OUTPATIENT
Start: 2019-03-11 | End: 2020-10-13

## 2019-03-11 RX ORDER — PEN NEEDLE, DIABETIC 31 GX5/16"
NEEDLE, DISPOSABLE MISCELLANEOUS
Qty: 1 EACH | Refills: 1 | Status: SHIPPED | OUTPATIENT
Start: 2019-03-11 | End: 2019-03-18

## 2019-03-11 RX ORDER — BUMETANIDE 2 MG/1
2 TABLET ORAL 2 TIMES DAILY
Qty: 60 TAB | Refills: 0 | Status: ON HOLD | OUTPATIENT
Start: 2019-03-11 | End: 2019-03-27 | Stop reason: SDUPTHER

## 2019-03-11 RX ORDER — BISACODYL 5 MG
5 TABLET, DELAYED RELEASE (ENTERIC COATED) ORAL
Qty: 10 TAB | Refills: 0 | Status: SHIPPED | OUTPATIENT
Start: 2019-03-11

## 2019-03-11 RX ADMIN — HEPARIN SODIUM 5000 UNITS: 5000 INJECTION INTRAVENOUS; SUBCUTANEOUS at 07:25

## 2019-03-11 RX ADMIN — Medication 10 ML: at 07:26

## 2019-03-11 RX ADMIN — Medication 1 CAPSULE: at 08:31

## 2019-03-11 RX ADMIN — ASPIRIN 81 MG CHEWABLE TABLET 81 MG: 81 TABLET CHEWABLE at 08:31

## 2019-03-11 RX ADMIN — BUDESONIDE 500 MCG: 0.5 INHALANT RESPIRATORY (INHALATION) at 07:54

## 2019-03-11 RX ADMIN — OXYBUTYNIN CHLORIDE 15 MG: 5 TABLET, EXTENDED RELEASE ORAL at 08:31

## 2019-03-11 RX ADMIN — THERA TABS 1 TABLET: TAB at 08:31

## 2019-03-11 RX ADMIN — CARVEDILOL 6.25 MG: 6.25 TABLET, FILM COATED ORAL at 07:26

## 2019-03-11 RX ADMIN — BUDESONIDE 500 MCG: 0.5 INHALANT RESPIRATORY (INHALATION) at 09:00

## 2019-03-11 RX ADMIN — IPRATROPIUM BROMIDE AND ALBUTEROL SULFATE 3 ML: .5; 3 SOLUTION RESPIRATORY (INHALATION) at 07:54

## 2019-03-11 RX ADMIN — VENLAFAXINE 37.5 MG: 37.5 TABLET ORAL at 08:31

## 2019-03-11 RX ADMIN — BUMETANIDE 2 MG: 1 TABLET ORAL at 08:31

## 2019-03-11 NOTE — PROGRESS NOTES
Received call back from PCP office. Hospital follow-up PCP transitional care appointment has been scheduled with Dr. Yariel Mckeon for Thursday, 3/21/19 at 10:30 a.m. Pending patient discharge.   Dominik Cardoza, Care Management Specialist.

## 2019-03-11 NOTE — PROGRESS NOTES
[facility]   [address] [facility], [facility], [facility]  Phone: [phone]   [phone]       Nephrology Progress Note  Vernon Asencio     1940     [id number]  Date of Admission : 3/1/2019  03/11/19    CC:  Follow up for ASUNCION       Assessment and Plan   ASUNCION on CKD :  · Multifactorial.  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 Cr down today. Last HD on 3/8. Voiding but not recorded. No cp, sob, n/v/d at this time. Review of Systems: A comprehensive review of systems was negative.     Current Medications:   Current Facility-Administered Medications   Medication Dose Route Frequency    arformoterol (BROVANA) neb solution 15 mcg  15 mcg Nebulization BID RT    And    budesonide (PULMICORT) 500 mcg/2 ml nebulizer suspension  500 mcg Nebulization BID RT    benzonatate (TESSALON) capsule 100 mg  100 mg Oral TID PRN    bumetanide (BUMEX) tablet 2 mg  2 mg Oral BID    insulin glargine (LANTUS) injection 12 Units  12 Units SubCUTAneous QHS    guaiFENesin (ROBITUSSIN) 100 mg/5 mL oral liquid 200 mg  200 mg Oral Q4H PRN    epoetin benigno-epbx (RETACRIT) 12,000 Units combo injection  12,000 Units SubCUTAneous Q MON, WED & FRI    insulin lispro (HUMALOG) injection   SubCUTAneous AC&HS    heparin (porcine) injection 5,000 Units  5,000 Units SubCUTAneous Q8H    heparin (porcine) 1,000 unit/mL injection 4,300 Units  4,300 Units InterCATHeter PRN    albuterol-ipratropium (DUO-NEB) 2.5 MG-0.5 MG/3 ML  3 mL Nebulization TID RT    albuterol (PROVENTIL VENTOLIN) nebulizer solution 2.5 mg  2.5 mg Nebulization Q4H PRN    aspirin chewable tablet 81 mg  81 mg Oral DAILY    sodium chloride (NS) flush 5-40 mL  5-40 mL IntraVENous PRN    carvedilol (COREG) tablet 6.25 mg  6.25 mg Oral BID WITH MEALS    gabapentin (NEURONTIN) capsule 300 mg  300 mg Oral TID PRN    LORazepam (ATIVAN) tablet 0.5 mg  0.5 mg Oral Q8H PRN    oxybutynin chloride XL (DITROPAN XL) tablet 15 mg  15 mg Oral DAILY    therapeutic multivitamin (THERAGRAN) tablet 1 Tab  1 Tab Oral DAILY    traMADol (ULTRAM) tablet 50 mg  50 mg Oral Q8H PRN    venlafaxine (EFFEXOR) tablet 37.5 mg  37.5 mg Oral BID    sodium chloride (NS) flush 5-40 mL  5-40 mL IntraVENous Q8H    sodium chloride (NS) flush 5-40 mL  5-40 mL IntraVENous PRN    ondansetron (ZOFRAN) injection 4 mg  4 mg IntraVENous Q4H PRN    bisacodyl (DULCOLAX) tablet 5 mg  5 mg Oral DAILY PRN    lactobac ac& pc-s.therm-b.anim (JUVE Q/RISAQUAD)  1 Cap Oral DAILY    glucose chewable tablet 16 g  4 Tab Oral PRN    dextrose (D50W) injection syrg 12.5-25 g  12.5-25 g IntraVENous PRN    glucagon (GLUCAGEN) injection 1 mg  1 mg IntraMUSCular PRN    morphine injection 4 mg  4 mg IntraVENous Q2H PRN      Allergies   Allergen Reactions    Tylenol [Acetaminophen] Other (comments)     Pt told by MD not to take, pt unsure why       Objective:  Vitals:    Vitals:    03/11/19 0549 03/11/19 0725 03/11/19 0754 03/11/19 0818   BP:  163/79  181/81   Pulse:  88  90   Resp:    16   Temp:    98.1 °F (36.7 °C)   TempSrc:       SpO2:   95% 96%   Weight: 111.8 kg (246 lb 7.6 oz)      Height:         Intake and Output:  No intake/output data recorded.   03/09 1901 - 03/11 0700  In: 61 [P.O.:60]  Out: -     Physical Examination:    GEN:  NAD  NECK:  Supple, no thyromegaly  RESP: CTA  b/l, no  wheezing,   CVS: RRR,S1,S2   ABDO:  soft , non tender, No mass  NEURO: non focal, normal speech  EXT: Edema +nt     Right ij permacath +  []    High complexity decision making was performed  []    Patient is at high-risk of decompensation with multiple organ involvement    Lab Data Personally Reviewed: I have reviewed all the pertinent labs, microbiology data and radiology studies during assessment. Recent Labs     03/11/19  0250 03/10/19  0245 03/09/19  0450    137 136   K 3.4* 3.7 3.8   CL 98 99 100   CO2 31 31 26   * 152* 138*   BUN 15 14 10   CREA 2.74* 2.82* 2.30*   CA 9.9 9.5 9.3   PHOS 3.4 3.1 2.6   ALB 2.3* 2.3* 2.2*     No results for input(s): WBC, HGB, HCT, PLT, HGBEXT, HCTEXT, PLTEXT, HGBEXT, HCTEXT, PLTEXT in the last 72 hours.   No results found for: [facility]  Lab Results   Component Value Date/Time    Culture result: NO GROWTH 5 DAYS 03/01/2019 08:22 AM     Recent Results (from the past 24 hour(s))   GLUCOSE, POC    Collection Time: 03/10/19 11:39 AM   Result Value Ref Range    Glucose (POC) 262 (H) 65 - 100 mg/dL    Performed by LISS Scott 119, POC    Collection Time: 03/10/19  4:27 PM   Result Value Ref Range    Glucose (POC) 166 (H) 65 - 100 mg/dL    Performed by LISS Scott 119, POC    Collection Time: 03/10/19 10:03 PM   Result Value Ref Range    Glucose (POC) 193 (H) 65 - 100 mg/dL    Performed by Connor Link    RENAL FUNCTION PANEL    Collection Time: 03/11/19  2:50 AM   Result Value Ref Range    Sodium 136 136 - 145 mmol/L    Potassium 3.4 (L) 3.5 - 5.1 mmol/L    Chloride 98 97 - 108 mmol/L    CO2 31 21 - 32 mmol/L    Anion gap 7 5 - 15 mmol/L    Glucose 155 (H) 65 - 100 mg/dL    BUN 15 6 - 20 MG/DL    Creatinine 2.74 (H) 0.55 - 1.02 MG/DL    BUN/Creatinine ratio 5 (L) 12 - 20      GFR est AA 20 (L) >60 ml/min/1.73m2    GFR est non-AA 17 (L) >60 ml/min/1.73m2    Calcium 9.9 8.5 - 10.1 MG/DL    Phosphorus 3.4 2.6 - 4.7 MG/DL    Albumin 2.3 (L) 3.5 - 5.0 g/dL   GLUCOSE, POC    Collection Time: 03/11/19  6:43 AM   Result Value Ref Range    Glucose (POC) 162 (H) 65 - 100 mg/dL    Performed by Ani Corporal   travel RN        I have reviewed the flowsheets. Chart and Pertinent Notes have been reviewed. No change in PMH ,family and social history from Consult note.       Joaquin Brown MD

## 2019-03-11 NOTE — DISCHARGE INSTRUCTIONS
Discharge Instructions       PATIENT ID: Fredi Davila  MRN: [id number]   YOB: 1940    DATE OF ADMISSION: 3/1/2019  7:52 AM    DATE OF DISCHARGE: 3/11/2019    PRIMARY CARE PROVIDER: Mariza Horvath MD     ATTENDING PHYSICIAN: Dhruv Dowell MD  DISCHARGING PROVIDER: Ashley Nice MD    To contact this individual call [phone] and ask the  to page. If unavailable ask to be transferred the Adult Hospitalist Department. DISCHARGE DIAGNOSES 3/11/2019 see dc note, acute renal failure,     CONSULTATIONS: IP CONSULT TO NEPHROLOGY  IP CONSULT TO NEPHROLOGY    PROCEDURES/SURGERIES: Procedure(s):  PERCUTANEOUS CORONARY INTERVENTION  Left And Right Heart Cath / Coronary Angiography    PENDING TEST RESULTS:   At the time of discharge the following test results are still pending: na    FOLLOW UP APPOINTMENTS:   Follow-up Information     Follow up With Specialties Details Why Contact Info    [address] [place] [address]  [phone]    Dr. Shruthi Ontiveros  On 3/15/2019 Cardiology follow up appointment on Friday 3/15/19 at 11:30 AM.  Dr. Chasity Hoffman is on vacation. Dr. Radha Luz will see you.   [facility]  [address] [place] [address], [facility] Gabe Acosta,[address]Th Floor Unit, [facility].  [phone]    Kerry Ramirez on [address]   Appointment scheduled for 3/13 at 3:45 PM  Dialysis will Monday, Wednesday and Friday at 4:30 PM [address]  [phone]      [address] on [address]    Moises Nails MD Nephrology In 1 week  [facility]  [phone]             ADDITIONAL CARE RECOMMENDATIONS: na    DIET: Diabetic Diet and Renal Diet   3  ACTIVITY: Activity as tolerated    WOUND CARE: na    EQUIPMENT needed: na      DISCHARGE MEDICATIONS:   See Medication Reconciliation Form    · It is important that you take the medication exactly as they are prescribed. · Keep your medication in the bottles provided by the pharmacist and keep a list of the medication names, dosages, and times to be taken in your wallet. · Do not take other medications without consulting your doctor. NOTIFY YOUR PHYSICIAN FOR ANY OF THE FOLLOWING:   Fever over 101 degrees for 24 hours. Chest pain, shortness of breath, fever, chills, nausea, vomiting, diarrhea, change in mentation, falling, weakness, bleeding. Severe pain or pain not relieved by medications. Or, any other signs or symptoms that you may have questions about.       DISPOSITION:    Home With:   OT  PT  HH  RN       SNF/Inpatient Rehab/LTAC   x Independent/assisted living    Hospice    Other:            Signed:   Wilma Greene MD  3/11/2019  9:12 AM

## 2019-03-11 NOTE — PROGRESS NOTES
Darshana Ratliff called this AM and advised patient has a chair at [facility], [address] [facility] [phone] at 3:45 on 3/13. Patient did not need HD today. Granddaughter is on way to hospital to take patient home. Discussed with granddaughter that [address] and therapy arranged through [facility]. Granddaughter advised patient may have Medicaid but need to check as this could help with transport to/from HD over next few weeks. Very supportive family. Patient did not require home oxygen. 'Placed consult to [facility]    Care Management Interventions  PCP Verified by CM: Yes(Patient verified PCP as Dr. Pauly Flowers)  Mode of Transport at Discharge:  Other (see comment)(Family will transport home upon dishcarge)  Transition of Care Consult (CM Consult): [address]: Yes  MyChart Signup: No  Discharge Durable Medical Equipment: No  Health Maintenance Reviewed: Yes  Physical Therapy Consult: No  Occupational Therapy Consult: No  Speech Therapy Consult: No  Current Support Network: Own Home, Lives Alone, Family Lives Nearby  Confirm Follow Up Transport: Family  Plan discussed with Pt/Family/Caregiver: Yes  Freedom of Choice Offered: Yes  [address] Provided?: No  Discharge Location  Discharge Placement: Home with home health

## 2019-03-11 NOTE — PROGRESS NOTES
Assumed care of pt this am. Pt is alert and oriented with no needs at this time. Pt on 2 liters NC but does not qualify for home o2 per care management. Pt to be discharged home. IR called to remove dialysis access. Access removed, no sign of bleeding. Pt and family have been given discharge information with no questions at this time. Pt is now ready for discharge.

## 2019-03-12 ENCOUNTER — TELEPHONE (OUTPATIENT)
Dept: CASE MANAGEMENT | Age: 79
End: 2019-03-12

## 2019-03-12 NOTE — TELEPHONE ENCOUNTER
[facility] was unable to schedule patient till end of this week. Spoke with Granddaughter, Daniela Huang, via phone who had no preference. Have sent to both [facility] and [facility] as these agencies contract with [employer]. Jose Townsend had a question on the insuline pin. Have telephoned PCP office which will give Jose Townsend a call about the insuline pin. Also notified [facility] [employer]  21 .

## 2019-03-18 ENCOUNTER — HOSPITAL ENCOUNTER (INPATIENT)
Age: 79
LOS: 9 days | Discharge: NURSING, REHAB OR LONG-TERM CARE | DRG: 186 | End: 2019-03-27
Attending: EMERGENCY MEDICINE | Admitting: INTERNAL MEDICINE
Payer: MEDICARE

## 2019-03-18 ENCOUNTER — APPOINTMENT (OUTPATIENT)
Dept: GENERAL RADIOLOGY | Age: 79
DRG: 186 | End: 2019-03-18
Attending: EMERGENCY MEDICINE
Payer: MEDICARE

## 2019-03-18 DIAGNOSIS — G93.41 ACUTE METABOLIC ENCEPHALOPATHY: ICD-10-CM

## 2019-03-18 DIAGNOSIS — J90 PLEURAL EFFUSION ON LEFT: ICD-10-CM

## 2019-03-18 DIAGNOSIS — I63.511 ACUTE RIGHT MCA STROKE (HCC): ICD-10-CM

## 2019-03-18 DIAGNOSIS — I50.9 ACUTE DECOMPENSATED HEART FAILURE (HCC): Primary | ICD-10-CM

## 2019-03-18 PROBLEM — R06.02 SOB (SHORTNESS OF BREATH): Status: ACTIVE | Noted: 2019-03-18

## 2019-03-18 LAB
ALBUMIN SERPL-MCNC: 2.5 G/DL (ref 3.5–5)
ALBUMIN/GLOB SERPL: 0.4 {RATIO} (ref 1.1–2.2)
ALP SERPL-CCNC: 121 U/L (ref 45–117)
ALT SERPL-CCNC: 20 U/L (ref 12–78)
ANION GAP SERPL CALC-SCNC: 9 MMOL/L (ref 5–15)
APPEARANCE UR: CLEAR
AST SERPL-CCNC: 35 U/L (ref 15–37)
BACTERIA URNS QL MICRO: NEGATIVE /HPF
BASOPHILS # BLD: 0.1 K/UL (ref 0–0.1)
BASOPHILS NFR BLD: 1 % (ref 0–1)
BILIRUB SERPL-MCNC: 0.6 MG/DL (ref 0.2–1)
BILIRUB UR QL: NEGATIVE
BNP SERPL-MCNC: 3925 PG/ML
BUN SERPL-MCNC: 33 MG/DL (ref 6–20)
BUN/CREAT SERPL: 13 (ref 12–20)
CALCIUM SERPL-MCNC: 9.4 MG/DL (ref 8.5–10.1)
CHLORIDE SERPL-SCNC: 78 MMOL/L (ref 97–108)
CO2 SERPL-SCNC: 39 MMOL/L (ref 21–32)
COLOR UR: ABNORMAL
COMMENT, HOLDF: NORMAL
CREAT SERPL-MCNC: 2.54 MG/DL (ref 0.55–1.02)
DIFFERENTIAL METHOD BLD: ABNORMAL
EOSINOPHIL # BLD: 0.1 K/UL (ref 0–0.4)
EOSINOPHIL NFR BLD: 0 % (ref 0–7)
EPITH CASTS URNS QL MICRO: ABNORMAL /LPF
ERYTHROCYTE [DISTWIDTH] IN BLOOD BY AUTOMATED COUNT: 14.5 % (ref 11.5–14.5)
GLOBULIN SER CALC-MCNC: 5.6 G/DL (ref 2–4)
GLUCOSE BLD STRIP.AUTO-MCNC: 296 MG/DL (ref 65–100)
GLUCOSE SERPL-MCNC: 374 MG/DL (ref 65–100)
GLUCOSE UR STRIP.AUTO-MCNC: 100 MG/DL
HCT VFR BLD AUTO: 32.6 % (ref 35–47)
HGB BLD-MCNC: 10.1 G/DL (ref 11.5–16)
HGB UR QL STRIP: NEGATIVE
HYALINE CASTS URNS QL MICRO: ABNORMAL /LPF (ref 0–5)
IMM GRANULOCYTES # BLD AUTO: 0.1 K/UL (ref 0–0.04)
IMM GRANULOCYTES NFR BLD AUTO: 1 % (ref 0–0.5)
KETONES UR QL STRIP.AUTO: NEGATIVE MG/DL
LEUKOCYTE ESTERASE UR QL STRIP.AUTO: ABNORMAL
LYMPHOCYTES # BLD: 1.7 K/UL (ref 0.8–3.5)
LYMPHOCYTES NFR BLD: 11 % (ref 12–49)
MCH RBC QN AUTO: 26.4 PG (ref 26–34)
MCHC RBC AUTO-ENTMCNC: 31 G/DL (ref 30–36.5)
MCV RBC AUTO: 85.3 FL (ref 80–99)
MONOCYTES # BLD: 1.6 K/UL (ref 0–1)
MONOCYTES NFR BLD: 10 % (ref 5–13)
NEUTS SEG # BLD: 11.7 K/UL (ref 1.8–8)
NEUTS SEG NFR BLD: 77 % (ref 32–75)
NITRITE UR QL STRIP.AUTO: NEGATIVE
NRBC # BLD: 0.02 K/UL (ref 0–0.01)
NRBC BLD-RTO: 0.1 PER 100 WBC
PH UR STRIP: 5.5 [PH] (ref 5–8)
PLATELET # BLD AUTO: 417 K/UL (ref 150–400)
PMV BLD AUTO: 10.9 FL (ref 8.9–12.9)
POTASSIUM SERPL-SCNC: 3 MMOL/L (ref 3.5–5.1)
PROT SERPL-MCNC: 8.1 G/DL (ref 6.4–8.2)
PROT UR STRIP-MCNC: 30 MG/DL
RBC # BLD AUTO: 3.82 M/UL (ref 3.8–5.2)
RBC #/AREA URNS HPF: ABNORMAL /HPF (ref 0–5)
SAMPLES BEING HELD,HOLD: NORMAL
SERVICE CMNT-IMP: ABNORMAL
SODIUM SERPL-SCNC: 126 MMOL/L (ref 136–145)
SP GR UR REFRACTOMETRY: 1.01 (ref 1–1.03)
TROPONIN I SERPL-MCNC: <0.05 NG/ML
UROBILINOGEN UR QL STRIP.AUTO: 0.2 EU/DL (ref 0.2–1)
WBC # BLD AUTO: 15.2 K/UL (ref 3.6–11)
WBC URNS QL MICRO: ABNORMAL /HPF (ref 0–4)

## 2019-03-18 PROCEDURE — 99285 EMERGENCY DEPT VISIT HI MDM: CPT

## 2019-03-18 PROCEDURE — 87633 RESP VIRUS 12-25 TARGETS: CPT

## 2019-03-18 PROCEDURE — 94761 N-INVAS EAR/PLS OXIMETRY MLT: CPT

## 2019-03-18 PROCEDURE — 83880 ASSAY OF NATRIURETIC PEPTIDE: CPT

## 2019-03-18 PROCEDURE — 94640 AIRWAY INHALATION TREATMENT: CPT

## 2019-03-18 PROCEDURE — 80053 COMPREHEN METABOLIC PANEL: CPT

## 2019-03-18 PROCEDURE — 85025 COMPLETE CBC W/AUTO DIFF WBC: CPT

## 2019-03-18 PROCEDURE — 82962 GLUCOSE BLOOD TEST: CPT

## 2019-03-18 PROCEDURE — 74011250636 HC RX REV CODE- 250/636: Performed by: INTERNAL MEDICINE

## 2019-03-18 PROCEDURE — 93005 ELECTROCARDIOGRAM TRACING: CPT

## 2019-03-18 PROCEDURE — 36415 COLL VENOUS BLD VENIPUNCTURE: CPT

## 2019-03-18 PROCEDURE — 74011636637 HC RX REV CODE- 636/637: Performed by: HOSPITALIST

## 2019-03-18 PROCEDURE — 71045 X-RAY EXAM CHEST 1 VIEW: CPT

## 2019-03-18 PROCEDURE — 84484 ASSAY OF TROPONIN QUANT: CPT

## 2019-03-18 PROCEDURE — 81001 URINALYSIS AUTO W/SCOPE: CPT

## 2019-03-18 PROCEDURE — 65660000000 HC RM CCU STEPDOWN

## 2019-03-18 PROCEDURE — 74011000250 HC RX REV CODE- 250: Performed by: EMERGENCY MEDICINE

## 2019-03-18 PROCEDURE — 77030029684 HC NEB SM VOL KT MONA -A

## 2019-03-18 PROCEDURE — 74011250637 HC RX REV CODE- 250/637: Performed by: INTERNAL MEDICINE

## 2019-03-18 RX ORDER — INSULIN GLARGINE 100 [IU]/ML
15 INJECTION, SOLUTION SUBCUTANEOUS DAILY
Status: DISCONTINUED | OUTPATIENT
Start: 2019-03-19 | End: 2019-03-27 | Stop reason: HOSPADM

## 2019-03-18 RX ORDER — MORPHINE SULFATE 2 MG/ML
2 INJECTION, SOLUTION INTRAMUSCULAR; INTRAVENOUS
Status: DISCONTINUED | OUTPATIENT
Start: 2019-03-18 | End: 2019-03-27 | Stop reason: HOSPADM

## 2019-03-18 RX ORDER — CALCITRIOL 0.25 UG/1
0.5 CAPSULE ORAL DAILY
Status: DISCONTINUED | OUTPATIENT
Start: 2019-03-19 | End: 2019-03-25

## 2019-03-18 RX ORDER — BENZONATATE 100 MG/1
100 CAPSULE ORAL
Status: ON HOLD | COMMUNITY
End: 2020-10-13

## 2019-03-18 RX ORDER — SODIUM CHLORIDE 0.9 % (FLUSH) 0.9 %
5-40 SYRINGE (ML) INJECTION EVERY 8 HOURS
Status: DISCONTINUED | OUTPATIENT
Start: 2019-03-18 | End: 2019-03-27 | Stop reason: HOSPADM

## 2019-03-18 RX ORDER — BUMETANIDE 0.25 MG/ML
2 INJECTION INTRAMUSCULAR; INTRAVENOUS 2 TIMES DAILY
Status: DISCONTINUED | OUTPATIENT
Start: 2019-03-18 | End: 2019-03-20

## 2019-03-18 RX ORDER — ASPIRIN 81 MG/1
81 TABLET ORAL DAILY
Status: DISCONTINUED | OUTPATIENT
Start: 2019-03-19 | End: 2019-03-24

## 2019-03-18 RX ORDER — INSULIN LISPRO 100 [IU]/ML
INJECTION, SOLUTION INTRAVENOUS; SUBCUTANEOUS
Status: DISCONTINUED | OUTPATIENT
Start: 2019-03-18 | End: 2019-03-27 | Stop reason: HOSPADM

## 2019-03-18 RX ORDER — ALBUTEROL SULFATE 0.83 MG/ML
2.5 SOLUTION RESPIRATORY (INHALATION)
Status: DISCONTINUED | OUTPATIENT
Start: 2019-03-18 | End: 2019-03-27 | Stop reason: HOSPADM

## 2019-03-18 RX ORDER — BISACODYL 5 MG
5 TABLET, DELAYED RELEASE (ENTERIC COATED) ORAL
Status: DISCONTINUED | OUTPATIENT
Start: 2019-03-18 | End: 2019-03-27 | Stop reason: HOSPADM

## 2019-03-18 RX ORDER — ROSUVASTATIN CALCIUM 10 MG/1
20 TABLET, COATED ORAL
Status: DISCONTINUED | OUTPATIENT
Start: 2019-03-18 | End: 2019-03-20

## 2019-03-18 RX ORDER — HEPARIN SODIUM 5000 [USP'U]/ML
5000 INJECTION, SOLUTION INTRAVENOUS; SUBCUTANEOUS EVERY 8 HOURS
Status: DISCONTINUED | OUTPATIENT
Start: 2019-03-18 | End: 2019-03-24 | Stop reason: ALTCHOICE

## 2019-03-18 RX ORDER — ASPIRIN 81 MG/1
81 TABLET ORAL DAILY
COMMUNITY

## 2019-03-18 RX ORDER — BUMETANIDE 0.25 MG/ML
2 INJECTION INTRAMUSCULAR; INTRAVENOUS
Status: COMPLETED | OUTPATIENT
Start: 2019-03-18 | End: 2019-03-18

## 2019-03-18 RX ORDER — VENLAFAXINE 37.5 MG/1
37.5 TABLET ORAL 2 TIMES DAILY
Status: DISCONTINUED | OUTPATIENT
Start: 2019-03-18 | End: 2019-03-27 | Stop reason: HOSPADM

## 2019-03-18 RX ORDER — DEXTROSE 50 % IN WATER (D50W) INTRAVENOUS SYRINGE
25-50 AS NEEDED
Status: DISCONTINUED | OUTPATIENT
Start: 2019-03-18 | End: 2019-03-27 | Stop reason: HOSPADM

## 2019-03-18 RX ORDER — CARVEDILOL 6.25 MG/1
6.25 TABLET ORAL 2 TIMES DAILY WITH MEALS
Status: DISCONTINUED | OUTPATIENT
Start: 2019-03-19 | End: 2019-03-24

## 2019-03-18 RX ORDER — INSULIN LISPRO 100 [IU]/ML
INJECTION, SOLUTION INTRAVENOUS; SUBCUTANEOUS
Status: DISCONTINUED | OUTPATIENT
Start: 2019-03-18 | End: 2019-03-18

## 2019-03-18 RX ORDER — ONDANSETRON 2 MG/ML
4 INJECTION INTRAMUSCULAR; INTRAVENOUS
Status: DISCONTINUED | OUTPATIENT
Start: 2019-03-18 | End: 2019-03-27 | Stop reason: HOSPADM

## 2019-03-18 RX ORDER — MAGNESIUM SULFATE 100 %
4 CRYSTALS MISCELLANEOUS AS NEEDED
Status: DISCONTINUED | OUTPATIENT
Start: 2019-03-18 | End: 2019-03-27 | Stop reason: HOSPADM

## 2019-03-18 RX ORDER — SODIUM CHLORIDE 0.9 % (FLUSH) 0.9 %
5-40 SYRINGE (ML) INJECTION AS NEEDED
Status: DISCONTINUED | OUTPATIENT
Start: 2019-03-18 | End: 2019-03-27 | Stop reason: HOSPADM

## 2019-03-18 RX ORDER — BENZONATATE 100 MG/1
100 CAPSULE ORAL
Status: DISCONTINUED | OUTPATIENT
Start: 2019-03-18 | End: 2019-03-27 | Stop reason: HOSPADM

## 2019-03-18 RX ADMIN — Medication 10 ML: at 22:25

## 2019-03-18 RX ADMIN — HEPARIN SODIUM 5000 UNITS: 5000 INJECTION INTRAVENOUS; SUBCUTANEOUS at 22:23

## 2019-03-18 RX ADMIN — VENLAFAXINE 37.5 MG: 37.5 TABLET ORAL at 22:23

## 2019-03-18 RX ADMIN — ALBUTEROL SULFATE 1 DOSE: 2.5 SOLUTION RESPIRATORY (INHALATION) at 14:51

## 2019-03-18 RX ADMIN — INSULIN LISPRO 3 UNITS: 100 INJECTION, SOLUTION INTRAVENOUS; SUBCUTANEOUS at 22:23

## 2019-03-18 RX ADMIN — ROSUVASTATIN CALCIUM 20 MG: 10 TABLET, FILM COATED ORAL at 22:23

## 2019-03-18 RX ADMIN — BUMETANIDE 2 MG: 0.25 INJECTION INTRAMUSCULAR; INTRAVENOUS at 14:49

## 2019-03-18 NOTE — H&P
History and Physical  Primary Care Provider: Clifton Arreola MD    Subjective:     Colin Hammans is a 78 y.o. female with pmh of newly diagnosed CKD s/p HD (had not needed HD on discharge) , recently diagnosed NSTEMI and Tokostubos cardiomyopathy (with recent LHC without evidence of CAD), ?COPD (had wheezing prior admission and started on inhalers) who presented to the ED initially for hyperglycemia and SOB. Per family (daughters) at bedside, patient lives alone and had been altered and more sleepy than usual since discharge from the hospital 03/11. Today she had high blood sugars at home > 500 which were noted by nursing. She also was hypoxic with saturations of 88 or so at home on room air. Subsequently 911 was called. Although she lives alone she has family at her side 24/7. They have noted intermittent altered mental status, which has not improved since discharge from the hospital. Deny any F/C. Has had some intermittnet wheezing for which she has been taking her grandchildren's nebulizer treatments without improvement. No abdominal pain, N/V/D/C. In the ED was hypoxic to 88-86% on RA and admitted for further workup. Review of Systems:    A comprehensive review of systems was negative except for that written in the History of Present Illness. Past Medical History:   Diagnosis Date    CAD (coronary artery disease)     Diabetes ([facility] [address]     Hypertension       Past Surgical History:   Procedure Laterality Date    HX KNEE REPLACEMENT Bilateral     HX ORTHOPAEDIC      IR INSERT TUNL CVC W/O PORT OVER 5 YR  3/5/2019     Prior to Admission medications    Medication Sig Start Date End Date Taking? Authorizing Provider   aspirin delayed-release 81 mg tablet Take 81 mg by mouth daily. Yes Provider, Historical   benzonatate (TESSALON) 100 mg capsule Take 100 mg by mouth three (3) times daily as needed for Cough.    Yes Provider, Historical   carvedilol (COREG) 6.25 mg tablet Take 1 Tab by mouth two (2) times daily (with meals). 3/11/19  Yes Kaiser Anderson MD   bumetanide (BUMEX) 2 mg tablet Take 1 Tab by mouth two (2) times a day. 3/11/19  Yes Kaiser Anderson MD   bisacodyl (DULCOLAX) 5 mg EC tablet Take 1 Tab by mouth daily as needed for Constipation. 3/11/19  Yes Kaiser Anderson MD   budesonide (PULMICORT) 0.5 mg/2 mL nbsp 2 mL by Nebulization route two (2) times a day. 3/11/19  Yes Kaiser Anderson MD   arformoterol (BROVANA) 15 mcg/2 mL nebu neb solution 2 mL by Nebulization route two (2) times a day. 3/11/19  Yes Kaiesr Anderson MD   albuterol (PROVENTIL VENTOLIN) 2.5 mg /3 mL (0.083 %) nebulizer solution 3 mL by Nebulization route every four (4) hours as needed for Wheezing. 3/11/19  Yes Kaiser Anderson MD   insulin glargine (LANTUS SOLOSTAR U-100 INSULIN) 100 unit/mL (3 mL) inpn 15 units daily  Subcutaneously 3/11/19  Yes Kaiser Anderson MD   rosuvastatin (CRESTOR) 20 mg tablet Take 1 Tab by mouth nightly. 3/11/19  Yes Kaiser Anderson MD   oxybutynin chloride XL (DITROPAN XL) 15 mg CR tablet Take 15 mg by mouth daily. Yes Provider, Historical   therapeutic multivitamin (THERAGRAN) tablet Take 1 Tab by mouth daily. Yes Provider, Historical   gabapentin (NEURONTIN) 300 mg capsule Take 300 mg by mouth three (3) times daily as needed. Yes Provider, Historical   venlafaxine (EFFEXOR) 37.5 mg tablet Take 37.5 mg by mouth two (2) times a day. Yes Provider, Historical   traMADol (ULTRAM) 50 mg tablet Take 1 Tab by mouth every eight (8) hours as needed for Pain. Max Daily Amount: 150 mg. 3/9/18  Yes James Delatorre PA   LORazepam (ATIVAN) 0.5 mg tablet Take 0.5 mg by mouth every eight (8) hours as needed for Anxiety. Yes Provider, Historical   calcitRIOL (ROCALTROL) 0.5 mcg capsule Take 0.5 mcg by mouth daily. Yes Provider, Historical     Allergies   Allergen Reactions    Tylenol [Acetaminophen] Other (comments)     Pt told by MD not to take, pt unsure why      No family history on file.      SOCIAL HISTORY:  Patient resides at Home. Patient ambulates with rolling walker or cane, has not gotten out of bed since prior discharge. Smoking history: never  Alcohol history: Denies         Objective:       Physical Exam:   Visit Vitals  /63 (BP 1 Location: Right arm, BP Patient Position: At rest)   Pulse 82   Temp 98.5 °F (36.9 °C)   Resp 27   Ht 5' 5\" (1.651 m)   Wt 99.8 kg (220 lb)   SpO2 98%   BMI 36.61 kg/m²     General appearance: alert, cooperative, no distress, appears stated age  Eyes: conjunctivae/corneas clear. PERRL, EOM's intact  Lungs: clear to auscultation bilaterally  Heart: regular rate and rhythm, S1, S2 normal, no murmur, click, rub or gallop  Abdomen: Obese, soft, non-tender. Bowel sounds normal. No masses,  no organomegaly  Extremities: edema 1-2+ bilaterally   Skin: Skin color, texture, turgor normal. No rashes or lesions  Neurologic: No focal deficits, arousable to pain, not able to follow commands or answer questions appropriately. ECG:  normal EKG, normal sinus rhythm, PACs    Data Review: All diagnostic labs and studies have been reviewed. Chest x-ray 03/18  IMPRESSION: Interval enlargement of a left pleural effusion, now moderate to  large in size. .    Assessment:     Active Problems:    SOB (shortness of breath) (3/18/2019)        Plan:     SOB - likely from fluid overload and large L pleural effusion, BNP elevated. - No signs of infection, send procalcitonin  - Hold oral diuretics, start IV bumex  - Consider IR drainage of pleural effusion tomorrow   - Consult cardiology, seen by Dr. Ysabel Celaya during last admission  - No need for repeat echo unless cardiology states otherwise. - Check Influenza     Takotsubo cardiomyopathy   - Diuresis, cardiology consult  - I and O, daily weight     Newly diagnosed CKD, Cr actually stable, and no acute need for HD as far as I can tell.  Did require HD during prior admission  - Diuresis as above  - Follow I and O   - Nephrology consult    Metabolic encephalopathy - ? Polypharmacy  - Hold all narcotics, benzos, and other altering medications: holding tramadol, ativan, gabapentin, oxybutynin    Depression - continue venlafaxine  H/o NSTEMI - trend troponin, continue ASA, Statin Recent cath last admission. Type 2 diabetes with hyperglycemia 8.3% - POCT glucose checks, long acting insulin, hypoglycemia protocol. DVT PPX - heparin   CODE STATUS: FULL, patient and family would benefit from palliative care consult, or ACP conversation. May have unrealistic expectations of recovery. FUNCTIONAL STATUS PRIOR TO HOSPITALIZATION (including history of recent falls):Prior to last hospitalization was walking with cane, driving. Since discharge has not been able to get up out of bed.      Signed By: Marcell Jaffe MD     March 18, 2019

## 2019-03-18 NOTE — ED TRIAGE NOTES
PT presented to the ER with EMS from home with complaints of SOB that began yesterday. PT with HX of COPD and MI 3 weeks ago. PT  for EMS, PT took diabetic meds and ate about an hour ago. EMS states o2 sats 78% RA and placed on 2L NC. RR shallow, grunting, and difficulty speaking in full sentences.

## 2019-03-18 NOTE — ED NOTES
PT with jerking consisting of entire body lasting less than a second multiple times a minute. PT states the jerking is new after cardiac surgery 3 weeks ago.

## 2019-03-18 NOTE — PROGRESS NOTES
Admission Medication Reconciliation:    Information obtained from: [facility] (granddaughter), RX Query, AVS from last visit    Significant PMH/Disease States:   Past Medical History:   Diagnosis Date    CAD (coronary artery disease)     Diabetes ([facility] [address]     Hypertension        Chief Complaint for this Admission:  SOB    Allergies:  Tylenol [acetaminophen]    Prior to Admission Medications:   Prior to Admission Medications   Prescriptions Last Dose Informant Patient Reported? Taking? LORazepam (ATIVAN) 0.5 mg tablet 3/18/2019 at Unknown time  Yes Yes   Sig: Take 0.5 mg by mouth every eight (8) hours as needed for Anxiety. albuterol (PROVENTIL VENTOLIN) 2.5 mg /3 mL (0.083 %) nebulizer solution 3/18/2019 at Unknown time  No Yes   Sig: 3 mL by Nebulization route every four (4) hours as needed for Wheezing. arformoterol (BROVANA) 15 mcg/2 mL nebu neb solution 3/18/2019 at Unknown time  No Yes   Si mL by Nebulization route two (2) times a day. aspirin delayed-release 81 mg tablet 3/18/2019 at Unknown time  Yes Yes   Sig: Take 81 mg by mouth daily. benzonatate (TESSALON) 100 mg capsule 3/18/2019 at Unknown time  Yes Yes   Sig: Take 100 mg by mouth three (3) times daily as needed for Cough. bisacodyl (DULCOLAX) 5 mg EC tablet   No Yes   Sig: Take 1 Tab by mouth daily as needed for Constipation. budesonide (PULMICORT) 0.5 mg/2 mL nbsp 3/18/2019 at Unknown time  No Yes   Si mL by Nebulization route two (2) times a day. bumetanide (BUMEX) 2 mg tablet 3/18/2019 at Unknown time  No Yes   Sig: Take 1 Tab by mouth two (2) times a day. calcitRIOL (ROCALTROL) 0.5 mcg capsule 3/18/2019 at Unknown time  Yes Yes   Sig: Take 0.5 mcg by mouth daily. carvedilol (COREG) 6.25 mg tablet 3/18/2019 at Unknown time  No Yes   Sig: Take 1 Tab by mouth two (2) times daily (with meals).    gabapentin (NEURONTIN) 300 mg capsule 3/18/2019 at Unknown time  Yes Yes   Sig: Take 300 mg by mouth three (3) times daily as needed. insulin glargine (LANTUS SOLOSTAR U-100 INSULIN) 100 unit/mL (3 mL) inpn 3/18/2019 at Unknown time  No Yes   Sig: 15 units daily  Subcutaneously   oxybutynin chloride XL (DITROPAN XL) 15 mg CR tablet 3/18/2019 at Unknown time  Yes Yes   Sig: Take 15 mg by mouth daily. rosuvastatin (CRESTOR) 20 mg tablet 3/17/2019 at Unknown time  No Yes   Sig: Take 1 Tab by mouth nightly. therapeutic multivitamin (THERAGRAN) tablet 3/18/2019 at Unknown time  Yes Yes   Sig: Take 1 Tab by mouth daily. traMADol (ULTRAM) 50 mg tablet   No Yes   Sig: Take 1 Tab by mouth every eight (8) hours as needed for Pain. Max Daily Amount: 150 mg.   venlafaxine (EFFEXOR) 37.5 mg tablet 3/18/2019 at Unknown time  Yes Yes   Sig: Take 37.5 mg by mouth two (2) times a day. Facility-Administered Medications: None         Comments/Recommendations: Called Francisca (grand-daughter) who essentially manages medications for patient. She reports updating medications as per AVS from last visit. Family members rotate providing care to patient, son told Jennifer Vazquez that patient has had all her medications today. No changes except to update administration times. Thank you for allowing me to participate in the care of your patient.     Neville MalloryD, RN #[id number]

## 2019-03-18 NOTE — ED PROVIDER NOTES
78 y.o. female with past medical history significant for Diabetes, Hypertension, and NSTEMI who presents via EMS from home with chief complaint of chest pain. Patient is a poor historian. Patient states onset yesterday of SOB and chest pain. Per EMS, en route to [facility] ED patient's blood sugar 423, had O2 sat 78% on RA and was placed on 2L NC which improved to 94%. Patient presents to [facility] ED today with persisting SOB and chest pain, and upon examination has O2 sat 95% on 2L NC. Patient reports constant chest pain that radiates \"all around her body\" that is aggravated with pleuritic movement. Patient reports accompanying productive cough with white sputum, as well as intermittent \"jerks\" that last a few seconds that onset after cardiac cath three weeks ago. Patient denies the use of supplemental O2 at baseline. Pt denies fever, chills, hemoptysis, congestion, abdominal pain, nausea, vomiting, diarrhea, difficulty with urination or dysuria. Old Chart Review: Patient was admitted to [facility] on 03/01/019 for sepsis of questionable origin, NSTEMI and underwent cardiac cath on 03/02/2019 showing no sign CAD, elevated right and left ventricular filling pressure, and takatsubos cardiomyopathy performed by cardiologist Dr. Michael Estrella, and performed ECHO showing EF 60-65%. There are no other acute medical concerns at this time. PCP: Rodolfo Velasquez MD    Note written by Iris Ji, as dictated by Leonardo Brock, DO 12:46 PM                   Past Medical History:   Diagnosis Date    CAD (coronary artery disease)     Diabetes ([facility] [address]     Hypertension        Past Surgical History:   Procedure Laterality Date    HX KNEE REPLACEMENT Bilateral     HX ORTHOPAEDIC      IR INSERT TUNL CVC W/O PORT OVER 5 YR  3/5/2019         No family history on file.     Social History     Socioeconomic History    Marital status:      Spouse name: Not on file    Number of children: Not on file    Years of education: Not on file    Highest education level: Not on file   Social Needs    Financial resource strain: Not on file    Food insecurity - worry: Not on file    Food insecurity - inability: Not on file    Transportation needs - medical: Not on file   Omnigy needs - non-medical: Not on file   Occupational History    Not on file   Tobacco Use    Smoking status: Never Smoker    Smokeless tobacco: Never Used   Substance and Sexual Activity    Alcohol use: No    Drug use: No    Sexual activity: Not on file   Other Topics Concern    Not on file   Social History Narrative    Not on file         ALLERGIES: Tylenol [acetaminophen]    Review of Systems   Constitutional: Negative for chills and fever. HENT: Negative for congestion. Respiratory: Positive for cough and shortness of breath. Cardiovascular: Positive for chest pain. Negative for leg swelling. Gastrointestinal: Negative for abdominal pain, diarrhea, nausea and vomiting. Genitourinary: Negative for difficulty urinating and dysuria. All other systems reviewed and are negative. Vitals:    03/18/19 1234   BP: (!) 169/120   Pulse: 95   Resp: (!) 38   Temp: 98 °F (36.7 °C)   SpO2: (!) 88%   Weight: 99.8 kg (220 lb)   Height: 5' 5\" (1.651 m)            Physical Exam   Nursing note and vitals reviewed. Constitutional: Pt is awake and alert. Pt appears well-developed and well-nourished. HENT:   Head: Normocephalic and atraumatic. Nose: Nose normal.   Mouth/Throat: Oropharynx is clear and moist. No oropharyngeal exudate. Eyes: Conjunctivae and extraocular motions are normal. Pupils are equal, round, and reactive to light. Right eye exhibits no discharge. Left eye exhibits no discharge. No scleral icterus. Neck: No tracheal deviation present. Supple neck. Cardiovascular: Normal rate, regular rhythm, normal heart sounds and intact distal pulses. Exam reveals no gallop and no friction rub. No murmur heard.   Pulmonary/Chest: Mild respiratory distress, diminished lung sounds. Pt  has no wheezes. Pt  has no rales. Abdominal: Soft. Pt  exhibits no distension and no mass. No tenderness. Pt  has no rebound and no guarding. Musculoskeletal:  Pt  exhibits no edema and no tenderness. Ext: Normal ROM in all four extremities; not tender to palpation; distal pulses are normal, no edema. Neurological:  Pt is alert. nonfocal neuro exam.  Skin: Skin is warm and dry. Pt  is not diaphoretic. Psychiatric:  Pt  has a normal mood and affect. Behavior is normal.   Note written by Iris Holly, as dictated by Daija Knox DO 12:46 PM       MDM  Number of Diagnoses or Management Options  Acute decompensated heart failure [facility]):   Pleural effusion on left:   Critical Care  Total time providing critical care: 30-74 minutes     30 minutes    Procedures  ED EKG interpretation:  Rhythm: normal sinus rhythm with PACs; and regular . Rate (approx.): 89 bpm; Axis: normal; ST/T wave: Nonspecific lateral ST/T wave changes. No STEMI. Note written by Iris Holly, as dictated by Daija Knox DO 1:33 PM         Hospitalist TigerTexlyla for Admission  2:16 PM    ED Room Number: ER22/22  Patient Name and age:  Jordi Mena 78 y.o.  female  Working Diagnosis:   1. Acute decompensated heart failure ([address]    2.  Pleural effusion on left      Readmission: yes  Isolation Requirements:  no  Recommended Level of Care:  telemetry  Code Status:  full    Diuretics ordered  D/w hospitalist  Likely fluid overload  Low pulse ox for EMS    Recent Results (from the past 12 hour(s))   EKG, 12 LEAD, INITIAL    Collection Time: 03/18/19 12:39 PM   Result Value Ref Range    Ventricular Rate 89 BPM    Atrial Rate 89 BPM    P-R Interval 168 ms    QRS Duration 78 ms    Q-T Interval 388 ms    QTC Calculation (Bezet) 472 ms    Calculated R Axis 46 degrees    Calculated T Axis 143 degrees    Diagnosis       Sinus rhythm with premature atrial complexes  ST & T wave abnormality, consider lateral ischemia  Prolonged QT  When compared with ECG of 02-MAR-2019 08:13,  Sinus rhythm has replaced Atrial flutter  T wave inversion now evident in Anterolateral leads     CBC WITH AUTOMATED DIFF    Collection Time: 03/18/19 12:49 PM   Result Value Ref Range    WBC 15.2 (H) 3.6 - 11.0 K/uL    RBC 3.82 3.80 - 5.20 M/uL    HGB 10.1 (L) 11.5 - 16.0 g/dL    HCT 32.6 (L) 35.0 - 47.0 %    MCV 85.3 80.0 - 99.0 FL    MCH 26.4 26.0 - 34.0 PG    MCHC 31.0 30.0 - 36.5 g/dL    RDW 14.5 11.5 - 14.5 %    PLATELET 343 (H) 990 - 400 K/uL    MPV 10.9 8.9 - 12.9 FL    NRBC 0.1 (H) 0  WBC    ABSOLUTE NRBC 0.02 (H) 0.00 - 0.01 K/uL    NEUTROPHILS 77 (H) 32 - 75 %    LYMPHOCYTES 11 (L) 12 - 49 %    MONOCYTES 10 5 - 13 %    EOSINOPHILS 0 0 - 7 %    BASOPHILS 1 0 - 1 %    IMMATURE GRANULOCYTES 1 (H) 0.0 - 0.5 %    ABS. NEUTROPHILS 11.7 (H) 1.8 - 8.0 K/UL    ABS. LYMPHOCYTES 1.7 0.8 - 3.5 K/UL    ABS. MONOCYTES 1.6 (H) 0.0 - 1.0 K/UL    ABS. EOSINOPHILS 0.1 0.0 - 0.4 K/UL    ABS. BASOPHILS 0.1 0.0 - 0.1 K/UL    ABS. IMM. GRANS. 0.1 (H) 0.00 - 0.04 K/UL    DF AUTOMATED     METABOLIC PANEL, COMPREHENSIVE    Collection Time: 03/18/19 12:49 PM   Result Value Ref Range    Sodium 126 (L) 136 - 145 mmol/L    Potassium 3.0 (L) 3.5 - 5.1 mmol/L    Chloride 78 (L) 97 - 108 mmol/L    CO2 39 (H) 21 - 32 mmol/L    Anion gap 9 5 - 15 mmol/L    Glucose 374 (H) 65 - 100 mg/dL    BUN 33 (H) 6 - 20 MG/DL    Creatinine 2.54 (H) 0.55 - 1.02 MG/DL    BUN/Creatinine ratio 13 12 - 20      GFR est AA 22 (L) >60 ml/min/1.73m2    GFR est non-AA 18 (L) >60 ml/min/1.73m2    Calcium 9.4 8.5 - 10.1 MG/DL    Bilirubin, total 0.6 0.2 - 1.0 MG/DL    ALT (SGPT) 20 12 - 78 U/L    AST (SGOT) 35 15 - 37 U/L    Alk.  phosphatase 121 (H) 45 - 117 U/L    Protein, total 8.1 6.4 - 8.2 g/dL    Albumin 2.5 (L) 3.5 - 5.0 g/dL    Globulin 5.6 (H) 2.0 - 4.0 g/dL    A-G Ratio 0.4 (L) 1.1 - 2.2     TROPONIN I    Collection Time: 03/18/19 12:49 PM Result Value Ref Range    Troponin-I, Qt. <0.05 <0.05 ng/mL   SAMPLES BEING HELD    Collection Time: 03/18/19 12:49 PM   Result Value Ref Range    SAMPLES BEING HELD 1RED 1BLU 1UA 1UC     COMMENT        Add-on orders for these samples will be processed based on acceptable specimen integrity and analyte stability, which may vary by analyte.

## 2019-03-18 NOTE — ED NOTES
1600: Assumed care of pt, report received from Xander Ambrose RN. Pt sleeping in stretcher on 2L, VSS    1630: Report attempted, no answer  1820: Patient left department for transportation to inpatient bed. Patient's VS at the time of transfer were /64, 96 on 2L, denies pain at this time, 98.3 orally, HR 84 sinus rhythm on the monitor. Patient was alert and oriented x 4 and denies further needs at time of transfer. Patient's family went home prior to transfer to floor. TRANSFER - OUT REPORT:    Verbal report given to David Cifuentes RN(name) on [facility]  being transferred to [facility](unit) for routine progression of care       Report consisted of patients Situation, Background, Assessment and   Recommendations(SBAR). Information from the following report(s) SBAR, Kardex, ED Summary, [facility] and Recent Results was reviewed with the receiving nurse. Opportunity for questions and clarification was provided.

## 2019-03-18 NOTE — ED NOTES
Bedside shift change report given to Lauro stewart (oncoming nurse) by Lelia Gallegos  (offgoing nurse). Report included the following information SBAR, ED Summary and MAR.

## 2019-03-19 ENCOUNTER — APPOINTMENT (OUTPATIENT)
Dept: ULTRASOUND IMAGING | Age: 79
DRG: 186 | End: 2019-03-19
Attending: HOSPITALIST
Payer: MEDICARE

## 2019-03-19 ENCOUNTER — APPOINTMENT (OUTPATIENT)
Dept: GENERAL RADIOLOGY | Age: 79
DRG: 186 | End: 2019-03-19
Attending: RADIOLOGY
Payer: MEDICARE

## 2019-03-19 LAB
ANION GAP SERPL CALC-SCNC: 6 MMOL/L (ref 5–15)
APPEARANCE FLD: ABNORMAL
ATRIAL RATE: 89 BPM
B PERT DNA SPEC QL NAA+PROBE: NOT DETECTED
BASOPHILS # BLD: 0.1 K/UL (ref 0–0.1)
BASOPHILS NFR BLD: 0 % (ref 0–1)
BUN SERPL-MCNC: 37 MG/DL (ref 6–20)
BUN/CREAT SERPL: 16 (ref 12–20)
C PNEUM DNA SPEC QL NAA+PROBE: NOT DETECTED
CALCIUM SERPL-MCNC: 9.3 MG/DL (ref 8.5–10.1)
CALCULATED R AXIS, ECG10: 46 DEGREES
CALCULATED T AXIS, ECG11: 143 DEGREES
CHLORIDE SERPL-SCNC: 82 MMOL/L (ref 97–108)
CO2 SERPL-SCNC: 41 MMOL/L (ref 21–32)
COLOR FLD: ABNORMAL
CREAT SERPL-MCNC: 2.29 MG/DL (ref 0.55–1.02)
DIAGNOSIS, 93000: NORMAL
DIFFERENTIAL METHOD BLD: ABNORMAL
EOSINOPHIL # BLD: 0.2 K/UL (ref 0–0.4)
EOSINOPHIL NFR BLD: 1 % (ref 0–7)
ERYTHROCYTE [DISTWIDTH] IN BLOOD BY AUTOMATED COUNT: 14.6 % (ref 11.5–14.5)
FLUAV H1 2009 PAND RNA SPEC QL NAA+PROBE: NOT DETECTED
FLUAV H1 RNA SPEC QL NAA+PROBE: NOT DETECTED
FLUAV H3 RNA SPEC QL NAA+PROBE: NOT DETECTED
FLUAV SUBTYP SPEC NAA+PROBE: NOT DETECTED
FLUBV RNA SPEC QL NAA+PROBE: NOT DETECTED
GLUCOSE BLD STRIP.AUTO-MCNC: 157 MG/DL (ref 65–100)
GLUCOSE BLD STRIP.AUTO-MCNC: 224 MG/DL (ref 65–100)
GLUCOSE BLD STRIP.AUTO-MCNC: 261 MG/DL (ref 65–100)
GLUCOSE BLD STRIP.AUTO-MCNC: 301 MG/DL (ref 65–100)
GLUCOSE SERPL-MCNC: 221 MG/DL (ref 65–100)
HADV DNA SPEC QL NAA+PROBE: NOT DETECTED
HCOV 229E RNA SPEC QL NAA+PROBE: NOT DETECTED
HCOV HKU1 RNA SPEC QL NAA+PROBE: NOT DETECTED
HCOV NL63 RNA SPEC QL NAA+PROBE: NOT DETECTED
HCOV OC43 RNA SPEC QL NAA+PROBE: NOT DETECTED
HCT VFR BLD AUTO: 31.3 % (ref 35–47)
HGB BLD-MCNC: 9.5 G/DL (ref 11.5–16)
HMPV RNA SPEC QL NAA+PROBE: NOT DETECTED
HPIV1 RNA SPEC QL NAA+PROBE: NOT DETECTED
HPIV2 RNA SPEC QL NAA+PROBE: NOT DETECTED
HPIV3 RNA SPEC QL NAA+PROBE: NOT DETECTED
HPIV4 RNA SPEC QL NAA+PROBE: NOT DETECTED
IMM GRANULOCYTES # BLD AUTO: 0.1 K/UL (ref 0–0.04)
IMM GRANULOCYTES NFR BLD AUTO: 1 % (ref 0–0.5)
LDH FLD L TO P-CCNC: 353 U/L
LDH SERPL L TO P-CCNC: 441 U/L (ref 81–246)
LYMPHOCYTES # BLD: 2 K/UL (ref 0.8–3.5)
LYMPHOCYTES NFR BLD: 14 % (ref 12–49)
LYMPHOCYTES NFR FLD: 45 %
M PNEUMO DNA SPEC QL NAA+PROBE: NOT DETECTED
MAGNESIUM SERPL-MCNC: 1.3 MG/DL (ref 1.6–2.4)
MCH RBC QN AUTO: 26 PG (ref 26–34)
MCHC RBC AUTO-ENTMCNC: 30.4 G/DL (ref 30–36.5)
MCV RBC AUTO: 85.5 FL (ref 80–99)
MESOTHL CELL NFR FLD: 14 %
MONOCYTES # BLD: 1.5 K/UL (ref 0–1)
MONOCYTES NFR BLD: 10 % (ref 5–13)
MONOS+MACROS NFR FLD: 37 %
NEUTROPHILS NFR FLD: 4 %
NEUTS SEG # BLD: 10.5 K/UL (ref 1.8–8)
NEUTS SEG NFR BLD: 74 % (ref 32–75)
NRBC # BLD: 0 K/UL (ref 0–0.01)
NRBC BLD-RTO: 0 PER 100 WBC
NUC CELL # FLD: 3540 /CU MM
P-R INTERVAL, ECG05: 168 MS
PHOSPHATE SERPL-MCNC: 4.4 MG/DL (ref 2.6–4.7)
PLATELET # BLD AUTO: 404 K/UL (ref 150–400)
PMV BLD AUTO: 10.5 FL (ref 8.9–12.9)
POTASSIUM SERPL-SCNC: 2.5 MMOL/L (ref 3.5–5.1)
PROT FLD-MCNC: 5.5 G/DL
PROT SERPL-MCNC: 7.9 G/DL (ref 6.4–8.2)
Q-T INTERVAL, ECG07: 388 MS
QRS DURATION, ECG06: 78 MS
QTC CALCULATION (BEZET), ECG08: 472 MS
RBC # BLD AUTO: 3.66 M/UL (ref 3.8–5.2)
RBC # FLD: >100 /CU MM
RSV RNA SPEC QL NAA+PROBE: NOT DETECTED
RV+EV RNA SPEC QL NAA+PROBE: NOT DETECTED
SERVICE CMNT-IMP: ABNORMAL
SODIUM SERPL-SCNC: 129 MMOL/L (ref 136–145)
SPECIMEN SOURCE FLD: ABNORMAL
SPECIMEN SOURCE FLD: NORMAL
SPECIMEN SOURCE FLD: NORMAL
TROPONIN I SERPL-MCNC: <0.05 NG/ML
VENTRICULAR RATE, ECG03: 89 BPM
WBC # BLD AUTO: 14.3 K/UL (ref 3.6–11)

## 2019-03-19 PROCEDURE — 0W9B3ZZ DRAINAGE OF LEFT PLEURAL CAVITY, PERCUTANEOUS APPROACH: ICD-10-PCS | Performed by: RADIOLOGY

## 2019-03-19 PROCEDURE — 74011000250 HC RX REV CODE- 250

## 2019-03-19 PROCEDURE — 77030038269 HC DRN EXT URIN PURWCK BARD -A

## 2019-03-19 PROCEDURE — 71045 X-RAY EXAM CHEST 1 VIEW: CPT

## 2019-03-19 PROCEDURE — 83735 ASSAY OF MAGNESIUM: CPT

## 2019-03-19 PROCEDURE — 32555 ASPIRATE PLEURA W/ IMAGING: CPT

## 2019-03-19 PROCEDURE — 36415 COLL VENOUS BLD VENIPUNCTURE: CPT

## 2019-03-19 PROCEDURE — 84155 ASSAY OF PROTEIN SERUM: CPT

## 2019-03-19 PROCEDURE — 83615 LACTATE (LD) (LDH) ENZYME: CPT

## 2019-03-19 PROCEDURE — 80048 BASIC METABOLIC PNL TOTAL CA: CPT

## 2019-03-19 PROCEDURE — 74011250636 HC RX REV CODE- 250/636

## 2019-03-19 PROCEDURE — 74011250636 HC RX REV CODE- 250/636: Performed by: INTERNAL MEDICINE

## 2019-03-19 PROCEDURE — 89050 BODY FLUID CELL COUNT: CPT

## 2019-03-19 PROCEDURE — 88112 CYTOPATH CELL ENHANCE TECH: CPT

## 2019-03-19 PROCEDURE — P9047 ALBUMIN (HUMAN), 25%, 50ML: HCPCS

## 2019-03-19 PROCEDURE — 74011250637 HC RX REV CODE- 250/637: Performed by: HOSPITALIST

## 2019-03-19 PROCEDURE — 87205 SMEAR GRAM STAIN: CPT

## 2019-03-19 PROCEDURE — 84484 ASSAY OF TROPONIN QUANT: CPT

## 2019-03-19 PROCEDURE — 74011250636 HC RX REV CODE- 250/636: Performed by: HOSPITALIST

## 2019-03-19 PROCEDURE — 85025 COMPLETE CBC W/AUTO DIFF WBC: CPT

## 2019-03-19 PROCEDURE — 74011636637 HC RX REV CODE- 636/637: Performed by: HOSPITALIST

## 2019-03-19 PROCEDURE — 82962 GLUCOSE BLOOD TEST: CPT

## 2019-03-19 PROCEDURE — 88305 TISSUE EXAM BY PATHOLOGIST: CPT

## 2019-03-19 PROCEDURE — 74011250637 HC RX REV CODE- 250/637: Performed by: INTERNAL MEDICINE

## 2019-03-19 PROCEDURE — 74011000250 HC RX REV CODE- 250: Performed by: INTERNAL MEDICINE

## 2019-03-19 PROCEDURE — 84157 ASSAY OF PROTEIN OTHER: CPT

## 2019-03-19 PROCEDURE — 84100 ASSAY OF PHOSPHORUS: CPT

## 2019-03-19 PROCEDURE — 74011636637 HC RX REV CODE- 636/637: Performed by: INTERNAL MEDICINE

## 2019-03-19 PROCEDURE — 65660000000 HC RM CCU STEPDOWN

## 2019-03-19 PROCEDURE — 77010033678 HC OXYGEN DAILY

## 2019-03-19 RX ORDER — LIDOCAINE HYDROCHLORIDE 10 MG/ML
INJECTION, SOLUTION EPIDURAL; INFILTRATION; INTRACAUDAL; PERINEURAL
Status: COMPLETED
Start: 2019-03-19 | End: 2019-03-19

## 2019-03-19 RX ORDER — POTASSIUM CHLORIDE 14.9 MG/ML
10 INJECTION INTRAVENOUS
Status: COMPLETED | OUTPATIENT
Start: 2019-03-19 | End: 2019-03-19

## 2019-03-19 RX ORDER — ALBUMIN HUMAN 250 G/1000ML
SOLUTION INTRAVENOUS
Status: COMPLETED
Start: 2019-03-19 | End: 2019-03-19

## 2019-03-19 RX ORDER — MAGNESIUM SULFATE HEPTAHYDRATE 40 MG/ML
2 INJECTION, SOLUTION INTRAVENOUS ONCE
Status: COMPLETED | OUTPATIENT
Start: 2019-03-19 | End: 2019-03-19

## 2019-03-19 RX ORDER — SODIUM BICARBONATE 42 MG/ML
2 INJECTION, SOLUTION INTRAVENOUS
Status: COMPLETED | OUTPATIENT
Start: 2019-03-19 | End: 2019-03-19

## 2019-03-19 RX ORDER — LIDOCAINE HYDROCHLORIDE 10 MG/ML
10 INJECTION, SOLUTION EPIDURAL; INFILTRATION; INTRACAUDAL; PERINEURAL ONCE
Status: COMPLETED | OUTPATIENT
Start: 2019-03-19 | End: 2019-03-19

## 2019-03-19 RX ORDER — SODIUM BICARBONATE 42 MG/ML
INJECTION, SOLUTION INTRAVENOUS
Status: COMPLETED
Start: 2019-03-19 | End: 2019-03-19

## 2019-03-19 RX ORDER — POTASSIUM CHLORIDE 750 MG/1
60 TABLET, FILM COATED, EXTENDED RELEASE ORAL
Status: COMPLETED | OUTPATIENT
Start: 2019-03-19 | End: 2019-03-19

## 2019-03-19 RX ORDER — ALBUMIN HUMAN 250 G/1000ML
25 SOLUTION INTRAVENOUS ONCE
Status: COMPLETED | OUTPATIENT
Start: 2019-03-19 | End: 2019-03-19

## 2019-03-19 RX ADMIN — POTASSIUM CHLORIDE 10 MEQ: 200 INJECTION, SOLUTION INTRAVENOUS at 10:31

## 2019-03-19 RX ADMIN — BUMETANIDE 2 MG: 0.25 INJECTION INTRAMUSCULAR; INTRAVENOUS at 17:23

## 2019-03-19 RX ADMIN — VENLAFAXINE 37.5 MG: 37.5 TABLET ORAL at 08:54

## 2019-03-19 RX ADMIN — INSULIN GLARGINE 15 UNITS: 100 INJECTION, SOLUTION SUBCUTANEOUS at 10:35

## 2019-03-19 RX ADMIN — LIDOCAINE HYDROCHLORIDE 10 ML: 10 INJECTION, SOLUTION EPIDURAL; INFILTRATION; INTRACAUDAL; PERINEURAL at 13:03

## 2019-03-19 RX ADMIN — SODIUM BICARBONATE 2 ML: 42 INJECTION, SOLUTION INTRAVENOUS at 13:03

## 2019-03-19 RX ADMIN — SODIUM CHLORIDE 500 ML: 900 INJECTION, SOLUTION INTRAVENOUS at 13:30

## 2019-03-19 RX ADMIN — ASPIRIN 81 MG: 81 TABLET ORAL at 08:54

## 2019-03-19 RX ADMIN — CARVEDILOL 6.25 MG: 6.25 TABLET, FILM COATED ORAL at 08:54

## 2019-03-19 RX ADMIN — HEPARIN SODIUM 5000 UNITS: 5000 INJECTION INTRAVENOUS; SUBCUTANEOUS at 15:28

## 2019-03-19 RX ADMIN — ALBUMIN HUMAN 25 G: 250 SOLUTION INTRAVENOUS at 13:39

## 2019-03-19 RX ADMIN — Medication 10 ML: at 22:51

## 2019-03-19 RX ADMIN — INSULIN LISPRO 3 UNITS: 100 INJECTION, SOLUTION INTRAVENOUS; SUBCUTANEOUS at 17:28

## 2019-03-19 RX ADMIN — POTASSIUM CHLORIDE 60 MEQ: 750 TABLET, EXTENDED RELEASE ORAL at 08:55

## 2019-03-19 RX ADMIN — VENLAFAXINE 37.5 MG: 37.5 TABLET ORAL at 17:23

## 2019-03-19 RX ADMIN — CALCITRIOL 0.5 MCG: 0.25 CAPSULE ORAL at 08:54

## 2019-03-19 RX ADMIN — HEPARIN SODIUM 5000 UNITS: 5000 INJECTION INTRAVENOUS; SUBCUTANEOUS at 22:51

## 2019-03-19 RX ADMIN — INSULIN LISPRO 3 UNITS: 100 INJECTION, SOLUTION INTRAVENOUS; SUBCUTANEOUS at 22:51

## 2019-03-19 RX ADMIN — ROSUVASTATIN CALCIUM 20 MG: 10 TABLET, FILM COATED ORAL at 22:51

## 2019-03-19 RX ADMIN — CARVEDILOL 6.25 MG: 6.25 TABLET, FILM COATED ORAL at 17:23

## 2019-03-19 RX ADMIN — INSULIN LISPRO 2 UNITS: 100 INJECTION, SOLUTION INTRAVENOUS; SUBCUTANEOUS at 11:18

## 2019-03-19 RX ADMIN — MAGNESIUM SULFATE HEPTAHYDRATE 2 G: 40 INJECTION, SOLUTION INTRAVENOUS at 08:56

## 2019-03-19 RX ADMIN — Medication 10 ML: at 14:45

## 2019-03-19 RX ADMIN — HEPARIN SODIUM 5000 UNITS: 5000 INJECTION INTRAVENOUS; SUBCUTANEOUS at 05:54

## 2019-03-19 RX ADMIN — Medication 10 ML: at 05:54

## 2019-03-19 RX ADMIN — POTASSIUM CHLORIDE 10 MEQ: 200 INJECTION, SOLUTION INTRAVENOUS at 08:56

## 2019-03-19 RX ADMIN — INSULIN LISPRO 7 UNITS: 100 INJECTION, SOLUTION INTRAVENOUS; SUBCUTANEOUS at 06:00

## 2019-03-19 RX ADMIN — ALBUMIN (HUMAN) 25 G: 0.25 INJECTION, SOLUTION INTRAVENOUS at 13:39

## 2019-03-19 RX ADMIN — BUMETANIDE 2 MG: 0.25 INJECTION INTRAMUSCULAR; INTRAVENOUS at 08:56

## 2019-03-19 RX ADMIN — POTASSIUM CHLORIDE 10 MEQ: 200 INJECTION, SOLUTION INTRAVENOUS at 11:11

## 2019-03-19 NOTE — PROGRESS NOTES
Care Management Interventions PCP Verified by CM: Yes 
Palliative Care Criteria Met (RRAT>21 & CHF Dx)?: No 
Mode of Transport at Discharge: Other (see comment) MyChart Signup: No 
Discharge Durable Medical Equipment: No 
Health Maintenance Reviewed: Yes Physical Therapy Consult: No 
Occupational Therapy Consult: No 
Speech Therapy Consult: No 
Current Support Network: Own Home, Lives Alone Confirm Follow Up Transport: Family Plan discussed with Pt/Family/Caregiver: Yes [employer] Information Provided?: No 
Discharge Location Discharge Placement: Home with home health Reason for Readmission:     SOB, patent recently admitted for sepsis on 3/1. RRAT Score and Risk Level:     25 Level of Readmission:    Level 1 Care Conference scheduled:   Not appropriate at this time. Resources/supports as identified by patient/family:   Her son and granddaughter Top Challenges facing patient (as identified by patient/family and CM): Finances/Medication cost?   Patient listed as having [employer] insurance. Transportation      Her family assist with transportation Support system or lack thereof? See above Living arrangements? Patient lives alone in own apartment, with family support nearby. Self-care/ADLs/Cognition? Patient is alert and oriented, independent with ADLs prior to admission with assistance of a cane. Current Advanced Directive/Advance Care Plan:  Not one file, patient stated her granddaughter Angelica Ojeda would make decisions if needed. Plan for utilizing home health:   TBD, will recommend PT/OT to MD. Likelihood of additional readmission:   moderate Transition of Care Plan:    Based on readmission, the patient's previous Plan of Care 
 has been evaluated and/or modified. The current Transition of Care Plan is:        
Chart reviewed for transitions of care, and discussed in rounds.  CM met with patient at bedside to explain role, and offer support. Patient alert and oriented x4,and confirmed demographics. She lives alone in her apartment with close family nearby. The patient may benefit from PT/OT with HH at home once discharged. Her family provides transportation to medical appointments and personal errands. She uses a cane at baseline. CM will follow for any other discharge needs.  
 
[facility]ida CabreraSumner County Hospital

## 2019-03-19 NOTE — PROGRESS NOTES
1240: pt to ED US for left thoracentesis 1300: procedure start 1320: pt BP 82/48- symptomatic, eyes rolled back in head, mildly responsive, pt placed in reverse trendelenberg. 1100 mls pleural fluid removed from left chest by Dr. Marina Nelson and procedure complete. 1328: pts bp 105/44, more responsive- paged attending to update. Awaiting chest xray. 1330: received orders from attending Dr. Joe Mata for 500 ml NS bolus and 25 g albumin- will administer. Pts bp much improved. 1345: TRANSFER - OUT REPORT: 
 
Verbal report given to YURIY Kaur on Michelle Farley  being transferred to [facility] for routine progression of care Report consisted of patients Situation, Background, Assessment and  
Recommendations(SBAR). Information from the following report(s) SBAR, Kardex, Procedure Summary, Intake/Output and MAR was reviewed with the receiving nurse. Lines:  
Peripheral IV 03/18/19 Right Antecubital (Active) Site Assessment Clean, dry, & intact 3/19/2019  4:19 AM  
Phlebitis Assessment 0 3/19/2019  4:19 AM  
Infiltration Assessment 0 3/19/2019  4:19 AM  
Dressing Status Clean, dry, & intact 3/19/2019  4:19 AM  
Dressing Type Transparent 3/19/2019  4:19 AM  
Hub Color/Line Status Pink;Flushed;Capped 3/19/2019  4:19 AM  
Action Taken Open ports on tubing capped 3/19/2019  4:19 AM  
Alcohol Cap Used Yes 3/19/2019  4:19 AM  
  
 
Opportunity for questions and clarification was provided. Patient transported with: 
 Enjoi

## 2019-03-19 NOTE — PROCEDURES
PROCEDURE:Left thoracentesis. INDICATION:SOB,pleural effusion. ANESTHESIA:local.  COMPLICATION:NONE. SPECIMENS REMOVED:samples to lab. BLOOD LOSS:NONE. /ASSISTANT:CHIRAG Lea RECOMMENDATIONS:CXR-ordered. CONSENT OBTAINED:YES.  NOTES:1100cc drained.

## 2019-03-19 NOTE — PROGRESS NOTES
Hospitalist Progress Note Sharon Mi MD 
Answering service: [phone] OR [phone] from in house phone Date of Service:  3/19/2019 NAME:  Juliet Aceves :  1940 MRN:  [id number] Admission Summary:  
Juliet Aceves is a 78 y.o. female with pmh of newly diagnosed CKD s/p HD (had not needed HD on discharge) , recently diagnosed NSTEMI and Tokostubos cardiomyopathy (with recent LHC without evidence of CAD), ?COPD (had wheezing prior admission and started on inhalers) who presented to the ED initially for hyperglycemia and SOB. Per family (daughters) at bedside, patient lives alone and had been altered and more sleepy than usual since discharge from the hospital . Today she had high blood sugars at home > 500 which were noted by nursing. She also was hypoxic with saturations of 88 or so at home on room air. Subsequently 911 was called. Although she lives alone she has family at her side . They have noted intermittent altered mental status, which has not improved since discharge from the hospital. Deny any F/C. Has had some intermittnet wheezing for which she has been taking her grandchildren's nebulizer treatments without improvement. No abdominal pain, N/V/D/C. In the ED was hypoxic to 88-86% on RA and admitted for further workup. Interval history / Subjective:  
   
Pt seen in room She reports being Dyspneic even at rest. Denies chest pains Assessment & Plan: SOB - likely from fluid overload and large L pleural effusion, BNP elevated. - No signs of infection, send procalcitonin -  On IV bumex - D/W pt and grand daughter - they agree for thoracentesis, aware of risk of Pneumothorax, bleedingm infection with procedure. - Consult cardiology, seen by Dr. Ysabel Celaya during last admission - No need for repeat echo unless cardiology states otherwise. - Check Influenza - neg - H/o NSTEMI - trend troponin, continue ASA, Statin Recent cath last admission. 
  
Takotsubo cardiomyopathy  
- Diuresis, cardiology consult - I and O, daily weight  
  
Newly diagnosed CKD, Cr actually stable, and no acute need for HD as far as I can tell. Did require HD during prior admission - Diuresis as above - Follow I and O  
- Nephrology consult 
  
Metabolic encephalopathy - ? Polypharmacy 
- Hold all narcotics, benzos, and other altering medications: holding tramadol, ativan, gabapentin, oxybutynin 
  
Depression - continue venlafaxine Type 2 diabetes with hyperglycemia 8.3% - POCT glucose checks, long acting insulin, hypoglycemia protocol.  
  
DVT PPX - heparin CODE STATUS: FULL, I discussed with patient and daughter and she wanted full code. Risk of deterioration: medium Total time spent with patient: 45 Minutes Care Plan discussed with: Patient/Family and Nurse Disposition: TBD Hospital Problems  Date Reviewed: 3/18/2019 Codes Class Noted POA  
 SOB (shortness of breath) ICD-10-CM: R06.02 
ICD-9-CM: 786.05  3/18/2019 Unknown Review of Systems:  
Pertinent items are noted in HPI. Vital Signs:  
 Last 24hrs VS reviewed since prior progress note. Most recent are: 
Visit Vitals /88 Pulse 98 Temp 98.7 °F (37.1 °C) Resp 20 Ht 5' 5\" (1.651 m) Wt 98.9 kg (218 lb) SpO2 92% BMI 36.28 kg/m² Intake/Output Summary (Last 24 hours) at 3/19/2019 1000 Last data filed at 3/19/2019 4292 Gross per 24 hour Intake 0 ml Output 225 ml Net -225 ml Physical Examination:  
 
 
     
Constitutional:  No acute distress, cooperative, pleasant   
ENT:  Oral mucous moist, oropharynx benign. Neck supple, Resp:  CTA bilaterally. No wheezing/rhonchi/rales. No accessory muscle use CV:  Regular rhythm, normal rate, no murmurs, gallops, rubs GI:  Soft, non distended, non tender. normoactive bowel sounds, no hepatosplenomegaly Musculoskeletal:  No edema, warm, 2+ pulses throughout Neurologic:  Moves all extremities. AAOx3, CN II-XII reviewed Skin:  Good turgor, no rashes or ulcers Data Review:  
 Review and/or order of clinical lab test 
Review and/or order of tests in the radiology section of CPT Review and/or order of tests in the medicine section of CPT Labs:  
 
Recent Labs  
  03/19/19 
0425 03/18/19 
1249 WBC 14.3* 15.2* HGB 9.5* 10.1* HCT 31.3* 32.6* * 417* Recent Labs  
  03/19/19 
0425 03/18/19 
1249 * 126*  
K 2.5* 3.0*  
CL 82* 78* CO2 41* 39* BUN 37* 33* CREA 2.29* 2.54* * 374* CA 9.3 9.4 MG 1.3*  --   
PHOS 4.4  --   
 
Recent Labs  
  03/18/19 
1249 SGOT 35 ALT 20 * TBILI 0.6 TP 8.1 ALB 2.5*  
GLOB 5.6* No results for input(s): INR, PTP, APTT in the last 72 hours. No lab exists for component: INREXT No results for input(s): FE, TIBC, PSAT, FERR in the last 72 hours. No results found for: FOL, RBCF No results for input(s): PH, PCO2, PO2 in the last 72 hours. Recent Labs  
  03/19/19 0425 03/18/19 
1249 TROIQ <0.05 <0.05 No results found for: CHOL, CHOLX, CHLST, CHOLV, HDL, LDL, LDLC, DLDLP, TGLX, TRIGL, TRIGP, CHHD, CHHDX Lab Results Component Value Date/Time Glucose (POC) 301 (H) 03/19/2019 05:53 AM  
 Glucose (POC) 296 (H) 03/18/2019 09:49 PM  
 Glucose (POC) 261 (H) 03/11/2019 11:26 AM  
 Glucose (POC) 162 (H) 03/11/2019 06:43 AM  
 Glucose (POC) 193 (H) 03/10/2019 10:03 PM  
 
Lab Results Component Value Date/Time  Color YELLOW/STRAW 03/18/2019 12:49 PM  
 Appearance CLEAR 03/18/2019 12:49 PM  
 Specific gravity 1.014 03/18/2019 12:49 PM  
 pH (UA) 5.5 03/18/2019 12:49 PM  
 Protein 30 (A) 03/18/2019 12:49 PM  
 Glucose 100 (A) 03/18/2019 12:49 PM  
 Ketone NEGATIVE  03/18/2019 12:49 PM  
 Bilirubin NEGATIVE  03/18/2019 12:49 PM  
 Urobilinogen 0.2 03/18/2019 12:49 PM  
 Nitrites NEGATIVE  03/18/2019 12:49 PM  
 Leukocyte Esterase TRACE (A) 03/18/2019 12:49 PM  
 Epithelial cells FEW 03/18/2019 12:49 PM  
 Bacteria NEGATIVE  03/18/2019 12:49 PM  
 WBC 5-10 03/18/2019 12:49 PM  
 RBC 0-5 03/18/2019 12:49 PM  
 
 
 
Medications Reviewed:  
 
Current Facility-Administered Medications Medication Dose Route Frequency  potassium chloride 10 mEq in 50 ml IVPB  10 mEq IntraVENous Q1H  
 sodium chloride (NS) flush 5-40 mL  5-40 mL IntraVENous Q8H  
 sodium chloride (NS) flush 5-40 mL  5-40 mL IntraVENous PRN  
 morphine injection 2 mg  2 mg IntraVENous Q4H PRN  
 ondansetron (ZOFRAN) injection 4 mg  4 mg IntraVENous Q4H PRN  
 albuterol (PROVENTIL VENTOLIN) nebulizer solution 2.5 mg  2.5 mg Nebulization Q4H PRN  
 aspirin delayed-release tablet 81 mg  81 mg Oral DAILY  benzonatate (TESSALON) capsule 100 mg  100 mg Oral TID PRN  
 bisacodyl (DULCOLAX) tablet 5 mg  5 mg Oral DAILY PRN  
 calcitRIOL (ROCALTROL) capsule 0.5 mcg  0.5 mcg Oral DAILY  carvedilol (COREG) tablet 6.25 mg  6.25 mg Oral BID WITH MEALS  insulin glargine (LANTUS) injection 15 Units  15 Units SubCUTAneous DAILY  rosuvastatin (CRESTOR) tablet 20 mg  20 mg Oral QHS  venlafaxine (EFFEXOR) tablet 37.5 mg  37.5 mg Oral BID  bumetanide (BUMEX) injection 2 mg  2 mg IntraVENous BID  
 glucose chewable tablet 16 g  4 Tab Oral PRN  
 dextrose (D50W) injection syrg 12.5-25 g  25-50 mL IntraVENous PRN  
 glucagon (GLUCAGEN) injection 1 mg  1 mg IntraMUSCular PRN  
 heparin (porcine) injection 5,000 Units  5,000 Units SubCUTAneous Q8H  
 insulin lispro (HUMALOG) injection   SubCUTAneous AC&HS  
 
______________________________________________________________________ EXPECTED LENGTH OF STAY: - - - 
ACTUAL LENGTH OF STAY:          1 
 
            
Yogesh Richards MD  
Patient has given Verbal permission to discuss medical care with persons present in the room and and also with contact as listed on face sheet.

## 2019-03-19 NOTE — CONSULTS
NEPHROLOGY CONSULT NOTE     Patient: Fadia Santos MRN: [id number]  PCP: Alexia Mixon MD   :     1940  Age:   78 y.o. Sex:  female      Referring physician: Marco Wallis MD  Reason for consultation: 78 y.o. female with SOB (shortness of breath) [B31.50] complicated by ASUNCION   Admission Date: 3/18/2019 12:25 PM  LOS: 1 day      ASSESSMENT and PLAN :   ASUNCION on CKD :  - resolving ASUNCION, likely from ATN and KIRAN  - ok to continue current diuretics  - daily labs and strict I/Os while here    CKD II w/ baseline Cr around 1    L pleural effusion:  - for thoracentesis today     Hypokalemia/Hypomag:  - IV repletion ongoing    Hyponatremia:  - likely 2/2 volume overload  - cont diuresis  - daily Na levels    Recent NSTEMI:   - Cath : Non occlusiVe CAD   - Suspected Takatsubos CMP w/ EF 45%      Chronic Anemia:  - check iron studies in AM  - was on THEODORA last admission    DM-II:  - on insulin     Active Problems / Assessment AAActive  : Active Problems:    SOB (shortness of breath) (3/18/2019)         Subjective:   HPI: Fadia Santos is a 78 y.o.  female who has been admitted to the hospital for SOB and hypoglycemia. She has a hx of ASUNCION on CKD II, recently here for NSTEMI s/p cath that revealed Takatsubos CMP w/ EF 45%. She developed ASUNCION from contrast nephropathy and ATN 2/2 hypotension. She recovered some renal function prior to d/c. Her Cr was 2.7 upon d/c and is now down to 2.3. Her CXR revealed a large L pleural effusion. She is currently receiving bumex at 2mg IV BID. Voided only about 200cc since admission. Her SOB is stable. She denies cp, n/v/d.     Past Medical Hx:   Past Medical History:   Diagnosis Date    CAD (coronary artery disease)     Diabetes ([facility] [address]     Hypertension         Past Surgical Hx:     Past Surgical History:   Procedure Laterality Date    HX KNEE REPLACEMENT Bilateral     HX ORTHOPAEDIC      IR INSERT TUNL CVC W/O PORT OVER 5 YR  3/5/2019 Medications:  Prior to Admission medications    Medication Sig Start Date End Date Taking? Authorizing Provider   aspirin delayed-release 81 mg tablet Take 81 mg by mouth daily. Yes Provider, Historical   benzonatate (TESSALON) 100 mg capsule Take 100 mg by mouth three (3) times daily as needed for Cough. Yes Provider, Historical   carvedilol (COREG) 6.25 mg tablet Take 1 Tab by mouth two (2) times daily (with meals). 3/11/19  Yes Janessa Bonilla MD   bumetanide (BUMEX) 2 mg tablet Take 1 Tab by mouth two (2) times a day. 3/11/19  Yes Janessa Bonilla MD   bisacodyl (DULCOLAX) 5 mg EC tablet Take 1 Tab by mouth daily as needed for Constipation. 3/11/19  Yes Janessa Bonilla MD   budesonide (PULMICORT) 0.5 mg/2 mL nbsp 2 mL by Nebulization route two (2) times a day. 3/11/19  Yes Janessa Bonilla MD   arformoterol (BROVANA) 15 mcg/2 mL nebu neb solution 2 mL by Nebulization route two (2) times a day. 3/11/19  Yes Janessa Bonilla MD   albuterol (PROVENTIL VENTOLIN) 2.5 mg /3 mL (0.083 %) nebulizer solution 3 mL by Nebulization route every four (4) hours as needed for Wheezing. 3/11/19  Yes Janessa Bonilla MD   insulin glargine (LANTUS SOLOSTAR U-100 INSULIN) 100 unit/mL (3 mL) inpn 15 units daily  Subcutaneously 3/11/19  Yes Janessa Bonilla MD   rosuvastatin (CRESTOR) 20 mg tablet Take 1 Tab by mouth nightly. 3/11/19  Yes Janessa Bonilla MD   oxybutynin chloride XL (DITROPAN XL) 15 mg CR tablet Take 15 mg by mouth daily. Yes Provider, Historical   therapeutic multivitamin (THERAGRAN) tablet Take 1 Tab by mouth daily. Yes Provider, Historical   gabapentin (NEURONTIN) 300 mg capsule Take 300 mg by mouth three (3) times daily as needed. Yes Provider, Historical   venlafaxine (EFFEXOR) 37.5 mg tablet Take 37.5 mg by mouth two (2) times a day. Yes Provider, Historical   traMADol (ULTRAM) 50 mg tablet Take 1 Tab by mouth every eight (8) hours as needed for Pain.  Max Daily Amount: 150 mg. 3/9/18  Yes Radha Prince, PA   LORazepam (ATIVAN) 0.5 mg tablet Take 0.5 mg by mouth every eight (8) hours as needed for Anxiety. Yes Provider, Historical   calcitRIOL (ROCALTROL) 0.5 mcg capsule Take 0.5 mcg by mouth daily. Yes Provider, Historical       Allergies   Allergen Reactions    Tylenol [Acetaminophen] Other (comments)     Pt told by MD not to take, pt unsure why       Social Hx:  reports that  has never smoked. she has never used smokeless tobacco. She reports that she does not drink alcohol or use drugs. No family history on file. Review of Systems:  A twelve point review of system was performed today. Pertinent positives and negatives are mentioned in the HPI. The reminder of the ROS is negative and noncontributory. Objective:    Vitals:    Vitals:    03/18/19 2311 03/19/19 0419 03/19/19 0436 03/19/19 0817   BP: 150/77 104/67  150/88   Pulse: 88 92  98   Resp: 22 22  20   Temp: 98.3 °F (36.8 °C) 98.3 °F (36.8 °C)  98.7 °F (37.1 °C)   SpO2: 97% 94%  92%   Weight:   98.9 kg (218 lb)    Height:   5' 5\" (1.651 m)      I&O's:  03/18 0701 - 03/19 0700  In: 0   Out: 225 [Urine:225]  Visit Vitals  /88   Pulse 98   Temp 98.7 °F (37.1 °C)   Resp 20   Ht 5' 5\" (1.651 m)   Wt 98.9 kg (218 lb)   SpO2 92%   BMI 36.28 kg/m²       Physical Exam:  General:Alert, No distress,   HEENT: Eyes are PERRL. Conjunctiva without pallor ,erythema. The sclerae without icterus. .   Neck:Supple,no mass palpable  Lungs : reduced bibasilar breath sounds, L > R  CVS: RRR, S1 S2 normal, No rub,  trace LE edema  Abdomen: Soft, Non tender, No hepatosplenomegaly, bowel sounds present  Extremities: No cyanosis, No clubbing  Skin: No rash or lesions.   Lymph nodes: No palpable nodes  MS: No joint swelling, erythema, warmth  Neurologic: non focal, AAO x 3  Psych: normal affect    Laboratory Results:    Lab Results   Component Value Date    BUN 37 (H) 03/19/2019     (L) 03/19/2019    K 2.5 (LL) 03/19/2019    CL 82 (L) 03/19/2019    CO2 41 (HH) 03/19/2019       Lab Results   Component Value Date    BUN 37 (H) 03/19/2019    BUN 33 (H) 03/18/2019    BUN 15 03/11/2019    BUN 14 03/10/2019    BUN 10 03/09/2019    K 2.5 (LL) 03/19/2019    K 3.0 (L) 03/18/2019    K 3.4 (L) 03/11/2019    K 3.7 03/10/2019    K 3.8 03/09/2019       Lab Results   Component Value Date    WBC 14.3 (H) 03/19/2019    RBC 3.66 (L) 03/19/2019    HGB 9.5 (L) 03/19/2019    HCT 31.3 (L) 03/19/2019    MCV 85.5 03/19/2019    MCH 26.0 03/19/2019    RDW 14.6 (H) 03/19/2019     (H) 03/19/2019       Lab Results   Component Value Date    PHOS 4.4 03/19/2019       Urine dipstick:   Lab Results   Component Value Date/Time    Color YELLOW/STRAW 03/18/2019 12:49 PM    Appearance CLEAR 03/18/2019 12:49 PM    Specific gravity 1.014 03/18/2019 12:49 PM    pH (UA) 5.5 03/18/2019 12:49 PM    Protein 30 (A) 03/18/2019 12:49 PM    Glucose 100 (A) 03/18/2019 12:49 PM    Ketone NEGATIVE  03/18/2019 12:49 PM    Bilirubin NEGATIVE  03/18/2019 12:49 PM    Urobilinogen 0.2 03/18/2019 12:49 PM    Nitrites NEGATIVE  03/18/2019 12:49 PM    Leukocyte Esterase TRACE (A) 03/18/2019 12:49 PM    Epithelial cells FEW 03/18/2019 12:49 PM    Bacteria NEGATIVE  03/18/2019 12:49 PM    WBC 5-10 03/18/2019 12:49 PM    RBC 0-5 03/18/2019 12:49 PM       I have reviewed the following: All pertinent labs, microbiology data, radiology imaging for my assessment               Thank you for allowing us to participate in the care of this patient. We will follow patient. Please dont hesitate to call with any questions    Radha Jha MD  3/19/2019        [address] [facility] Nephrology [address], [place] [address] [place] [address]  Phone - [phone]   Fax - [phone]  [email or web address]

## 2019-03-19 NOTE — PROGRESS NOTES

## 2019-03-19 NOTE — DIABETES MGMT
DTC Consult Note Recommendations/ Comments: Blood sugars improved with addition of Lantus. Current hospital DM medication: Lantus 15 units, Lispro correction, normal sensitivity Consult received for:  [x]             Assessment of home management [x]             Insulin instruction Chart reviewed and initial evaluation complete on [address]. Patient is a 78 y.o. female with known Type 2 Diabetes on insulin injections: Lantus : 15 units at home. BG monitoring at home 1-2 times per day. Patient reports BG levels at home : unable to report -  Stated \"between the lines. \"  Patient able to answer some questions, but not all. Family present and stated that her son comes by in the mornings to check her blood sugar and give her insulin. Also, home nurse has been working with patient on insulin and monitoring. Left contact information for family that was not present. Patient is not appropriate for outpatient education. Assessed and instructed patient on the following:  
·  interpretation of lab results, blood sugar goals, complications of diabetes mellitus, hypoglycemia prevention and treatment, insulin adjustments and nutrition Provided patient with the following: [x]             Survival skills education materials [x]             Outpatient DTC contact number Discussed with patient and/or family need for follow up appointment for diabetes management after discharge. A1c:  
Lab Results Component Value Date/Time Hemoglobin A1c 8.3 (H) 03/05/2019 04:45 AM  
 
 
Recent Glucose Results:  
Lab Results Component Value Date/Time  (H) 03/19/2019 04:25 AM  
 GLUCPOC 157 (H) 03/19/2019 11:13 AM  
 GLUCPOC 301 (H) 03/19/2019 05:53 AM  
 GLUCPOC 296 (H) 03/18/2019 09:49 PM  
  
 
Lab Results Component Value Date/Time Creatinine 2.29 (H) 03/19/2019 04:25 AM  
 
Estimated Creatinine Clearance: 23.2 mL/min (A) (based on SCr of 2.29 mg/dL (H)). Active Orders Diet DIET DIABETIC WITH OPTIONS Consistent Carb 1800kcal; Soft Solids; 2 GM NA (House Low NA) PO intake: No data found. Will continue to follow as needed. Thank you. Sirisha Gonsalez MS, RN, CDE Time spent: 30 minutes

## 2019-03-20 ENCOUNTER — APPOINTMENT (OUTPATIENT)
Dept: GENERAL RADIOLOGY | Age: 79
DRG: 186 | End: 2019-03-20
Attending: INTERNAL MEDICINE
Payer: MEDICARE

## 2019-03-20 LAB
ALBUMIN SERPL-MCNC: 2.7 G/DL (ref 3.5–5)
ANION GAP SERPL CALC-SCNC: 7 MMOL/L (ref 5–15)
ANION GAP SERPL CALC-SCNC: 9 MMOL/L (ref 5–15)
BUN SERPL-MCNC: 38 MG/DL (ref 6–20)
BUN SERPL-MCNC: 39 MG/DL (ref 6–20)
BUN/CREAT SERPL: 20 (ref 12–20)
BUN/CREAT SERPL: 21 (ref 12–20)
CALCIUM SERPL-MCNC: 9.1 MG/DL (ref 8.5–10.1)
CALCIUM SERPL-MCNC: 9.2 MG/DL (ref 8.5–10.1)
CHLORIDE SERPL-SCNC: 90 MMOL/L (ref 97–108)
CHLORIDE SERPL-SCNC: 90 MMOL/L (ref 97–108)
CO2 SERPL-SCNC: 36 MMOL/L (ref 21–32)
CO2 SERPL-SCNC: 36 MMOL/L (ref 21–32)
COMMENT, HOLDF: NORMAL
CREAT SERPL-MCNC: 1.9 MG/DL (ref 0.55–1.02)
CREAT SERPL-MCNC: 1.93 MG/DL (ref 0.55–1.02)
ERYTHROCYTE [DISTWIDTH] IN BLOOD BY AUTOMATED COUNT: 14.4 % (ref 11.5–14.5)
FERRITIN SERPL-MCNC: 1375 NG/ML (ref 26–388)
GLUCOSE BLD STRIP.AUTO-MCNC: 173 MG/DL (ref 65–100)
GLUCOSE BLD STRIP.AUTO-MCNC: 190 MG/DL (ref 65–100)
GLUCOSE BLD STRIP.AUTO-MCNC: 232 MG/DL (ref 65–100)
GLUCOSE BLD STRIP.AUTO-MCNC: 256 MG/DL (ref 65–100)
GLUCOSE SERPL-MCNC: 203 MG/DL (ref 65–100)
GLUCOSE SERPL-MCNC: 233 MG/DL (ref 65–100)
HCT VFR BLD AUTO: 31.7 % (ref 35–47)
HGB BLD-MCNC: 9.9 G/DL (ref 11.5–16)
IRON SATN MFR SERPL: 7 % (ref 20–50)
IRON SERPL-MCNC: 14 UG/DL (ref 35–150)
MAGNESIUM SERPL-MCNC: 1.9 MG/DL (ref 1.6–2.4)
MCH RBC QN AUTO: 26.6 PG (ref 26–34)
MCHC RBC AUTO-ENTMCNC: 31.2 G/DL (ref 30–36.5)
MCV RBC AUTO: 85.2 FL (ref 80–99)
NRBC # BLD: 0 K/UL (ref 0–0.01)
NRBC BLD-RTO: 0 PER 100 WBC
PHOSPHATE SERPL-MCNC: 2.7 MG/DL (ref 2.6–4.7)
PLATELET # BLD AUTO: 397 K/UL (ref 150–400)
PMV BLD AUTO: 10.5 FL (ref 8.9–12.9)
POTASSIUM SERPL-SCNC: 3.2 MMOL/L (ref 3.5–5.1)
POTASSIUM SERPL-SCNC: 3.5 MMOL/L (ref 3.5–5.1)
RBC # BLD AUTO: 3.72 M/UL (ref 3.8–5.2)
SAMPLES BEING HELD,HOLD: NORMAL
SERVICE CMNT-IMP: ABNORMAL
SODIUM SERPL-SCNC: 133 MMOL/L (ref 136–145)
SODIUM SERPL-SCNC: 135 MMOL/L (ref 136–145)
T4 FREE SERPL-MCNC: 1.7 NG/DL (ref 0.8–1.5)
TIBC SERPL-MCNC: 188 UG/DL (ref 250–450)
TSH SERPL DL<=0.05 MIU/L-ACNC: 2.01 UIU/ML (ref 0.36–3.74)
WBC # BLD AUTO: 17.5 K/UL (ref 3.6–11)

## 2019-03-20 PROCEDURE — 83735 ASSAY OF MAGNESIUM: CPT

## 2019-03-20 PROCEDURE — 36415 COLL VENOUS BLD VENIPUNCTURE: CPT

## 2019-03-20 PROCEDURE — 94760 N-INVAS EAR/PLS OXIMETRY 1: CPT

## 2019-03-20 PROCEDURE — 82962 GLUCOSE BLOOD TEST: CPT

## 2019-03-20 PROCEDURE — 84443 ASSAY THYROID STIM HORMONE: CPT

## 2019-03-20 PROCEDURE — 83540 ASSAY OF IRON: CPT

## 2019-03-20 PROCEDURE — 74011636637 HC RX REV CODE- 636/637: Performed by: HOSPITALIST

## 2019-03-20 PROCEDURE — 74011636637 HC RX REV CODE- 636/637: Performed by: INTERNAL MEDICINE

## 2019-03-20 PROCEDURE — 74011250636 HC RX REV CODE- 250/636: Performed by: INTERNAL MEDICINE

## 2019-03-20 PROCEDURE — 87186 SC STD MICRODIL/AGAR DIL: CPT

## 2019-03-20 PROCEDURE — 85027 COMPLETE CBC AUTOMATED: CPT

## 2019-03-20 PROCEDURE — 65660000000 HC RM CCU STEPDOWN

## 2019-03-20 PROCEDURE — 74011250636 HC RX REV CODE- 250/636: Performed by: HOSPITALIST

## 2019-03-20 PROCEDURE — 87086 URINE CULTURE/COLONY COUNT: CPT

## 2019-03-20 PROCEDURE — 80069 RENAL FUNCTION PANEL: CPT

## 2019-03-20 PROCEDURE — 71046 X-RAY EXAM CHEST 2 VIEWS: CPT

## 2019-03-20 PROCEDURE — 82728 ASSAY OF FERRITIN: CPT

## 2019-03-20 PROCEDURE — 74011000258 HC RX REV CODE- 258: Performed by: HOSPITALIST

## 2019-03-20 PROCEDURE — 84439 ASSAY OF FREE THYROXINE: CPT

## 2019-03-20 PROCEDURE — 87077 CULTURE AEROBIC IDENTIFY: CPT

## 2019-03-20 PROCEDURE — 74011250637 HC RX REV CODE- 250/637: Performed by: INTERNAL MEDICINE

## 2019-03-20 PROCEDURE — 74011000250 HC RX REV CODE- 250: Performed by: INTERNAL MEDICINE

## 2019-03-20 PROCEDURE — 80048 BASIC METABOLIC PNL TOTAL CA: CPT

## 2019-03-20 PROCEDURE — 77030038269 HC DRN EXT URIN PURWCK BARD -A

## 2019-03-20 PROCEDURE — 77010033678 HC OXYGEN DAILY

## 2019-03-20 PROCEDURE — 87040 BLOOD CULTURE FOR BACTERIA: CPT

## 2019-03-20 RX ORDER — BUMETANIDE 1 MG/1
2 TABLET ORAL 2 TIMES DAILY
Status: DISCONTINUED | OUTPATIENT
Start: 2019-03-20 | End: 2019-03-25

## 2019-03-20 RX ORDER — POTASSIUM CHLORIDE 750 MG/1
40 TABLET, FILM COATED, EXTENDED RELEASE ORAL 3 TIMES DAILY
Status: DISCONTINUED | OUTPATIENT
Start: 2019-03-20 | End: 2019-03-21

## 2019-03-20 RX ADMIN — POTASSIUM CHLORIDE 40 MEQ: 750 TABLET, EXTENDED RELEASE ORAL at 21:36

## 2019-03-20 RX ADMIN — HEPARIN SODIUM 5000 UNITS: 5000 INJECTION INTRAVENOUS; SUBCUTANEOUS at 06:30

## 2019-03-20 RX ADMIN — POTASSIUM CHLORIDE 40 MEQ: 750 TABLET, EXTENDED RELEASE ORAL at 09:15

## 2019-03-20 RX ADMIN — INSULIN LISPRO 3 UNITS: 100 INJECTION, SOLUTION INTRAVENOUS; SUBCUTANEOUS at 06:30

## 2019-03-20 RX ADMIN — DOXYCYCLINE 100 MG: 100 INJECTION, POWDER, LYOPHILIZED, FOR SOLUTION INTRAVENOUS at 11:52

## 2019-03-20 RX ADMIN — VENLAFAXINE 37.5 MG: 37.5 TABLET ORAL at 17:09

## 2019-03-20 RX ADMIN — SODIUM CHLORIDE 125 MG: 9 INJECTION, SOLUTION INTRAVENOUS at 18:04

## 2019-03-20 RX ADMIN — BUMETANIDE 2 MG: 1 TABLET ORAL at 17:09

## 2019-03-20 RX ADMIN — HEPARIN SODIUM 5000 UNITS: 5000 INJECTION INTRAVENOUS; SUBCUTANEOUS at 21:37

## 2019-03-20 RX ADMIN — CARVEDILOL 6.25 MG: 6.25 TABLET, FILM COATED ORAL at 08:28

## 2019-03-20 RX ADMIN — ASPIRIN 81 MG: 81 TABLET ORAL at 08:28

## 2019-03-20 RX ADMIN — Medication 10 ML: at 06:30

## 2019-03-20 RX ADMIN — BUMETANIDE 2 MG: 0.25 INJECTION INTRAMUSCULAR; INTRAVENOUS at 08:28

## 2019-03-20 RX ADMIN — INSULIN GLARGINE 15 UNITS: 100 INJECTION, SOLUTION SUBCUTANEOUS at 08:29

## 2019-03-20 RX ADMIN — DOXYCYCLINE 100 MG: 100 INJECTION, POWDER, LYOPHILIZED, FOR SOLUTION INTRAVENOUS at 22:45

## 2019-03-20 RX ADMIN — CARVEDILOL 6.25 MG: 6.25 TABLET, FILM COATED ORAL at 17:09

## 2019-03-20 RX ADMIN — CEFTRIAXONE 1 G: 1 INJECTION, POWDER, FOR SOLUTION INTRAMUSCULAR; INTRAVENOUS at 10:21

## 2019-03-20 RX ADMIN — CALCITRIOL 0.5 MCG: 0.25 CAPSULE ORAL at 08:28

## 2019-03-20 RX ADMIN — INSULIN LISPRO 2 UNITS: 100 INJECTION, SOLUTION INTRAVENOUS; SUBCUTANEOUS at 12:33

## 2019-03-20 RX ADMIN — VENLAFAXINE 37.5 MG: 37.5 TABLET ORAL at 08:28

## 2019-03-20 RX ADMIN — POTASSIUM CHLORIDE 40 MEQ: 750 TABLET, EXTENDED RELEASE ORAL at 16:38

## 2019-03-20 RX ADMIN — HEPARIN SODIUM 5000 UNITS: 5000 INJECTION INTRAVENOUS; SUBCUTANEOUS at 14:52

## 2019-03-20 RX ADMIN — INSULIN LISPRO 5 UNITS: 100 INJECTION, SOLUTION INTRAVENOUS; SUBCUTANEOUS at 16:38

## 2019-03-20 NOTE — PROGRESS NOTES
Nephrology Progress Note Alyssa Gee Date of Admission : 3/18/2019 CC:  Follow up for ASUNCION Assessment and Plan ASUNCION on CKD : 
- resolving ASUNCION, likely from ATN and KIRAN 
- ok to continue current diuretics - will f/u labs from today 
  
CKD II w/ baseline Cr around 1 
  
L pleural effusion: 
- s/p thoracentesis 3/19 
  
Hypokalemia/Hypomag: 
- labs pending 
  
Hyponatremia: 
- likely 2/2 volume overload 
- cont diuresis 
  
Recent NSTEMI:  
- Cath : Non occlusiVe CAD  
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  
  
Chronic Anemia: 
- CBC and iron studies pending 
  
DM-II: 
- on insulin Interval History: 
Seen and examined. Thoracentesis yesterday, 1.1 liters removed. Pt feeling better. Voiding. Labs pending this AM.  No cp, sob, n/v/d reported. Current Medications: all current  Medications have been eviewed in [facility] Review of Systems: Pertinent items are noted in HPI. Objective: 
Vitals:   
Vitals:  
 03/19/19 1956 03/19/19 2346 03/20/19 0574 03/20/19 0187 BP: 118/74 122/84 110/72 144/78 Pulse: 87 85 82 85 Resp: 20 18 16 16 Temp: 99 °F (37.2 °C) 98.7 °F (37.1 °C) 98.3 °F (36.8 °C) 98.3 °F (36.8 °C) SpO2: 95%  95% 91% Weight:   99.6 kg (219 lb 9.6 oz) Height:      
 
Intake and Output: 
No intake/output data recorded. 03/18 1901 - 03/20 0700 In: 143 [P.O.:125; I.V.:18] Out: 1625 [Urine:525] Physical Examination:General: NAD,Conversant Neck:  Supple, no mass Resp:  Reduced bibasilar breath sounds CV:  RRR,  no murmur or rub, 1+ LE edema GI:  Soft, NT, + Bowel sounds, no hepatosplenomegaly Neurologic:  Non focal 
Psych:             AAO x 3 appropriate affect Skin:  No Rash :  No cart[place]a []    High complexity decision making was performed 
[]    Patient is at high-risk of decompensation with multiple organ involvement Lab Data Personally Reviewed: I have reviewed all the pertinent labs, microbiology data and radiology studies during assessment. Recent Labs  
  03/20/19 
0922 03/19/19 
0425 03/18/19 
1249 NA  --  129* 126* K  --  2.5* 3.0*  
CL  --  82* 78* CO2  --  41* 39* GLU  --  221* 374* BUN  --  37* 33* CREA  --  2.29* 2.54* CA  --  9.3 9.4 MG 1.9 1.3*  --   
PHOS  --  4.4  --   
ALB  --   --  2.5* SGOT  --   --  35 ALT  --   --  20 Recent Labs  
  03/20/19 
0432 03/19/19 
0425 03/18/19 
1249 WBC 17.5* 14.3* 15.2* HGB 9.9* 9.5* 10.1* HCT 31.7* 31.3* 32.6*  
 404* 417* No results found for: SDES Lab Results Component Value Date/Time Culture result: NO GROWTH AFTER 18 HOURS 03/19/2019 01:02 PM  
 Culture result: NO GROWTH 5 DAYS 03/01/2019 08:22 AM  
 
Recent Results (from the past 24 hour(s)) GLUCOSE, POC Collection Time: 03/19/19 11:13 AM  
Result Value Ref Range Glucose (POC) 157 (H) 65 - 100 mg/dL Performed by Peri Looney LD Collection Time: 03/19/19  1:00 PM  
Result Value Ref Range  (H) 81 - 246 U/L  
CELL COUNT, BODY FLUID Collection Time: 03/19/19  1:02 PM  
Result Value Ref Range BODY FLUID TYPE PLEURAL FLUID FLUID COLOR ORANGE    
 FLUID APPEARANCE CLOUDY FLUID RBC CT. >100 (H) 0 /cu mm FLUID NUCLEATED CELLS 3,540 /cu mm  
 FLD NEUTROPHILS 4 (A) NRRE % FLD LYMPHS 45 (A) NRRE % FLD MONO/MACROPHAGES 37 (A) NRRE % FLUID MESOTHELIAL 14 (A) NRRE %  
LDH, BODY FLUID Collection Time: 03/19/19  1:02 PM  
Result Value Ref Range Fluid Type: PLEURAL FLUID    
 LD, body fld. 353 U/L  
CULTURE, BODY FLUID W GRAM STAIN Collection Time: 03/19/19  1:02 PM  
Result Value Ref Range Special Requests: NO SPECIAL REQUESTS    
 GRAM STAIN 2+ WBCS SEEN    
 GRAM STAIN NO ORGANISMS SEEN Culture result: NO GROWTH AFTER 18 HOURS    
PROTEIN TOTAL, FLUID Collection Time: 03/19/19  1:02 PM  
Result Value Ref Range Fluid Type: PLEURAL FLUID Protein total, body fld. 5.5 g/dL GLUCOSE, POC  Collection Time: 03/19/19  5:04 PM  
 Result Value Ref Range Glucose (POC) 224 (H) 65 - 100 mg/dL Performed by Cedric Webster GLUCOSE, POC Collection Time: 03/19/19 10:04 PM  
Result Value Ref Range Glucose (POC) 261 (H) 65 - 100 mg/dL Performed by Megan Danielle CBC W/O DIFF Collection Time: 03/20/19  4:32 AM  
Result Value Ref Range WBC 17.5 (H) 3.6 - 11.0 K/uL  
 RBC 3.72 (L) 3.80 - 5.20 M/uL HGB 9.9 (L) 11.5 - 16.0 g/dL HCT 31.7 (L) 35.0 - 47.0 % MCV 85.2 80.0 - 99.0 FL  
 MCH 26.6 26.0 - 34.0 PG  
 MCHC 31.2 30.0 - 36.5 g/dL  
 RDW 14.4 11.5 - 14.5 % PLATELET 610 845 - 401 K/uL MPV 10.5 8.9 - 12.9 FL  
 NRBC 0.0 0  WBC ABSOLUTE NRBC 0.00 0.00 - 0.01 K/uL GLUCOSE, POC Collection Time: 03/20/19  6:26 AM  
Result Value Ref Range Glucose (POC) 232 (H) 65 - 100 mg/dL Performed by Kelsy Rdz MAGNESIUM Collection Time: 03/20/19  9:22 AM  
Result Value Ref Range Magnesium 1.9 1.6 - 2.4 mg/dL TSH 3RD GENERATION Collection Time: 03/20/19  9:22 AM  
Result Value Ref Range TSH 2.01 0.36 - 3.74 uIU/mL T4, FREE Collection Time: 03/20/19  9:22 AM  
Result Value Ref Range T4, Free 1.7 (H) 0.8 - 1.5 NG/DL Leonor Barba MD 
[address] [place] [address], [address] [facility] Phone - [phone] Fax - [phone] 
[email or web address] [email or web address]

## 2019-03-20 NOTE — DIABETES MGMT
DTC Progress Note Recommendations/ Comments: Chart reviewed due to hyperglycemia. FBG this am was 232 mg/d. Pt has received 15 units of correction in the last 24 hours. If appropriate, please consider: 
Increase Lantus to 18 units Current hospital DM medication: Lantus, 15 units Lispro correction scale, normal sensitivity Chart reviewed on [facility]. Patient is a 78 y.o. female with known DM on 15 units of Lantus at home. A1c:  
Lab Results Component Value Date/Time Hemoglobin A1c 8.3 (H) 03/05/2019 04:45 AM  
 
 
Recent Glucose Results:  
Lab Results Component Value Date/Time  (H) 03/20/2019 09:22 AM  
  (H) 03/20/2019 04:32 AM  
 GLUCPOC 190 (H) 03/20/2019 12:21 PM  
 GLUCPOC 232 (H) 03/20/2019 06:26 AM  
 GLUCPOC 261 (H) 03/19/2019 10:04 PM  
  
 
Lab Results Component Value Date/Time Creatinine 1.93 (H) 03/20/2019 09:22 AM  
 
Estimated Creatinine Clearance: 27.6 mL/min (A) (based on SCr of 1.93 mg/dL (H)). Active Orders Diet DIET DIABETIC WITH OPTIONS Consistent Carb 1800kcal; Soft Solids; 2 GM NA (House Low NA) PO intake:  
Patient Vitals for the past 72 hrs: 
 % Diet Eaten  
03/20/19 1218 90 % 03/20/19 0757 80 % 03/19/19 1723 100 % Will continue to follow as needed. Thank you Kvng Yang RD Time spent: 5 minutes

## 2019-03-20 NOTE — PROGRESS NOTES
0230: Patient disconnected her tele, took off her O2 and gown. Oriented to self, place and year. Patient cleaned, repositioned, purwick changed, O2 back on.  
 
0800: during shift change report patient had taken her O2 off again, oriented to self and place (after asking numerous times). Patient stated she is ok and there is nothing wrong with her. Day shift nurse Doc Mckinley at bedside. Will ctm Bedside and Verbal shift change report given to Doc Mckinley (oncoming nurse) by Aidee Patel (offgoing nurse).  Report included the following information SBAR, Kardex, MAR, Med Rec Status and Cardiac Rhythm SR.

## 2019-03-20 NOTE — PROGRESS NOTES
Problem: Falls - Risk of 
Goal: *Absence of Falls Document Emily Ko Fall Risk and appropriate interventions in the flowsheet. Outcome: Progressing Towards Goal 
Fall Risk Interventions: 
Mobility Interventions: Utilize walker, cane, or other assistive device Mentation Interventions: More frequent rounding Medication Interventions: Teach patient to arise slowly, Patient to call before getting OOB Elimination Interventions: Call light in reach History of Falls Interventions: Door open when patient unattended Problem: Pressure Injury - Risk of 
Goal: *Prevention of pressure injury Document Mohan Scale and appropriate interventions in the flowsheet. Outcome: Progressing Towards Goal 
Pressure Injury Interventions: 
Sensory Interventions: Minimize linen layers, Maintain/enhance activity level, Keep linens dry and wrinkle-free Moisture Interventions: Internal/External urinary devices, Limit adult briefs Activity Interventions: Increase time out of bed, Pressure redistribution bed/mattress(bed type) Mobility Interventions: Pressure redistribution bed/mattress (bed type), HOB 30 degrees or less Nutrition Interventions: Document food/fluid/supplement intake Friction and Shear Interventions: Minimize layers

## 2019-03-20 NOTE — PROGRESS NOTES
Physical therapy order received and acknowledged. Chart review. Attempted evaluation. Patient just received her dinner meal tray. Therapy will check back to complete evaluation. Thank you.

## 2019-03-20 NOTE — PROGRESS NOTES
Hospitalist Progress Note Warden Donell MD 
Answering service: [phone] OR 1960 from in house phone Date of Service:  3/20/2019 NAME:  Mg Dunn :  1940 MRN:  [id number] Admission Summary:  
Mg Dunn is a 78 y.o. female with pmh of newly diagnosed CKD s/p HD (had not needed HD on discharge) , recently diagnosed NSTEMI and Tokostubos cardiomyopathy (with recent LHC without evidence of CAD), ?COPD (had wheezing prior admission and started on inhalers) who presented to the ED initially for hyperglycemia and SOB. Per family (daughters) at bedside, patient lives alone and had been altered and more sleepy than usual since discharge from the hospital . Today she had high blood sugars at home > 500 which were noted by nursing. She also was hypoxic with saturations of 88 or so at home on room air. Subsequently 911 was called. Although she lives alone she has family at her side . They have noted intermittent altered mental status, which has not improved since discharge from the hospital. Deny any F/C. Has had some intermittnet wheezing for which she has been taking her grandchildren's nebulizer treatments without improvement. No abdominal pain, N/V/D/C. In the ED was hypoxic to 88-86% on RA and admitted for further workup. Interval history / Subjective:  
   
Pt seen in room She reports dyspnea has improved now Denies Fevers Assessment & Plan: SOB - likely from fluid overload and large L pleural effusion, BNP elevated. -  On bumex - S/P  Thoracentesis 3/19 
- Influenza - neg - H/o NSTEMI - trend troponin, continue ASA, Statin Recent cath last admission. Leukocytosis Worsened 3/20 Check Urine and Blood cultures R/o Sepsis Start on Rocephin/Doxy - Cover for pneumonia, UTI Thoracentesis analysis Exudative - Followup Fluid Cultures  
  Takotsubo cardiomyopathy - Diuresis, cardiology consult - I and O, daily weight  
  
Newly diagnosed CKD, Cr actually stable, and no acute need for HD as far as I can tell. Did require HD during prior admission - Diuresis as above - Follow I and O  
- Nephrology consulted 
  
Metabolic encephalopathy - ? Polypharmacy 
- Hold all narcotics, benzos, and other altering medications: holding tramadol, ativan, gabapentin, oxybutynin 
-Resolved  
  
Depression - continue venlafaxine Type 2 diabetes with hyperglycemia 8.3% - POCT glucose checks, long acting insulin, hypoglycemia protocol.  
  
DVT PPX - heparin Iron Def anemia Start on iron Infusion CODE STATUS: FULL, I discussed with patient and daughter and she wanted full code. - 3/19 Risk of deterioration: medium Total time spent with patient: 32 Minutes Care Plan discussed with: Patient/Family and Nurse Disposition: HH PT, OT, RN and PT [facility]   
 
Hospital Problems  Date Reviewed: 3/18/2019 Codes Class Noted POA  
 SOB (shortness of breath) ICD-10-CM: R06.02 
ICD-9-CM: 786.05  3/18/2019 Unknown Review of Systems:  
Pertinent items are noted in HPI. Vital Signs:  
 Last 24hrs VS reviewed since prior progress note. Most recent are: 
Visit Vitals /78 (BP 1 Location: Left arm, BP Patient Position: At rest) Pulse 85 Temp 98.3 °F (36.8 °C) Resp 16 Ht 5' 5\" (1.651 m) Wt 99.6 kg (219 lb 9.6 oz) SpO2 91% BMI 36.54 kg/m² Intake/Output Summary (Last 24 hours) at 3/20/2019 0930 Last data filed at 3/19/2019 1723 Gross per 24 hour Intake 143 ml Output 1400 ml Net -1257 ml Physical Examination:  
 
 
     
Constitutional:  No acute distress, cooperative, pleasant   
ENT:  Oral mucous moist, oropharynx benign. Neck supple, Resp:  Decreased at bases. No accessory muscle use CV:  Regular rhythm, normal rate, no murmurs, gallops, rubs GI:  Soft, non distended, non tender.  normoactive bowel sounds, no hepatosplenomegaly Musculoskeletal:  No edema, warm, 2+ pulses throughout Neurologic:  Moves all extremities. AAOx3, CN II-XII reviewed Skin:  Good turgor, no rashes or ulcers Data Review:  
 Review and/or order of clinical lab test 
Review and/or order of tests in the radiology section of CPT Review and/or order of tests in the medicine section of [facility] Labs:  
 
Recent Labs  
  03/20/19 
0432 03/19/19 
0425 WBC 17.5* 14.3* HGB 9.9* 9.5* HCT 31.7* 31.3*  
 404* Recent Labs  
  03/19/19 0425 03/18/19 
1249 * 126*  
K 2.5* 3.0*  
CL 82* 78* CO2 41* 39* BUN 37* 33* CREA 2.29* 2.54* * 374* CA 9.3 9.4 MG 1.3*  --   
PHOS 4.4  --   
 
Recent Labs  
  03/19/19 0425 03/18/19 
1249 SGOT  --  35 ALT  --  20  
AP  --  121* TBILI  --  0.6 TP 7.9 8.1 ALB  --  2.5*  
GLOB  --  5.6* No results for input(s): INR, PTP, APTT in the last 72 hours. No lab exists for component: INREXT, INREXT No results for input(s): FE, TIBC, PSAT, FERR in the last 72 hours. No results found for: FOL, RBCF No results for input(s): PH, PCO2, PO2 in the last 72 hours. Recent Labs  
  03/19/19 0425 03/18/19 
1249 TROIQ <0.05 <0.05 No results found for: CHOL, CHOLX, CHLST, CHOLV, HDL, LDL, LDLC, DLDLP, TGLX, TRIGL, TRIGP, CHHD, CHHDX Lab Results Component Value Date/Time Glucose (POC) 232 (H) 03/20/2019 06:26 AM  
 Glucose (POC) 261 (H) 03/19/2019 10:04 PM  
 Glucose (POC) 224 (H) 03/19/2019 05:04 PM  
 Glucose (POC) 157 (H) 03/19/2019 11:13 AM  
 Glucose (POC) 301 (H) 03/19/2019 05:53 AM  
 
Lab Results Component Value Date/Time  Color YELLOW/STRAW 03/18/2019 12:49 PM  
 Appearance CLEAR 03/18/2019 12:49 PM  
 Specific gravity 1.014 03/18/2019 12:49 PM  
 pH (UA) 5.5 03/18/2019 12:49 PM  
 Protein 30 (A) 03/18/2019 12:49 PM  
 Glucose 100 (A) 03/18/2019 12:49 PM  
 Ketone NEGATIVE  03/18/2019 12:49 PM  
 Bilirubin NEGATIVE  03/18/2019 12:49 PM  
 Urobilinogen 0.2 03/18/2019 12:49 PM  
 Nitrites NEGATIVE  03/18/2019 12:49 PM  
 Leukocyte Esterase TRACE (A) 03/18/2019 12:49 PM  
 Epithelial cells FEW 03/18/2019 12:49 PM  
 Bacteria NEGATIVE  03/18/2019 12:49 PM  
 WBC 5-10 03/18/2019 12:49 PM  
 RBC 0-5 03/18/2019 12:49 PM  
 
 
 
Medications Reviewed:  
 
Current Facility-Administered Medications Medication Dose Route Frequency  potassium chloride SR (KLOR-CON 10) tablet 40 mEq  40 mEq Oral TID  bumetanide (BUMEX) tablet 2 mg  2 mg Oral BID  doxycycline (VIBRAMYCIN) 100 mg in 0.9% sodium chloride (MBP/ADV) 100 mL  100 mg IntraVENous Q12H  cefTRIAXone (ROCEPHIN) 1 g in 0.9% sodium chloride (MBP/ADV) 50 mL  1 g IntraVENous Q24H  
 sodium chloride (NS) flush 5-40 mL  5-40 mL IntraVENous Q8H  
 sodium chloride (NS) flush 5-40 mL  5-40 mL IntraVENous PRN  
 morphine injection 2 mg  2 mg IntraVENous Q4H PRN  
 ondansetron (ZOFRAN) injection 4 mg  4 mg IntraVENous Q4H PRN  
 albuterol (PROVENTIL VENTOLIN) nebulizer solution 2.5 mg  2.5 mg Nebulization Q4H PRN  
 aspirin delayed-release tablet 81 mg  81 mg Oral DAILY  benzonatate (TESSALON) capsule 100 mg  100 mg Oral TID PRN  
 bisacodyl (DULCOLAX) tablet 5 mg  5 mg Oral DAILY PRN  
 calcitRIOL (ROCALTROL) capsule 0.5 mcg  0.5 mcg Oral DAILY  carvedilol (COREG) tablet 6.25 mg  6.25 mg Oral BID WITH MEALS  insulin glargine (LANTUS) injection 15 Units  15 Units SubCUTAneous DAILY  venlafaxine (EFFEXOR) tablet 37.5 mg  37.5 mg Oral BID  
 glucose chewable tablet 16 g  4 Tab Oral PRN  
 dextrose (D50W) injection syrg 12.5-25 g  25-50 mL IntraVENous PRN  
 glucagon (GLUCAGEN) injection 1 mg  1 mg IntraMUSCular PRN  
 heparin (porcine) injection 5,000 Units  5,000 Units SubCUTAneous Q8H  
 insulin lispro (HUMALOG) injection   SubCUTAneous AC&HS  
 
______________________________________________________________________ EXPECTED LENGTH OF STAY: 4d 19h ACTUAL LENGTH OF STAY:          2 
 
            
Corby Beach MD  
Patient has given Verbal permission to discuss medical care with  
persons present in the room and and also with contact as listed on face sheet.

## 2019-03-20 NOTE — PROGRESS NOTES
Bedside and Verbal shift change report given to Thais Doran (oncoming nurse) by Agustin Miner (offgoing nurse). Report included the following information SBAR, MAR and Cardiac Rhythm Sinus Rhythm.

## 2019-03-20 NOTE — CONSULTS
[address]    Name:  Artemio Uribe  MR#:  [id number]  :  1940  ACCOUNT #:  [de-identified]  DATE OF SERVICE:  2019    CARDIOLOGY CONSULTATION    This 79-year-old woman was admitted to hospital with volume overload, short of breath. She is feeling better. She had a thoracentesis yesterday. It is not clear how accurate historian she is. At first, I saw her on 2019. She had a sudden onset of shortness of breath the morning of admission. She had ongoing chest discomfort and appeared to be in pulmonary edema, remained very short of breath and uncomfortable. She diuresed. Her troponin went from 0.52 to 1.31 with a normal CPK. No acute EKG changes. There was some brief atrial fibrillation. She underwent cardiac catheterization the next morning, which showed no significant coronary artery disease with circumflex dominant system. Normal left ventricular size with moderate anterolateral and apical hypokinesis in the setting of elevated biomarkers consistent with Takotsubo cardiomyopathy. Her filling pressures were elevated. Her chest pain resolved this week, controlled volume, but she developed acute renal failure related to contrast and volume depletion. Her initial creatinine pre-cath had been 1.05. Her creatinine now is 2.29. She has been off dialysis. Troponins have been negative. She was admitted on  with a large left pleural effusion, pulmonary congestion, elevated proBNP, had no chest pain. She has been diuresing with an output of about 1500 mL negative and had thoracentesis. PAST MEDICAL HISTORY:  Hypertension, diabetes, Takotsubo's cardiomyopathy with clean coronaries as of . She did get dialysis last hospitalization for a short time. She has had prior knee replacements. FAMILY HISTORY:  Noncontributory. SOCIAL HISTORY:  No smoking. No alcohol. , lives alone. Has an extended family.     CURRENT MEDICATIONS: Aspirin 81 mg a day, Bumex 2 mg IV q.12, Rocaltrol 0.5 daily, Coreg 6.25 twice a day, heparin 5000 subcu q.8, Lantus 15 units daily, sliding scale insulin coverage, rosuvastatin 20 mg a day, Effexor 37.5 mg daily. ALLERGIES:  ACETAMINOPHEN. REVIEW OF SYSTEMS:  Not reliable. PHYSICAL EXAMINATION:  GENERAL:  On exam, frail elderly lady, no acute distress. VITAL SIGNS:  Blood pressure 144/78, pulse 84 and regular, afebrile. HEENT:  Sitting up. There is no jugular venous distention. Carotids are full without bruits. Sclerae are clear. LUNGS:  Show diminished breath sounds on the left. No wheezes, rales, or rhonchi. HEART:  Regular rate and rhythm without murmur. ABDOMEN:  Somewhat obese without obvious masses or organomegaly. EXTREMITIES:  Trace edema. Pedal pulses are intact. SKIN:  Intact. No skeletal deformities. NEUROLOGIC:  Nonfocal.    LABORATORY DATA:  Hemoglobin 9.9, hematocrit 31.7, white count of 17,500, platelets 340,340. Sodium 129, potassium 2.5, chloride 82, CO2 of 41, glucose 221, BUN 37, creatinine 2.29. Chest x-ray shows diminished pleural fluid, still looks congested. Urinalysis negative for bacteria. EKG:  Sinus rhythm with PACs, nonspecific ST-T wave changes. PROBLEMS:  1.  Volume overload with left pleural effusion, somewhat suspicious for a more primary pulmonary source. 2.  Resolving acute kidney failure, multifactorial.  3.  Hypertension. 4.  History of Takotsubo's cardiomyopathy. 5.  Question of mental status. 6.  Type 2 diabetes mellitus. PLAN:  Repeat echo. Get thyroid functions. Replete potassium. Cutback on diuretic. May need a pulmonary consult. We will follow with you.       Clint Loaiza MD [id number]  D:  03/20/2019 8:47  T:  03/20/2019 8:51  JOB #:  [id number]

## 2019-03-21 LAB
ANION GAP SERPL CALC-SCNC: 4 MMOL/L (ref 5–15)
BASOPHILS # BLD: 0.1 K/UL (ref 0–0.1)
BASOPHILS NFR BLD: 1 % (ref 0–1)
BUN SERPL-MCNC: 43 MG/DL (ref 6–20)
BUN/CREAT SERPL: 23 (ref 12–20)
CALCIUM SERPL-MCNC: 9.2 MG/DL (ref 8.5–10.1)
CHLORIDE SERPL-SCNC: 97 MMOL/L (ref 97–108)
CO2 SERPL-SCNC: 37 MMOL/L (ref 21–32)
CREAT SERPL-MCNC: 1.84 MG/DL (ref 0.55–1.02)
DIFFERENTIAL METHOD BLD: ABNORMAL
EOSINOPHIL # BLD: 0.4 K/UL (ref 0–0.4)
EOSINOPHIL NFR BLD: 3 % (ref 0–7)
ERYTHROCYTE [DISTWIDTH] IN BLOOD BY AUTOMATED COUNT: 14.5 % (ref 11.5–14.5)
GLUCOSE BLD STRIP.AUTO-MCNC: 113 MG/DL (ref 65–100)
GLUCOSE BLD STRIP.AUTO-MCNC: 155 MG/DL (ref 65–100)
GLUCOSE BLD STRIP.AUTO-MCNC: 187 MG/DL (ref 65–100)
GLUCOSE BLD STRIP.AUTO-MCNC: 232 MG/DL (ref 65–100)
GLUCOSE SERPL-MCNC: 174 MG/DL (ref 65–100)
HCT VFR BLD AUTO: 31.1 % (ref 35–47)
HGB BLD-MCNC: 9.6 G/DL (ref 11.5–16)
IMM GRANULOCYTES # BLD AUTO: 0.1 K/UL (ref 0–0.04)
IMM GRANULOCYTES NFR BLD AUTO: 1 % (ref 0–0.5)
LYMPHOCYTES # BLD: 1.9 K/UL (ref 0.8–3.5)
LYMPHOCYTES NFR BLD: 19 % (ref 12–49)
MCH RBC QN AUTO: 27 PG (ref 26–34)
MCHC RBC AUTO-ENTMCNC: 30.9 G/DL (ref 30–36.5)
MCV RBC AUTO: 87.6 FL (ref 80–99)
MONOCYTES # BLD: 1 K/UL (ref 0–1)
MONOCYTES NFR BLD: 10 % (ref 5–13)
NEUTS SEG # BLD: 6.8 K/UL (ref 1.8–8)
NEUTS SEG NFR BLD: 66 % (ref 32–75)
NRBC # BLD: 0 K/UL (ref 0–0.01)
NRBC BLD-RTO: 0 PER 100 WBC
PLATELET # BLD AUTO: 375 K/UL (ref 150–400)
PMV BLD AUTO: 10.5 FL (ref 8.9–12.9)
POTASSIUM SERPL-SCNC: 4.5 MMOL/L (ref 3.5–5.1)
RBC # BLD AUTO: 3.55 M/UL (ref 3.8–5.2)
SERVICE CMNT-IMP: ABNORMAL
SODIUM SERPL-SCNC: 138 MMOL/L (ref 136–145)
WBC # BLD AUTO: 10.2 K/UL (ref 3.6–11)

## 2019-03-21 PROCEDURE — 80048 BASIC METABOLIC PNL TOTAL CA: CPT

## 2019-03-21 PROCEDURE — 82962 GLUCOSE BLOOD TEST: CPT

## 2019-03-21 PROCEDURE — 97530 THERAPEUTIC ACTIVITIES: CPT

## 2019-03-21 PROCEDURE — 74011250636 HC RX REV CODE- 250/636: Performed by: HOSPITALIST

## 2019-03-21 PROCEDURE — 77030038269 HC DRN EXT URIN PURWCK BARD -A

## 2019-03-21 PROCEDURE — 74011636637 HC RX REV CODE- 636/637: Performed by: INTERNAL MEDICINE

## 2019-03-21 PROCEDURE — 65660000000 HC RM CCU STEPDOWN

## 2019-03-21 PROCEDURE — 77010033678 HC OXYGEN DAILY

## 2019-03-21 PROCEDURE — 94760 N-INVAS EAR/PLS OXIMETRY 1: CPT

## 2019-03-21 PROCEDURE — 74011000258 HC RX REV CODE- 258: Performed by: HOSPITALIST

## 2019-03-21 PROCEDURE — 36415 COLL VENOUS BLD VENIPUNCTURE: CPT

## 2019-03-21 PROCEDURE — 74011250636 HC RX REV CODE- 250/636: Performed by: INTERNAL MEDICINE

## 2019-03-21 PROCEDURE — 74011000258 HC RX REV CODE- 258: Performed by: INTERNAL MEDICINE

## 2019-03-21 PROCEDURE — 97161 PT EVAL LOW COMPLEX 20 MIN: CPT

## 2019-03-21 PROCEDURE — 85025 COMPLETE CBC W/AUTO DIFF WBC: CPT

## 2019-03-21 PROCEDURE — 74011636637 HC RX REV CODE- 636/637: Performed by: HOSPITALIST

## 2019-03-21 PROCEDURE — 74011250637 HC RX REV CODE- 250/637: Performed by: INTERNAL MEDICINE

## 2019-03-21 PROCEDURE — 93005 ELECTROCARDIOGRAM TRACING: CPT

## 2019-03-21 PROCEDURE — 65270000029 HC RM PRIVATE

## 2019-03-21 RX ORDER — POTASSIUM CHLORIDE 750 MG/1
20 TABLET, FILM COATED, EXTENDED RELEASE ORAL DAILY
Status: DISCONTINUED | OUTPATIENT
Start: 2019-03-22 | End: 2019-03-27 | Stop reason: HOSPADM

## 2019-03-21 RX ORDER — POTASSIUM CHLORIDE 750 MG/1
20 TABLET, FILM COATED, EXTENDED RELEASE ORAL DAILY
Status: DISCONTINUED | OUTPATIENT
Start: 2019-03-22 | End: 2019-03-21

## 2019-03-21 RX ADMIN — INSULIN LISPRO 3 UNITS: 100 INJECTION, SOLUTION INTRAVENOUS; SUBCUTANEOUS at 11:48

## 2019-03-21 RX ADMIN — CARVEDILOL 6.25 MG: 6.25 TABLET, FILM COATED ORAL at 17:23

## 2019-03-21 RX ADMIN — INSULIN GLARGINE 15 UNITS: 100 INJECTION, SOLUTION SUBCUTANEOUS at 08:50

## 2019-03-21 RX ADMIN — INSULIN LISPRO 2 UNITS: 100 INJECTION, SOLUTION INTRAVENOUS; SUBCUTANEOUS at 17:24

## 2019-03-21 RX ADMIN — HEPARIN SODIUM 5000 UNITS: 5000 INJECTION INTRAVENOUS; SUBCUTANEOUS at 14:39

## 2019-03-21 RX ADMIN — HEPARIN SODIUM 5000 UNITS: 5000 INJECTION INTRAVENOUS; SUBCUTANEOUS at 06:39

## 2019-03-21 RX ADMIN — BUMETANIDE 2 MG: 1 TABLET ORAL at 08:39

## 2019-03-21 RX ADMIN — EPOETIN ALFA-EPBX 10000 UNITS: 10000 INJECTION, SOLUTION INTRAVENOUS; SUBCUTANEOUS at 11:49

## 2019-03-21 RX ADMIN — VENLAFAXINE 37.5 MG: 37.5 TABLET ORAL at 17:24

## 2019-03-21 RX ADMIN — CALCITRIOL 0.5 MCG: 0.25 CAPSULE ORAL at 08:39

## 2019-03-21 RX ADMIN — DOXYCYCLINE 100 MG: 100 INJECTION, POWDER, LYOPHILIZED, FOR SOLUTION INTRAVENOUS at 22:59

## 2019-03-21 RX ADMIN — INSULIN LISPRO 2 UNITS: 100 INJECTION, SOLUTION INTRAVENOUS; SUBCUTANEOUS at 06:44

## 2019-03-21 RX ADMIN — DOXYCYCLINE 100 MG: 100 INJECTION, POWDER, LYOPHILIZED, FOR SOLUTION INTRAVENOUS at 10:32

## 2019-03-21 RX ADMIN — BUMETANIDE 2 MG: 1 TABLET ORAL at 17:23

## 2019-03-21 RX ADMIN — HEPARIN SODIUM 5000 UNITS: 5000 INJECTION INTRAVENOUS; SUBCUTANEOUS at 23:00

## 2019-03-21 RX ADMIN — Medication 10 ML: at 23:00

## 2019-03-21 RX ADMIN — Medication 10 ML: at 06:40

## 2019-03-21 RX ADMIN — CEFTRIAXONE 1 G: 1 INJECTION, POWDER, FOR SOLUTION INTRAMUSCULAR; INTRAVENOUS at 09:15

## 2019-03-21 RX ADMIN — VENLAFAXINE 37.5 MG: 37.5 TABLET ORAL at 08:40

## 2019-03-21 RX ADMIN — IRON SUCROSE 200 MG: 20 INJECTION, SOLUTION INTRAVENOUS at 11:50

## 2019-03-21 RX ADMIN — CARVEDILOL 6.25 MG: 6.25 TABLET, FILM COATED ORAL at 08:40

## 2019-03-21 RX ADMIN — ASPIRIN 81 MG: 81 TABLET ORAL at 08:39

## 2019-03-21 NOTE — PROGRESS NOTES
Received call from pharmacy, patient K was elevated ay 4.5, stated she will contact MD to see if we can decrease dose for PO Potassium or hold all together. She stated that if we do not hear back from her, to HOLD the 9am dose of PO Potassium. I passed this info on to day shift nurse Anuja Card Bedside and Verbal shift change report given to Michael Mckeon (oncoming nurse) by Brenda Cortez (offgoing nurse).  Report included the following information SBAR, Kardex, MAR, Med Rec Status and Cardiac Rhythm SR.

## 2019-03-21 NOTE — PROGRESS NOTES
TRANSFER - OUT REPORT: 
 
Verbal report given to Bernarda(name) on 60Ijeoma Rao  being transferred to (unit) for routine progression of care Report consisted of patients Situation, Background, Assessment and  
Recommendations(SBAR). Information from the following report(s) SBAR and MAR was reviewed with the receiving nurse. Lines:  
Peripheral IV Left;Posterior;Proximal;Lower Hand (Active) Site Assessment Clean, dry, & intact 3/21/2019 12:10 PM  
Phlebitis Assessment 0 3/21/2019 12:10 PM  
Infiltration Assessment 0 3/21/2019 12:10 PM  
Dressing Status Clean, dry, & intact 3/21/2019 12:10 PM  
Dressing Type Tape;Transparent 3/21/2019 12:10 PM  
Hub Color/Line Status Blue;Capped 3/21/2019 12:10 PM  
Alcohol Cap Used Yes 3/21/2019 12:10 PM  
  
 
Opportunity for questions and clarification was provided. Patient transported with: 
 Pear Deck

## 2019-03-21 NOTE — PROGRESS NOTES
Problem: Mobility Impaired (Adult and Pediatric) Goal: *Acute Goals and Plan of Care (Insert Text) Description Physical Therapy Goals Initiated 3/21/2019 1. Patient will move from supine to sit and sit to supine , scoot up and down and roll side to side in bed with modified independence within 7 day(s). 2.  Patient will transfer from bed to chair and chair to bed with modified independence using the least restrictive device within 7 day(s). 3.  Patient will perform sit to stand with modified independence within 7 day(s). 4.  Patient will ambulate with modified independence for 150 feet with the least restrictive device within 7 day(s). 5.  Patient will ascend/descend 2 stairs with right handrail(s) for community access with supervision/set-up within 7 day(s). Outcome: Progressing Towards Goal 
PHYSICAL THERAPY EVALUATION Patient: Augusto Dacosta (52 y.o. female) Date: 3/21/2019 Primary Diagnosis: SOB (shortness of breath) [R06.02] Precautions:  Fall(fluctuating confusion) ASSESSMENT : 
Based on the objective data described below, the patient presents with overall CGA-min A + max verbal cues to attend/recall current task for transfers and gait. Very slow to answer questions and poor historian throughout session. Could state she was at [facility], but unable to provide further history or other than the fact that her children do not live with her. Max verbal cues to perform sit<>stand as patient presented easily distracted and with impaired short term memory. Question her safety for living alone, however per some notes in the chart, her children have been staying with her 24/7? If patient has 24 hr care at home, she could likely d/c home with HHPT. If 24 hr care not feasible for family recommend d/c to SNF for rehab to increase safety and independence. Patient will benefit from skilled intervention to address the above impairments. Patient?s rehabilitation potential is considered to be Fair Factors which may influence rehabilitation potential include:  
? None noted ? Mental ability/status- impaired short term memory on eval 
?         Medical condition ? Home/family situation and support systems- lives alone? ?         Safety awareness 
? Pain tolerance/management 
? Other: PLAN : 
Recommendations and Planned Interventions: 
?           Bed Mobility Training             ? Neuromuscular Re-Education ? Transfer Training                   ? Orthotic/Prosthetic Training 
? Gait Training                         ? Modalities ? Therapeutic Exercises           ? Edema Management/Control ? Therapeutic Activities            ? Patient and Family Training/Education ? Other (comment): Frequency/Duration: Patient will be followed by physical therapy  5 times a week to address goals. Discharge Recommendations: Home Health if 24 hr supervision/assist available at home, if not available then recommend d/c to SNF for rehab to increase independence Further Equipment Recommendations for Discharge: TBD, likely none SUBJECTIVE:  
Patient stated ? [facility]. ..why did I come. .. ? mumbling and repeating PT's questions, unable to answer orientation questions efficiently/accurately beyond self and being at [facility] OBJECTIVE DATA SUMMARY:  
HISTORY:   
Past Medical History:  
Diagnosis Date CAD (coronary artery disease) Diabetes ([facility] [address] Hypertension Past Surgical History:  
Procedure Laterality Date HX KNEE REPLACEMENT Bilateral   
 HX ORTHOPAEDIC    
 IR INSERT TUNL CVC W/O PORT OVER 5 YR  3/5/2019 Prior Level of Function/Home Situation: Lives alone per chart but family may have been staying with her? Unclear. Used a cane prior to admission but answered that she does have a RW when asked. Personal factors and/or comorbidities impacting plan of care: PMH, short term memory loss Home Situation Home Environment: Private residence # Steps to Enter: 0 One/Two Story Residence: Two story Living Alone: Yes Support Systems: Child(jonn) Patient Expects to be Discharged to[de-identified] Private residence Current DME Used/Available at Home: Cane, straight, Walker, rolling EXAMINATION/PRESENTATION/DECISION MAKING:  
Critical Behavior: 
Neurologic State: Alert Orientation Level: Oriented to person, Oriented to place, Oriented to situation, Oriented to time Hearing: Auditory Auditory Impairment: None Range Of Motion: 
AROM: Generally decreased, functional 
Strength:   
Strength: Generally decreased, functional 
Tone & Sensation:  
Tone: Normal 
Sensation: Impaired(hx of neuropathy, difficulty to assess) Coordination: 
Coordination: Generally decreased, functional 
Functional Mobility: 
Bed Mobility: 
Supine to Sit: (received in chair) Transfers: 
Sit to Stand: Contact guard assistance Stand to Sit: Contact guard assistance Stand Pivot Transfers: Contact guard assistance;Minimum assistance(verbal directions) Balance:  
Sitting: Intact Standing: Intact; With support Ambulation/Gait Training: 
Distance (ft): 15 Feet (ft) Assistive Device: Gait belt;Walker, rolling Ambulation - Level of Assistance: Contact guard assistance Gait Abnormalities: Decreased step clearance Base of Support: Widened Speed/Lucy: Slow Step Length: Right shortened;Left shortened Functional Measure: 
Tinetti test: 
 
Sitting Balance: 1 Arises: 1 Attempts to Rise: 1 Immediate Standing Balance: 1 Standing Balance: 1 Nudged: 0 Eyes Closed: 0 Turn 360 Degrees - Continuous/Discontinuous: 0 Turn 360 Degrees - Steady/Unsteady: 1 Sitting Down: 1 Balance Score: 7 Indication of Gait: 1 
R Step Length/Height: 1 L Step Length/Height: 1 
R Foot Clearance: 1 L Foot Clearance: 1 Step Symmetry: 1 Step Continuity: 1 Path: 1 Trunk: 0 Walking Time: 0 Gait Score: 8 Total Score: 15 
 
 
 Tinetti Tool Score Risk of Falls 
<19 = High Fall Risk 19-24 = Moderate Fall Risk 25-28 = Low Fall Risk Tinetti ME. Performance-Oriented Assessment of Mobility Problems in Elderly Patients. [facility] 66; [id number]. (Scoring Description: PT Bulletin Feb. 10, 1993) Older adults: Michellerious Deandre et al, 2009; n = [address] elderly evaluated with ABC, YADIRA, ADL, and IADL) · Mean YADIRA score for males aged 69-68 years = 26.21(3.40) · Mean YADIRA score for females age 69-68 years = 25.16(4.30) · Mean YADIRA score for males over 80 years = 23.29(6.02) · Mean YADIRA score for females over 80 years = 17.20(8.32) Physical Therapy Evaluation Charge Determination History Examination Presentation Decision-Making HIGH Complexity :3+ comorbidities / personal factors will impact the outcome/ POC  HIGH Complexity : 4+ Standardized tests and measures addressing body structure, function, activity limitation and / or participation in recreation  LOW Complexity : Stable, uncomplicated  Other outcome measures Tinetti 15/28  MEDIUM Based on the above components, the patient evaluation is determined to be of the following complexity level: LOW Pain: 
Pain Scale 1: Numeric (0 - 10) Pain Intensity 1: 0 Activity Tolerance: VSS Please refer to the flowsheet for vital signs taken during this treatment. After treatment:  
?         Patient left in no apparent distress sitting up in wheelchair for transfer to new room ? Patient left in no apparent distress in bed 
? Call bell left within reach ? Nursing notified ? Caregiver present ? Bed alarm activated COMMUNICATION/EDUCATION:  
The patient?s plan of care was discussed with: Registered Nurse. ?         Fall prevention education was provided and the patient/caregiver indicated understanding. ? Patient/family have participated as able in goal setting and plan of care. ?         Patient/family agree to work toward stated goals and plan of care. ?         Patient understands intent and goals of therapy, but is neutral about his/her participation. ? Patient is unable to participate in goal setting and plan of care. Thank you for this referral. 
Denis Hines, PT, DPT Time Calculation: 17 mins

## 2019-03-21 NOTE — DIABETES MGMT
DTC Progress Note Recommendations/ Comments: Chart reviewed due to hyperglycemia. FBG this am was 232 mg/dl again. Pt has received 10 units of correction in the last 24 hours. If appropriate, please consider: 
Increase Lantus to 18 units Current hospital DM medication: Lantus, 15 units Lispro correction scale, normal sensitivity Chart reviewed on [address] [facility]. Patient is a 78 y.o. female with known DM on 15 units of Lantus at home. A1c:  
Lab Results Component Value Date/Time Hemoglobin A1c 8.3 (H) 03/05/2019 04:45 AM  
 
 
Recent Glucose Results:  
Lab Results Component Value Date/Time  (H) 03/21/2019 03:48 AM  
 GLUCPOC 232 (H) 03/21/2019 11:43 AM  
 GLUCPOC 187 (H) 03/21/2019 06:41 AM  
 GLUCPOC 173 (H) 03/20/2019 09:29 PM  
  
 
Lab Results Component Value Date/Time Creatinine 1.84 (H) 03/21/2019 03:48 AM  
 
Estimated Creatinine Clearance: 28.9 mL/min (A) (based on SCr of 1.84 mg/dL (H)). Active Orders Diet DIET DIABETIC WITH OPTIONS Consistent Carb 1800kcal; Soft Solids; 2 GM NA (House Low NA) PO intake:  
Patient Vitals for the past 72 hrs: 
 % Diet Eaten  
03/21/19 1210 70 % 03/21/19 0852 90 % 03/20/19 1552 80 % 03/20/19 1218 90 % 03/20/19 0757 80 % 03/19/19 1723 100 % Will continue to follow as needed. Thank you Gilberto Baldwin RD Time spent: 5 minutes

## 2019-03-21 NOTE — PROGRESS NOTES
Primary Nurse Maco Pulido RN and Lexi Acosta RN performed a dual skin assessment on this patient No impairment noted Mohan score is 14 Buttocks red and skin intact

## 2019-03-21 NOTE — PROGRESS NOTES
Hospitalist Progress Note Verónica Cruz MD 
Answering service: [phone] [place] [phone] from in house phone Date of Service:  3/21/2019 NAME:  Pascual Arellano :  1940 MRN:  [id number] Admission Summary:  
Pascual Arellano is a 78 y.o. female with pmh of newly diagnosed CKD s/p HD (had not needed HD on discharge) , recently diagnosed NSTEMI and Tokostubos cardiomyopathy (with recent LHC without evidence of CAD), ?COPD (had wheezing prior admission and started on inhalers) who presented to the ED initially for hyperglycemia and SOB. Per family (daughters) at bedside, patient lives alone and had been altered and more sleepy than usual since discharge from the hospital . Today she had high blood sugars at home > 500 which were noted by nursing. She also was hypoxic with saturations of 88 or so at home on room air. Subsequently 911 was called. Although she lives alone she has family at her side . They have noted intermittent altered mental status, which has not improved since discharge from the hospital. Deny any F/C. Has had some intermittnet wheezing for which she has been taking her grandchildren's nebulizer treatments without improvement. No abdominal pain, N/V/D/C. In the ED was hypoxic to 88-86% on RA and admitted for further workup. Interval history / Subjective:  
   
Pt seen in room Reports she feels weak Assessment & Plan: SOB - likely from fluid overload and large L pleural effusion, BNP elevated. -  On bumex - S/P  Thoracentesis 3/19 
- Influenza - neg - H/o NSTEMI - trend troponin, continue ASA, Statin Recent cath last admission. Leukocytosis Improving U/A positive for UTI Check Urine and Blood cultures Nn Rocephin/Doxy - Cover for pneumonia, UTI Thoracentesis analysis Exudative - Followup Fluid Cultures - so far NGTD 
  
Takotsubo cardiomyopathy  
- Diuresis, cardiology consult - I and O, daily weight  
  
Newly diagnosed CKD, Cr actually stable, and no acute need for HD as far as I can tell. Did require HD during prior admission - Diuresis as above - Follow I and O  
- Nephrology consulted Hypokalemia Adjusted Potassium dosing.  
  
Metabolic encephalopathy - ? Polypharmacy 
- Hold all narcotics, benzos, and other altering medications: holding tramadol, ativan, gabapentin, oxybutynin 
-Resolved  
  
Depression - continue venlafaxine Type 2 diabetes with hyperglycemia 8.3% - POCT glucose checks, long acting insulin, hypoglycemia protocol.  
  
DVT PPX - heparin Iron Def anemia Start on iron Infusion CODE STATUS: FULL, I discussed with patient and daughter and she wanted full code. - 3/19 Risk of deterioration: medium Total time spent with patient: 32 Minutes Care Plan discussed with: Patient/Family and Nurse Disposition: HH PT, OT, RN and PT eval - d/c in 1- 2 days Hospital Problems  Date Reviewed: 3/18/2019 Codes Class Noted POA  
 SOB (shortness of breath) ICD-10-CM: R06.02 
ICD-9-CM: 786.05  3/18/2019 Unknown Review of Systems:  
Pertinent items are noted in HPI. Vital Signs:  
 Last 24hrs VS reviewed since prior progress note. Most recent are: 
Visit Vitals /66 (BP 1 Location: Left arm, BP Patient Position: At rest) Pulse 80 Temp 98 °F (36.7 °C) Resp 16 Ht 5' 5\" (1.651 m) Wt 99.1 kg (218 lb 8 oz) SpO2 97% BMI 36.36 kg/m² Intake/Output Summary (Last 24 hours) at 3/21/2019 1858 Last data filed at 3/21/2019 0700 Gross per 24 hour Intake 630 ml Output 550 ml Net 80 ml Physical Examination:  
 
 
     
Constitutional:  No acute distress, cooperative, pleasant   
ENT:  Oral mucous moist, oropharynx benign. Neck supple, Resp:  Decreased at bases. No accessory muscle use CV:  Regular rhythm, normal rate, no murmurs, gallops, rubs GI:  Soft, non distended, non tender. normoactive bowel sounds, no hepatosplenomegaly Musculoskeletal:  No edema, warm, 2+ pulses throughout Neurologic:  Moves all extremities. AAOx3, CN II-XII reviewed Skin:  Good turgor, no rashes or ulcers Data Review:  
 Review and/or order of clinical lab test 
Review and/or order of tests in the radiology section of CPT Review and/or order of tests in the medicine section of CPT Labs:  
 
Recent Labs  
  03/21/19 0348 03/20/19 
4387 WBC 10.2 17.5* HGB 9.6* 9.9*  
HCT 31.1* 31.7*  397 Recent Labs  
  03/21/19 0348 03/20/19 
9120 03/20/19 0432 03/19/19 
0425  133* 135* 129*  
K 4.5 3.2* 3.5 2.5*  
CL 97 90* 90* 82* CO2 37* 36* 36* 41* BUN 43* 38* 39* 37* CREA 1.84* 1.93* 1.90* 2.29* * 203* 233* 221* CA 9.2 9.2 9.1 9.3 MG  --  1.9  --  1.3*  
PHOS  --   --  2.7 4.4 Recent Labs  
  03/20/19 0432 03/19/19 0425 03/18/19 
1249 SGOT  --   --  35 ALT  --   --  20  
AP  --   --  121* TBILI  --   --  0.6 TP  --  7.9 8.1 ALB 2.7*  --  2.5*  
GLOB  --   --  5.6* No results for input(s): INR, PTP, APTT in the last 72 hours. No lab exists for component: INREXT, INREXT Recent Labs  
  03/20/19 
8743 TIBC 188* PSAT 7*  
FERR 1,375* No results found for: FOL, RBCF No results for input(s): PH, PCO2, PO2 in the last 72 hours. Recent Labs  
  03/19/19 0425 03/18/19 
1249 TROIQ <0.05 <0.05 No results found for: CHOL, CHOLX, CHLST, CHOLV, HDL, LDL, LDLC, DLDLP, TGLX, TRIGL, TRIGP, CHHD, CHHDX Lab Results Component Value Date/Time Glucose (POC) 187 (H) 03/21/2019 06:41 AM  
 Glucose (POC) 173 (H) 03/20/2019 09:29 PM  
 Glucose (POC) 256 (H) 03/20/2019 04:11 PM  
 Glucose (POC) 190 (H) 03/20/2019 12:21 PM  
 Glucose (POC) 232 (H) 03/20/2019 06:26 AM  
 
Lab Results Component Value Date/Time  Color YELLOW/STRAW 03/18/2019 12:49 PM  
 Appearance CLEAR 03/18/2019 12:49 PM  
 Specific gravity 1.014 03/18/2019 12:49 PM  
 pH (UA) 5.5 03/18/2019 12:49 PM  
 Protein 30 (A) 03/18/2019 12:49 PM  
 Glucose 100 (A) 03/18/2019 12:49 PM  
 Ketone NEGATIVE  03/18/2019 12:49 PM  
 Bilirubin NEGATIVE  03/18/2019 12:49 PM  
 Urobilinogen 0.2 03/18/2019 12:49 PM  
 Nitrites NEGATIVE  03/18/2019 12:49 PM  
 Leukocyte Esterase TRACE (A) 03/18/2019 12:49 PM  
 Epithelial cells FEW 03/18/2019 12:49 PM  
 Bacteria NEGATIVE  03/18/2019 12:49 PM  
 WBC 5-10 03/18/2019 12:49 PM  
 RBC 0-5 03/18/2019 12:49 PM  
 
 
 
Medications Reviewed:  
 
Current Facility-Administered Medications Medication Dose Route Frequency  potassium chloride SR (KLOR-CON 10) tablet 20 mEq  20 mEq Oral DAILY  bumetanide (BUMEX) tablet 2 mg  2 mg Oral BID  doxycycline (VIBRAMYCIN) 100 mg in 0.9% sodium chloride (MBP/ADV) 100 mL  100 mg IntraVENous Q12H  cefTRIAXone (ROCEPHIN) 1 g in 0.9% sodium chloride (MBP/ADV) 50 mL  1 g IntraVENous Q24H  
 sodium chloride (NS) flush 5-40 mL  5-40 mL IntraVENous Q8H  
 sodium chloride (NS) flush 5-40 mL  5-40 mL IntraVENous PRN  
 morphine injection 2 mg  2 mg IntraVENous Q4H PRN  
 ondansetron (ZOFRAN) injection 4 mg  4 mg IntraVENous Q4H PRN  
 albuterol (PROVENTIL VENTOLIN) nebulizer solution 2.5 mg  2.5 mg Nebulization Q4H PRN  
 aspirin delayed-release tablet 81 mg  81 mg Oral DAILY  benzonatate (TESSALON) capsule 100 mg  100 mg Oral TID PRN  
 bisacodyl (DULCOLAX) tablet 5 mg  5 mg Oral DAILY PRN  
 calcitRIOL (ROCALTROL) capsule 0.5 mcg  0.5 mcg Oral DAILY  carvedilol (COREG) tablet 6.25 mg  6.25 mg Oral BID WITH MEALS  insulin glargine (LANTUS) injection 15 Units  15 Units SubCUTAneous DAILY  venlafaxine (EFFEXOR) tablet 37.5 mg  37.5 mg Oral BID  
 glucose chewable tablet 16 g  4 Tab Oral PRN  
 dextrose (D50W) injection syrg 12.5-25 g  25-50 mL IntraVENous PRN  
 glucagon (GLUCAGEN) injection 1 mg  1 mg IntraMUSCular PRN  
  heparin (porcine) injection 5,000 Units  5,000 Units SubCUTAneous Q8H  
 insulin lispro (HUMALOG) injection   SubCUTAneous AC&HS  
 
______________________________________________________________________ EXPECTED LENGTH OF STAY: 4d 19h ACTUAL LENGTH OF STAY:          3 
 
            
Pippa Stafford MD  
Patient has given Verbal permission to discuss medical care with  
persons present in the room and and also with contact as listed on face sheet.

## 2019-03-21 NOTE — PROGRESS NOTES
TRANSFER - IN REPORT: 
 
[facility] [address]  being received from [facility] for routine progression of care Report consisted of patients Situation, Background, Assessment and  
Recommendations(SBAR). Information from the following report(s) SBAR, Kardex and MAR was reviewed with the receiving nurse. Opportunity for questions and clarification was provided. Assessment completed upon patients arrival to unit and care assumed.

## 2019-03-21 NOTE — PROGRESS NOTES
Nephrology Progress Note Michelle Farley Date of Admission : 3/18/2019 CC:  Follow up for ASUNCION Assessment and Plan ASUNCION on CKD : 
- resolving ASUNCION, likely from ATN and KIRAN 
- Cr continues to improve 
- change to oral diuretics 
- daily labs while here CKD II w/ baseline Cr around 1 
  
L pleural effusion: 
- s/p thoracentesis 3/19 
  
Hypokalemia/Hypomag: 
- labs pending 
  
Hyponatremia: 
- likely 2/2 volume overload 
- cont diuresis 
  
Recent NSTEMI:  
- Cath : Non occlusiVe CAD  
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  
  
Chronic Anemia: 
- give IV iron while here - Epogen x 1 today 
  
DM-II: 
- on insulin Interval History: 
Seen and examined. Still on NC O2. Good UOP. No increase in sob. No cp, n/v/d reported. Current Medications: all current  Medications have been eviewed in [facility] Review of Systems: Pertinent items are noted in HPI. Objective: 
Vitals:   
Vitals:  
 03/20/19 2249 03/21/19 0272 03/21/19 2238 03/21/19 9036 BP: 122/66 104/66  117/73 Pulse: 75 80  74 Resp: 16 16  17 Temp: 98.2 °F (36.8 °C) 98 °F (36.7 °C)  97.8 °F (36.6 °C) SpO2: 98% 97%  98% Weight:   99.1 kg (218 lb 8 oz) Height:      
 
Intake and Output: 
03/21 0701 - 03/21 1900 In: 240 [P.O.:240] Out: 250 [Urine:250] 03/19 1901 - 03/21 0700 In: 1 [P.O.:680; I.V.:150] Out: 850 [Urine:850] Physical Examination:General: NAD,Conversant Neck:  Supple, no mass Resp:  Reduced bibasilar breath sounds CV:  RRR,  no murmur or rub, 1+ LE edema GI:  Soft, NT, + Bowel sounds, no hepatosplenomegaly Neurologic:  Non focal 
Psych:             AAO x 3 appropriate affect Skin:  No Rash :  No cartagena []    High complexity decision making was performed 
[]    Patient is at high-risk of decompensation with multiple organ involvement Lab Data Personally Reviewed: I have reviewed all the pertinent labs, microbiology data and radiology studies during assessment. Recent Labs  
  03/21/19 2376 03/20/19 
7129 03/20/19 
0432 03/19/19 
0425 03/18/19 
1249  133* 135* 129* 126*  
K 4.5 3.2* 3.5 2.5* 3.0*  
CL 97 90* 90* 82* 78* CO2 37* 36* 36* 41* 39* * 203* 233* 221* 374* BUN 43* 38* 39* 37* 33* CREA 1.84* 1.93* 1.90* 2.29* 2.54* CA 9.2 9.2 9.1 9.3 9.4 MG  --  1.9  --  1.3*  --   
PHOS  --   --  2.7 4.4  --   
ALB  --   --  2.7*  --  2.5* SGOT  --   --   --   --  35 ALT  --   --   --   --  20 Recent Labs  
  03/21/19 
0348 03/20/19 0432 03/19/19 
0425 WBC 10.2 17.5* 14.3* HGB 9.6* 9.9* 9.5* HCT 31.1* 31.7* 31.3*  
 397 404* No results found for: SDES Lab Results Component Value Date/Time Culture result: NO GROWTH AFTER 18 HOURS 03/19/2019 01:02 PM  
 Culture result: NO GROWTH 5 DAYS 03/01/2019 08:22 AM  
 
Recent Results (from the past 24 hour(s)) SAMPLES BEING HELD Collection Time: 03/20/19 12:02 PM  
Result Value Ref Range SAMPLES BEING HELD 1UA   
 COMMENT Add-on orders for these samples will be processed based on acceptable specimen integrity and analyte stability, which may vary by analyte. GLUCOSE, POC Collection Time: 03/20/19 12:21 PM  
Result Value Ref Range Glucose (POC) 190 (H) 65 - 100 mg/dL Performed by Jax Ramírez GLUCOSE, POC Collection Time: 03/20/19  4:11 PM  
Result Value Ref Range Glucose (POC) 256 (H) 65 - 100 mg/dL Performed by Jeny Briones GLUCOSE, POC Collection Time: 03/20/19  9:29 PM  
Result Value Ref Range Glucose (POC) 173 (H) 65 - 100 mg/dL Performed by Dio Garrett CBC WITH AUTOMATED DIFF Collection Time: 03/21/19  3:48 AM  
Result Value Ref Range WBC 10.2 3.6 - 11.0 K/uL  
 RBC 3.55 (L) 3.80 - 5.20 M/uL HGB 9.6 (L) 11.5 - 16.0 g/dL HCT 31.1 (L) 35.0 - 47.0 % MCV 87.6 80.0 - 99.0 FL  
 MCH 27.0 26.0 - 34.0 PG  
 MCHC 30.9 30.0 - 36.5 g/dL  
 RDW 14.5 11.5 - 14.5 % PLATELET 407 156 - 390 K/uL MPV 10.5 8.9 - 12.9 FL  
 NRBC 0.0 0  WBC ABSOLUTE NRBC 0.00 0.00 - 0.01 K/uL NEUTROPHILS 66 32 - 75 % LYMPHOCYTES 19 12 - 49 % MONOCYTES 10 5 - 13 % EOSINOPHILS 3 0 - 7 % BASOPHILS 1 0 - 1 % IMMATURE GRANULOCYTES 1 (H) 0.0 - 0.5 % ABS. NEUTROPHILS 6.8 1.8 - 8.0 K/UL  
 ABS. LYMPHOCYTES 1.9 0.8 - 3.5 K/UL  
 ABS. MONOCYTES 1.0 0.0 - 1.0 K/UL  
 ABS. EOSINOPHILS 0.4 0.0 - 0.4 K/UL  
 ABS. BASOPHILS 0.1 0.0 - 0.1 K/UL  
 ABS. IMM. GRANS. 0.1 (H) 0.00 - 0.04 K/UL  
 DF AUTOMATED METABOLIC PANEL, BASIC Collection Time: 03/21/19  3:48 AM  
Result Value Ref Range Sodium 138 136 - 145 mmol/L Potassium 4.5 3.5 - 5.1 mmol/L Chloride 97 97 - 108 mmol/L  
 CO2 37 (H) 21 - 32 mmol/L Anion gap 4 (L) 5 - 15 mmol/L Glucose 174 (H) 65 - 100 mg/dL BUN 43 (H) 6 - 20 MG/DL Creatinine 1.84 (H) 0.55 - 1.02 MG/DL  
 BUN/Creatinine ratio 23 (H) 12 - 20 GFR est AA 32 (L) >60 ml/min/1.73m2 GFR est non-AA 26 (L) >60 ml/min/1.73m2 Calcium 9.2 8.5 - 10.1 MG/DL  
GLUCOSE, POC Collection Time: 03/21/19  6:41 AM  
Result Value Ref Range Glucose (POC) 187 (H) 65 - 100 mg/dL Performed by Noland Cushing Mardell Balk, MD 
[address] [place] [address], [address] [facility] Phone - [phone] Fax - [phone] 
[email or web address]

## 2019-03-21 NOTE — PROGRESS NOTES
Problem: Diabetes Self-Management Goal: *Monitoring blood glucose, interpreting and using results Description Identify recommended blood glucose targets  and personal targets. Outcome: Progressing Towards Goal 
Note:  
Blood sugar check AC/HS, administering insulin as ordered, will ctm Problem: Falls - Risk of 
Goal: *Absence of Falls Description Document Pink Rank Fall Risk and appropriate interventions in the flowsheet. Outcome: Progressing Towards Goal 
Note:  
Fall Risk Interventions: 
Mobility Interventions: Patient to call before getting OOB Mentation Interventions: Increase mobility Medication Interventions: Patient to call before getting OOB Elimination Interventions: Call light in reach History of Falls Interventions: Evaluate medications/consider consulting pharmacy Problem: Pressure Injury - Risk of 
Goal: *Prevention of pressure injury Description Document Mohan Scale and appropriate interventions in the flowsheet. Outcome: Progressing Towards Goal 
Note:  
Pressure Injury Interventions: 
Sensory Interventions: Assess changes in LOC, Minimize linen layers, Turn and reposition approx. every two hours (pillows and wedges if needed) Moisture Interventions: Absorbent underpads Activity Interventions: Increase time out of bed, Pressure redistribution bed/mattress(bed type) Mobility Interventions: HOB 30 degrees or less Nutrition Interventions: Document food/fluid/supplement intake Friction and Shear Interventions: HOB 30 degrees or less

## 2019-03-22 ENCOUNTER — APPOINTMENT (OUTPATIENT)
Dept: CT IMAGING | Age: 79
DRG: 186 | End: 2019-03-22
Attending: HOSPITALIST
Payer: MEDICARE

## 2019-03-22 LAB
AMMONIA PLAS-SCNC: <10 UMOL/L
ANION GAP SERPL CALC-SCNC: 5 MMOL/L (ref 5–15)
ARTERIAL PATENCY WRIST A: YES
BACTERIA SPEC CULT: ABNORMAL
BASE EXCESS BLD CALC-SCNC: 13 MMOL/L
BASOPHILS # BLD: 0.1 K/UL (ref 0–0.1)
BASOPHILS NFR BLD: 1 % (ref 0–1)
BDY SITE: ABNORMAL
BUN SERPL-MCNC: 36 MG/DL (ref 6–20)
BUN/CREAT SERPL: 21 (ref 12–20)
CALCIUM SERPL-MCNC: 10.4 MG/DL (ref 8.5–10.1)
CC UR VC: ABNORMAL
CHLORIDE SERPL-SCNC: 95 MMOL/L (ref 97–108)
CO2 SERPL-SCNC: 38 MMOL/L (ref 21–32)
CREAT SERPL-MCNC: 1.75 MG/DL (ref 0.55–1.02)
DIFFERENTIAL METHOD BLD: ABNORMAL
EOSINOPHIL # BLD: 0.2 K/UL (ref 0–0.4)
EOSINOPHIL NFR BLD: 2 % (ref 0–7)
ERYTHROCYTE [DISTWIDTH] IN BLOOD BY AUTOMATED COUNT: 14.4 % (ref 11.5–14.5)
GAS FLOW.O2 O2 DELIVERY SYS: ABNORMAL L/MIN
GLUCOSE BLD STRIP.AUTO-MCNC: 126 MG/DL (ref 65–100)
GLUCOSE BLD STRIP.AUTO-MCNC: 135 MG/DL (ref 65–100)
GLUCOSE BLD STRIP.AUTO-MCNC: 150 MG/DL (ref 65–100)
GLUCOSE BLD STRIP.AUTO-MCNC: 211 MG/DL (ref 65–100)
GLUCOSE SERPL-MCNC: 160 MG/DL (ref 65–100)
HCO3 BLD-SCNC: 35.2 MMOL/L (ref 22–26)
HCT VFR BLD AUTO: 35.1 % (ref 35–47)
HGB BLD-MCNC: 10.9 G/DL (ref 11.5–16)
IMM GRANULOCYTES # BLD AUTO: 0.1 K/UL (ref 0–0.04)
IMM GRANULOCYTES NFR BLD AUTO: 1 % (ref 0–0.5)
LYMPHOCYTES # BLD: 2.1 K/UL (ref 0.8–3.5)
LYMPHOCYTES NFR BLD: 20 % (ref 12–49)
MCH RBC QN AUTO: 26.8 PG (ref 26–34)
MCHC RBC AUTO-ENTMCNC: 31.1 G/DL (ref 30–36.5)
MCV RBC AUTO: 86.5 FL (ref 80–99)
MONOCYTES # BLD: 1.1 K/UL (ref 0–1)
MONOCYTES NFR BLD: 11 % (ref 5–13)
NEUTS SEG # BLD: 6.9 K/UL (ref 1.8–8)
NEUTS SEG NFR BLD: 65 % (ref 32–75)
NRBC # BLD: 0 K/UL (ref 0–0.01)
NRBC BLD-RTO: 0 PER 100 WBC
O2/TOTAL GAS SETTING VFR VENT: 0.21 %
PCO2 BLD: 41.6 MMHG (ref 35–45)
PH BLD: 7.53 [PH] (ref 7.35–7.45)
PLATELET # BLD AUTO: 413 K/UL (ref 150–400)
PMV BLD AUTO: 10.5 FL (ref 8.9–12.9)
PO2 BLD: 64 MMHG (ref 80–100)
POTASSIUM SERPL-SCNC: 3.7 MMOL/L (ref 3.5–5.1)
RBC # BLD AUTO: 4.06 M/UL (ref 3.8–5.2)
SAO2 % BLD: 94 % (ref 92–97)
SERVICE CMNT-IMP: ABNORMAL
SODIUM SERPL-SCNC: 138 MMOL/L (ref 136–145)
SPECIMEN TYPE: ABNORMAL
TOTAL RESP. RATE, ITRR: 16
WBC # BLD AUTO: 10.5 K/UL (ref 3.6–11)

## 2019-03-22 PROCEDURE — 74011250636 HC RX REV CODE- 250/636: Performed by: HOSPITALIST

## 2019-03-22 PROCEDURE — 82803 BLOOD GASES ANY COMBINATION: CPT

## 2019-03-22 PROCEDURE — 74011000258 HC RX REV CODE- 258: Performed by: INTERNAL MEDICINE

## 2019-03-22 PROCEDURE — 74011250636 HC RX REV CODE- 250/636: Performed by: INTERNAL MEDICINE

## 2019-03-22 PROCEDURE — 65660000000 HC RM CCU STEPDOWN

## 2019-03-22 PROCEDURE — 82140 ASSAY OF AMMONIA: CPT

## 2019-03-22 PROCEDURE — 70450 CT HEAD/BRAIN W/O DYE: CPT

## 2019-03-22 PROCEDURE — 74011636637 HC RX REV CODE- 636/637: Performed by: INTERNAL MEDICINE

## 2019-03-22 PROCEDURE — 36415 COLL VENOUS BLD VENIPUNCTURE: CPT

## 2019-03-22 PROCEDURE — 85025 COMPLETE CBC W/AUTO DIFF WBC: CPT

## 2019-03-22 PROCEDURE — 74011000258 HC RX REV CODE- 258: Performed by: HOSPITALIST

## 2019-03-22 PROCEDURE — 74011250637 HC RX REV CODE- 250/637: Performed by: INTERNAL MEDICINE

## 2019-03-22 PROCEDURE — 74011250637 HC RX REV CODE- 250/637: Performed by: HOSPITALIST

## 2019-03-22 PROCEDURE — 80048 BASIC METABOLIC PNL TOTAL CA: CPT

## 2019-03-22 PROCEDURE — 74011636637 HC RX REV CODE- 636/637: Performed by: HOSPITALIST

## 2019-03-22 PROCEDURE — 82962 GLUCOSE BLOOD TEST: CPT

## 2019-03-22 PROCEDURE — 36600 WITHDRAWAL OF ARTERIAL BLOOD: CPT

## 2019-03-22 RX ORDER — CEPHALEXIN 500 MG/1
500 CAPSULE ORAL 4 TIMES DAILY
Qty: 20 CAP | Refills: 0 | Status: SHIPPED
Start: 2019-03-22 | End: 2019-03-23

## 2019-03-22 RX ORDER — DOXYCYCLINE 100 MG/1
100 CAPSULE ORAL 2 TIMES DAILY
Qty: 8 CAP | Refills: 0 | Status: SHIPPED
Start: 2019-03-22 | End: 2019-03-23

## 2019-03-22 RX ORDER — BENZONATATE 100 MG/1
100 CAPSULE ORAL
Status: ON HOLD | COMMUNITY
Start: 2019-03-22 | End: 2020-10-13

## 2019-03-22 RX ORDER — POTASSIUM CHLORIDE 1500 MG/1
20 TABLET, FILM COATED, EXTENDED RELEASE ORAL DAILY
Qty: 10 TAB | Refills: 0 | Status: ON HOLD
Start: 2019-03-23 | End: 2020-10-13

## 2019-03-22 RX ORDER — LEVOFLOXACIN 5 MG/ML
750 INJECTION, SOLUTION INTRAVENOUS
Status: DISCONTINUED | OUTPATIENT
Start: 2019-03-22 | End: 2019-03-26 | Stop reason: SDUPTHER

## 2019-03-22 RX ORDER — UREA 10 %
2 LOTION (ML) TOPICAL 2 TIMES DAILY
Qty: 20 TAB | Refills: 0 | Status: SHIPPED
Start: 2019-03-22 | End: 2019-03-27

## 2019-03-22 RX ADMIN — HEPARIN SODIUM 5000 UNITS: 5000 INJECTION INTRAVENOUS; SUBCUTANEOUS at 15:06

## 2019-03-22 RX ADMIN — POTASSIUM CHLORIDE 20 MEQ: 750 TABLET, EXTENDED RELEASE ORAL at 08:48

## 2019-03-22 RX ADMIN — CALCITRIOL 0.5 MCG: 0.25 CAPSULE ORAL at 08:48

## 2019-03-22 RX ADMIN — LEVOFLOXACIN 750 MG: 5 INJECTION, SOLUTION INTRAVENOUS at 15:06

## 2019-03-22 RX ADMIN — Medication 10 ML: at 15:07

## 2019-03-22 RX ADMIN — HEPARIN SODIUM 5000 UNITS: 5000 INJECTION INTRAVENOUS; SUBCUTANEOUS at 22:21

## 2019-03-22 RX ADMIN — VENLAFAXINE 37.5 MG: 37.5 TABLET ORAL at 08:48

## 2019-03-22 RX ADMIN — CARVEDILOL 6.25 MG: 6.25 TABLET, FILM COATED ORAL at 07:48

## 2019-03-22 RX ADMIN — BUMETANIDE 2 MG: 1 TABLET ORAL at 08:47

## 2019-03-22 RX ADMIN — Medication 10 ML: at 22:21

## 2019-03-22 RX ADMIN — CARVEDILOL 6.25 MG: 6.25 TABLET, FILM COATED ORAL at 17:47

## 2019-03-22 RX ADMIN — HEPARIN SODIUM 5000 UNITS: 5000 INJECTION INTRAVENOUS; SUBCUTANEOUS at 07:48

## 2019-03-22 RX ADMIN — INSULIN LISPRO 2 UNITS: 100 INJECTION, SOLUTION INTRAVENOUS; SUBCUTANEOUS at 17:47

## 2019-03-22 RX ADMIN — VENLAFAXINE 37.5 MG: 37.5 TABLET ORAL at 17:47

## 2019-03-22 RX ADMIN — IRON SUCROSE 200 MG: 20 INJECTION, SOLUTION INTRAVENOUS at 11:53

## 2019-03-22 RX ADMIN — BUMETANIDE 2 MG: 1 TABLET ORAL at 17:47

## 2019-03-22 RX ADMIN — INSULIN GLARGINE 15 UNITS: 100 INJECTION, SOLUTION SUBCUTANEOUS at 08:50

## 2019-03-22 RX ADMIN — DOXYCYCLINE 100 MG: 100 INJECTION, POWDER, LYOPHILIZED, FOR SOLUTION INTRAVENOUS at 10:52

## 2019-03-22 RX ADMIN — CEFTRIAXONE 1 G: 1 INJECTION, POWDER, FOR SOLUTION INTRAMUSCULAR; INTRAVENOUS at 08:52

## 2019-03-22 RX ADMIN — INSULIN LISPRO 3 UNITS: 100 INJECTION, SOLUTION INTRAVENOUS; SUBCUTANEOUS at 11:53

## 2019-03-22 RX ADMIN — ASPIRIN 81 MG: 81 TABLET ORAL at 08:47

## 2019-03-22 NOTE — PROGRESS NOTES
Reason for Admission:   SOB 
            
RRAT Score:     30 Resources/supports as identified by patient/family:  Family lives close by and is supportive. Top Challenges facing patient (as identified by patient/family and CM): lives alone Finances/Medication cost?  
Medicare and BCBS supplemental      
           
Transportation? Family will transport at discharge. Patient drives person vehicle to and from for appointment. Family plans to assist patient with follow up appointments at discharge. Support system or lack thereof? Patient's family is support. Son Cayla Forte and Carmen Manley, grand daughter. Living arrangements? Patient lives alone Self-care/ADLs/Cognition? Patient is independent with ADL's. Patient alert but not oriented. Current Advanced Directive/Advance Care Plan:  Not on file. Plan for utilizing home health:  No plans for home health. Patient family would like for patient to go to SNF. Likelihood of readmission: moderate Transition of Care Plan:               
CM met with patient to discuss discharge plans. Patient alert but not oriented during assessment. Called Romario Collins. Patient lives alone is independent with ADL's. Uses a cane for assistance. No oxygen, uses room air. Patient drives person vehicle to and from. Patient's family will transport at discharge and assist with transportation to follow up appointments. Confirmed PCP Dr. Gustavo Nguyen [phone]. Received home health orders. Patient family prefers SNF. Offered [facility]. Sent referral to [facility] via cc link. Family is in agreement. Called [facility] at [facility] to request auth for admission. Received call from [facility] advised patient authorized.  Rafa Mcclellan from "[employer] also called and provided [facility] auth# [id number]. Set up transportation with [facility] for will call provided auth# in referral. [facility] accepted. CM to follow patient for updates. BRADLEY Rodriges/EMANI CM received update from doctor that patient likely to discharge tomorrow. CM called [facility] at [facility] to advise. CM completed ambulance form and left it on the chart along with packet. BRADLEY Rodriges/CRM Care Management Interventions PCP Verified by CM: Yes 
Palliative Care Criteria Met (RRAT>21 & CHF Dx)?: No 
Mode of Transport at Discharge: Other (see comment)(Family will transport) Transition of Care Consult (CM Consult): SNF([facility] awaiting confirmation) MyChart Signup: No 
Discharge Durable Medical Equipment: No 
Health Maintenance Reviewed: Yes Physical Therapy Consult: Yes Occupational Therapy Consult: Yes Speech Therapy Consult: No 
Current Support Network: Lives Alone Confirm Follow Up Transport: Family(Granduaghter or son) Plan discussed with Pt/Family/Caregiver: Yes Freedom of Choice Offered: Yes [employer] Information Provided?: No 
Discharge Location Discharge Placement: Skilled nursing facility

## 2019-03-22 NOTE — PROGRESS NOTES
Hospitalist Progress Note Wu Gentile MD 
Answering service: [phone] OR [phone] from in house phone Date of Service:  3/22/2019 NAME:  Vimal Cruz :  1940 MRN:  [id number] Admission Summary:  
Vimal Cruz is a 78 y.o. female with pmh of newly diagnosed CKD s/p HD (had not needed HD on discharge) , recently diagnosed NSTEMI and Tokostubos cardiomyopathy (with recent LHC without evidence of CAD), ?COPD (had wheezing prior admission and started on inhalers) who presented to the ED initially for hyperglycemia and SOB. Per family (daughters) at bedside, patient lives alone and had been altered and more sleepy than usual since discharge from the hospital . Today she had high blood sugars at home > 500 which were noted by nursing. She also was hypoxic with saturations of 88 or so at home on room air. Subsequently 911 was called. Although she lives alone she has family at her side . They have noted intermittent altered mental status, which has not improved since discharge from the hospital. Deny any F/C. Has had some intermittnet wheezing for which she has been taking her grandchildren's nebulizer treatments without improvement. No abdominal pain, N/V/D/C. In the ED was hypoxic to 88-86% on RA and admitted for further workup. Interval history / Subjective:  
   
Pt seen in room Denies any complaints On Room air Assessment & Plan: SOB - likely from fluid overload and large L pleural effusion, BNP elevated. -  On bumex - S/P  Thoracentesis 3/19 - Cytology neg For malignancy - Influenza - neg 
-Echo Showing Normal EF at [facility] admission Leukocytosis Resolved U/A positive for UTI Blood cultures NGTD Rocephin/Doxy - Cover for pneumonia, UTI Thoracentesis  - Followup Fluid Cultures - so far NGTD 
  
Takotsubo cardiomyopathy  
- Diuresis, cardiology consult - I and O, daily weight -Recheck Echo Showing Normal LVEF 
  
Newly diagnosed CKD, Cr actually stable, and no acute need for HD as far as I can tell. Did require HD during prior admission - Diuresis as above - Follow I and O  
- Nephrology consulted 
-C/w home dosing of diuretics Recheck BMP in 1 week Hypokalemia Adjusted Potassium dosing.  
  
Metabolic encephalopathy - ? Polypharmacy/UTI - holding tramadol, ativan, gabapentin, oxybutynin 
-Check CT head 
  
Depression - continue venlafaxine Type 2 diabetes with hyperglycemia 8.3%  
- POCT glucose checks, long acting insulin, hypoglycemia protocol.  
  
DVT PPX - heparin Iron Def anemia 
on iron Infusion CODE STATUS: FULL, I discussed with patient and daughter and she wanted full code. - 3/19 Risk of deterioration: medium Total time spent with patient: 33 Minutes Care Plan discussed with: Patient/Family and Nurse Disposition: HH PT, OT, RN and PT eval - d/c today if ct head ok Hospital Problems  Date Reviewed: 3/18/2019 Codes Class Noted POA  
 SOB (shortness of breath) ICD-10-CM: R06.02 
ICD-9-CM: 786.05  3/18/2019 Unknown Review of Systems:  
Pertinent items are noted in HPI. Vital Signs:  
 Last 24hrs VS reviewed since prior progress note. Most recent are: 
Visit Vitals /69 (BP 1 Location: Right arm, BP Patient Position: At rest) Pulse 82 Temp 98.5 °F (36.9 °C) Resp 18 Ht 5' 5\" (1.651 m) Wt 94.3 kg (207 lb 12.8 oz) SpO2 95% BMI 34.58 kg/m² Intake/Output Summary (Last 24 hours) at 3/22/2019 2570 Last data filed at 3/21/2019 1210 Gross per 24 hour Intake 550 ml Output 200 ml Net 350 ml Physical Examination:  
 
 
     
Constitutional:  No acute distress, cooperative, pleasant   
ENT:  Oral mucous moist, oropharynx benign. Neck supple, Resp:  Decreased at bases. No accessory muscle use CV:  Regular rhythm, normal rate, no murmurs, gallops, rubs GI:  Soft, non distended, non tender. normoactive bowel sounds, no hepatosplenomegaly Musculoskeletal:  No edema, warm, 2+ pulses throughout Neurologic:  Moves all extremities. AAOx3, CN II-XII reviewed Skin:  Good turgor, no rashes or ulcers Data Review:  
 Review and/or order of clinical lab test 
Review and/or order of tests in the radiology section of CPT Review and/or order of tests in the medicine section of CPT Labs:  
 
Recent Labs  
  03/22/19 
0555 03/21/19 
0348 WBC 10.5 10.2 HGB 10.9* 9.6* HCT 35.1 31.1*  
* 375 Recent Labs  
  03/22/19 
0555 03/21/19 
5372 03/20/19 
5049 03/20/19 
3082  138 133* 135* K 3.7 4.5 3.2* 3.5 CL 95* 97 90* 90* CO2 38* 37* 36* 36* BUN 36* 43* 38* 39* CREA 1.75* 1.84* 1.93* 1.90* * 174* 203* 233* CA 10.4* 9.2 9.2 9.1 MG  --   --  1.9  --   
PHOS  --   --   --  2.7 Recent Labs  
  03/20/19 
6212 ALB 2.7* No results for input(s): INR, PTP, APTT in the last 72 hours. No lab exists for component: INREXT, INREXT Recent Labs  
  03/20/19 
5167 TIBC 188* PSAT 7*  
FERR 1,375* No results found for: FOL, RBCF No results for input(s): PH, PCO2, PO2 in the last 72 hours. No results for input(s): CPK, CKNDX, TROIQ in the last 72 hours. No lab exists for component: CPKMB No results found for: CHOL, CHOLX, CHLST, CHOLV, HDL, LDL, LDLC, DLDLP, TGLX, TRIGL, TRIGP, CHHD, CHHDX Lab Results Component Value Date/Time Glucose (POC) 135 (H) 03/22/2019 06:31 AM  
 Glucose (POC) 113 (H) 03/21/2019 10:31 PM  
 Glucose (POC) 155 (H) 03/21/2019 04:40 PM  
 Glucose (POC) 232 (H) 03/21/2019 11:43 AM  
 Glucose (POC) 187 (H) 03/21/2019 06:41 AM  
 
Lab Results Component Value Date/Time  Color YELLOW/STRAW 03/18/2019 12:49 PM  
 Appearance CLEAR 03/18/2019 12:49 PM  
 Specific gravity 1.014 03/18/2019 12:49 PM  
 pH (UA) 5.5 03/18/2019 12:49 PM  
 Protein 30 (A) 03/18/2019 12:49 PM  
 Glucose 100 (A) 03/18/2019 12:49 PM  
 Ketone NEGATIVE  03/18/2019 12:49 PM  
 Bilirubin NEGATIVE  03/18/2019 12:49 PM  
 Urobilinogen 0.2 03/18/2019 12:49 PM  
 Nitrites NEGATIVE  03/18/2019 12:49 PM  
 Leukocyte Esterase TRACE (A) 03/18/2019 12:49 PM  
 Epithelial cells FEW 03/18/2019 12:49 PM  
 Bacteria NEGATIVE  03/18/2019 12:49 PM  
 WBC 5-10 03/18/2019 12:49 PM  
 RBC 0-5 03/18/2019 12:49 PM  
 
 
 
Medications Reviewed:  
 
Current Facility-Administered Medications Medication Dose Route Frequency  potassium chloride SR (KLOR-CON 10) tablet 20 mEq  20 mEq Oral DAILY  iron sucrose (VENOFER) 200 mg in 0.9% sodium chloride 100 mL IVPB  200 mg IntraVENous Q24H  
 bumetanide (BUMEX) tablet 2 mg  2 mg Oral BID  doxycycline (VIBRAMYCIN) 100 mg in 0.9% sodium chloride (MBP/ADV) 100 mL  100 mg IntraVENous Q12H  cefTRIAXone (ROCEPHIN) 1 g in 0.9% sodium chloride (MBP/ADV) 50 mL  1 g IntraVENous Q24H  
 sodium chloride (NS) flush 5-40 mL  5-40 mL IntraVENous Q8H  
 sodium chloride (NS) flush 5-40 mL  5-40 mL IntraVENous PRN  
 morphine injection 2 mg  2 mg IntraVENous Q4H PRN  
 ondansetron (ZOFRAN) injection 4 mg  4 mg IntraVENous Q4H PRN  
 albuterol (PROVENTIL VENTOLIN) nebulizer solution 2.5 mg  2.5 mg Nebulization Q4H PRN  
 aspirin delayed-release tablet 81 mg  81 mg Oral DAILY  benzonatate (TESSALON) capsule 100 mg  100 mg Oral TID PRN  
 bisacodyl (DULCOLAX) tablet 5 mg  5 mg Oral DAILY PRN  
 calcitRIOL (ROCALTROL) capsule 0.5 mcg  0.5 mcg Oral DAILY  carvedilol (COREG) tablet 6.25 mg  6.25 mg Oral BID WITH MEALS  insulin glargine (LANTUS) injection 15 Units  15 Units SubCUTAneous DAILY  venlafaxine (EFFEXOR) tablet 37.5 mg  37.5 mg Oral BID  
 glucose chewable tablet 16 g  4 Tab Oral PRN  
 dextrose (D50W) injection syrg 12.5-25 g  25-50 mL IntraVENous PRN  
 glucagon (GLUCAGEN) injection 1 mg  1 mg IntraMUSCular PRN  
  heparin (porcine) injection 5,000 Units  5,000 Units SubCUTAneous Q8H  
 insulin lispro (HUMALOG) injection   SubCUTAneous AC&HS  
 
______________________________________________________________________ EXPECTED LENGTH OF STAY: 4d 19h ACTUAL LENGTH OF STAY:          4 
 
            
Marietta Chu MD  
Patient has given Verbal permission to discuss medical care with  
persons present in the room and and also with contact as listed on face sheet.

## 2019-03-22 NOTE — PROGRESS NOTES
Nephrology Progress Note Day Valenzuela Date of Admission : 3/18/2019 CC:  Follow up for ASUNCION Assessment and Plan ASUNCION on CKD : 
- resolving ASUNCION, likely from ATN and KIRAN 
- Cr continues to improve 
- cont current diuretics CKD II w/ baseline Cr around 1 
  
L pleural effusion: 
- s/p thoracentesis 3/19 
  
Hypokalemia/Hypomag: 
- labs pending 
  
Hyponatremia: 
- likely 2/2 volume overload 
- cont diuresis 
  
Recent NSTEMI:  
- Cath : Non occlusiVe CAD  
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  
  
Chronic Anemia: 
- give IV iron while here - THEODORA x 1 on 3/21 
  
DM-II: 
- on insulin Interval History: 
Seen and examined. On RA. Cr improving. Confused this AM.  No cp or sob reported. Voiding well. Current Medications: all current  Medications have been eviewed in [facility] Review of Systems: Pertinent items are noted in HPI. Objective: 
Vitals:   
Vitals:  
 03/21/19 1515 03/21/19 2023 03/22/19 0245 03/22/19 6140 BP: 125/68 137/64 170/69 111/73 Pulse: 82 80 82 86 Resp: 18 18 18 18 Temp: 98.1 °F (36.7 °C) 98.4 °F (36.9 °C) 98.5 °F (36.9 °C) 97.2 °F (36.2 °C) SpO2: 93% 91% 95% 93% Weight:   94.3 kg (207 lb 12.8 oz) Height:      
 
Intake and Output: 
No intake/output data recorded. 03/20 1901 - 03/22 0700 In: 790 [P.O.:440; I.V.:350] Out: 1000 [Urine:1000] Physical Examination:General: NAD,Conversant Neck:  Supple, no mass Resp:  Reduced bibasilar breath sounds CV:  RRR,  no murmur or rub, 1+ LE edema GI:  Soft, NT, + Bowel sounds, no hepatosplenomegaly Neurologic:  Non focal 
Psych:             AAO x 3 appropriate affect Skin:  No Rash :  No cartagena []    High complexity decision making was performed 
[]    Patient is at high-risk of decompensation with multiple organ involvement Lab Data Personally Reviewed: I have reviewed all the pertinent labs, microbiology data and radiology studies during assessment. Recent Labs  
  03/22/19 
0555 03/21/19 7995 03/20/19 
8414 03/20/19 
8494  138 133* 135* K 3.7 4.5 3.2* 3.5 CL 95* 97 90* 90* CO2 38* 37* 36* 36* * 174* 203* 233* BUN 36* 43* 38* 39* CREA 1.75* 1.84* 1.93* 1.90* CA 10.4* 9.2 9.2 9.1 MG  --   --  1.9  --   
PHOS  --   --   --  2.7 ALB  --   --   --  2.7* Recent Labs  
  03/22/19 
0555 03/21/19 
0348 03/20/19 
5137 WBC 10.5 10.2 17.5* HGB 10.9* 9.6* 9.9*  
HCT 35.1 31.1* 31.7* * 375 397 No results found for: SDES Lab Results Component Value Date/Time Culture result: CITROBACTER YOUNGAE (A) 03/20/2019 12:02 PM  
 Culture result: NO GROWTH 2 DAYS 03/20/2019 11:00 AM  
 Culture result: NO GROWTH 3 DAYS 03/19/2019 01:02 PM  
 
Recent Results (from the past 24 hour(s)) GLUCOSE, POC Collection Time: 03/21/19  4:40 PM  
Result Value Ref Range Glucose (POC) 155 (H) 65 - 100 mg/dL Performed by Aramis Reynoso, POC Collection Time: 03/21/19 10:31 PM  
Result Value Ref Range Glucose (POC) 113 (H) 65 - 100 mg/dL Performed by Floyd Isbell CBC WITH AUTOMATED DIFF Collection Time: 03/22/19  5:55 AM  
Result Value Ref Range WBC 10.5 3.6 - 11.0 K/uL  
 RBC 4.06 3.80 - 5.20 M/uL  
 HGB 10.9 (L) 11.5 - 16.0 g/dL HCT 35.1 35.0 - 47.0 % MCV 86.5 80.0 - 99.0 FL  
 MCH 26.8 26.0 - 34.0 PG  
 MCHC 31.1 30.0 - 36.5 g/dL  
 RDW 14.4 11.5 - 14.5 % PLATELET 312 (H) 974 - 400 K/uL MPV 10.5 8.9 - 12.9 FL  
 NRBC 0.0 0  WBC ABSOLUTE NRBC 0.00 0.00 - 0.01 K/uL NEUTROPHILS 65 32 - 75 % LYMPHOCYTES 20 12 - 49 % MONOCYTES 11 5 - 13 % EOSINOPHILS 2 0 - 7 % BASOPHILS 1 0 - 1 % IMMATURE GRANULOCYTES 1 (H) 0.0 - 0.5 % ABS. NEUTROPHILS 6.9 1.8 - 8.0 K/UL  
 ABS. LYMPHOCYTES 2.1 0.8 - 3.5 K/UL  
 ABS. MONOCYTES 1.1 (H) 0.0 - 1.0 K/UL  
 ABS. EOSINOPHILS 0.2 0.0 - 0.4 K/UL  
 ABS. BASOPHILS 0.1 0.0 - 0.1 K/UL  
 ABS. IMM. GRANS. 0.1 (H) 0.00 - 0.04 K/UL  
 DF AUTOMATED METABOLIC PANEL, BASIC  
 Collection Time: 03/22/19  5:55 AM  
Result Value Ref Range Sodium 138 136 - 145 mmol/L Potassium 3.7 3.5 - 5.1 mmol/L Chloride 95 (L) 97 - 108 mmol/L  
 CO2 38 (H) 21 - 32 mmol/L Anion gap 5 5 - 15 mmol/L Glucose 160 (H) 65 - 100 mg/dL BUN 36 (H) 6 - 20 MG/DL Creatinine 1.75 (H) 0.55 - 1.02 MG/DL  
 BUN/Creatinine ratio 21 (H) 12 - 20 GFR est AA 34 (L) >60 ml/min/1.73m2 GFR est non-AA 28 (L) >60 ml/min/1.73m2 Calcium 10.4 (H) 8.5 - 10.1 MG/DL  
GLUCOSE, POC Collection Time: 03/22/19  6:31 AM  
Result Value Ref Range Glucose (POC) 135 (H) 65 - 100 mg/dL Performed by Tavo Wright AMMONIA Collection Time: 03/22/19 10:05 AM  
Result Value Ref Range Ammonia <10 <32 UMOL/L  
POC G3 - PUL Collection Time: 03/22/19 10:08 AM  
Result Value Ref Range FIO2 (POC) 0.21 % pH (POC) 7.534 (H) 7.35 - 7.45    
 pCO2 (POC) 41.6 35.0 - 45.0 MMHG  
 pO2 (POC) 64 (L) 80 - 100 MMHG  
 HCO3 (POC) 35.2 (H) 22 - 26 MMOL/L  
 sO2 (POC) 94 92 - 97 % Base excess (POC) 13 mmol/L Site RIGHT RADIAL Device: ROOM AIR Allens test (POC) YES Specimen type (POC) ARTERIAL Total resp. rate 16 GLUCOSE, POC Collection Time: 03/22/19 11:39 AM  
Result Value Ref Range Glucose (POC) 211 (H) 65 - 100 mg/dL Performed by Evelyn Bosworth Alric Raja, MD 
[address] [place] [address], [facility] Phone - [phone] Fax - [phone] 
[email or web address]

## 2019-03-22 NOTE — PROGRESS NOTES
Occupational Therapy Chart reviewed. Attempted to see pt for OT assessment. Pt able to state her name but extremely distracted requiring max cues to re-direct and attend to task asked. Pt unable to state where she was. Educated pt on role of OT and pt stated she would attempt EOB. With removal of covers, pt immediately pulled them back to her chin and refused to allow OT to remove her covers. Despite several attempts pt continued to refuse, stating \"no. No. No. I am cold. \" Informed CM who reported pt is scheduled to discharge to SNF today. Agree with SNF recommendation for OT and PT.  Kendrick Wray, OT

## 2019-03-22 NOTE — PROGRESS NOTES
Charted 3/22/19: 
 
Spiritual Care Partner Volunteer visited patient in Rm 356 on 3/21/19. Documented by: Chaplain Ledezma MDiv, MACE 
[phone] [phone])

## 2019-03-22 NOTE — PROGRESS NOTES
Deferred visit: Patient was scheduled for disch and this has been deferred until tomorrow. PT deferred today as I was heading in after OT and noted she declined OT services.

## 2019-03-23 ENCOUNTER — APPOINTMENT (OUTPATIENT)
Dept: GENERAL RADIOLOGY | Age: 79
DRG: 186 | End: 2019-03-23
Attending: FAMILY MEDICINE
Payer: MEDICARE

## 2019-03-23 ENCOUNTER — APPOINTMENT (OUTPATIENT)
Dept: MRI IMAGING | Age: 79
DRG: 186 | End: 2019-03-23
Attending: HOSPITALIST
Payer: MEDICARE

## 2019-03-23 ENCOUNTER — APPOINTMENT (OUTPATIENT)
Dept: VASCULAR SURGERY | Age: 79
DRG: 186 | End: 2019-03-23
Attending: HOSPITALIST
Payer: MEDICARE

## 2019-03-23 LAB
ASPIRIN TEST, ASPIRN: 389 ARU
BACTERIA SPEC CULT: NORMAL
CLOSURE TME COLL+ADP BLD: 69 SEC (ref 62–100)
COMMENT, HOLDF: NORMAL
GLUCOSE BLD STRIP.AUTO-MCNC: 128 MG/DL (ref 65–100)
GLUCOSE BLD STRIP.AUTO-MCNC: 141 MG/DL (ref 65–100)
GLUCOSE BLD STRIP.AUTO-MCNC: 145 MG/DL (ref 65–100)
GLUCOSE BLD STRIP.AUTO-MCNC: 210 MG/DL (ref 65–100)
GRAM STN SPEC: NORMAL
GRAM STN SPEC: NORMAL
SAMPLES BEING HELD,HOLD: NORMAL
SERVICE CMNT-IMP: ABNORMAL
SERVICE CMNT-IMP: NORMAL

## 2019-03-23 PROCEDURE — 74011250637 HC RX REV CODE- 250/637: Performed by: HOSPITALIST

## 2019-03-23 PROCEDURE — 85576 BLOOD PLATELET AGGREGATION: CPT

## 2019-03-23 PROCEDURE — 74011636637 HC RX REV CODE- 636/637: Performed by: INTERNAL MEDICINE

## 2019-03-23 PROCEDURE — 82962 GLUCOSE BLOOD TEST: CPT

## 2019-03-23 PROCEDURE — 36415 COLL VENOUS BLD VENIPUNCTURE: CPT

## 2019-03-23 PROCEDURE — 74011250637 HC RX REV CODE- 250/637: Performed by: INTERNAL MEDICINE

## 2019-03-23 PROCEDURE — 74011250636 HC RX REV CODE- 250/636: Performed by: INTERNAL MEDICINE

## 2019-03-23 PROCEDURE — 74011000258 HC RX REV CODE- 258: Performed by: INTERNAL MEDICINE

## 2019-03-23 PROCEDURE — 74011636637 HC RX REV CODE- 636/637: Performed by: HOSPITALIST

## 2019-03-23 PROCEDURE — 71045 X-RAY EXAM CHEST 1 VIEW: CPT

## 2019-03-23 PROCEDURE — 93880 EXTRACRANIAL BILAT STUDY: CPT

## 2019-03-23 PROCEDURE — 65660000000 HC RM CCU STEPDOWN

## 2019-03-23 PROCEDURE — 70551 MRI BRAIN STEM W/O DYE: CPT

## 2019-03-23 RX ORDER — NYSTATIN 100000 [USP'U]/G
POWDER TOPICAL 2 TIMES DAILY
Status: DISCONTINUED | OUTPATIENT
Start: 2019-03-23 | End: 2019-03-27 | Stop reason: HOSPADM

## 2019-03-23 RX ORDER — ROSUVASTATIN CALCIUM 10 MG/1
20 TABLET, COATED ORAL
Status: DISCONTINUED | OUTPATIENT
Start: 2019-03-23 | End: 2019-03-27 | Stop reason: HOSPADM

## 2019-03-23 RX ORDER — SODIUM CHLORIDE 0.9 % (FLUSH) 0.9 %
5-40 SYRINGE (ML) INJECTION EVERY 8 HOURS
Status: DISCONTINUED | OUTPATIENT
Start: 2019-03-23 | End: 2019-03-27 | Stop reason: HOSPADM

## 2019-03-23 RX ORDER — SODIUM CHLORIDE 0.9 % (FLUSH) 0.9 %
5-40 SYRINGE (ML) INJECTION AS NEEDED
Status: DISCONTINUED | OUTPATIENT
Start: 2019-03-23 | End: 2019-03-27 | Stop reason: HOSPADM

## 2019-03-23 RX ORDER — LEVOFLOXACIN 750 MG/1
750 TABLET ORAL
Qty: 3 TAB | Refills: 0 | Status: SHIPPED
Start: 2019-03-23 | End: 2019-03-28

## 2019-03-23 RX ORDER — IRON POLYSACCHARIDE COMPLEX 150 MG
150 CAPSULE ORAL 2 TIMES DAILY
Qty: 60 CAP | Refills: 0 | Status: SHIPPED
Start: 2019-03-23 | End: 2019-04-22

## 2019-03-23 RX ADMIN — HEPARIN SODIUM 5000 UNITS: 5000 INJECTION INTRAVENOUS; SUBCUTANEOUS at 21:55

## 2019-03-23 RX ADMIN — HEPARIN SODIUM 5000 UNITS: 5000 INJECTION INTRAVENOUS; SUBCUTANEOUS at 07:31

## 2019-03-23 RX ADMIN — VENLAFAXINE 37.5 MG: 37.5 TABLET ORAL at 09:43

## 2019-03-23 RX ADMIN — ROSUVASTATIN CALCIUM 20 MG: 10 TABLET, FILM COATED ORAL at 21:55

## 2019-03-23 RX ADMIN — INSULIN GLARGINE 15 UNITS: 100 INJECTION, SOLUTION SUBCUTANEOUS at 09:48

## 2019-03-23 RX ADMIN — Medication 10 ML: at 14:40

## 2019-03-23 RX ADMIN — CARVEDILOL 6.25 MG: 6.25 TABLET, FILM COATED ORAL at 17:54

## 2019-03-23 RX ADMIN — VENLAFAXINE 37.5 MG: 37.5 TABLET ORAL at 17:54

## 2019-03-23 RX ADMIN — HEPARIN SODIUM 5000 UNITS: 5000 INJECTION INTRAVENOUS; SUBCUTANEOUS at 14:38

## 2019-03-23 RX ADMIN — Medication 10 ML: at 21:55

## 2019-03-23 RX ADMIN — BUMETANIDE 2 MG: 1 TABLET ORAL at 04:40

## 2019-03-23 RX ADMIN — BUMETANIDE 2 MG: 1 TABLET ORAL at 17:54

## 2019-03-23 RX ADMIN — CARVEDILOL 6.25 MG: 6.25 TABLET, FILM COATED ORAL at 07:31

## 2019-03-23 RX ADMIN — ASPIRIN 81 MG: 81 TABLET ORAL at 09:42

## 2019-03-23 RX ADMIN — Medication 10 ML: at 07:31

## 2019-03-23 RX ADMIN — CALCITRIOL 0.5 MCG: 0.25 CAPSULE ORAL at 09:42

## 2019-03-23 RX ADMIN — Medication 10 ML: at 17:55

## 2019-03-23 RX ADMIN — INSULIN LISPRO 3 UNITS: 100 INJECTION, SOLUTION INTRAVENOUS; SUBCUTANEOUS at 11:52

## 2019-03-23 RX ADMIN — IRON SUCROSE 200 MG: 20 INJECTION, SOLUTION INTRAVENOUS at 11:20

## 2019-03-23 RX ADMIN — POTASSIUM CHLORIDE 20 MEQ: 750 TABLET, EXTENDED RELEASE ORAL at 09:42

## 2019-03-23 RX ADMIN — INSULIN LISPRO 2 UNITS: 100 INJECTION, SOLUTION INTRAVENOUS; SUBCUTANEOUS at 17:54

## 2019-03-23 RX ADMIN — INSULIN LISPRO 2 UNITS: 100 INJECTION, SOLUTION INTRAVENOUS; SUBCUTANEOUS at 07:31

## 2019-03-23 RX ADMIN — Medication 10 ML: at 11:22

## 2019-03-23 NOTE — PROGRESS NOTES
CXR reviewed Continue Bumex 2 mg BID If possible watch I/O to determine response to diuretics Please call if pt needs to be seen ove rthe w/e RENAL function stable Cade Burton MD 
[facility] Office :[phone] Fax: [phone]'

## 2019-03-23 NOTE — DISCHARGE INSTRUCTIONS
· It is important that you take the medication exactly as they are prescribed. · Keep your medication in the bottles provided by the pharmacist and keep a list of the medication names, dosages, and times to be taken in your wallet. · Do not take other medications without consulting your doctor. · No drinking alcohol or driving car or operating machinery if you are on narcotic pain medications. Donot take sedating mediations if you are sleepy or confused. · Fall Precautions  · Keep Well Hydrated  · Report to your medical provider if you feel you have  developed allergies to medications  · Follow up with your PCP or Consultant for medication adjustments and refills  · Monitor for signs of fevers,chills,bleeding,chest pain and seek medical attention if you do so.    · Recheck CXR in 2 weeks  · Check for Home Oxygen prior to d/c

## 2019-03-23 NOTE — PROGRESS NOTES
Problem: Patient Education: Go to Patient Education Activity Goal: Patient/Family Education Outcome: Progressing Towards Goal

## 2019-03-23 NOTE — PROGRESS NOTES
CM follow up. Per hospitalist, patient is not medically stable for discharge. Staff nurse notified daughter. CM spoke with Natalie Isabel, [facility] Admissions [phone], and update was given. Jocy again confirmed that patient is accepted for admission when medically stable. CM noted that [facility] BLS stretcher continues on Will Call status.

## 2019-03-23 NOTE — PROGRESS NOTES
Bedside shift change report given to Itzel Carson (oncoming nurse) by Kate Lopez (offgoing nurse). Report included the following information SBAR, Kardex and MAR.

## 2019-03-23 NOTE — DISCHARGE SUMMARY
Discharge Summary       PATIENT ID: Vimal Cruz  MRN: [id number]   YOB: 1940    DATE OF ADMISSION: 3/18/2019 12:25 PM    DATE OF DISCHARGE: 03/23/19  PRIMARY CARE PROVIDER: Bernardo Britton MD       DISCHARGING PROVIDER: Wu Gentile MD    To contact this individual call [phone] and ask the  to page. If unavailable ask to be transferred the Adult Hospitalist Department. CONSULTATIONS: IP CONSULT TO HOSPITALIST  IP CONSULT TO CARDIOLOGY  IP CONSULT TO CARDIOLOGY  IP CONSULT TO NEPHROLOGY      PROCEDURES/SURGERIES: * No surgery found *    IMAGING  Xr Chest Pa Lat    Result Date: 3/20/2019  IMPRESSION: 1. Small pleural effusions cause bibasilar atelectasis. Follow-up to resolution is suggested. Ct Head Wo Cont    Result Date: 3/22/2019  Impression: 1. No evidence of acute infarct or intracranial hemorrhage. 2. Minimal periventricular white matter disease is likely secondary to chronic small vessel ischemic changes. Xr Chest Port    Result Date: 3/23/2019  IMPRESSION: 1. Unchanged mild pulmonary interstitial edema. Unchanged small bilateral pleural effusions and bibasilar atelectasis. Xr Chest Port    Result Date: 3/19/2019  IMPRESSION: No pneumothorax. Decreased small left pleural effusion and decreased left basilar opacity. Stable small right pleural effusion. Xr Chest Port    Result Date: 3/18/2019  IMPRESSION: Interval enlargement of a left pleural effusion, now moderate to large in size. Us Thoracentesis Lt Ndl W Image    Result Date: 3/19/2019  IMPRESSION: Successful left thoracentesis. Chest x-ray ordered.            ADMITTING DIAGNOSES  Per admitting  Julian Neal a 78 y.o. female with pmh of newly diagnosed CKD s/p HD (had not needed HD on discharge) , recently diagnosed NSTEMI and Tokostubos cardiomyopathy (with recent LHC without evidence of CAD), ?COPD (had wheezing prior admission and started on inhalers) who presented to the ED initially for hyperglycemia and SOB. Per family (daughters) at bedside, patient lives alone and had been altered and more sleepy than usual since discharge from the hospital 03/11. Today she had high blood sugars at home > 500 which were noted by nursing. She also was hypoxic with saturations of 88 or so at home on room air. Subsequently 911 was called. Although she lives alone she has family at her side 24/7. They have noted intermittent altered mental status, which has not improved since discharge from the hospital. Deny any F/C. Has had some intermittnet wheezing for which she has been taking her grandchildren's nebulizer treatments without improvement. No abdominal pain, N/V/D/C. In the ED was hypoxic to 88-86% on RA and admitted for further workup.        HOSPITAL COURSE:   SOB - likely from fluid overload and large L pleural effusion, BNP elevated. - Streamlined to po bumex  - S/P  Thoracentesis 3/19 - Cytology neg For malignancy   - Influenza - neg  -Echo Showing Normal EF at this admission   Recheck CXR on 3/23- stable  Would Need Followup CXR as op to reassess diuretic Dosing  Pulm Follow up as op - Op sleep study?     Leukocytosis  Resolved  U/A positive for UTI - likely poa  Blood cultures NGTD   Rocephin/Doxy - Cover for pneumonia, UTI - now Streamlined to Levaquin Renal dosing   Thoracentesis  - Followup Fluid Cultures - so far NGTD, Cytology Neg For maliganancy    Takotsubo cardiomyopathy   - Diuresis, cardiology consult  - I and O, daily weight   -Recheck Echo Showing Normal LVEF, improves. D/W cardiology no intervention      Newly diagnosed CKD 3  - Diuresis as above  - Follow I and O   - Nephrology consulted  -C/w home dosing of diuretics  Recheck BMP in 1-2 days and assess Volume status     Hypokalemia  Adjusted Potassium dosing.      Metabolic encephalopathy - ?  Polypharmacy/UTI  - holding tramadol, ativan, gabapentin  -Check CT head/22- neg for acute processes  -Improved and now at baseline  -Could benefit from [address] psychiatry for depression      Depression - on venlafaxine     Type 2 diabetes with hyperglycemia  A1c is  8.3%   - POCT glucose checks, long acting insulin, hypoglycemia protocol.      Iron Def anemia  on iron Infusion while in patient  Likely From Anemia of chronic Disease    D/W Granddaughter on phone and happy with discharge    [id number] / PLAN:      Patient Active Problem List   Diagnosis Code    NSTEMI (non-ST elevated myocardial infarction) ([facility] I21.4    Benign essential hypertension I10    Diabetic peripheral neuropathy ([facility] E11.42    Type II diabetes mellitus, uncontrolled ([facility] E11.65    Hyperlipidemia E78.5    SOB (shortness of breath) R06.02       FOLLOW UP APPOINTMENTS:    Follow-up Information     Follow up With Specialties Details Why Contact Info    [facility] Pul[facility] Disease In 2 weeks Follow Up pleural Effusion  [address] [facility]  [address]  [place] [place]  [place] [facility]      Rodolfo Velasquez MD Internal Medicine   [address] [place] [address]) [address] [place], [place] [id number] [phone]             ADDITIONAL CARE RECOMMENDATIONS:   · It is important that you take the medication exactly as they are prescribed. · Keep your medication in the bottles provided by the pharmacist and keep a list of the medication names, dosages, and times to be taken in your wallet. · Do not take other medications without consulting your doctor. · No drinking alcohol or driving car or operating machinery if you are on narcotic pain medications. Donot take sedating mediations if you are sleepy or confused.    · Fall Precautions  · Keep Well Hydrated  · Report to your medical provider if you feel you have  developed allergies to medications  · Follow up with your PCP or Consultant for medication adjustments and refills  · Monitor for signs of fevers,chills,bleeding,chest pain and seek medical attention if you do so. · Recheck CXR in 2 weeks  · Check for Home Oxygen prior to d/c    DIET: Diabetic Diet    ACTIVITY: PT/OT Eval and Treat    EQUIPMENT needed: Walker    DISCHARGE MEDICATIONS:  Current Discharge Medication List      START taking these medications    Details   levoFLOXacin (LEVAQUIN) 750 mg tablet Take 1 Tab by mouth every fourty-eight (48) hours for 3 doses. Qty: 3 Tab, Refills: 0      iron polysaccharides (FERREX 150) 150 mg iron capsule Take 1 Cap by mouth two (2) times a day for 30 days. Qty: 60 Cap, Refills: 0      docusate sodium (COLACE) 50 mg capsule Take 1 Cap by mouth two (2) times daily as needed for Constipation for up to 90 days. Qty: 60 Cap, Refills: 2      potassium chloride SR (K-TAB) 20 mEq tablet Take 1 Tab by mouth daily. Qty: 10 Tab, Refills: 0      Lactobacillus Acidoph & Bulgar (FLORANEX) 1 million cell tab tablet Take 2 Tabs by mouth two (2) times a day for 5 days. Qty: 20 Tab, Refills: 0         CONTINUE these medications which have CHANGED    Details   !! benzonatate (TESSALON) 100 mg capsule Take 1 Cap by mouth three (3) times daily as needed for Cough. !! - Potential duplicate medications found. Please discuss with provider. CONTINUE these medications which have NOT CHANGED    Details   aspirin delayed-release 81 mg tablet Take 81 mg by mouth daily. !! benzonatate (TESSALON) 100 mg capsule Take 100 mg by mouth three (3) times daily as needed for Cough. carvedilol (COREG) 6.25 mg tablet Take 1 Tab by mouth two (2) times daily (with meals). Qty: 60 Tab, Refills: 0      bumetanide (BUMEX) 2 mg tablet Take 1 Tab by mouth two (2) times a day. Qty: 60 Tab, Refills: 0      bisacodyl (DULCOLAX) 5 mg EC tablet Take 1 Tab by mouth daily as needed for Constipation.   Qty: 10 Tab, Refills: 0      budesonide (PULMICORT) 0.5 mg/2 mL nbsp 2 mL by Nebulization route two (2) times a day. Qty: 60 Each, Refills: 1      arformoterol (BROVANA) 15 mcg/2 mL nebu neb solution 2 mL by Nebulization route two (2) times a day. Qty: 60 Vial, Refills: 0      albuterol (PROVENTIL VENTOLIN) 2.5 mg /3 mL (0.083 %) nebulizer solution 3 mL by Nebulization route every four (4) hours as needed for Wheezing. Qty: 60 Each, Refills: 0      insulin glargine (LANTUS SOLOSTAR U-100 INSULIN) 100 unit/mL (3 mL) inpn 15 units daily  Subcutaneously  Qty: 1 Adjustable Dose Pre-filled Pen Syringe, Refills: 1      rosuvastatin (CRESTOR) 20 mg tablet Take 1 Tab by mouth nightly. Qty: 30 Tab, Refills: 0      oxybutynin chloride XL (DITROPAN XL) 15 mg CR tablet Take 15 mg by mouth daily. therapeutic multivitamin (THERAGRAN) tablet Take 1 Tab by mouth daily. venlafaxine (EFFEXOR) 37.5 mg tablet Take 37.5 mg by mouth two (2) times a day. calcitRIOL (ROCALTROL) 0.5 mcg capsule Take 0.5 mcg by mouth daily. !! - Potential duplicate medications found. Please discuss with provider.       STOP taking these medications       gabapentin (NEURONTIN) 300 mg capsule Comments:   Reason for Stopping:         traMADol (ULTRAM) 50 mg tablet Comments:   Reason for Stopping:         LORazepam (ATIVAN) 0.5 mg tablet Comments:   Reason for Stopping:               All Micro Results     Procedure Component Value Units Date/Time    CULTURE, BLOOD, PAIRED [[id number]] Collected:  03/20/19 1100    Order Status:  Completed Specimen:  Blood Updated:  03/23/19 0502     Special Requests: NO SPECIAL REQUESTS        Culture result: NO GROWTH 3 DAYS       CULTURE, BODY FLUID [[id number]] Collected:  03/19/19 1302    Order Status:  Completed Specimen:  Pleural Fluid Updated:  03/22/19 1008     Special Requests: NO SPECIAL REQUESTS        GRAM STAIN 2+ WBCS SEEN         NO ORGANISMS SEEN        Culture result: NO GROWTH 3 DAYS       CULTURE, URINE [[id number]]  (Abnormal)  (Susceptibility) Collected: 03/20/19 1202    Order Status:  Completed Specimen:  Urine from Clean catch Updated:  03/22/19 0857     Special Requests: NO SPECIAL REQUESTS        [place] Count --        >100,000  COLONIES/mL       Culture result: [place] [[id number]] Collected:  03/18/19 2331    Order Status:  Completed Specimen:  Nasopharyngeal Updated:  03/19/19 0640     Adenovirus NOT DETECTED        Coronavirus 229E NOT DETECTED        Coronavirus HKU1 NOT DETECTED        Coronavirus CVNL63 NOT DETECTED        Coronavirus OC43 NOT DETECTED        Metapneumovirus NOT DETECTED        Rhinovirus and Enterovirus NOT DETECTED        Influenza A NOT DETECTED        Influenza A, subtype H1 NOT DETECTED        Influenza A, subtype H3 NOT DETECTED        INFLUENZA A H1N1 PCR NOT DETECTED        Influenza B NOT DETECTED        Parainfluenza 1 NOT DETECTED        Parainfluenza 2 NOT DETECTED        Parainfluenza 3 NOT DETECTED        Parainfluenza virus 4 NOT DETECTED        RSV by PCR NOT DETECTED        Bordetella pertussis - PCR NOT DETECTED        Chlamydophila pneumoniae DNA, QL, PCR NOT DETECTED        Mycoplasma pneumoniae DNA, QL, PCR NOT DETECTED       URINE CULTURE HOLD SAMPLE [[id number]]     Order Status:  Sent Specimen:  Urine           Recent Results (from the past 24 hour(s))   AMMONIA    Collection Time: 03/22/19 10:05 AM   Result Value Ref Range    Ammonia <10 <32 UMOL/L   POC G3 - PUL    Collection Time: 03/22/19 10:08 AM   Result Value Ref Range    FIO2 (POC) 0.21 %    pH (POC) 7.534 (H) 7.35 - 7.45      pCO2 (POC) 41.6 35.0 - 45.0 MMHG    pO2 (POC) 64 (L) 80 - 100 MMHG    HCO3 (POC) 35.2 (H) 22 - 26 MMOL/L    sO2 (POC) 94 92 - 97 %    Base excess (POC) 13 mmol/L    Site RIGHT RADIAL      Device: ROOM AIR      Allens test (POC) YES      Specimen type (POC) ARTERIAL      Total resp.  rate 16     GLUCOSE, POC    Collection Time: 03/22/19 11:39 AM   Result Value Ref Range    Glucose (POC) 211 (H) 65 - 100 mg/dL    Performed by Marcy Marin, POC    Collection Time: 03/22/19  4:43 PM   Result Value Ref Range    Glucose (POC) 150 (H) 65 - 100 mg/dL    Performed by Denisse Davis, POC    Collection Time: 03/22/19  9:09 PM   Result Value Ref Range    Glucose (POC) 126 (H) 65 - 100 mg/dL    Performed by Jacki IVY, POC    Collection Time: 03/23/19  6:16 AM   Result Value Ref Range    Glucose (POC) 141 (H) 65 - 100 mg/dL    Performed by Marcela Hernandez            NOTIFY YOUR PHYSICIAN FOR ANY OF THE FOLLOWING:   Fever over 101 degrees for 24 hours. Chest pain, shortness of breath, fever, chills, nausea, vomiting, diarrhea, change in mentation, falling, weakness, bleeding. Severe pain or pain not relieved by medications. Or, any other signs or symptoms that you may have questions about. DISPOSITION:    Home With:   OT  PT  HH  RN      X Long term SNF/Inpatient Rehab    Independent/assisted living    Hospice    Other:       PATIENT CONDITION AT DISCHARGE:     Functional status    Poor    X Deconditioned     Independent      Cognition     Lucid    X Forgetful     Dementia      Catheters/lines (plus indication)    Nelson     PICC     PEG    X None      Code status   X  Full code     DNR      PHYSICAL EXAMINATION AT DISCHARGE:  Gen:  No distress, alert  HEENT:  Normal cephalic atraumatic, extra-occular movements are intact. Neck:  Supple, No JVD  Lungs:  Clear bilaterally, no wheeze, no rales, normal effort  Heart:  Regular Rate and Rhythm, normal S1 and S2, no edema  Abdomen:  Soft, non tender, normal bowel sounds, no guarding.   Extremities:  Well perfused, no cyanosis or edema  Neurological:  Awake and alert, CN's are intact, normal strength throughout extremities  Skin:  No rashes or moles  Mental Status:  Normal thought process, does not appear anxious      CHRONIC MEDICAL DIAGNOSES:  Problem List as of 3/23/2019 Date Reviewed: 3/18/2019          Codes Class Noted - Resolved    SOB (shortness of breath) ICD-10-CM: R06.02  ICD-9-CM: 786.05  3/18/2019 - Present        NSTEMI (non-ST elevated myocardial infarction) [facility]) ICD-10-CM: I21.4  ICD-9-CM: 410.70  3/2/2019 - Present        Benign essential hypertension ICD-10-CM: I10  ICD-9-CM: 401.1  3/2/2019 - Present        Diabetic peripheral neuropathy (HCC) ICD-10-CM: E11.42  ICD-9-CM: 250.60, 357.2  3/2/2019 - Present        Type II diabetes mellitus, uncontrolled ([facility] ICD-10-CM: E11.65  ICD-9-CM: 250.02  3/2/2019 - Present        Hyperlipidemia ICD-10-CM: E78.5  ICD-9-CM: 272.4  3/2/2019 - Present        RESOLVED: CHF (congestive heart failure) ([facility] ICD-10-CM: I50.9  ICD-9-CM: 428.0  3/2/2019 - 3/11/2019        RESOLVED: Sepsis ([facility] ICD-10-CM: A41.9  ICD-9-CM: 038.9, 995.91  3/1/2019 - 3/11/2019                Discussed with patient and family. Explained the importance of following up, Compliance with medications and recommendations on discharge,Side effect profile of medications were explained. Safety precautions at home and while taking pain medications also explained. All questions answered to the satisfaction of the patient/family. Discussed with consultant(s) who are agreeable to the discharge. Verbal and written instructions on discharge given. Explained about Discharge medications and side effect profile. Advised patient/family to followup with their pcp for medication refills and preauthorization of medications, Home health orders. checkups,screenign programs as appropriate for age.        Thank you Abebe Arroyo MD for taking care of your patient, Please call with any questions.       Greater than 36  minutes were spent with the patient on counseling and coordination of care    Signed:   Frantz Sears MD  3/23/2019  9:18 AM

## 2019-03-23 NOTE — PROGRESS NOTES
Problem: Falls - Risk of 
Goal: *Absence of Falls Description Document Mady Quick Fall Risk and appropriate interventions in the flowsheet.  
Outcome: Progressing Towards Goal

## 2019-03-23 NOTE — PROGRESS NOTES
Noted Punctuate focus of ischemia on MRi brain. Not sure if this is the cause for her encephalopathy. Order Stroke Order set ASA,Statin Neuro checks Q 4 Control Spikes of Bp >220/110 with prn bp medications Echo already performed Tele monitoring to r/o cardiac arrhythmias that can lead to emboli Check Hba1c, Lipid panel PT/OT eval- performed Neurology Consult Patient is at high risk of deterioration and would need to be inpatient for close monitoring. If any worsening would need to be transferred to a higher level of care.

## 2019-03-23 NOTE — PROGRESS NOTES
Patient getting confused again - check oxygen sats, Check MRI brain. Hold Transfer.  
Recheck U/A.

## 2019-03-23 NOTE — PROGRESS NOTES
Patient very confused upon assessment. Granddaughter says this is not usual for patient. Patient is not able to answer any questions and is just staring as if not to focus on anything. O2 sat 80% on RA. O2 applied via NC @ 2L. Sat up to 84%. O2 increased to 4L. O2 sat up to 98%. Patient is now oriented to self and granddaughter. Also, able to verbalize that she is in the hospital.  Lung sounds significantly diminished on the Left. Will continue to monitor.

## 2019-03-23 NOTE — PROGRESS NOTES
Patient is refusing ABG's and morning labs. CXR obtained. HOB elevated to near 90 degrees. O2 sat up to 100%. RT in room with nurse. Decreased O2 to 3L,  Patient maintained O2 sat of 100%. Patient is oriented only to self.

## 2019-03-23 NOTE — PROGRESS NOTES
Bedside shift change report given to jennifer (oncoming nurse) by Kim Edward (offgoing nurse). Report included the following information SBAR, Kardex and MAR.

## 2019-03-24 ENCOUNTER — APPOINTMENT (OUTPATIENT)
Dept: MRI IMAGING | Age: 79
DRG: 186 | End: 2019-03-24
Attending: PSYCHIATRY & NEUROLOGY
Payer: MEDICARE

## 2019-03-24 LAB
ALBUMIN SERPL-MCNC: 2.5 G/DL (ref 3.5–5)
ANION GAP SERPL CALC-SCNC: 7 MMOL/L (ref 5–15)
BUN SERPL-MCNC: 52 MG/DL (ref 6–20)
BUN/CREAT SERPL: 20 (ref 12–20)
CALCIUM SERPL-MCNC: 10.4 MG/DL (ref 8.5–10.1)
CHLORIDE SERPL-SCNC: 97 MMOL/L (ref 97–108)
CHOLEST SERPL-MCNC: 121 MG/DL
CLOSURE TME COLL+EPINEP BLD: 175 SEC (ref 82–150)
CO2 SERPL-SCNC: 36 MMOL/L (ref 21–32)
CREAT SERPL-MCNC: 2.56 MG/DL (ref 0.55–1.02)
GLUCOSE BLD STRIP.AUTO-MCNC: 119 MG/DL (ref 65–100)
GLUCOSE BLD STRIP.AUTO-MCNC: 133 MG/DL (ref 65–100)
GLUCOSE BLD STRIP.AUTO-MCNC: 156 MG/DL (ref 65–100)
GLUCOSE BLD STRIP.AUTO-MCNC: 227 MG/DL (ref 65–100)
GLUCOSE SERPL-MCNC: 138 MG/DL (ref 65–100)
HCT VFR BLD AUTO: 34.1 % (ref 35–47)
HDLC SERPL-MCNC: 47 MG/DL
HDLC SERPL: 2.6 {RATIO} (ref 0–5)
LDLC SERPL CALC-MCNC: 57.8 MG/DL (ref 0–100)
LIPID PROFILE,FLP: NORMAL
PHOSPHATE SERPL-MCNC: 4.2 MG/DL (ref 2.6–4.7)
PLATELET # BLD AUTO: 421 K/UL (ref 150–400)
POTASSIUM SERPL-SCNC: 3.9 MMOL/L (ref 3.5–5.1)
SERVICE CMNT-IMP: ABNORMAL
SODIUM SERPL-SCNC: 140 MMOL/L (ref 136–145)
TRIGL SERPL-MCNC: 81 MG/DL (ref ?–150)
VLDLC SERPL CALC-MCNC: 16.2 MG/DL

## 2019-03-24 PROCEDURE — 80069 RENAL FUNCTION PANEL: CPT

## 2019-03-24 PROCEDURE — 74011250637 HC RX REV CODE- 250/637: Performed by: INTERNAL MEDICINE

## 2019-03-24 PROCEDURE — 74011250636 HC RX REV CODE- 250/636: Performed by: HOSPITALIST

## 2019-03-24 PROCEDURE — 65660000001 HC RM ICU INTERMED STEPDOWN

## 2019-03-24 PROCEDURE — 36415 COLL VENOUS BLD VENIPUNCTURE: CPT

## 2019-03-24 PROCEDURE — 74011250636 HC RX REV CODE- 250/636: Performed by: INTERNAL MEDICINE

## 2019-03-24 PROCEDURE — 82962 GLUCOSE BLOOD TEST: CPT

## 2019-03-24 PROCEDURE — 70544 MR ANGIOGRAPHY HEAD W/O DYE: CPT

## 2019-03-24 PROCEDURE — 93005 ELECTROCARDIOGRAM TRACING: CPT

## 2019-03-24 PROCEDURE — 80061 LIPID PANEL: CPT

## 2019-03-24 PROCEDURE — 74011636637 HC RX REV CODE- 636/637: Performed by: HOSPITALIST

## 2019-03-24 PROCEDURE — 74011250637 HC RX REV CODE- 250/637: Performed by: HOSPITALIST

## 2019-03-24 PROCEDURE — 74011636637 HC RX REV CODE- 636/637: Performed by: INTERNAL MEDICINE

## 2019-03-24 RX ORDER — SODIUM CHLORIDE 9 MG/ML
500 INJECTION, SOLUTION INTRAVENOUS ONCE
Status: DISPENSED | OUTPATIENT
Start: 2019-03-24 | End: 2019-03-24

## 2019-03-24 RX ORDER — CARVEDILOL 12.5 MG/1
12.5 TABLET ORAL 2 TIMES DAILY WITH MEALS
Status: DISCONTINUED | OUTPATIENT
Start: 2019-03-24 | End: 2019-03-27 | Stop reason: HOSPADM

## 2019-03-24 RX ADMIN — APIXABAN 2.5 MG: 2.5 TABLET, FILM COATED ORAL at 17:20

## 2019-03-24 RX ADMIN — POTASSIUM CHLORIDE 20 MEQ: 750 TABLET, EXTENDED RELEASE ORAL at 08:52

## 2019-03-24 RX ADMIN — BUMETANIDE 2 MG: 1 TABLET ORAL at 17:20

## 2019-03-24 RX ADMIN — ASPIRIN 81 MG: 81 TABLET ORAL at 08:52

## 2019-03-24 RX ADMIN — SODIUM CHLORIDE 500 ML: 900 INJECTION, SOLUTION INTRAVENOUS at 11:00

## 2019-03-24 RX ADMIN — VENLAFAXINE 37.5 MG: 37.5 TABLET ORAL at 17:20

## 2019-03-24 RX ADMIN — Medication 10 ML: at 21:59

## 2019-03-24 RX ADMIN — Medication 10 ML: at 13:50

## 2019-03-24 RX ADMIN — Medication 10 ML: at 06:36

## 2019-03-24 RX ADMIN — CARVEDILOL 6.25 MG: 6.25 TABLET, FILM COATED ORAL at 06:37

## 2019-03-24 RX ADMIN — CARVEDILOL 12.5 MG: 12.5 TABLET, FILM COATED ORAL at 17:20

## 2019-03-24 RX ADMIN — BUMETANIDE 2 MG: 1 TABLET ORAL at 08:52

## 2019-03-24 RX ADMIN — NYSTATIN: 100000 POWDER TOPICAL at 17:29

## 2019-03-24 RX ADMIN — NYSTATIN: 100000 POWDER TOPICAL at 08:51

## 2019-03-24 RX ADMIN — INSULIN GLARGINE 15 UNITS: 100 INJECTION, SOLUTION SUBCUTANEOUS at 08:53

## 2019-03-24 RX ADMIN — CALCITRIOL 0.5 MCG: 0.25 CAPSULE ORAL at 08:52

## 2019-03-24 RX ADMIN — HEPARIN SODIUM 5000 UNITS: 5000 INJECTION INTRAVENOUS; SUBCUTANEOUS at 06:37

## 2019-03-24 RX ADMIN — HEPARIN SODIUM 5000 UNITS: 5000 INJECTION INTRAVENOUS; SUBCUTANEOUS at 13:43

## 2019-03-24 RX ADMIN — LEVOFLOXACIN 750 MG: 5 INJECTION, SOLUTION INTRAVENOUS at 13:44

## 2019-03-24 RX ADMIN — VENLAFAXINE 37.5 MG: 37.5 TABLET ORAL at 08:52

## 2019-03-24 RX ADMIN — INSULIN LISPRO 3 UNITS: 100 INJECTION, SOLUTION INTRAVENOUS; SUBCUTANEOUS at 13:42

## 2019-03-24 NOTE — PROGRESS NOTES
New order acknowledged. Discussed case with nurse. Patient's HR into the 140's with minimal activity today. Awaiting results of EKG. MRI revealing small punctate stroke in the right hemisphere. Recommended to hold PT today. Patient was evaluated last week and it was recommended patient have 24 hour care at discharge or SNF for rehab. Will reassess Monday as medically appropriate. Thanks! Sheree Bernal PT, DPT Geriatric Clinical Specialist

## 2019-03-24 NOTE — PROGRESS NOTES
1420  TRANSFER - IN REPORT: 
 
Verbal report received from [facility] RN(name) on [address]  being received from [address] [place] [address](unit) for routine progression of care Report consisted of patients Situation, Background, Assessment and  
Recommendations(SBAR). Information from the following report(s) SBAR, Kardex, Procedure Summary, Intake/Output, MAR, Accordion, Recent Results, Med Rec Status and Cardiac Rhythm AFib was reviewed with the receiving nurse. Opportunity for questions and clarification was provided. 1452 Assessment completed upon patients arrival to unit and care assumed. pt on floor on monitor confirmed with monitor tech. Assessment complete. Primary Nurse Lon Saravia and Annie Bangura RN performed a dual skin assessment on this patient No impairment noted Mohan score is 23 
 
1535 per monitor tech, patient converted to NSR.  
 
1950 Bedside shift change report given to You Lynch RN (oncoming nurse) by Marcela Brandt RN (offgoing nurse). Report included the following information SBAR, OR Summary, Procedure Summary, Intake/Output, MAR, Accordion, Recent Results, Med Rec Status and Cardiac Rhythm NSR. Problem: Diabetes Self-Management Goal: *Disease process and treatment process Description Define diabetes and identify own type of diabetes; list 3 options for treating diabetes. Outcome: Progressing Towards Goal 
Goal: *Monitoring blood glucose, interpreting and using results Description Identify recommended blood glucose targets  and personal targets. Outcome: Progressing Towards Goal 
Goal: *Prevention, detection, treatment of acute complications Description List symptoms of hyper- and hypoglycemia; describe how to treat low blood sugar and actions for lowering  high blood glucose level. Outcome: Progressing Towards Goal 
  
Problem: Falls - Risk of 
Goal: *Absence of Falls Description Document Emilyjed Ko Fall Risk and appropriate interventions in the flowsheet. Outcome: Progressing Towards Goal 
Note:  
Fall Risk Interventions: 
Mobility Interventions: Communicate number of staff needed for ambulation/transfer, OT consult for ADLs, Patient to call before getting OOB, Strengthening exercises (ROM-active/passive), Utilize gait belt for transfers/ambulation Mentation Interventions: Adequate sleep, hydration, pain control, Door open when patient unattended, Eyeglasses and hearing aids, Familiar objects from home, Gait belt with transfers/ambulation, More frequent rounding, Reorient patient, Toileting rounds Medication Interventions: Patient to call before getting OOB, Evaluate medications/consider consulting pharmacy, Teach patient to arise slowly Elimination Interventions: Call light in reach, Patient to call for help with toileting needs, Toileting schedule/hourly rounds History of Falls Interventions: Consult care management for discharge planning, Evaluate medications/consider consulting pharmacy, Room close to nurse's station, Utilize gait belt for transfer/ambulation Pt to call prior to oob, call bell in reach,  
 
 
  
Problem: Pressure Injury - Risk of 
Goal: *Prevention of pressure injury Description Document Mohan Scale and appropriate interventions in the flowsheet. Outcome: Progressing Towards Goal 
Note:  
Pressure Injury Interventions: 
Sensory Interventions: Assess need for specialty bed, Chair cushion, Float heels, Keep linens dry and wrinkle-free, Minimize linen layers, Monitor skin under medical devices, Pad between skin to skin Moisture Interventions: Apply protective barrier, creams and emollients, Check for incontinence Q2 hours and as needed, Internal/External fecal devices, Limit adult briefs, Maintain skin hydration (lotion/cream), Minimize layers, Moisture barrier Activity Interventions: Increase time out of bed, Pressure redistribution bed/mattress(bed type), PT/OT evaluation Mobility Interventions: Chair cushion, Pressure redistribution bed/mattress (bed type), Suspension boots Nutrition Interventions: Discuss nutritional consult with provider, Document food/fluid/supplement intake, Offer support with meals,snacks and hydration Friction and Shear Interventions: Foam dressings/transparent film/skin sealants, Lift sheet, Apply protective barrier, creams and emollients, Minimize layers, Transfer aides:transfer board/Jailene lift/ceiling lift, Transferring/repositioning devices Pt needs assisstance repositioning. Pt legs are elevated with pillows to off load heels. Problem: Pain Goal: *Control of Pain Outcome: Progressing Towards Goal

## 2019-03-24 NOTE — PROGRESS NOTES
[facility] Cardiology Progress Note Date of service: 3/24/2019 Subjective: Ms Marilyn Keen was noted to go back into afib today, prompting re-consultation with Cardiology She was apparently near discharge until yesterday and then had some mental status changes Neurology was consulted and neuroimaging was concerning for small ischemic stroke. She is not a good historian and can't tell me much about her current problems Has no recollection of seeing a Cardiologist or having a cath. Dr Joycelyn Benitez has worked her up recently and a cath was done showing normal coronaries and Takotsubo cardiomyopathy. Objective: 
 
Visit Vitals /62 (BP 1 Location: Left arm, BP Patient Position: At rest) Pulse (!) 110 Temp 98 °F (36.7 °C) Resp 17 Ht 5' 5\" (1.651 m) Wt 94.8 kg (208 lb 15.9 oz) SpO2 93% BMI 34.78 kg/m² Physical Exam  
Constitutional: She appears well-developed and well-nourished. HENT:  
Head: Normocephalic and atraumatic. Eyes: Conjunctivae are normal. No scleral icterus. Neck: No JVD present. Cardiovascular: Normal rate and normal heart sounds. An irregular rhythm present. Exam reveals no gallop. No murmur heard. Pulmonary/Chest: Effort normal and breath sounds normal. No stridor. No respiratory distress. She has no wheezes. She has no rales. Abdominal: Soft. She exhibits no distension. Musculoskeletal: She exhibits no edema or deformity. Neurological: She is alert. Skin: Skin is warm and dry. Data reviewed: 
Recent Results (from the past 12 hour(s)) GLUCOSE, POC Collection Time: 03/24/19  6:06 AM  
Result Value Ref Range Glucose (POC) 133 (H) 65 - 100 mg/dL Performed by Deandre Shepard LIPID PANEL Collection Time: 03/24/19  6:32 AM  
Result Value Ref Range LIPID PROFILE Cholesterol, total 121 <200 MG/DL Triglyceride 81 <150 MG/DL  
 HDL Cholesterol 47 MG/DL  
 LDL, calculated 57.8 0 - 100 MG/DL  VLDL, calculated 16.2 MG/DL  
 CHOL/HDL Ratio 2.6 0.0 - 5.0 RENAL FUNCTION PANEL Collection Time: 03/24/19  6:32 AM  
Result Value Ref Range Sodium 140 136 - 145 mmol/L Potassium 3.9 3.5 - 5.1 mmol/L Chloride 97 97 - 108 mmol/L  
 CO2 36 (H) 21 - 32 mmol/L Anion gap 7 5 - 15 mmol/L Glucose 138 (H) 65 - 100 mg/dL BUN 52 (H) 6 - 20 MG/DL Creatinine 2.56 (H) 0.55 - 1.02 MG/DL  
 BUN/Creatinine ratio 20 12 - 20 GFR est AA 22 (L) >60 ml/min/1.73m2 GFR est non-AA 18 (L) >60 ml/min/1.73m2 Calcium 10.4 (H) 8.5 - 10.1 MG/DL Phosphorus 4.2 2.6 - 4.7 MG/DL Albumin 2.5 (L) 3.5 - 5.0 g/dL EKG, 12 LEAD, INITIAL Collection Time: 03/24/19  9:38 AM  
Result Value Ref Range Ventricular Rate 126 BPM  
 Atrial Rate 133 BPM  
 QRS Duration 82 ms Q-T Interval 274 ms QTC Calculation (Bezet) 396 ms Calculated R Axis 54 degrees Calculated T Axis -157 degrees Diagnosis Atrial fibrillation ST & T wave abnormality, consider inferolateral ischemia or digitalis effect Abnormal ECG When compared with ECG of 21-MAR-2019 06:55, Atrial fibrillation has replaced Sinus rhythm Vent. rate has increased BY  46 BPM 
Non-specific change in ST segment in Lateral leads Inverted T waves have replaced nonspecific T wave abnormality in Inferior  
leads T wave inversion more evident in Lateral leads GLUCOSE, POC Collection Time: 03/24/19 11:27 AM  
Result Value Ref Range Glucose (POC) 227 (H) 65 - 100 mg/dL Performed by [facility] MRI Brain IMPRESSION:   
1. Likely punctate acute ischemic focus, right cerebral white matter. 2. Chronic microangiopathy. 3. No bleed or shift. Assessment: 1. Possible ischemic stroke concerning for embolism 2. Paroxysmal atrial fibrillation: back in a.fib, after brief episode earlier this admission Asymptomatic 3. Type II diabetes 4. ASUNCION, ? Underlying CKD 5. Takotsubo cardiomyopathy Plan: 
Start Eliquis Stop aspirin Increase coreg to 12.5mg bid for better rate control Will update Dr Aron Moreno Signed: 
Serenity Kay MD 
Interventional Cardiology 3/24/2019

## 2019-03-24 NOTE — PROGRESS NOTES
Noted Afib with RVR and hypotension - Fluid bolus now, transfer to [facility].  Cardiology eval.

## 2019-03-24 NOTE — PROGRESS NOTES
TRANSFER - OUT REPORT: 
 
Verbal report given to [facility] - LUCÍA BRUMFIELD RN(name) on [address]  being transferred to [facility](unit) for change in patient condition(New Onset of AFIB) Report consisted of patients Situation, Background, Assessment and  
Recommendations(SBAR). Information from the following report(s) SBAR was reviewed with the receiving nurse. Lines:  
Peripheral IV Left;Posterior;Proximal;Lower Hand (Active) Site Assessment Clean, dry, & intact 3/24/2019  8:39 AM  
Phlebitis Assessment 0 3/24/2019  8:39 AM  
Infiltration Assessment 0 3/24/2019  8:39 AM  
Dressing Status Clean, dry, & intact 3/24/2019  8:39 AM  
Dressing Type Transparent 3/24/2019  8:39 AM  
Hub Color/Line Status Capped 3/23/2019 11:20 AM  
Alcohol Cap Used Yes 3/24/2019  8:39 AM  
  
 
Opportunity for questions and clarification was provided.    
 
Patient transported with: O2, Monitor, RN

## 2019-03-24 NOTE — CONSULTS
NEUROLOGY  3/24/2019     Consulted by: Odin Yadav MD        Patient ID:  Eduarda Fagan  [id number]  78 y.o.  1940    CC: Mental status changes    HPI    Ms. Kyle Larios is a 51-year-old woman who was admitted March 18 for shortness of breath, hypoglycemia, lethargy and mental status changes. She was getting ready to be discharged yesterday but continued to have confusion. MRI brain was obtained and it showed a very small punctate stroke in the right hemisphere. Neurology was consulted as a result. This morning on my visit with her she is not oriented to why she is in the hospital.  She denies feeling different than her usual.  Denies headache. No family at the bedside. Her hospital course has been remarkable for possible UTI, acute kidney dysfunction in the setting of recent diagnosis of CKD, possibly fluid overload status. Review of Systems   Unable to perform ROS: Mental acuity       Past Medical History:   Diagnosis Date    CAD (coronary artery disease)     Diabetes ([facility] [address]     Hypertension      No family history on file.   Social History     Socioeconomic History    Marital status:      Spouse name: Not on file    Number of children: Not on file    Years of education: Not on file    Highest education level: Not on file   Occupational History    Not on file   Social Needs    Financial resource strain: Not on file    Food insecurity:     Worry: Not on file     Inability: Not on file    Transportation needs:     Medical: Not on file     Non-medical: Not on file   Tobacco Use    Smoking status: Never Smoker    Smokeless tobacco: Never Used   Substance and Sexual Activity    Alcohol use: No    Drug use: No    Sexual activity: Not on file   Lifestyle    Physical activity:     Days per week: Not on file     Minutes per session: Not on file    Stress: Not on file   Relationships    Social connections:     Talks on phone: Not on file     Gets together: Not on file     Attends Spiritism service: Not on file     Active member of club or organization: Not on file     Attends meetings of clubs or organizations: Not on file     Relationship status: Not on file    Intimate partner violence:     Fear of current or ex partner: Not on file     Emotionally abused: Not on file     Physically abused: Not on file     Forced sexual activity: Not on file   Other Topics Concern    Not on file   Social History Narrative    Not on file     Current Facility-Administered Medications   Medication Dose Route Frequency    sodium chloride (NS) flush 5-40 mL  5-40 mL IntraVENous Q8H    sodium chloride (NS) flush 5-40 mL  5-40 mL IntraVENous PRN    rosuvastatin (CRESTOR) tablet 20 mg  20 mg Oral QHS    nystatin (MYCOSTATIN) 100,000 unit/gram powder   Topical BID    levoFLOXacin (LEVAQUIN) 750 mg in D5W IVPB  750 mg IntraVENous Q48H    potassium chloride SR (KLOR-CON 10) tablet 20 mEq  20 mEq Oral DAILY    bumetanide (BUMEX) tablet 2 mg  2 mg Oral BID    sodium chloride (NS) flush 5-40 mL  5-40 mL IntraVENous Q8H    sodium chloride (NS) flush 5-40 mL  5-40 mL IntraVENous PRN    morphine injection 2 mg  2 mg IntraVENous Q4H PRN    ondansetron (ZOFRAN) injection 4 mg  4 mg IntraVENous Q4H PRN    albuterol (PROVENTIL VENTOLIN) nebulizer solution 2.5 mg  2.5 mg Nebulization Q4H PRN    aspirin delayed-release tablet 81 mg  81 mg Oral DAILY    benzonatate (TESSALON) capsule 100 mg  100 mg Oral TID PRN    bisacodyl (DULCOLAX) tablet 5 mg  5 mg Oral DAILY PRN    calcitRIOL (ROCALTROL) capsule 0.5 mcg  0.5 mcg Oral DAILY    carvedilol (COREG) tablet 6.25 mg  6.25 mg Oral BID WITH MEALS    insulin glargine (LANTUS) injection 15 Units  15 Units SubCUTAneous DAILY    venlafaxine (EFFEXOR) tablet 37.5 mg  37.5 mg Oral BID    glucose chewable tablet 16 g  4 Tab Oral PRN    dextrose (D50W) injection syrg 12.5-25 g  25-50 mL IntraVENous PRN    glucagon (GLUCAGEN) injection 1 mg  1 mg IntraMUSCular PRN    heparin (porcine) injection 5,000 Units  5,000 Units SubCUTAneous Q8H    insulin lispro (HUMALOG) injection   SubCUTAneous AC&HS     Allergies   Allergen Reactions    Tylenol [Acetaminophen] Other (comments)     Pt told by MD not to take, pt unsure why       Visit Vitals  /67 (BP 1 Location: Right arm, BP Patient Position: Sitting)   Pulse 74   Temp 97.7 °F (36.5 °C)   Resp 18   Ht 5' 5\" (1.651 m)   Wt 94.8 kg (208 lb 15.9 oz)   SpO2 98%   BMI 34.78 kg/m²     Physical Exam   Constitutional: She appears well-developed and well-nourished. Cardiovascular: Normal rate. Pulmonary/Chest: Effort normal.   Skin: Skin is warm and dry. Psychiatric: Her behavior is normal.   Vitals reviewed. Neurologic Exam     Mental Status   Elderly woman awake and alert. She seems a little bit drowsy. She does not know why she is in the hospital.  She knows what year it is after significant perseveration. She knows she is in the hospital.  She can follow commands slowly intermittently. Attention seems poor. Pupils are equal, EOMI  Face is grossly symmetric with midline tongue. Speech is slow fragmented.   She is globally weak all extremities 4/5  No abnormal movements  Sensation grossly intact throughout  Gait deferred due to global weakness and deconditioning            Lab Results   Component Value Date/Time    WBC 10.5 03/22/2019 05:55 AM    HGB 10.9 (L) 03/22/2019 05:55 AM    HCT 34.1 (L) 03/23/2019 08:31 PM    PLATELET 463 (H) 45/11/2048 08:31 PM    MCV 86.5 03/22/2019 05:55 AM     Lab Results   Component Value Date/Time    Hemoglobin A1c 8.3 (H) 03/05/2019 04:45 AM    Glucose 138 (H) 03/24/2019 06:32 AM    Glucose (POC) 133 (H) 03/24/2019 06:06 AM    LDL, calculated 57.8 03/24/2019 06:32 AM    Creatinine 2.56 (H) 03/24/2019 06:32 AM      Lab Results   Component Value Date/Time    Cholesterol, total 121 03/24/2019 06:32 AM    HDL Cholesterol 47 03/24/2019 06:32 AM    LDL, calculated 57.8 03/24/2019 06:32 AM Triglyceride 81 03/24/2019 06:32 AM    CHOL/HDL Ratio 2.6 03/24/2019 06:32 AM     Lab Results   Component Value Date/Time    ALT (SGPT) 20 03/18/2019 12:49 PM    AST (SGOT) 35 03/18/2019 12:49 PM    Alk. phosphatase 121 (H) 03/18/2019 12:49 PM    Bilirubin, total 0.6 03/18/2019 12:49 PM    Albumin 2.5 (L) 03/24/2019 06:32 AM    Protein, total 7.9 03/19/2019 04:25 AM    Ammonia <10 03/22/2019 10:05 AM    PLATELET 782 (H) 24/05/6421 08:31 PM    Hepatitis B surface Ag 0.39 03/05/2019 04:45 AM        CT Results (maximum last 3): Results from [place] encounter on 03/18/19   CT HEAD WO CONT    Narrative Indication:  headache     Comparison: CT 4/13/2018    Findings: 5 mm axial images were obtained from the skull base through the  vertex. CT dose reduction was achieved through the use of a standardized protocol  tailored for this examination and automatic exposure control for dose  modulation. The ventricles and cortical sulci are prominent, compatible with age related  volume loss. There is no evidence of intracranial hemorrhage, mass, mass effect,  or acute infarct. There are bilateral senescent basal ganglia calcifications. There is periventricular white matter disease. No extra-axial fluid collections  are seen. The visualized paranasal sinuses and mastoid air cells are clear. The  orbital structures are unremarkable. No osseous abnormalities are seen. Impression Impression:   1. No evidence of acute infarct or intracranial hemorrhage. 2. Minimal periventricular white matter disease is likely secondary to chronic  small vessel ischemic changes.       Results from [place] encounter on 03/01/19   CTA CHEST W OR W WO CONT    Narrative EXAMINATION:  CTA CHEST W OR W WO CONT  CLINICAL INFORMATION:  eval for PE/pneumonia, shortness of breath with onset  upon waking today  COMPARISON: Chest radiograph of earlier today    TECHNIQUE: Precontrast  images were obtained to localize the volume for  acquisition. Multislice helical CT arteriography was performed from the  diaphragm to the thoracic inlet during uneventful rapid bolus intravenous  administration of 80 mL of Isovue  370. Lung and soft tissue windows were  generated. Post processing was performed to include 3D MIP  and coronal and  sagittal reformatted images. CT dose reduction was achieved through the use of a  standardized protocol tailored for this examination and automatic exposure  control for dose modulation. Findings  DIAGNOSTIC QUALITY: Adequate. PULMONARY EMBOLISM: None. PULMONARY ARTERIES:  Normal in caliber. LUNG PARENCHYMA: Mild bilateral central airspace disease, most consistent with  pulmonary edema. Mild atelectasis adjacent to the pleural fluid. PLEURAL EFFUSION: Small bilateral pleural effusions. CENTRAL AIRWAYS:  Patent. ADENOPATHY:  None. HEART : Mild cardiomegaly. Coronary artery calcification. Mitral and aortic  valve calcification. GREAT VESSELS:  No significant abnormalities. UPPER ABDOMEN:  No significant abnormalities. BONES:  Degenerative changes in the spine. Impression IMPRESSION:    1. No evidence of acute pulmonary thromboembolism. 2. Mild pulmonary edema and small bilateral pleural effusions. MRI Results (maximum last 3): Results from [place] encounter on 03/18/19   MRI BRAIN WO CONT    Narrative EXAM: MRI Brain without contrast.    INDICATION:  Confusion     SEQUENCES:   Sagittal T1, axial T1, T2, GRE and FLAIR; Cor T2; and diffusion  imaging of the brain. No contrast.    FINDINGS:  There are several small foci of T2 hyperintensity in the bilateral cerebral  white matter most compatible with microangiopathy; one of these, a punctate  focus in the right periventricular deep white matter, demonstrates diffusion  restriction possibly representing a ischemia/infarct. Diffusion imaging is otherwise normal with no large acute infarct.     There is no evidence for mass, hemorrhage, shift, or hydrocephalus. There is no  extra-axial fluid collection. Craniocervical junction is unremarkable. Appropriate flow voids are present in the major vertebrobasilar and carotid  arteries. Impression IMPRESSION:    1. Likely punctate acute ischemic focus, right cerebral white matter. 2. Chronic microangiopathy. 3. No bleed or shift. VAS/US/Carotid Doppler Results (maximum last 3): Results from [place] encounter on 11/21/16   DUPLEX LOWER EXT VENOUS LEFT       PET Results (maximum last 3): No results found for this or any previous visit. Assessment and Plan        77-year-old woman who has been in the hospital for the past few days having intermittent mental status changes. I suspect this is multifactorial metabolic in origin (recent MI, CKD/AK I, polypharmacy, UTI). She does have a very small tiny infarct in the right hemisphere that I do not think would account for her presentation wholly. She has several risk factors to have stroke. I do think an MRA needs to be done. New echo should be completed as well as this may have been an embolic event and she had a recent MI. I placed an order for MRA. Continue aspirin as is for now. If there is any evidence of intracranial atherosclerosis I would probably recommend we change to Plavix. If she has no evidence of intracranial atherosclerosis please maintain baby aspirin only. However, need to make sure echo without any new issue. Statin is not indicated in this case. Her LDL is below goal and she is elderly. I will follow peripherally. Please call if needed.         2 [facility], DO  NEUROLOGIST  Diplomate ABPN  3/24/2019

## 2019-03-24 NOTE — PROGRESS NOTES
Hospitalist Progress Note Gabino Rao MD 
Answering service: [phone] OR [phone] from in house phone Date of Service:  3/24/2019 NAME:  Cam Ellison :  1940 MRN:  [id number] Admission Summary:  
Cam Ellison is a 78 y.o. female with pmh of newly diagnosed CKD s/p HD (had not needed HD on discharge) , recently diagnosed NSTEMI and Tokostubos cardiomyopathy (with recent LHC without evidence of CAD), ?COPD (had wheezing prior admission and started on inhalers) who presented to the ED initially for hyperglycemia and SOB. Per family (daughters) at bedside, patient lives alone and had been altered and more sleepy than usual since discharge from the hospital . Today she had high blood sugars at home > 500 which were noted by nursing. She also was hypoxic with saturations of 88 or so at home on room air. Subsequently 911 was called. Although she lives alone she has family at her side . They have noted intermittent altered mental status, which has not improved since discharge from the hospital. Deny any F/C. Has had some intermittnet wheezing for which she has been taking her grandchildren's nebulizer treatments without improvement. No abdominal pain, N/V/D/C. In the ED was hypoxic to 88-86% on RA and admitted for further workup. Interval history / Subjective:  
   
Pt seen in room Denies any complaints On Room air Sitting up in bed. She denies any complaints today Later on noted afib with rate of 120 on monitor Pt has no complaints Assessment & Plan: SOB - likely from fluid overload and large L pleural effusion, BNP elevated. -  On bumex - S/P  Thoracentesis 3/19 - Cytology neg For malignancy - Influenza - neg 
-Echo Showing Normal EF at this admission Metabolic encephalopathy - ? Polypharmacy/UTI - holding tramadol, ativan, gabapentin, oxybutynin 
-Check CT head 3/22- neg -Still was confused- MRI brain - Punctuate ischemia 
-Check Carotid US Afib with RVR New onset Transfer to tele Fluid Bolus for hypotension Request cardiology Re Eval  
 
Worsening CKD 3 
- Follow I and O  
- Nephrology consulted 
-Diuretics per renal  
 
Leukocytosis Resolved U/A positive for UTI - likely  POA Blood cultures NGTD Rocephin/Doxy - Cover for pneumonia, UTI Thoracentesis  - Followup Fluid Cultures - so far NGTD 
  
Takotsubo cardiomyopathy  
- Diuresis, cardiology consult - I and O, daily weight  
-Recheck Echo Showing Normal LVEF 
  
 
Hypokalemia Adjusted Potassium dosing.  
   
Depression - on venlafaxine Type 2 diabetes with hyperglycemia 8.3%  
- POCT glucose checks, long acting insulin, hypoglycemia protocol.  
  
DVT PPX - heparin Iron Def anemia 
on iron Infusion CODE STATUS: FULL, I discussed with patient and daughter and she wanted full code. - 3/19 Risk of deterioration: medium Total time spent with patient: 32 Minutes Care Plan discussed with: Patient/Family and Nurse Disposition: HH PT, OT, RN and PT eval   
 
Hospital Problems  Date Reviewed: 3/18/2019 Codes Class Noted POA  
 SOB (shortness of breath) ICD-10-CM: R06.02 
ICD-9-CM: 786.05  3/18/2019 Unknown Review of Systems:  
Pertinent items are noted in HPI. Vital Signs:  
 Last 24hrs VS reviewed since prior progress note. Most recent are: 
Visit Vitals /74 (BP 1 Location: Left arm, BP Patient Position: At rest) Pulse 87 Temp 97.5 °F (36.4 °C) Resp 18 Ht 5' 5\" (1.651 m) Wt 94.8 kg (208 lb 15.9 oz) SpO2 97% BMI 34.78 kg/m² Intake/Output Summary (Last 24 hours) at 3/24/2019 4393 Last data filed at 3/23/2019 2002 Gross per 24 hour Intake 990 ml Output 1 ml Net 989 ml Physical Examination:  
 
 
     
Constitutional:  No acute distress, ENT:  Oral mucous moist, oropharynx benign.  Neck supple,   
 Resp:  Decreased at bases. No accessory muscle use CV:  Regular rhythm, normal rate, no murmurs, gallops, rubs GI:  Soft, non distended, non tender. normoactive bowel sounds, no hepatosplenomegaly Musculoskeletal:  No edema, warm, 2+ pulses throughout Neurologic:  Moves all extremities. AAO person and place, CN II-XII reviewed Skin:  Good turgor, no rashes or ulcers Data Review:  
 Review and/or order of clinical lab test 
Review and/or order of tests in the radiology section of CPT Review and/or order of tests in the medicine section of CPT Labs:  
 
Recent Labs  
  03/23/19 2031 03/22/19 
0555 WBC  --  10.5 HGB  --  10.9* HCT 34.1* 35.1 * 413* Recent Labs  
  03/24/19 
5178 03/22/19 
0555  138  
K 3.9 3.7 CL 97 95* CO2 36* 38* BUN 52* 36* CREA 2.56* 1.75* * 160* CA 10.4* 10.4* PHOS 4.2  --   
 
Recent Labs  
  03/24/19 
0559 ALB 2.5* No results for input(s): INR, PTP, APTT in the last 72 hours. No lab exists for component: INREXT, INREXT No results for input(s): FE, TIBC, PSAT, FERR in the last 72 hours. No results found for: FOL, RBCF No results for input(s): PH, PCO2, PO2 in the last 72 hours. No results for input(s): CPK, CKNDX, TROIQ in the last 72 hours. No lab exists for component: CPKMB Lab Results Component Value Date/Time Cholesterol, total 121 03/24/2019 06:32 AM  
 HDL Cholesterol 47 03/24/2019 06:32 AM  
 LDL, calculated 57.8 03/24/2019 06:32 AM  
 Triglyceride 81 03/24/2019 06:32 AM  
 CHOL/HDL Ratio 2.6 03/24/2019 06:32 AM  
 
Lab Results Component Value Date/Time Glucose (POC) 133 (H) 03/24/2019 06:06 AM  
 Glucose (POC) 128 (H) 03/23/2019 09:01 PM  
 Glucose (POC) 145 (H) 03/23/2019 04:13 PM  
 Glucose (POC) 210 (H) 03/23/2019 11:02 AM  
 Glucose (POC) 141 (H) 03/23/2019 06:16 AM  
 
Lab Results Component Value Date/Time  Color YELLOW/STRAW 03/18/2019 12:49 PM  
 Appearance CLEAR 03/18/2019 12:49 PM  
 Specific gravity 1.014 03/18/2019 12:49 PM  
 pH (UA) 5.5 03/18/2019 12:49 PM  
 Protein 30 (A) 03/18/2019 12:49 PM  
 Glucose 100 (A) 03/18/2019 12:49 PM  
 Ketone NEGATIVE  03/18/2019 12:49 PM  
 Bilirubin NEGATIVE  03/18/2019 12:49 PM  
 Urobilinogen 0.2 03/18/2019 12:49 PM  
 Nitrites NEGATIVE  03/18/2019 12:49 PM  
 Leukocyte Esterase TRACE (A) 03/18/2019 12:49 PM  
 Epithelial cells FEW 03/18/2019 12:49 PM  
 Bacteria NEGATIVE  03/18/2019 12:49 PM  
 WBC 5-10 03/18/2019 12:49 PM  
 RBC 0-5 03/18/2019 12:49 PM  
 
 
 
Medications Reviewed:  
 
Current Facility-Administered Medications Medication Dose Route Frequency  sodium chloride (NS) flush 5-40 mL  5-40 mL IntraVENous Q8H  
 sodium chloride (NS) flush 5-40 mL  5-40 mL IntraVENous PRN  
 rosuvastatin (CRESTOR) tablet 20 mg  20 mg Oral QHS  nystatin (MYCOSTATIN) 100,000 unit/gram powder   Topical BID  levoFLOXacin (LEVAQUIN) 750 mg in D5W IVPB  750 mg IntraVENous Q48H  potassium chloride SR (KLOR-CON 10) tablet 20 mEq  20 mEq Oral DAILY  bumetanide (BUMEX) tablet 2 mg  2 mg Oral BID  sodium chloride (NS) flush 5-40 mL  5-40 mL IntraVENous Q8H  
 sodium chloride (NS) flush 5-40 mL  5-40 mL IntraVENous PRN  
 morphine injection 2 mg  2 mg IntraVENous Q4H PRN  
 ondansetron (ZOFRAN) injection 4 mg  4 mg IntraVENous Q4H PRN  
 albuterol (PROVENTIL VENTOLIN) nebulizer solution 2.5 mg  2.5 mg Nebulization Q4H PRN  
 aspirin delayed-release tablet 81 mg  81 mg Oral DAILY  benzonatate (TESSALON) capsule 100 mg  100 mg Oral TID PRN  
 bisacodyl (DULCOLAX) tablet 5 mg  5 mg Oral DAILY PRN  
 calcitRIOL (ROCALTROL) capsule 0.5 mcg  0.5 mcg Oral DAILY  carvedilol (COREG) tablet 6.25 mg  6.25 mg Oral BID WITH MEALS  insulin glargine (LANTUS) injection 15 Units  15 Units SubCUTAneous DAILY  venlafaxine (EFFEXOR) tablet 37.5 mg  37.5 mg Oral BID  
  glucose chewable tablet 16 g  4 Tab Oral PRN  
 dextrose (D50W) injection syrg 12.5-25 g  25-50 mL IntraVENous PRN  
 glucagon (GLUCAGEN) injection 1 mg  1 mg IntraMUSCular PRN  
 heparin (porcine) injection 5,000 Units  5,000 Units SubCUTAneous Q8H  
 insulin lispro (HUMALOG) injection   SubCUTAneous AC&HS  
 
______________________________________________________________________ EXPECTED LENGTH OF STAY: 4d 19h ACTUAL LENGTH OF STAY:          6 
 
            
Josh Moya MD  
Patient has given Verbal permission to discuss medical care with  
persons present in the room and and also with contact as listed on face sheet.

## 2019-03-25 LAB
ALBUMIN SERPL-MCNC: 2.6 G/DL (ref 3.5–5)
ANION GAP SERPL CALC-SCNC: 5 MMOL/L (ref 5–15)
APPEARANCE UR: CLEAR
ARTERIAL PATENCY WRIST A: YES
ATRIAL RATE: 133 BPM
ATRIAL RATE: 80 BPM
BACTERIA SPEC CULT: NORMAL
BACTERIA URNS QL MICRO: NEGATIVE /HPF
BASE EXCESS BLD CALC-SCNC: 15 MMOL/L
BDY SITE: ABNORMAL
BILIRUB UR QL: NEGATIVE
BUN SERPL-MCNC: 56 MG/DL (ref 6–20)
BUN/CREAT SERPL: 21 (ref 12–20)
CA-I BLD-SCNC: 1.26 MMOL/L (ref 1.12–1.32)
CALCIUM SERPL-MCNC: 10.4 MG/DL (ref 8.5–10.1)
CALCULATED P AXIS, ECG09: 47 DEGREES
CALCULATED R AXIS, ECG10: 54 DEGREES
CALCULATED R AXIS, ECG10: 55 DEGREES
CALCULATED T AXIS, ECG11: -157 DEGREES
CALCULATED T AXIS, ECG11: 110 DEGREES
CHLORIDE SERPL-SCNC: 100 MMOL/L (ref 97–108)
CO2 SERPL-SCNC: 36 MMOL/L (ref 21–32)
COLOR UR: ABNORMAL
COMMENT, HOLDF: NORMAL
CREAT SERPL-MCNC: 2.71 MG/DL (ref 0.55–1.02)
DIAGNOSIS, 93000: NORMAL
DIAGNOSIS, 93000: NORMAL
EPITH CASTS URNS QL MICRO: ABNORMAL /LPF
GAS FLOW.O2 O2 DELIVERY SYS: ABNORMAL L/MIN
GLUCOSE BLD STRIP.AUTO-MCNC: 113 MG/DL (ref 65–100)
GLUCOSE BLD STRIP.AUTO-MCNC: 182 MG/DL (ref 65–100)
GLUCOSE BLD STRIP.AUTO-MCNC: 213 MG/DL (ref 65–100)
GLUCOSE BLD STRIP.AUTO-MCNC: 233 MG/DL (ref 65–100)
GLUCOSE SERPL-MCNC: 121 MG/DL (ref 65–100)
GLUCOSE UR STRIP.AUTO-MCNC: NEGATIVE MG/DL
HCO3 BLD-SCNC: 38 MMOL/L (ref 22–26)
HGB UR QL STRIP: ABNORMAL
KETONES UR QL STRIP.AUTO: NEGATIVE MG/DL
LEFT CCA DIST DIAS: 8 CM/S
LEFT CCA DIST SYS: 53.6 CM/S
LEFT CCA PROX DIAS: 7 CM/S
LEFT CCA PROX SYS: 67.9 CM/S
LEFT ECA DIAS: 0 CM/S
LEFT ECA SYS: 37.8 CM/S
LEFT ICA DIST DIAS: 13.5 CM/S
LEFT ICA DIST SYS: 58.2 CM/S
LEFT ICA MID DIAS: 21.6 CM/S
LEFT ICA MID SYS: 86.4 CM/S
LEFT ICA PROX DIAS: 17.6 CM/S
LEFT ICA PROX SYS: 67.9 CM/S
LEFT ICA/CCA SYS: 1.61
LEFT VERTEBRAL DIAS: 13.62 CM/S
LEFT VERTEBRAL SYS: 61.2 CM/S
LEUKOCYTE ESTERASE UR QL STRIP.AUTO: ABNORMAL
NITRITE UR QL STRIP.AUTO: NEGATIVE
O2/TOTAL GAS SETTING VFR VENT: 0.21 %
P-R INTERVAL, ECG05: 156 MS
PCO2 BLD: 47.7 MMHG (ref 35–45)
PH BLD: 7.51 [PH] (ref 7.35–7.45)
PH UR STRIP: 6 [PH] (ref 5–8)
PHOSPHATE SERPL-MCNC: 3.1 MG/DL (ref 2.6–4.7)
PO2 BLD: 55 MMHG (ref 80–100)
POTASSIUM SERPL-SCNC: 3.8 MMOL/L (ref 3.5–5.1)
PROT UR STRIP-MCNC: NEGATIVE MG/DL
Q-T INTERVAL, ECG07: 274 MS
Q-T INTERVAL, ECG07: 394 MS
QRS DURATION, ECG06: 72 MS
QRS DURATION, ECG06: 82 MS
QTC CALCULATION (BEZET), ECG08: 396 MS
QTC CALCULATION (BEZET), ECG08: 454 MS
RBC #/AREA URNS HPF: ABNORMAL /HPF (ref 0–5)
RIGHT CCA DIST DIAS: 9 CM/S
RIGHT CCA DIST SYS: 49.1 CM/S
RIGHT CCA PROX DIAS: 10.2 CM/S
RIGHT CCA PROX SYS: 67.4 CM/S
RIGHT ECA DIAS: 0 CM/S
RIGHT ECA SYS: 52.6 CM/S
RIGHT ICA DIST DIAS: 17.9 CM/S
RIGHT ICA DIST SYS: 77.3 CM/S
RIGHT ICA MID DIAS: 19 CM/S
RIGHT ICA MID SYS: 71.8 CM/S
RIGHT ICA PROX DIAS: 10.2 CM/S
RIGHT ICA PROX SYS: 63 CM/S
RIGHT ICA/CCA SYS: 1.6
RIGHT VERTEBRAL DIAS: 12.42 CM/S
RIGHT VERTEBRAL SYS: 56.4 CM/S
SAMPLES BEING HELD,HOLD: NORMAL
SAO2 % BLD: 90 % (ref 92–97)
SERVICE CMNT-IMP: ABNORMAL
SERVICE CMNT-IMP: NORMAL
SODIUM SERPL-SCNC: 141 MMOL/L (ref 136–145)
SP GR UR REFRACTOMETRY: 1.01 (ref 1–1.03)
SPECIMEN TYPE: ABNORMAL
UR CULT HOLD, URHOLD: NORMAL
UROBILINOGEN UR QL STRIP.AUTO: 0.2 EU/DL (ref 0.2–1)
VENTRICULAR RATE, ECG03: 126 BPM
VENTRICULAR RATE, ECG03: 80 BPM
WBC URNS QL MICRO: ABNORMAL /HPF (ref 0–4)
YEAST BUDDING URNS QL: PRESENT
YEAST URNS QL MICRO: PRESENT

## 2019-03-25 PROCEDURE — 97530 THERAPEUTIC ACTIVITIES: CPT

## 2019-03-25 PROCEDURE — 36600 WITHDRAWAL OF ARTERIAL BLOOD: CPT

## 2019-03-25 PROCEDURE — 97165 OT EVAL LOW COMPLEX 30 MIN: CPT

## 2019-03-25 PROCEDURE — 97116 GAIT TRAINING THERAPY: CPT

## 2019-03-25 PROCEDURE — 74011250636 HC RX REV CODE- 250/636: Performed by: HOSPITALIST

## 2019-03-25 PROCEDURE — 74011250636 HC RX REV CODE- 250/636: Performed by: INTERNAL MEDICINE

## 2019-03-25 PROCEDURE — 36415 COLL VENOUS BLD VENIPUNCTURE: CPT

## 2019-03-25 PROCEDURE — 74011636637 HC RX REV CODE- 636/637: Performed by: INTERNAL MEDICINE

## 2019-03-25 PROCEDURE — 74011636637 HC RX REV CODE- 636/637: Performed by: HOSPITALIST

## 2019-03-25 PROCEDURE — 65660000001 HC RM ICU INTERMED STEPDOWN

## 2019-03-25 PROCEDURE — 74011250637 HC RX REV CODE- 250/637: Performed by: HOSPITALIST

## 2019-03-25 PROCEDURE — 94760 N-INVAS EAR/PLS OXIMETRY 1: CPT

## 2019-03-25 PROCEDURE — 74011250637 HC RX REV CODE- 250/637: Performed by: INTERNAL MEDICINE

## 2019-03-25 PROCEDURE — 82962 GLUCOSE BLOOD TEST: CPT

## 2019-03-25 PROCEDURE — 80069 RENAL FUNCTION PANEL: CPT

## 2019-03-25 PROCEDURE — 92610 EVALUATE SWALLOWING FUNCTION: CPT

## 2019-03-25 PROCEDURE — 81001 URINALYSIS AUTO W/SCOPE: CPT

## 2019-03-25 PROCEDURE — 97535 SELF CARE MNGMENT TRAINING: CPT

## 2019-03-25 PROCEDURE — 77010033678 HC OXYGEN DAILY

## 2019-03-25 PROCEDURE — 82803 BLOOD GASES ANY COMBINATION: CPT

## 2019-03-25 RX ORDER — SODIUM CHLORIDE 9 MG/ML
50 INJECTION, SOLUTION INTRAVENOUS CONTINUOUS
Status: DISCONTINUED | OUTPATIENT
Start: 2019-03-25 | End: 2019-03-27

## 2019-03-25 RX ORDER — CALCITONIN SALMON 200 [USP'U]/ML
4 INJECTION, SOLUTION INTRAMUSCULAR; SUBCUTANEOUS EVERY 12 HOURS
Status: COMPLETED | OUTPATIENT
Start: 2019-03-25 | End: 2019-03-25

## 2019-03-25 RX ADMIN — CALCITONIN SALMON 384 INT'L UNITS: 200 INJECTION, SOLUTION INTRAMUSCULAR; SUBCUTANEOUS at 11:48

## 2019-03-25 RX ADMIN — APIXABAN 2.5 MG: 2.5 TABLET, FILM COATED ORAL at 17:18

## 2019-03-25 RX ADMIN — Medication 10 ML: at 06:51

## 2019-03-25 RX ADMIN — VENLAFAXINE 37.5 MG: 37.5 TABLET ORAL at 17:18

## 2019-03-25 RX ADMIN — CARVEDILOL 12.5 MG: 12.5 TABLET, FILM COATED ORAL at 08:06

## 2019-03-25 RX ADMIN — INSULIN LISPRO 3 UNITS: 100 INJECTION, SOLUTION INTRAVENOUS; SUBCUTANEOUS at 11:48

## 2019-03-25 RX ADMIN — CARVEDILOL 12.5 MG: 12.5 TABLET, FILM COATED ORAL at 17:18

## 2019-03-25 RX ADMIN — POTASSIUM CHLORIDE 20 MEQ: 750 TABLET, EXTENDED RELEASE ORAL at 08:06

## 2019-03-25 RX ADMIN — SODIUM CHLORIDE 75 ML/HR: 900 INJECTION, SOLUTION INTRAVENOUS at 08:12

## 2019-03-25 RX ADMIN — Medication 10 ML: at 22:22

## 2019-03-25 RX ADMIN — INSULIN LISPRO 3 UNITS: 100 INJECTION, SOLUTION INTRAVENOUS; SUBCUTANEOUS at 17:18

## 2019-03-25 RX ADMIN — ROSUVASTATIN CALCIUM 20 MG: 10 TABLET, FILM COATED ORAL at 22:19

## 2019-03-25 RX ADMIN — NYSTATIN: 100000 POWDER TOPICAL at 08:14

## 2019-03-25 RX ADMIN — INSULIN GLARGINE 15 UNITS: 100 INJECTION, SOLUTION SUBCUTANEOUS at 08:06

## 2019-03-25 RX ADMIN — APIXABAN 2.5 MG: 2.5 TABLET, FILM COATED ORAL at 08:06

## 2019-03-25 RX ADMIN — VENLAFAXINE 37.5 MG: 37.5 TABLET ORAL at 08:06

## 2019-03-25 RX ADMIN — NYSTATIN: 100000 POWDER TOPICAL at 17:23

## 2019-03-25 RX ADMIN — CALCITONIN SALMON 384 INT'L UNITS: 200 INJECTION, SOLUTION INTRAMUSCULAR; SUBCUTANEOUS at 23:01

## 2019-03-25 NOTE — WOUND CARE
Wound Consult:  New Patient Visit. Chart reviewed. Consulted for assessment of groin area/pannus. Spoke with patients nurses who noted redness and rash on admission. Patient is resting on a Versacare bed with accumax mattress. She stands from side of bed to assess buttocks/sacrum. Assessment: 
Pannus/groin - faintly pink, intact skin, no rash or odor. Nystatin powder and barrier ointment in use. Sacrum, buttocks and heels intact no redness. Skin Care Recommendations: 1. Minimize friction/shear: minimize layers of linen/pads under patient. 2. Off load pressure/reposition: Patient moves fairly well but certainly may require prompting and some assistance to change position routinely. She is able to off load her heels. 3. Manage Moisture - keep skin folds dry; appears to have some functional incontinence per her nurses description; large soft pannus - suggested to continue current care with Nystatin powder/barrier ointment and use a pillow case to lay between skin folds to help with moisture/separation of skin from skin. Jeffery Ledbetter 4. Continue to monitor nutrition, pain, and skin risk scale, and skin assessment. Plan: Will sign off; please re-consult if needed. Albaro Babcock RN,[facility] Wound Healing Office [phone] Pager [phone]

## 2019-03-25 NOTE — PROGRESS NOTES
1930: Bedside and Verbal shift change report given to Denton Aldana RN (oncoming nurse) by Jayshree Michael RN (offgoing nurse). Report included the following information SBAR, Kardex, ED Summary, Procedure Summary, Intake/Output, MAR, Recent Results and Cardiac Rhythm NSR.  
 
2136: Patient refused to take medications. See MAR 
 
[phone]: Patient refused for nurse to draw lab work.

## 2019-03-25 NOTE — PROGRESS NOTES
Nephrology Progress Note [facility] Date of Admission : 3/18/2019 CC:  Follow up for ASUNCION Assessment and Plan ASUNCION on CKD : 
- contrast nephropathy that required dialysis. resolved - Worsening renal function  ==> 2/2 Over diuresis and Hypercalcemia  
- stopped all diuretics - start IVF : NS at 75 cc/ hr  
 
Hypercalcemia : 
- Corrected Ca 11.6 . Likely causing her confusion/AMS 
- stopped Calcitriol ==> Not sure why she was on it to begin with it - worsened by dehydration from diuretics - check Ionized Ca  
- expect improvement w/ IVF  
- If Ionized Ca > 1.2  ==> 2 doses of Calcitonin L pleural effusion: 
- s/p thoracentesis 3/19 
  
Recent NSTEMI:  
- Cath : Non occlusiVe CAD  
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  
  
Chronic Anemia: 
- give IV iron while here - THEODORA x 1 on 3/21 
  
DM-II: 
- on insulin Interval History: 
Weekend events noted She refused labs today Denies any HA, nausea, constipation Current Medications: all current  Medications have been eviewed in [facility] Review of Systems: Pertinent items are noted in HPI. Objective: 
Vitals:   
Vitals:  
 03/24/19 2310 03/25/19 0404 03/25/19 7725 03/25/19 6481 BP: 142/51 148/76  150/60 Pulse: 75 82  76 Resp: 16 16  18 Temp: 98.4 °F (36.9 °C) 98.8 °F (37.1 °C)  98.5 °F (36.9 °C) SpO2: 99% 100%  96% Weight:   96.2 kg (212 lb) Height:      
 
Intake and Output: 
No intake/output data recorded. 03/23 1901 - 03/25 0700 In: 0 [P.O.:440; I.V.:150] Out: 402 [Urine:401] Physical Examination:General: No distress Neck:  Supple, no mass Resp:  Reduced bibasilar breath sounds CV:  RRR,  no murmur or rub, no LE edema GI:  Soft, NT, + BS Neurologic:  Confused Skin:  No Rash :  No cartagena []    High complexity decision making was performed 
[]    Patient is at high-risk of decompensation with multiple organ involvement Lab Data Personally Reviewed: I have reviewed all the pertinent labs, microbiology data and radiology studies during assessment. Recent Labs  
  03/24/19 
2176   
K 3.9 CL 97  
CO2 36* * BUN 52* CREA 2.56* CA 10.4* PHOS 4.2 ALB 2.5* Recent Labs  
  03/23/19 
2031 HCT 34.1*  
* No results found for: SDES Lab Results Component Value Date/Time Culture result: CITROBACTER YOUNGAE (A) 03/20/2019 12:02 PM  
 Culture result: NO GROWTH 5 DAYS 03/20/2019 11:00 AM  
 Culture result: NO GROWTH 4 DAYS 03/19/2019 01:02 PM  
 
Recent Results (from the past 24 hour(s)) EKG, 12 LEAD, INITIAL Collection Time: 03/24/19  9:38 AM  
Result Value Ref Range Ventricular Rate 126 BPM  
 Atrial Rate 133 BPM  
 QRS Duration 82 ms Q-T Interval 274 ms QTC Calculation (Bezet) 396 ms Calculated R Axis 54 degrees Calculated T Axis -157 degrees Diagnosis Atrial fibrillation ST & T wave abnormality, consider inferolateral ischemia or digitalis effect Abnormal ECG When compared with ECG of 21-MAR-2019 06:55, Atrial fibrillation has replaced Sinus rhythm Vent. rate has increased BY  46 BPM 
Non-specific change in ST segment in Lateral leads Inverted T waves have replaced nonspecific T wave abnormality in Inferior  
leads T wave inversion more evident in Lateral leads Confirmed by Raymond Rodriguez MD. ([id number]) on 3/25/2019 12:10:11 AM 
  
GLUCOSE, POC Collection Time: 03/24/19 11:27 AM  
Result Value Ref Range Glucose (POC) 227 (H) 65 - 100 mg/dL Performed by Sandra Bliss GLUCOSE, POC Collection Time: 03/24/19  5:15 PM  
Result Value Ref Range Glucose (POC) 119 (H) 65 - 100 mg/dL Performed by Lawanda Garza, POC Collection Time: 03/24/19  9:56 PM  
Result Value Ref Range Glucose (POC) 156 (H) 65 - 100 mg/dL Performed by [facility] GLUCOSE, POC Collection Time: 03/25/19  6:50 AM  
Result Value Ref Range Glucose (POC) 113 (H) 65 - 100 mg/dL Performed by [facility] URINALYSIS W/MICROSCOPIC Collection Time: 03/25/19  7:07 AM  
Result Value Ref Range Color YELLOW/STRAW Appearance CLEAR CLEAR Specific gravity 1.013 1.003 - 1.030    
 pH (UA) 6.0 5.0 - 8.0 Protein NEGATIVE  NEG mg/dL Glucose NEGATIVE  NEG mg/dL Ketone NEGATIVE  NEG mg/dL Bilirubin NEGATIVE  NEG Blood TRACE (A) NEG Urobilinogen 0.2 0.2 - 1.0 EU/dL Nitrites NEGATIVE  NEG Leukocyte Esterase LARGE (A) NEG    
 WBC PENDING /hpf  
 RBC PENDING /hpf Epithelial cells PENDING /lpf Bacteria PENDING /hpf URINE CULTURE HOLD SAMPLE Collection Time: 03/25/19  7:07 AM  
Result Value Ref Range Urine culture hold URINE ON HOLD IN MICROBIOLOGY DEPT FOR 3 DAYS. IF UNPRESERVED URINE IS SUBMITTED, IT CANNOT BE USED FOR ADDITIONAL TESTING AFTER 24 HRS, RECOLLECTION WILL BE REQUIRED. Shaq Alcaraz MD 
[address] [place] [address], [address] [facility] Phone - [phone] Fax - [phone] 
[email or web address]

## 2019-03-25 NOTE — PROGRESS NOTES
Problem: Mobility Impaired (Adult and Pediatric) Goal: *Acute Goals and Plan of Care (Insert Text) Description Physical Therapy Goals Initiated 3/21/2019 1. Patient will move from supine to sit and sit to supine , scoot up and down and roll side to side in bed with modified independence within 7 day(s). 2.  Patient will transfer from bed to chair and chair to bed with modified independence using the least restrictive device within 7 day(s). 3.  Patient will perform sit to stand with modified independence within 7 day(s). 4.  Patient will ambulate with modified independence for 150 feet with the least restrictive device within 7 day(s). 5.  Patient will ascend/descend 2 stairs with right handrail(s) for community access with supervision/set-up within 7 day(s). Outcome: Progressing Towards Goal 
  
PHYSICAL THERAPY TREATMENT Patient: Moe Tracy (55 y.o. female) Date: 3/25/2019 Diagnosis: SOB (shortness of breath) [R06.02] <principal problem not specified> Precautions: Fall (Fluctuating confusion); Urine incontinence, Bed/chair alarm Chart, physical therapy assessment, plan of care and goals were reviewed. ASSESSMENT: 
Patient continues to be functionally limited by the following: fluctuating confusion, decreased attention to task (very distractible), generally decreased functional strength, B LE edema, and impaired cardiopulmonary tolerance. At this time, patient required up to minimal assist x 2 for safe functional transfers and short-distance gait; anticipate assist x 1 with introduction of rolling walker for energy conservation and balance purposes. During functional standing reaching tasks, patient required intermittent minimal physical assist along with an external supportive surface for balance/steadying.    
 
Patient's daughter and granddaughter at bedside, supportive, however, tending to patient's all functional needs when patient can do on own to regain her independence; reiterated to all. At this time, question her safety for living alone, however, per some notes in the chart, her children have been staying with her 24/7? If 24/7 physical assistance can be arranged, patient could discharge home with HHPT/OT and AD (likely rolling walker). However, patient would highly benefit from inpatient rehab at discharge in order to progress her functional safety, strength, and balance for safe independent mobility within her home. Prior Level of Function: Patient IND-Mod I, living alone in a 1-story house, driving, ambulating with rollator intermittently. Progression toward goals: 
?    Improving appropriately and progressing toward goals ? Improving slowly and progressing toward goals ? Not making progress toward goals and plan of care will be adjusted PLAN: 
Patient continues to benefit from skilled intervention to address the above impairments. Continue treatment per established plan of care. SUBJECTIVE:  
Patient stated ? What should I do with this [toilet paper]. ? OBJECTIVE DATA SUMMARY:  
Critical Behavior: 
Neurologic State: Alert, Confused(intermittent confusion) Orientation Level: Disoriented to person, Disoriented to place, Disoriented to time(cues for situation) Cognition: Decreased attention/concentration, Follows commands, Poor safety awareness Safety/Judgement: Awareness of environment, Decreased awareness of need for assistance, Decreased awareness of need for safety, Fall prevention Functional Mobility Training: 
Bed Mobility: 
Supine to Sit: Contact guard assistance Sit to Supine: (up in chair) Scooting: Contact guard assistance; Additional time Transfers: 
Sit to Stand: Minimum assistance;Assist x2 Stand to Sit: Minimum assistance;Assist x2 Bed to Chair: Minimum assistance;Assist x2 Balance: 
Sitting: Intact Standing: Impaired; Without support(aline HHA) Standing - Static: Good;Fair(some anterior/posterior sway with extended stand) Standing - Dynamic : Fair Ambulation/Gait Training: 
Distance (ft): 15 Feet (ft)(x 2) Assistive Device: (B HHA) Ambulation - Level of Assistance: Assist x2;Contact guard assistance;Minimal assistance Gait Abnormalities: Decreased step clearance Base of Support: Widened Speed/Lucy: Slow;Shuffled Step Length: Right shortened;Left shortened Pain: 
Pain Scale 1: Numeric (0 - 10) Pain Intensity 1: 0 Activity Tolerance: 
Please refer to the flowsheet for vital signs taken during this treatment. After treatment:  
?    Patient left in no apparent distress sitting up in chair ? Patient left in no apparent distress in bed 
? Call bell left within reach ? Nursing notified ? Caregiver present ? Bed alarm activated COMMUNICATION/COLLABORATION:  
The patient?s plan of care was discussed with: Occupational Therapist and Registered Nurse Dario Noel PT, DPT Time Calculation: 36 mins

## 2019-03-25 NOTE — PROGRESS NOTES
Problem: Self Care Deficits Care Plan (Adult) Goal: *Acute Goals and Plan of Care (Insert Text) Description Occupational Therapy Goals Initiated 3/25/2019 1. Patient will perform ADLs standing 5 mins without fatigue or LOB with supervision/set-up within 7 day(s). 2.  Patient will perform lower body ADLs with supervision/set-up within 7 day(s). 3.  Patient will perform gathering ADL items high and low 2/2 with supervision/set-up within 7 day(s). 4.  Patient will perform toilet transfers with supervision/set-up within 7 day(s). 5.  Patient will perform all aspects of toileting with supervision/set-up within 7 day(s). 6.  Patient will participate in upper extremity therapeutic exercise/activities to increase independence with ADLs with supervision/set-up for 5 minutes within 7 day(s). Outcome: Progressing Towards Goal 
  
OCCUPATIONAL THERAPY EVALUATION Patient: Vernon Asencio (85 y.o. female) Date: 3/25/2019 Primary Diagnosis: SOB (shortness of breath) [R06.02] Precautions: Fall(fluctuating confusion) ASSESSMENT : 
Based on the objective data described below, the patient presents with IND-setup for upper body ADLs, Mod-Total A for lower body ADLs, and CGA-Min A x2 for functional mobility s/p admission for SOB. PTA, patient IND-Mod I, living alone in a 1 story house, driving, ambulating with rollator. Now presents with impaired balance, decreased orientation (A&O x3) and attention (very distractable), activity tolerance and reach to BLEs, and generalized weakness. Received in supine with daughters present, agreeable to therapy. Increased assist required for functional transfers (Min A x2), Mod-Max A for lower body dressing & bathing after urgent voiding on the bed and floor. Max A for bowel hygiene standing with Min A from [facility], returned to the chair, clean gown & brief donned, all needs met. Of note, patient with fluctuating BP throughout session, RN aware. Considering patient was highly IND and living alone prior to d/c, recommend IPR, able to tolerate 3 hours/day of therapy. Recommend with nursing patient to complete as able in order to maintain strength, endurance and independence: ADLs with supervision/setup, OOB to chair 3x/day and mobilizing to the [facility] for toileting with 1-2 assist & RW. Thank you for your assistance. Patient will benefit from skilled intervention to address the above impairments. Patient?s rehabilitation potential is considered to be Good Factors which may influence rehabilitation potential include: ? None noted ? Mental ability/status ? Medical condition ? Home/family situation and support systems ? Safety awareness ? Pain tolerance/management ? Other: PLAN : 
Recommendations and Planned Interventions: 
?               Self Care Training                  ? Therapeutic Activities ? Functional Mobility Training    ? Cognitive Retraining 
? Therapeutic Exercises           ? Endurance Activities ? Balance Training                   ? Neuromuscular Re-Education ? Visual/Perceptual Training     ? Home Safety Training 
? Patient Education                 ? Family Training/Education ? Other (comment): Frequency/Duration: Patient will be followed by occupational therapy 5 times a week to address goals. Discharge Recommendations: Inpatient Rehab Further Equipment Recommendations for Discharge: TBD by rehab SUBJECTIVE:  
Patient stated ? I was running. Carrolyn Grange Carrolyn Grange Carrolyn Grange ? re: when asking situational orientation, patient not finishing statement OBJECTIVE DATA SUMMARY:  
HISTORY:  
Past Medical History:  
Diagnosis Date CAD (coronary artery disease) Diabetes ([facility] [address] Hypertension Past Surgical History: Procedure Laterality Date HX KNEE REPLACEMENT Bilateral   
 HX ORTHOPAEDIC    
 IR INSERT TUNL CVC W/O PORT OVER 5 YR  3/5/2019 Prior Level of Function/Environment/Context: IND-Mod I, lives alone, driving, ambulation with rollator, taking breaks with long distances. Has supportive family (present during session) and DME/AE (see below) Home Situation Home Environment: Private residence # Steps to Enter: 0 One/Two Story Residence: Two story Living Alone: Yes Support Systems: Child(jonn) Patient Expects to be Discharged to[de-identified] Private residence Current DME Used/Available at Home: Nguyễn Ríos, michelle, Walker, rollator, Grab bars, Shower chair Tub or Shower Type: Shower Hand dominance: Right EXAMINATION OF PERFORMANCE DEFICITS: 
Cognitive/Behavioral Status: 
Neurologic State: Alert;Confused(intermittent confusion) Orientation Level: Disoriented to person;Disoriented to place; Disoriented to time(cues for situation) Cognition: Decreased attention/concentration; Follows commands;Poor safety awareness Perception: Appears intact Perseveration: No perseveration noted Safety/Judgement: Awareness of environment;Decreased awareness of need for assistance;Decreased awareness of need for safety; Fall prevention Skin: Appears intact, skin breakdown on bottom, RN applying cream 
 
Edema: None noted Hearing: Auditory Auditory Impairment: None Vision/Perceptual:   
Tracking: Able to track stimulus in all quadrants w/o difficulty Acuity: Within Defined Limits Corrective Lenses: Reading glasses Range of Motion: Jesus Kays AROM: Generally decreased, functional 
  
  
  
  
  
  
  
Strength: Jesus Kays Strength: Generally decreased, functional 
  
  
  
  
Coordination: 
Coordination: Generally decreased, functional 
Fine Motor Skills-Upper: Left Intact; Right Intact Gross Motor Skills-Upper: Left Intact; Right Intact Tone & Sensation: Jesus Kays Tone: Normal 
  
  
  
  
  
  
  
Balance: Sitting: Intact Standing: Impaired; Without support(aline HHA) Standing - Static: Good;Fair(some anterior/posterior sway with extended stand) Standing - Dynamic : Fair Functional Mobility and Transfers for ADLs: 
Bed Mobility: 
Supine to Sit: Contact guard assistance Sit to Supine: (up in chair) Scooting: Contact guard assistance; Additional time Transfers: 
Sit to Stand: Minimum assistance;Assist x2 Stand to Sit: Minimum assistance;Assist x2 Bed to Chair: Minimum assistance;Assist x2 Toilet Transfer : Minimum assistance;Assist x2(to/from [facility]) ADL Assessment: 
Feeding: Independent Oral Facial Hygiene/Grooming: Minimum assistance(infer standing, setup seated) Bathing: Moderate assistance(infer for distal reach to BLEs & standing balance) Upper Body Dressing: Supervision Lower Body Dressing: Moderate assistance(infer for distal reach to BLEs & standing balance) Toileting: Maximum assistance(A for transfer, bowel hygiene, & standing balance) ADL Intervention and task modifications: 
  
 
  
 
  
 
Lower Body Bathing Bathing Assistance: Moderate assistance Perineal  : Maximum assistance(standing with Min A) Position Performed: Standing Cues: Verbal cues provided;Visual cues provided; Tactile cues provided Lower Body : Moderate assistance(for distal reach to BLEs) Position Performed: Seated in chair Cues: Verbal cues provided;Visual cues provided;Physical assistance Upper Body Dressing Assistance Hospital Gown: Minimum  assistance(for line management, cues for sequencing) Lower Body Dressing Assistance Protective Undergarmet: Maximum assistance(for distal donning & standing balance) Socks: Total assistance (dependent) Leg Crossed Method Used: No 
Position Performed: Seated in chair;Standing Cues: Verbal cues provided;Visual cues provided; Tactile cues provided;Physical assistance Toileting Toileting Assistance: Maximum assistance Bladder Hygiene: Minimum assistance(A for standing balance) Bowel Hygiene: Maximum assistance(standing) Clothing Management: Maximum assistance(clean brief donned) Cues: Verbal cues provided;Visual cues provided; Tactile cues provided;Physical assistance for pants up Adaptive Equipment: (BSC) Cognitive Retraining Safety/Judgement: Awareness of environment;Decreased awareness of need for assistance;Decreased awareness of need for safety; Fall prevention Therapeutic Exercise: SPT to [facility] & chair and sit<>stand x4 with Min A x2 Functional Measure: 
Barthel Index: 
Bathin Bladder: 5 Bowels: 10 
Groomin Dressin Feeding: 10 Mobility: 0 Stairs: 0 Toilet Use: 5 Transfer (Bed to Chair and Back): 10 Total: 50/100 Percentage of impairment  
0% 1-19% 20-39% 40-59% 60-79% 80-99% 100% Barthel Score 0-100 100 99-80 79-60 59-40 20-39 1-19 
 0 The Barthel ADL Index: Guidelines 1. The index should be used as a record of what a patient does, not as a record of what a patient could do. 2. The main aim is to establish degree of independence from any help, physical or verbal, however minor and for whatever reason. 3. The need for supervision renders the patient not independent. 4. A patient's performance should be established using the best available evidence. Asking the patient, friends/relatives and nurses are the usual sources, but direct observation and common sense are also important. However direct testing is not needed. 5. Usually the patient's performance over the preceding 24-48 hours is important, but occasionally longer periods will be relevant. 6. Middle categories imply that the patient supplies over 50 per cent of the effort. 7. Use of aids to be independent is allowed. Jasbir Childers., Barthel, D.W. ([id number]). Functional evaluation: the Barthel Index. [address] [facility] (14)2.  
GALILEO James, Carlitos Hraris., Lenord Shone., Krysten Kahn. (1999). Measuring the change indisability after inpatient rehabilitation; comparison of the responsiveness of the Barthel Index and Functional Wood Measure. Journal of Neurology, Neurosurgery, and Psychiatry, 66(4), 685-373. KARYN Peterson, JAMISON Rachel, & Carol Browning M.A. (2004.) Assessment of post-stroke quality of life in cost-effectiveness studies: The usefulness of the Barthel Index and the EuroQoL-5D. [facility], 13, 110-66 Occupational Therapy Evaluation Charge Determination History Examination Decision-Making LOW Complexity : Brief history review  MEDIUM Complexity : 3-5 performance deficits relating to physical, cognitive , or psychosocial skils that result in activity limitations and / or participation restrictions MEDIUM Complexity : Patient may present with comorbidities that affect occupational performnce. Miniml to moderate modification of tasks or assistance (eg, physical or verbal ) with assesment(s) is necessary to enable patient to complete evaluation Based on the above components, the patient evaluation is determined to be of the following complexity level: LOW Pain: 
Pain Scale 1: Numeric (0 - 10) Pain Intensity 1: 0 Activity Tolerance:  
Good despite fluctuating BP Please refer to the flowsheet for vital signs taken during this treatment. After treatment:  
? Patient left in no apparent distress sitting up in chair ? Patient left in no apparent distress in bed 
? Call bell left within reach ? Nursing notified ? Caregiver present ? Chair alarm activated COMMUNICATION/EDUCATION:  
The patient?s plan of care was discussed with: Physical Therapist and Registered Nurse. 
? Home safety education was provided and the patient/caregiver indicated understanding. ? Patient/family have participated as able in goal setting and plan of care. ? Patient/family agree to work toward stated goals and plan of care. ? Patient understands intent and goals of therapy, but is neutral about his/her participation. ? Patient is unable to participate in goal setting and plan of care. This patient?s plan of care is appropriate for delegation to VERONIKA. Thank you for this referral. 
JARRET Chaidez, OTR/L Time Calculation: 36 mins

## 2019-03-25 NOTE — PROGRESS NOTES
Hospitalist Progress Note Presley Machado MD 
Answering service: [phone] OR [phone] from in house phone Date of Service:  3/25/2019 NAME:  Mitzi Melara :  1940 MRN:  [id number] Admission Summary:  
Mitzi Melara is a 78 y.o. female with pmh of newly diagnosed CKD s/p HD (had not needed HD on discharge) , recently diagnosed NSTEMI and Tokostubos cardiomyopathy (with recent LHC without evidence of CAD), ?COPD (had wheezing prior admission and started on inhalers) who presented to the ED initially for hyperglycemia and SOB. Per family (daughters) at bedside, patient lives alone and had been altered and more sleepy than usual since discharge from the hospital . Today she had high blood sugars at home > 500 which were noted by nursing. She also was hypoxic with saturations of 88 or so at home on room air. Subsequently 911 was called. Although she lives alone she has family at her side . They have noted intermittent altered mental status, which has not improved since discharge from the hospital. Deny any F/C. Has had some intermittnet wheezing for which she has been taking her grandchildren's nebulizer treatments without improvement. No abdominal pain, N/V/D/C. In the ED was hypoxic to 88-86% on RA and admitted for further workup. Interval history / Subjective:  
   
Pt seen in room Denies any complaints On Room air Sitting up in bed. She denies any complaints today Assessment & Plan: SOB - likely from fluid overload and large L pleural effusion, BNP elevated. -  On bumex - S/P  Thoracentesis 3/19 - Cytology neg For malignancy - Influenza - neg 
-Echo Showing Normal EF at this admission Metabolic encephalopathy - ? Polypharmacy/UTI - holding tramadol, ativan, gabapentin, oxybutynin 
-Check CT head 3/22- neg 
-Still was confused- MRI brain - Punctuate ischemia -Check Carotid US - less than 50% stenosis of the right internal carotid and no stenosis of the left internal carotid. Afib with RVR - 3/24 BB for rate control Cardiology recommends eliquis for stroke prevention Worsening CKD 3 
- Follow I and O  
- Nephrology consulted 
-Diuretics per renal  
 
Hypercalcemia 3/25 
ordered calcitonin per renal Recommendations Recheck in am 
Monitor for arrythmias Leukocytosis Resolved U/A positive for UTI - likely  POA Blood cultures NGTD Rocephin/Doxy - Cover for pneumonia, UTI Thoracentesis  - Followup Fluid Cultures - so far NGTD 
  
Takotsubo cardiomyopathy  
- Diuresis, cardiology consult - I and O, daily weight  
-Recheck Echo Showing Normal LVEF 
  
 
Hypokalemia Adjusted Potassium dosing.  
   
Depression - on venlafaxine Type 2 diabetes with hyperglycemia 8.3%  
- POCT glucose checks, long acting insulin, hypoglycemia protocol.  
  
DVT PPX - heparin Iron Def anemia 
on iron Infusion CODE STATUS: FULL, I discussed with patient and daughter and she wanted full code. - 3/19 Risk of deterioration: medium Total time spent with patient: 32 Minutes Care Plan discussed with: Patient/Family and Nurse Disposition: HH PT, OT, RN and PT eval D/C in 1-2days to rehab once Calcium corrected Hospital Problems  Date Reviewed: 3/18/2019 Codes Class Noted POA  
 SOB (shortness of breath) ICD-10-CM: R06.02 
ICD-9-CM: 786.05  3/18/2019 Unknown Review of Systems:  
Pertinent items are noted in HPI. Vital Signs:  
 Last 24hrs VS reviewed since prior progress note. Most recent are: 
Visit Vitals /59 (BP 1 Location: Left arm, BP Patient Position: Supine) Pulse 81 Temp 97.3 °F (36.3 °C) Resp 18 Ht 5' 5\" (1.651 m) Wt 96.2 kg (212 lb) SpO2 94% BMI 35.28 kg/m² Intake/Output Summary (Last 24 hours) at 3/25/2019 1728 Last data filed at 3/25/2019 1100 Gross per 24 hour Intake 810 ml  
 Output 200 ml Net 610 ml Physical Examination:  
 
 
     
Constitutional:  No acute distress, ENT:  Oral mucous moist, oropharynx benign. Neck supple, Resp:  Decreased at bases. No accessory muscle use CV:  Regular rhythm, normal rate, no murmurs, gallops, rubs GI:  Soft, non distended, non tender. normoactive bowel sounds, no hepatosplenomegaly Musculoskeletal:  No edema, warm, 2+ pulses throughout Neurologic:  Moves all extremities. AAO person and place, CN II-XII reviewed Skin:  Good turgor, no rashes or ulcers Data Review:  
 Review and/or order of clinical lab test 
Review and/or order of tests in the radiology section of CPT Review and/or order of tests in the medicine section of CPT Labs:  
 
Recent Labs  
  03/23/19 
2031 HCT 34.1*  
* Recent Labs  
  03/25/19 
5097 03/24/19 
1278  140  
K 3.8 3.9  97 CO2 36* 36* BUN 56* 52* CREA 2.71* 2.56* * 138* CA 10.4* 10.4* PHOS 3.1 4.2 Recent Labs  
  03/25/19 
0244 03/24/19 
1803 ALB 2.6* 2.5* No results for input(s): INR, PTP, APTT in the last 72 hours. No lab exists for component: INREXT, INREXT No results for input(s): FE, TIBC, PSAT, FERR in the last 72 hours. No results found for: FOL, RBCF No results for input(s): PH, PCO2, PO2 in the last 72 hours. No results for input(s): CPK, CKNDX, TROIQ in the last 72 hours. No lab exists for component: CPKMB Lab Results Component Value Date/Time Cholesterol, total 121 03/24/2019 06:32 AM  
 HDL Cholesterol 47 03/24/2019 06:32 AM  
 LDL, calculated 57.8 03/24/2019 06:32 AM  
 Triglyceride 81 03/24/2019 06:32 AM  
 CHOL/HDL Ratio 2.6 03/24/2019 06:32 AM  
 
Lab Results Component Value Date/Time  Glucose (POC) 233 (H) 03/25/2019 04:20 PM  
 Glucose (POC) 213 (H) 03/25/2019 11:21 AM  
 Glucose (POC) 113 (H) 03/25/2019 06:50 AM  
 Glucose (POC) 156 (H) 03/24/2019 09:56 PM  
 Glucose (POC) 119 (H) 03/24/2019 05:15 PM  
 
Lab Results Component Value Date/Time Color YELLOW/STRAW 03/25/2019 07:07 AM  
 Appearance CLEAR 03/25/2019 07:07 AM  
 Specific gravity 1.013 03/25/2019 07:07 AM  
 pH (UA) 6.0 03/25/2019 07:07 AM  
 Protein NEGATIVE  03/25/2019 07:07 AM  
 Glucose NEGATIVE  03/25/2019 07:07 AM  
 Ketone NEGATIVE  03/25/2019 07:07 AM  
 Bilirubin NEGATIVE  03/25/2019 07:07 AM  
 Urobilinogen 0.2 03/25/2019 07:07 AM  
 Nitrites NEGATIVE  03/25/2019 07:07 AM  
 Leukocyte Esterase LARGE (A) 03/25/2019 07:07 AM  
 Epithelial cells MANY (A) 03/25/2019 07:07 AM  
 Bacteria NEGATIVE  03/25/2019 07:07 AM  
 WBC 5-10 03/25/2019 07:07 AM  
 RBC 0-5 03/25/2019 07:07 AM  
 
 
 
Medications Reviewed:  
 
Current Facility-Administered Medications Medication Dose Route Frequency  0.9% sodium chloride infusion  75 mL/hr IntraVENous CONTINUOUS  
 calciTONIN (MIACALCIN) injection 384 Int'l Units  4 Int'l Units/kg IntraMUSCular Q12H  carvedilol (COREG) tablet 12.5 mg  12.5 mg Oral BID WITH MEALS  
 apixaban (ELIQUIS) tablet 2.5 mg  2.5 mg Oral BID  sodium chloride (NS) flush 5-40 mL  5-40 mL IntraVENous Q8H  
 sodium chloride (NS) flush 5-40 mL  5-40 mL IntraVENous PRN  
 rosuvastatin (CRESTOR) tablet 20 mg  20 mg Oral QHS  nystatin (MYCOSTATIN) 100,000 unit/gram powder   Topical BID  levoFLOXacin (LEVAQUIN) 750 mg in D5W IVPB  750 mg IntraVENous Q48H  potassium chloride SR (KLOR-CON 10) tablet 20 mEq  20 mEq Oral DAILY  sodium chloride (NS) flush 5-40 mL  5-40 mL IntraVENous Q8H  
 sodium chloride (NS) flush 5-40 mL  5-40 mL IntraVENous PRN  
 morphine injection 2 mg  2 mg IntraVENous Q4H PRN  
 ondansetron (ZOFRAN) injection 4 mg  4 mg IntraVENous Q4H PRN  
 albuterol (PROVENTIL VENTOLIN) nebulizer solution 2.5 mg  2.5 mg Nebulization Q4H PRN  
 benzonatate (TESSALON) capsule 100 mg  100 mg Oral TID PRN  
 bisacodyl (DULCOLAX) tablet 5 mg  5 mg Oral DAILY PRN  
  insulin glargine (LANTUS) injection 15 Units  15 Units SubCUTAneous DAILY  venlafaxine (EFFEXOR) tablet 37.5 mg  37.5 mg Oral BID  
 glucose chewable tablet 16 g  4 Tab Oral PRN  
 dextrose (D50W) injection syrg 12.5-25 g  25-50 mL IntraVENous PRN  
 glucagon (GLUCAGEN) injection 1 mg  1 mg IntraMUSCular PRN  
 insulin lispro (HUMALOG) injection   SubCUTAneous AC&HS  
 
______________________________________________________________________ EXPECTED LENGTH OF STAY: 4d 19h ACTUAL LENGTH OF STAY:          7 
 
            
Corby Beach MD  
Patient has given Verbal permission to discuss medical care with  
persons present in the room and and also with contact as listed on face sheet.

## 2019-03-25 NOTE — PROGRESS NOTES
0730 Bedside shift change report given to Junior Wiley RN  (oncoming nurse) by Janessa Delgado RN (offgoing nurse). Report included the following information SBAR, Kardex, ED Summary, Procedure Summary, Intake/Output, MAR, Recent Results, Med Rec Status and Cardiac Rhythm nsr. 1930 Bedside shift change report given to [id number] [facility] (oncoming nurse) by Junior Wiley RN (offgoing nurse). Report included the following information SBAR, ED Summary, OR Summary, Procedure Summary, Intake/Output, MAR, Recent Results and Cardiac Rhythm NSR. Problem: Diabetes Self-Management Goal: *Disease process and treatment process Description Define diabetes and identify own type of diabetes; list 3 options for treating diabetes. Outcome: Progressing Towards Goal 
Goal: *Incorporating physical activity into lifestyle Description State effect of exercise on blood glucose levels. Outcome: Progressing Towards Goal 
Goal: *Developing strategies to promote health/change behavior Description Define the ABC's of diabetes; identify appropriate screenings, schedule and personal plan for screenings. Outcome: Progressing Towards Goal 
Goal: *Monitoring blood glucose, interpreting and using results Description Identify recommended blood glucose targets  and personal targets. Outcome: Progressing Towards Goal 
Goal: *Prevention, detection, treatment of acute complications Description List symptoms of hyper- and hypoglycemia; describe how to treat low blood sugar and actions for lowering  high blood glucose level. Outcome: Progressing Towards Goal 
  
Problem: Falls - Risk of 
Goal: *Absence of Falls Description Document Trista Mckennas Fall Risk and appropriate interventions in the flowsheet.  
Outcome: Progressing Towards Goal 
Note:  
Fall Risk Interventions: 
Mobility Interventions: Communicate number of staff needed for ambulation/transfer, Patient to call before getting OOB, PT Consult for mobility concerns, Strengthening exercises (ROM-active/passive), Utilize gait belt for transfers/ambulation Mentation Interventions: Adequate sleep, hydration, pain control, Door open when patient unattended, Evaluate medications/consider consulting pharmacy, Increase mobility, More frequent rounding, Reorient patient, Room close to nurse's station, Update white board Medication Interventions: Evaluate medications/consider consulting pharmacy, Patient to call before getting OOB, Teach patient to arise slowly, Utilize gait belt for transfers/ambulation Elimination Interventions: Call light in reach, Patient to call for help with toileting needs, Toilet paper/wipes in reach History of Falls Interventions: Consult care management for discharge planning, Door open when patient unattended, Evaluate medications/consider consulting pharmacy, Room close to nurse's station, Utilize gait belt for transfer/ambulation Pt to call prior to oob, call bell in reach Problem: Patient Education: Go to Patient Education Activity Goal: Patient/Family Education Outcome: Progressing Towards Goal 
  
Problem: Pressure Injury - Risk of 
Goal: *Prevention of pressure injury Description Document Mohan Scale and appropriate interventions in the flowsheet. Outcome: Progressing Towards Goal 
Note:  
Pressure Injury Interventions: 
Sensory Interventions: Assess changes in LOC, Assess need for specialty bed, Check visual cues for pain, Keep linens dry and wrinkle-free, Maintain/enhance activity level, Pressure redistribution bed/mattress (bed type) Moisture Interventions: Absorbent underpads, Apply protective barrier, creams and emollients, Assess need for specialty bed, Check for incontinence Q2 hours and as needed, Limit adult briefs, Maintain skin hydration (lotion/cream) Activity Interventions: Assess need for specialty bed, Increase time out of bed, Pressure redistribution bed/mattress(bed type), PT/OT evaluation Mobility Interventions: Assess need for specialty bed, Pressure redistribution bed/mattress (bed type), PT/OT evaluation, Turn and reposition approx. every two hours(pillow and wedges) Nutrition Interventions: Document food/fluid/supplement intake, Discuss nutritional consult with provider, Offer support with meals,snacks and hydration Friction and Shear Interventions: Apply protective barrier, creams and emollients, Lift sheet, Minimize layers Assist patient with turning

## 2019-03-25 NOTE — PROGRESS NOTES
CM received AMS update that will call request cancelled until patient is medically ready for discharge. CM to follow for updates. Montse Nazario CM/EMANI

## 2019-03-25 NOTE — CONSULTS
Neurology:    MRA reviewed. No atherosclerosis but there is an incidental internal carotid aneurysm. Probably unrelated to her acute issue but I do think she should be evaluated by neuro interventional for an opinion about surveillance. Cardiology note reviewed. Agree with Eliquis. Please call if needed.     [address], DO  Neurology

## 2019-03-26 LAB
ALBUMIN SERPL-MCNC: 2.3 G/DL (ref 3.5–5)
ANION GAP SERPL CALC-SCNC: 3 MMOL/L (ref 5–15)
BUN SERPL-MCNC: 44 MG/DL (ref 6–20)
BUN/CREAT SERPL: 20 (ref 12–20)
CALCIUM SERPL-MCNC: 9.5 MG/DL (ref 8.5–10.1)
CHLORIDE SERPL-SCNC: 105 MMOL/L (ref 97–108)
CO2 SERPL-SCNC: 34 MMOL/L (ref 21–32)
CREAT SERPL-MCNC: 2.23 MG/DL (ref 0.55–1.02)
ERYTHROCYTE [DISTWIDTH] IN BLOOD BY AUTOMATED COUNT: 15.2 % (ref 11.5–14.5)
GLUCOSE BLD STRIP.AUTO-MCNC: 135 MG/DL (ref 65–100)
GLUCOSE BLD STRIP.AUTO-MCNC: 168 MG/DL (ref 65–100)
GLUCOSE BLD STRIP.AUTO-MCNC: 219 MG/DL (ref 65–100)
GLUCOSE BLD STRIP.AUTO-MCNC: 238 MG/DL (ref 65–100)
GLUCOSE SERPL-MCNC: 181 MG/DL (ref 65–100)
HCT VFR BLD AUTO: 31.5 % (ref 35–47)
HGB BLD-MCNC: 9.3 G/DL (ref 11.5–16)
MCH RBC QN AUTO: 26.5 PG (ref 26–34)
MCHC RBC AUTO-ENTMCNC: 29.5 G/DL (ref 30–36.5)
MCV RBC AUTO: 89.7 FL (ref 80–99)
NRBC # BLD: 0.02 K/UL (ref 0–0.01)
NRBC BLD-RTO: 0.2 PER 100 WBC
PHOSPHATE SERPL-MCNC: 2.7 MG/DL (ref 2.6–4.7)
PLATELET # BLD AUTO: 384 K/UL (ref 150–400)
PMV BLD AUTO: 10.3 FL (ref 8.9–12.9)
POTASSIUM SERPL-SCNC: 4.3 MMOL/L (ref 3.5–5.1)
RBC # BLD AUTO: 3.51 M/UL (ref 3.8–5.2)
SERVICE CMNT-IMP: ABNORMAL
SODIUM SERPL-SCNC: 142 MMOL/L (ref 136–145)
WBC # BLD AUTO: 9.2 K/UL (ref 3.6–11)

## 2019-03-26 PROCEDURE — 74011250637 HC RX REV CODE- 250/637: Performed by: INTERNAL MEDICINE

## 2019-03-26 PROCEDURE — 74011250637 HC RX REV CODE- 250/637: Performed by: HOSPITALIST

## 2019-03-26 PROCEDURE — 77030038269 HC DRN EXT URIN PURWCK BARD -A

## 2019-03-26 PROCEDURE — 36415 COLL VENOUS BLD VENIPUNCTURE: CPT

## 2019-03-26 PROCEDURE — 74011636637 HC RX REV CODE- 636/637: Performed by: INTERNAL MEDICINE

## 2019-03-26 PROCEDURE — 80069 RENAL FUNCTION PANEL: CPT

## 2019-03-26 PROCEDURE — 97116 GAIT TRAINING THERAPY: CPT

## 2019-03-26 PROCEDURE — 65660000001 HC RM ICU INTERMED STEPDOWN

## 2019-03-26 PROCEDURE — 74011636637 HC RX REV CODE- 636/637: Performed by: HOSPITALIST

## 2019-03-26 PROCEDURE — 77010033678 HC OXYGEN DAILY

## 2019-03-26 PROCEDURE — 85027 COMPLETE CBC AUTOMATED: CPT

## 2019-03-26 PROCEDURE — 97530 THERAPEUTIC ACTIVITIES: CPT

## 2019-03-26 PROCEDURE — 82962 GLUCOSE BLOOD TEST: CPT

## 2019-03-26 RX ORDER — LEVOFLOXACIN 750 MG/1
750 TABLET ORAL ONCE
Status: COMPLETED | OUTPATIENT
Start: 2019-03-26 | End: 2019-03-26

## 2019-03-26 RX ADMIN — INSULIN GLARGINE 15 UNITS: 100 INJECTION, SOLUTION SUBCUTANEOUS at 08:19

## 2019-03-26 RX ADMIN — APIXABAN 2.5 MG: 2.5 TABLET, FILM COATED ORAL at 08:19

## 2019-03-26 RX ADMIN — ROSUVASTATIN CALCIUM 20 MG: 10 TABLET, FILM COATED ORAL at 21:36

## 2019-03-26 RX ADMIN — CARVEDILOL 12.5 MG: 12.5 TABLET, FILM COATED ORAL at 08:19

## 2019-03-26 RX ADMIN — VENLAFAXINE 37.5 MG: 37.5 TABLET ORAL at 08:19

## 2019-03-26 RX ADMIN — Medication 10 ML: at 07:05

## 2019-03-26 RX ADMIN — APIXABAN 2.5 MG: 2.5 TABLET, FILM COATED ORAL at 17:00

## 2019-03-26 RX ADMIN — POTASSIUM CHLORIDE 20 MEQ: 750 TABLET, EXTENDED RELEASE ORAL at 08:19

## 2019-03-26 RX ADMIN — CARVEDILOL 12.5 MG: 12.5 TABLET, FILM COATED ORAL at 16:58

## 2019-03-26 RX ADMIN — INSULIN LISPRO 3 UNITS: 100 INJECTION, SOLUTION INTRAVENOUS; SUBCUTANEOUS at 16:30

## 2019-03-26 RX ADMIN — LEVOFLOXACIN 750 MG: 750 TABLET, FILM COATED ORAL at 16:58

## 2019-03-26 RX ADMIN — INSULIN LISPRO 2 UNITS: 100 INJECTION, SOLUTION INTRAVENOUS; SUBCUTANEOUS at 07:04

## 2019-03-26 RX ADMIN — NYSTATIN: 100000 POWDER TOPICAL at 17:00

## 2019-03-26 RX ADMIN — Medication 10 ML: at 13:51

## 2019-03-26 RX ADMIN — VENLAFAXINE 37.5 MG: 37.5 TABLET ORAL at 17:00

## 2019-03-26 RX ADMIN — NYSTATIN: 100000 POWDER TOPICAL at 08:19

## 2019-03-26 RX ADMIN — INSULIN LISPRO 3 UNITS: 100 INJECTION, SOLUTION INTRAVENOUS; SUBCUTANEOUS at 11:30

## 2019-03-26 NOTE — PROGRESS NOTES
NUTRITION COMPLETE ASSESSMENT 
 
RECOMMENDATIONS:  
1. Encourage PO intake with meals and supplements 
 - use Glucerna to give meds 
 - if intake remains poor could consider appetite stimulant 2. Adjust insulin with BG trending up 3. Add daily MVI if not drinking shakes Interventions/Plan:  
Food/Nutrient Delivery:    Commercial supplement(Glucerna TID) Assessment:  
Reason for Assessment:  
[x]LOS [x]At Nutrition Risk Diet: Consistent carb(soft solids) Supplements: none Nutritionally Significant Medications: [x] Reviewed & Includes: coreg, lantus (15 units), levaquin, KCl, NS  @ 50ml/hr Meal Intake:  
Patient Vitals for the past 100 hrs: 
 % Diet Eaten  
03/26/19 0819 30 % 03/25/19 1100 30 % 03/25/19 0830 30 % 03/24/19 0839 5 %  
03/23/19 2002 30 % 03/23/19 1754 10 % 03/23/19 1300 25 % 03/23/19 0852 10 % Current Hospitalization:  
Appetite: Poor PO Ability: Independent Average po intake:25-50% Average supplements intake:    
 Subjective: Pt sleeping soundly. Objective: 
Pt admitted for SOB. PMHx: NSTEMI, DM, HLD, CHF, CKD, depressiom. Recent admit with new dx of CKD. Required dialysis that admit. ASUNCION on CKD this admit with renal following. Had improved but worsening again d/t over diuresis per renal notes. Hypercalcemia now an issue but improved, cleared for d/c once calcium WNL. Some confusion noted this admit. Appetite has been poor this admit (see above). Will add Glucerna TID (660kcal, 30g protein) for now. Encourage PO intake with meals and supplements. Wt loss over past couple weeks with improvement in fluid overload noted after HD. Wt Readings from Last 10 Encounters:  
03/26/19 94 kg (207 lb 3.7 oz) 03/23/19 94.8 kg (209 lb)  
03/11/19 111.8 kg (246 lb 7.6 oz) 03/09/18 105.3 kg (232 lb 4 oz) 08/08/17 101.6 kg (224 lb) 05/19/17 103 kg (227 lb)  
11/21/16 107.5 kg (237 lb)  
09/26/16 106.6 kg (235 lb) 08/02/16 107 kg (236 lb) 10/12/12 106.1 kg (234 lb) Will continue to follow for PO intake, supplement acceptance, pt interview as able, wt/fluid trends. Estimated Nutrition Needs:  
Kcals/day: 8682 Kcals/day(1690-1830kcal) Protein: 75 g(75-94g (0.8-1g/kg)) Fluid: 1700 ml(1ml/kcal) Based On: [place] [address] [place](x 1.2-1.3) Weight Used: Actual wt(94kg) Pt expected to meet estimated nutrient needs:  []   Yes     []  No [x] Unable to predict at this time Nutrition Diagnosis:  
1. Inadequate protein-energy intake related to poor appetite as evidenced by less than 50% meals consumed Goals:   
 Consumption of at least 50% meals and 2 supplements/day in 3-4 days Monitoring & Evaluation: - Total energy intake, Liquid meal replacement, Protein intake - Weight/weight change, Electrolyte and renal profile Previous Nutrition Goals Met:   N/A Previous Recommendations:    N/A Education & Discharge Needs: 
 [x] None Identified 
 [] Identified and addressed  
 [] Participated in care plan, discharge planning, and/or interdisciplinary rounds Cultural, Jehovah's witness and ethnic food preferences identified: None Skin Integrity: [x]Intact  []Other Edema: [x]None []Other Last BM: 3/25 Food Allergies: [x]None []Other Diet Restrictions: Cultural/Religion Preference(s): None Anthropometrics:   
Weight Loss Metrics 3/26/2019 3/11/2019 4/13/2018 3/9/2018 8/8/2017 5/19/2017 11/21/2016 Today's Wt 207 lb 3.7 oz 246 lb 7.6 oz 239 lb 232 lb 4 oz 224 lb 227 lb 237 lb BMI 34.49 kg/m2 41.02 kg/m2 39.77 kg/m2 38.65 kg/m2 37.28 kg/m2 37.77 kg/m2 39.44 kg/m2 Weight Source: Standing scale (comment) Height: 5' 5\" (165.1 cm), Body mass index is 34.49 kg/m². IBW : 56.7 kg (125 lb), % IBW (Calculated): 165.79 % Usual Body Weight: 106.1 kg (234 lb),   
 
Labs:   
Lab Results Component Value Date/Time  Sodium 142 03/26/2019 04:14 AM  
 Potassium 4.3 03/26/2019 04:14 AM  
 Chloride 105 03/26/2019 04:14 AM  
 CO2 34 (H) 03/26/2019 04:14 AM  
 Glucose 181 (H) 03/26/2019 04:14 AM  
 BUN 44 (H) 03/26/2019 04:14 AM  
 Creatinine 2.23 (H) 03/26/2019 04:14 AM  
 Calcium 9.5 03/26/2019 04:14 AM  
 Magnesium 1.9 03/20/2019 09:22 AM  
 Phosphorus 2.7 03/26/2019 04:14 AM  
 Albumin 2.3 (L) 03/26/2019 04:14 AM  
 
Lab Results Component Value Date/Time Hemoglobin A1c 8.3 (H) 03/05/2019 04:45 AM  
 
Kimberly Jacobo RD Pager #[phone] or [phone]

## 2019-03-26 NOTE — PROGRESS NOTES
Nephrology Progress Note Cam Face Date of Admission : 3/18/2019 CC:  Follow up for ASUNCION Assessment and Plan ASUNCION on CKD : 
- contrast nephropathy that required dialysis. resolved - Worsening renal function  ==> 2/2 Over diuresis and Hypercalcemia  
- continue to hold all diuretics - reduced IVF to 50 cc/ hr  
 
Hypercalcemia : 
- 2/2 calcitriol and dehydration  
- improved L pleural effusion: 
- s/p thoracentesis 3/19 
  
Recent NSTEMI:  
- Cath : Non occlusiVe CAD  
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  
  
Chronic Anemia: 
  
DM-II: 
- on insulin Interval History: 
Feels better Cr and Ca better UOP improving Denies any worsening SOB, N/V/CP Current Medications: all current  Medications have been eviewed in [facility] Review of Systems: Pertinent items are noted in HPI. Objective: 
Vitals:   
Vitals:  
 03/25/19 2300 03/26/19 0305 03/26/19 0421 03/26/19 0750 BP: 133/63 159/56  126/46 Pulse: 78 84  80 Resp: 18 18  18 Temp: 98.7 °F (37.1 °C) 98.7 °F (37.1 °C)  98.3 °F (36.8 °C) SpO2: 99% 98%  98% Weight:   94 kg (207 lb 3.7 oz) Height:      
 
Intake and Output: 
03/26 0701 - 03/26 1900 In: -  
Out: 400 [Urine:400] 03/24 1901 - 03/26 0700 In: 8972 [P.O.:760; I.V.:960] Out: 600 [Urine:600] Physical Examination:General: No distress Neck:  Supple, no mass Resp:  Reduced bibasilar breath sounds CV:  RRR,  no murmur or rub, no LE edema GI:  Soft, NT, + BS Neurologic:  AAO X 3 Skin:  No Rash :  No cartagena []    High complexity decision making was performed 
[]    Patient is at high-risk of decompensation with multiple organ involvement Lab Data Personally Reviewed: I have reviewed all the pertinent labs, microbiology data and radiology studies during assessment. Recent Labs  
  03/26/19 
0414 03/25/19 
3707 03/24/19 
9706  141 140  
K 4.3 3.8 3.9  100 97 CO2 34* 36* 36* * 121* 138* BUN 44* 56* 52*  
 CREA 2.23* 2.71* 2.56* CA 9.5 10.4* 10.4* PHOS 2.7 3.1 4.2 ALB 2.3* 2.6* 2.5* Recent Labs  
  03/26/19 0414 03/23/19 2031 WBC 9.2  --   
HGB 9.3*  --   
HCT 31.5* 34.1*  
 421* No results found for: SDES Lab Results Component Value Date/Time Culture result: CITROBACTER YOUNGAE (A) 03/20/2019 12:02 PM  
 Culture result: NO GROWTH 5 DAYS 03/20/2019 11:00 AM  
 Culture result: NO GROWTH 4 DAYS 03/19/2019 01:02 PM  
 
Recent Results (from the past 24 hour(s)) RENAL FUNCTION PANEL Collection Time: 03/25/19  8:24 AM  
Result Value Ref Range Sodium 141 136 - 145 mmol/L Potassium 3.8 3.5 - 5.1 mmol/L Chloride 100 97 - 108 mmol/L  
 CO2 36 (H) 21 - 32 mmol/L Anion gap 5 5 - 15 mmol/L Glucose 121 (H) 65 - 100 mg/dL BUN 56 (H) 6 - 20 MG/DL Creatinine 2.71 (H) 0.55 - 1.02 MG/DL  
 BUN/Creatinine ratio 21 (H) 12 - 20 GFR est AA 21 (L) >60 ml/min/1.73m2 GFR est non-AA 17 (L) >60 ml/min/1.73m2 Calcium 10.4 (H) 8.5 - 10.1 MG/DL Phosphorus 3.1 2.6 - 4.7 MG/DL Albumin 2.6 (L) 3.5 - 5.0 g/dL SAMPLES BEING HELD Collection Time: 03/25/19  8:24 AM  
Result Value Ref Range SAMPLES BEING HELD 1GOLD COMMENT Add-on orders for these samples will be processed based on acceptable specimen integrity and analyte stability, which may vary by analyte. POC EG7 Collection Time: 03/25/19  8:52 AM  
Result Value Ref Range Calcium, ionized (POC) 1.26 1.12 - 1.32 mmol/L  
 FIO2 (POC) 0.21 % pH (POC) 7.510 (H) 7.35 - 7.45    
 pCO2 (POC) 47.7 (H) 35.0 - 45.0 MMHG  
 pO2 (POC) 55 (L) 80 - 100 MMHG  
 HCO3 (POC) 38.0 (H) 22 - 26 MMOL/L Base excess (POC) 15 mmol/L  
 sO2 (POC) 90 (L) 92 - 97 % Site RIGHT RADIAL Device: ROOM AIR Allens test (POC) YES Specimen type (POC) ARTERIAL    
GLUCOSE, POC Collection Time: 03/25/19 11:21 AM  
Result Value Ref Range Glucose (POC) 213 (H) 65 - 100 mg/dL Performed by Tay Murillo GLUCOSE, POC Collection Time: 03/25/19  4:20 PM  
Result Value Ref Range Glucose (POC) 233 (H) 65 - 100 mg/dL Performed by Paola Butts, POC Collection Time: 03/25/19  9:05 PM  
Result Value Ref Range Glucose (POC) 182 (H) 65 - 100 mg/dL Performed by [facility] RENAL FUNCTION PANEL Collection Time: 03/26/19  4:14 AM  
Result Value Ref Range Sodium 142 136 - 145 mmol/L Potassium 4.3 3.5 - 5.1 mmol/L Chloride 105 97 - 108 mmol/L  
 CO2 34 (H) 21 - 32 mmol/L Anion gap 3 (L) 5 - 15 mmol/L Glucose 181 (H) 65 - 100 mg/dL BUN 44 (H) 6 - 20 MG/DL Creatinine 2.23 (H) 0.55 - 1.02 MG/DL  
 BUN/Creatinine ratio 20 12 - 20 GFR est AA 26 (L) >60 ml/min/1.73m2 GFR est non-AA 21 (L) >60 ml/min/1.73m2 Calcium 9.5 8.5 - 10.1 MG/DL Phosphorus 2.7 2.6 - 4.7 MG/DL Albumin 2.3 (L) 3.5 - 5.0 g/dL CBC W/O DIFF Collection Time: 03/26/19  4:14 AM  
Result Value Ref Range WBC 9.2 3.6 - 11.0 K/uL  
 RBC 3.51 (L) 3.80 - 5.20 M/uL HGB 9.3 (L) 11.5 - 16.0 g/dL HCT 31.5 (L) 35.0 - 47.0 % MCV 89.7 80.0 - 99.0 FL  
 MCH 26.5 26.0 - 34.0 PG  
 MCHC 29.5 (L) 30.0 - 36.5 g/dL  
 RDW 15.2 (H) 11.5 - 14.5 % PLATELET 368 849 - 340 K/uL MPV 10.3 8.9 - 12.9 FL  
 NRBC 0.2 (H) 0  WBC ABSOLUTE NRBC 0.02 (H) 0.00 - 0.01 K/uL GLUCOSE, POC Collection Time: 03/26/19  6:43 AM  
Result Value Ref Range Glucose (POC) 168 (H) 65 - 100 mg/dL Performed by [employer] Edi Aragon MD 
[address] [place] [address], [address] [facility] Phone - [phone] Fax - [phone] 
[email or web address]

## 2019-03-26 NOTE — PROGRESS NOTES
Hospitalist Progress Note Giovanni Muro MD 
Answering service: [phone] OR [phone] from in house phone Date of Service:  3/26/2019 NAME:  Adina Saldana :  1940 MRN:  [id number] Admission Summary:  
King Tang a 78 y.o. female with pmh of newly diagnosed CKD s/p HD (had not needed HD on discharge) , recently diagnosed NSTEMI and Tokostubos cardiomyopathy (with recent LHC without evidence of CAD), ?COPD (had wheezing prior admission and started on inhalers) who presented to the ED initially for hyperglycemia and SOB. Interval history / Subjective:  
Patient is seen and examined this morning. Patient is on 2L NC sitting comfortably. She alert and oriented X3. She is slow to respond but denied any new complaint. Assessment & Plan: # Acute Hypoxic respiratory failure likely from fluid overload and large L pleural effusion vs pulm hypertension - BNP elevated. -  On bumex - S/P  Thoracentesis 3/19 - Cytology neg For malignancy - Influenza - neg 
- Echo Showing Normal EF at this admission  
- wean down oxygen  
  
# Metabolic encephalopathy  
- ? Polypharmacy/UTI - holding tramadol, ativan, gabapentin, oxybutynin - Check CT head 3/22- neg - MRI brain - Punctuate ischemia 
-Check Carotid US - less than 50% stenosis of the right internal carotid and no stenosis of the left internal carotid. - tele showed no arrhythmia - ct with Eliquis  
- Neuro f/u jamari. 
  
# Afib with RVR - 3/24 
- BB for rate control - Cardiology recommends eliquis for stroke prevention  
  
# Worsening CKD 3 
- Follow I and O  
- Nephrology consulted - Diuretics per renal  
  
# Hypercalcemia 3/25 
ordered calcitonin per renal Recommendations Recheck in am 
Monitor for arrythmias 
  
# Leukocytosis - Resolved - U/A positive for UTI - likely  POA 
- Blood cultures NGTD 
- Rocephin/Doxy - Cover for pneumonia, UTI - Thoracentesis  - Followup Fluid Cultures - so far NGTD 
  
# Takotsubo cardiomyopathy  
- Diuresis, cardiology consult - I and O, daily weight  
- Recheck Echo Showing Normal LVEF 
  
  
# Hypokalemia - Adjusted Potassium dosing.  
   
Depression - on venlafaxine 
  
# Type 2 diabetes with hyperglycemia 8.3%  
- POCT glucose checks, long acting insulin, hypoglycemia protocol.  
  
DVT PPX - heparin  
  
Iron Def anemia 
on iron Infusion 
  
  
CODE STATUS: FULL 
  
Risk of deterioration: medium Care Plan discussed with: Patient and Nurse Disposition: HH PT, OT, RN and PT eval D/C in 1-2days to rehab Hospital Problems  Date Reviewed: 3/18/2019 Codes Class Noted POA  
 SOB (shortness of breath) ICD-10-CM: R06.02 
ICD-9-CM: 786.05  3/18/2019 Unknown Review of Systems:  
Pertinent positive mentioned in interval hx. Other systems reviewed and negative Vital Signs:  
 Last 24hrs VS reviewed since prior progress note. Most recent are: 
Visit Vitals /52 (BP 1 Location: Right arm, BP Patient Position: At rest) Pulse 81 Temp 98 °F (36.7 °C) Resp 18 Ht 5' 5\" (1.651 m) Wt 94 kg (207 lb 3.7 oz) SpO2 99% BMI 34.49 kg/m² Intake/Output Summary (Last 24 hours) at 3/26/2019 1556 Last data filed at 3/26/2019 0366 Gross per 24 hour Intake 1512.92 ml Output 600 ml Net 912.92 ml Physical Examination:  
 
Constitutional:  No acute distress, ENT:  Oral mucous moist, oropharynx benign. Neck supple, Resp:  Decreased at bases. No accessory muscle use CV:  Regular rhythm, normal rate, no murmurs, gallops, rubs GI:  Soft, non distended, non tender. normoactive bowel sounds, no hepatosplenomegaly Musculoskeletal:  No edema, warm, 2+ pulses throughout Neurologic:  Moves all extremities. AAO person and place, CN II-XII reviewed Skin:  Good turgor, no rashes or ulcers Data Review: I personally reviewed labs and imaging Labs:  
 
Recent Labs  
  03/26/19 0414 03/23/19 
2031 WBC 9.2  --   
HGB 9.3*  --   
HCT 31.5* 34.1*  
 421* Recent Labs  
  03/26/19 
0414 03/25/19 
8522 03/24/19 
4668  141 140  
K 4.3 3.8 3.9  100 97 CO2 34* 36* 36* BUN 44* 56* 52* CREA 2.23* 2.71* 2.56* * 121* 138* CA 9.5 10.4* 10.4* PHOS 2.7 3.1 4.2 Recent Labs  
  03/26/19 0414 03/25/19 
2842 03/24/19 
0221 ALB 2.3* 2.6* 2.5* No results for input(s): INR, PTP, APTT in the last 72 hours. No lab exists for component: INREXT No results for input(s): FE, TIBC, PSAT, FERR in the last 72 hours. No results found for: FOL, RBCF No results for input(s): PH, PCO2, PO2 in the last 72 hours. No results for input(s): CPK, CKNDX, TROIQ in the last 72 hours. No lab exists for component: CPKMB Lab Results Component Value Date/Time Cholesterol, total 121 03/24/2019 06:32 AM  
 HDL Cholesterol 47 03/24/2019 06:32 AM  
 LDL, calculated 57.8 03/24/2019 06:32 AM  
 Triglyceride 81 03/24/2019 06:32 AM  
 CHOL/HDL Ratio 2.6 03/24/2019 06:32 AM  
 
Lab Results Component Value Date/Time Glucose (POC) 238 (H) 03/26/2019 11:08 AM  
 Glucose (POC) 168 (H) 03/26/2019 06:43 AM  
 Glucose (POC) 182 (H) 03/25/2019 09:05 PM  
 Glucose (POC) 233 (H) 03/25/2019 04:20 PM  
 Glucose (POC) 213 (H) 03/25/2019 11:21 AM  
 
Lab Results Component Value Date/Time  Color YELLOW/STRAW 03/25/2019 07:07 AM  
 Appearance CLEAR 03/25/2019 07:07 AM  
 Specific gravity 1.013 03/25/2019 07:07 AM  
 pH (UA) 6.0 03/25/2019 07:07 AM  
 Protein NEGATIVE  03/25/2019 07:07 AM  
 Glucose NEGATIVE  03/25/2019 07:07 AM  
 Ketone NEGATIVE  03/25/2019 07:07 AM  
 Bilirubin NEGATIVE  03/25/2019 07:07 AM  
 Urobilinogen 0.2 03/25/2019 07:07 AM  
 Nitrites NEGATIVE  03/25/2019 07:07 AM  
 Leukocyte Esterase LARGE (A) 03/25/2019 07:07 AM  
 Epithelial cells MANY (A) 03/25/2019 07:07 AM  
 Bacteria NEGATIVE  03/25/2019 07:07 AM  
 WBC 5-10 03/25/2019 07:07 AM  
 RBC 0-5 03/25/2019 07:07 AM  
 
 
 
Medications Reviewed:  
 
Current Facility-Administered Medications Medication Dose Route Frequency  levoFLOXacin (LEVAQUIN) tablet 750 mg  750 mg Oral ONCE  
 0.9% sodium chloride infusion  50 mL/hr IntraVENous CONTINUOUS  
 carvedilol (COREG) tablet 12.5 mg  12.5 mg Oral BID WITH MEALS  
 apixaban (ELIQUIS) tablet 2.5 mg  2.5 mg Oral BID  sodium chloride (NS) flush 5-40 mL  5-40 mL IntraVENous Q8H  
 sodium chloride (NS) flush 5-40 mL  5-40 mL IntraVENous PRN  
 rosuvastatin (CRESTOR) tablet 20 mg  20 mg Oral QHS  nystatin (MYCOSTATIN) 100,000 unit/gram powder   Topical BID  potassium chloride SR (KLOR-CON 10) tablet 20 mEq  20 mEq Oral DAILY  sodium chloride (NS) flush 5-40 mL  5-40 mL IntraVENous Q8H  
 sodium chloride (NS) flush 5-40 mL  5-40 mL IntraVENous PRN  
 morphine injection 2 mg  2 mg IntraVENous Q4H PRN  
 ondansetron (ZOFRAN) injection 4 mg  4 mg IntraVENous Q4H PRN  
 albuterol (PROVENTIL VENTOLIN) nebulizer solution 2.5 mg  2.5 mg Nebulization Q4H PRN  
 benzonatate (TESSALON) capsule 100 mg  100 mg Oral TID PRN  
 bisacodyl (DULCOLAX) tablet 5 mg  5 mg Oral DAILY PRN  
 insulin glargine (LANTUS) injection 15 Units  15 Units SubCUTAneous DAILY  venlafaxine (EFFEXOR) tablet 37.5 mg  37.5 mg Oral BID  
 glucose chewable tablet 16 g  4 Tab Oral PRN  
 dextrose (D50W) injection syrg 12.5-25 g  25-50 mL IntraVENous PRN  
 glucagon (GLUCAGEN) injection 1 mg  1 mg IntraMUSCular PRN  
 insulin lispro (HUMALOG) injection   SubCUTAneous AC&HS  
 
______________________________________________________________________ EXPECTED LENGTH OF STAY: 4d 19h ACTUAL LENGTH OF STAY:          8 Aurora Field MD  
Patient has given Verbal permission to discuss medical care with  
persons present in the room and and also with contact as listed on face sheet.

## 2019-03-26 NOTE — PROGRESS NOTES
Problem: Mobility Impaired (Adult and Pediatric) Goal: *Acute Goals and Plan of Care (Insert Text) Description Physical Therapy Goals Initiated 3/21/2019 1. Patient will move from supine to sit and sit to supine , scoot up and down and roll side to side in bed with modified independence within 7 day(s). 2.  Patient will transfer from bed to chair and chair to bed with modified independence using the least restrictive device within 7 day(s). 3.  Patient will perform sit to stand with modified independence within 7 day(s). 4.  Patient will ambulate with modified independence for 150 feet with the least restrictive device within 7 day(s). 5.  Patient will ascend/descend 2 stairs with right handrail(s) for community access with supervision/set-up within 7 day(s). Outcome: Progressing Towards Goal 
 
PHYSICAL THERAPY TREATMENT Patient: Nataly Jimenez (52 y.o. female) Date: 3/26/2019 Diagnosis: SOB (shortness of breath) [R06.02] <principal problem not specified> Precautions: Fall(fluctuating confusion) Chart, physical therapy assessment, plan of care and goals were reviewed. ASSESSMENT: 
Pt is requiring min A for sit<>stand, stand pivot transfer and gait with rolling walker. Pt pleasantly confused and able to follow commands. Pt was incontinent with initial stand assisted pt to [facility]. PTA pt was living alone. At this time pt in unable to return home alone and could benefit from SNF to improve mobility and independence. Progression toward goals: 
?    Improving appropriately and progressing toward goals ? Improving slowly and progressing toward goals ? Not making progress toward goals and plan of care will be adjusted PLAN: 
Patient continues to benefit from skilled intervention to address the above impairments. Continue treatment per established plan of care. Discharge Recommendations:  Mustapha Thompson if unable then HHPT with 24/7 assistance Further Equipment Recommendations for Discharge:  owns rollator SUBJECTIVE:  
Patient stated ? I need to pee.? OBJECTIVE DATA SUMMARY:  
Critical Behavior: 
Neurologic State: Alert, Confused Orientation Level: Oriented to place, Oriented to person, Disoriented to time, Disoriented to situation Cognition: Decreased attention/concentration, Follows commands, Poor safety awareness Safety/Judgement: Awareness of environment, Decreased awareness of need for assistance, Decreased awareness of need for safety, Fall prevention Functional Mobility Training: 
Bed Mobility: 
  
  
  
  
  
  
Transfers: 
Sit to Stand: Minimum assistance Stand to Sit: Minimum assistance Bed to Chair: Minimum assistance(chair to [facility]) Balance: 
Sitting: Intact; With support Standing: Impaired; With support Standing - Static: Fair Standing - Dynamic : Fair Ambulation/Gait Training: 
Distance (ft): 20 Feet (ft) Assistive Device: Gait belt;Walker, rolling Ambulation - Level of Assistance: Minimal assistance Gait Abnormalities: Decreased step clearance Base of Support: Center of gravity altered Speed/Lucy: Slow Step Length: Left shortened;Right shortened Stairs: 
  
  
   
 
Neuro Re-Education: 
Therapeutic Exercises:  
 
Pain: 
Pain Scale 1: Numeric (0 - 10) Pain Intensity 1: 0 Activity Tolerance:  
Limited Please refer to the flowsheet for vital signs taken during this treatment. After treatment:  
?    Patient left in no apparent distress sitting up in chair ? Patient left in no apparent distress in bed 
? Call bell left within reach ? Nursing notified ? Caregiver present ? Bed alarm activated COMMUNICATION/COLLABORATION:  
The patient?s plan of care was discussed with: Registered Nurse Alfred Nissen, PTA Time Calculation: 31 mins

## 2019-03-26 NOTE — PROGRESS NOTES
0730 Bedside and Verbal shift change report given to GUDELIA Carbone (oncoming nurse) by Nirmal Yarbrough (offgoing nurse). Report included the following information SBAR, Kardex, Intake/Output, MAR, Recent Results and Cardiac Rhythm NSR . 1000 PT at bedside with patient 1930 Bedside and Verbal shift change report given to Kirill N Ayad Robertson (oncoming nurse) by Robyn Marie (offgoing nurse). Report included the following information SBAR, Kardex, Procedure Summary, MAR and Cardiac Rhythm NSR. Problem: Falls - Risk of 
Goal: *Absence of Falls Description Document Nicky Primes Fall Risk and appropriate interventions in the flowsheet. Outcome: Progressing Towards Goal 
  
Problem: Pressure Injury - Risk of 
Goal: *Prevention of pressure injury Description Document Mohan Scale and appropriate interventions in the flowsheet.  
Outcome: Progressing Towards Goal

## 2019-03-26 NOTE — PROGRESS NOTES
Clinical Pharmacy Note: Re: IV to PO Automatic Conversion - Antibiotic Please note: Lisa Maldonado?s medication(s) (Levaquin) has/have been changed from IV to PO based on the following criteria: 
 
The patient: 
1. Has received IV therapy for at least 48 hours 2. Has a functioning GI tract - Taking scheduled oral medications - Tolerating tube feeds at goal rate or a full liquid, soft, or regular diet 3. Is clinically stable - Temperature < 100.4F for at least 24 hours - WBC is trending down This IV to PO conversion is based on the [employer] approved automatic conversion policy for eligible patients. Please call with questions.

## 2019-03-26 NOTE — DIABETES MGMT
DTC Progress Note Recommendations/ Comments: Chart reviewed due to hyperglycemia. Has received 8 units of correction insulin over the last 24 hours. If appropriate, please consider: 
Increase Lantus to 18 units Current hospital DM medication: Lantus, 15 units Lispro correction scale, normal sensitivity Chart reviewed on [address]. Patient is a 78 y.o. female with known DM on 15 units of Lantus at home. A1c:  
Lab Results Component Value Date/Time Hemoglobin A1c 8.3 (H) 03/05/2019 04:45 AM  
 
 
Recent Glucose Results:  
Lab Results Component Value Date/Time  (H) 03/26/2019 04:14 AM  
 GLUCPOC 238 (H) 03/26/2019 11:08 AM  
 GLUCPOC 168 (H) 03/26/2019 06:43 AM  
 GLUCPOC 182 (H) 03/25/2019 09:05 PM  
  
 
Lab Results Component Value Date/Time Creatinine 2.23 (H) 03/26/2019 04:14 AM  
 
Estimated Creatinine Clearance: 23.2 mL/min (A) (based on SCr of 2.23 mg/dL (H)). Active Orders Diet DIET DIABETIC WITH OPTIONS Consistent Carb 1800kcal; Soft Solids; 2 GM NA (House Low NA) PO intake:  
Patient Vitals for the past 72 hrs: 
 % Diet Eaten  
03/26/19 0819 30 % 03/25/19 1100 30 % 03/25/19 0830 30 % 03/24/19 0839 5 %  
03/23/19 2002 30 % 03/23/19 1754 10 % Will continue to follow as needed. Thank you Abbie Negron RD, CDE [facility] Pager: [phone] Time spent: 5 minutes

## 2019-03-27 VITALS
SYSTOLIC BLOOD PRESSURE: 136 MMHG | TEMPERATURE: 98.1 F | HEART RATE: 74 BPM | BODY MASS INDEX: 34.97 KG/M2 | RESPIRATION RATE: 18 BRPM | OXYGEN SATURATION: 96 % | DIASTOLIC BLOOD PRESSURE: 51 MMHG | HEIGHT: 65 IN | WEIGHT: 209.88 LBS

## 2019-03-27 LAB
ALBUMIN SERPL-MCNC: 2.2 G/DL (ref 3.5–5)
ANION GAP SERPL CALC-SCNC: 5 MMOL/L (ref 5–15)
BUN SERPL-MCNC: 30 MG/DL (ref 6–20)
BUN/CREAT SERPL: 17 (ref 12–20)
CALCIUM SERPL-MCNC: 8.9 MG/DL (ref 8.5–10.1)
CHLORIDE SERPL-SCNC: 108 MMOL/L (ref 97–108)
CO2 SERPL-SCNC: 32 MMOL/L (ref 21–32)
CREAT SERPL-MCNC: 1.72 MG/DL (ref 0.55–1.02)
GLUCOSE BLD STRIP.AUTO-MCNC: 106 MG/DL (ref 65–100)
GLUCOSE BLD STRIP.AUTO-MCNC: 169 MG/DL (ref 65–100)
GLUCOSE SERPL-MCNC: 101 MG/DL (ref 65–100)
PHOSPHATE SERPL-MCNC: 2.2 MG/DL (ref 2.6–4.7)
POTASSIUM SERPL-SCNC: 3.8 MMOL/L (ref 3.5–5.1)
SERVICE CMNT-IMP: ABNORMAL
SERVICE CMNT-IMP: ABNORMAL
SODIUM SERPL-SCNC: 145 MMOL/L (ref 136–145)

## 2019-03-27 PROCEDURE — 74011636637 HC RX REV CODE- 636/637: Performed by: HOSPITALIST

## 2019-03-27 PROCEDURE — 74011250637 HC RX REV CODE- 250/637: Performed by: HOSPITALIST

## 2019-03-27 PROCEDURE — 74011250637 HC RX REV CODE- 250/637: Performed by: INTERNAL MEDICINE

## 2019-03-27 PROCEDURE — 82962 GLUCOSE BLOOD TEST: CPT

## 2019-03-27 PROCEDURE — 74011636637 HC RX REV CODE- 636/637: Performed by: INTERNAL MEDICINE

## 2019-03-27 PROCEDURE — 77030038269 HC DRN EXT URIN PURWCK BARD -A

## 2019-03-27 PROCEDURE — 80069 RENAL FUNCTION PANEL: CPT

## 2019-03-27 PROCEDURE — 36415 COLL VENOUS BLD VENIPUNCTURE: CPT

## 2019-03-27 RX ORDER — BUMETANIDE 1 MG/1
1 TABLET ORAL DAILY
Status: DISCONTINUED | OUTPATIENT
Start: 2019-03-27 | End: 2019-03-27 | Stop reason: HOSPADM

## 2019-03-27 RX ORDER — BUMETANIDE 1 MG/1
1 TABLET ORAL 2 TIMES DAILY
Qty: 60 TAB | Refills: 1 | Status: ON HOLD | OUTPATIENT
Start: 2019-03-27 | End: 2020-10-13

## 2019-03-27 RX ADMIN — BUMETANIDE 1 MG: 1 TABLET ORAL at 09:37

## 2019-03-27 RX ADMIN — NYSTATIN: 100000 POWDER TOPICAL at 09:43

## 2019-03-27 RX ADMIN — INSULIN LISPRO 2 UNITS: 100 INJECTION, SOLUTION INTRAVENOUS; SUBCUTANEOUS at 12:07

## 2019-03-27 RX ADMIN — POTASSIUM CHLORIDE 20 MEQ: 750 TABLET, EXTENDED RELEASE ORAL at 09:37

## 2019-03-27 RX ADMIN — VENLAFAXINE 37.5 MG: 37.5 TABLET ORAL at 09:37

## 2019-03-27 RX ADMIN — CARVEDILOL 12.5 MG: 12.5 TABLET, FILM COATED ORAL at 09:37

## 2019-03-27 RX ADMIN — APIXABAN 2.5 MG: 2.5 TABLET, FILM COATED ORAL at 09:37

## 2019-03-27 RX ADMIN — INSULIN GLARGINE 15 UNITS: 100 INJECTION, SOLUTION SUBCUTANEOUS at 09:37

## 2019-03-27 NOTE — PROGRESS NOTES
Spiritual Care Assessment/Progress Note [address] 
 
 
NAME: Augusto Dacosta      MRN: [id number] AGE: 78 y.o. SEX: female Protestant Affiliation: Non Church  
Language: English  
 
3/27/2019     Total Time (in minutes): 10 Spiritual Assessment begun in [facility] UNIT through conversation with: 
  
    [x]Patient        [] Family    [] Friend(s) Reason for Consult: Initial/Spiritual assessment, patient floor Spiritual beliefs: (Please include comment if needed) [x] Identifies with a dean tradition:     
   [] Supported by a dean community:        
   [] Claims no spiritual orientation:       
   [] Seeking spiritual identity:            
   [] Adheres to an individual form of spirituality:       
   [] Not able to assess:                   
 
    
Identified resources for coping:  
   [] Prayer                           
   [] Music                  [] Guided Imagery [x] Family/friends                 [] Pet visits [] Devotional reading                         [] Unknown 
   [] Other:                                          
 
 
Interventions offered during this visit: (See comments for more details) Patient Interventions: Affirmation of emotions/emotional suffering, Affirmation of dean, Catharsis/review of pertinent events in supportive environment, Coping skills reviewed/reinforced, Iconic (affirming the presence of God/Higher Power), Normalization of emotional/spiritual concerns, Prayer (assurance of) Plan of Care: 
 
 [] Support spiritual and/or cultural needs  
 [] Support AMD and/or advance care planning process    
 [] Support grieving process 
 [] Coordinate Rites and/or Rituals  
 [] Coordination with community clergy 
 [x] No spiritual needs identified at this time 
 [] Detailed Plan of Care below (See Comments)  [] Make referral to Music Therapy 
[] Make referral to Pet Therapy    
[] Make referral to Addiction services [] Make referral to [facility] 
[] Make referral to Spiritual Care Partner 
[] No future visits requested       
[x] Follow up visits as needed Visited pt on [facility]. Pt spoke of her long stay and how pleased she has been with the staff and treatment she has received. She enjoys watching TV and listed several shows that she watches on a regular basis. The TV seems to help her through the long hours of each day. Additionally, she enjoys visits from her family. Chaplains will continue to offer support as needed. Chaplain Melania, MDiv, MS, [address] 
[address] [place] [address] [phone])

## 2019-03-27 NOTE — PROGRESS NOTES
0730 Bedside shift change report given to [place], [place] and Tana Hernandez RN (oncoming nurse) by Chip Alvarado RN (offgoing nurse). Report included the following information SBAR, Kardex, Intake/Output, MAR, Recent Results and Cardiac Rhythm NSR. Problem: Falls - Risk of 
Goal: *Absence of Falls Description Document Larisa Fearing Fall Risk and appropriate interventions in the flowsheet. Outcome: Progressing Towards Goal 
  
Problem: Pressure Injury - Risk of 
Goal: *Prevention of pressure injury Description Document Mohan Scale and appropriate interventions in the flowsheet. Outcome: Progressing Towards Goal 
  
Problem: Gas Exchange - Impaired Goal: *Absence of hypoxia Outcome: Progressing Towards Goal 
Note:  
Patient off O2 and satting at 98

## 2019-03-27 NOTE — DISCHARGE SUMMARY
Discharge Summary       PATIENT ID: Fadia Santos  MRN: [id number]   YOB: 1940    DATE OF ADMISSION: 3/18/2019 12:25 PM    DATE OF DISCHARGE: 03/27/19  PRIMARY CARE PROVIDER: Alexia Mixon MD       DISCHARGING PROVIDER: Mary Yost MD    To contact this individual call [phone] and ask the  to page. If unavailable ask to be transferred the Adult Hospitalist Department. CONSULTATIONS: IP CONSULT TO HOSPITALIST  IP CONSULT TO CARDIOLOGY  IP CONSULT TO CARDIOLOGY  IP CONSULT TO CARDIOLOGY  IP CONSULT TO NEPHROLOGY  IP CONSULT TO NEUROLOGY  IP CONSULT TO NEUROINTERVENTIONAL SURGERY      PROCEDURES/SURGERIES: * No surgery found *    IMAGING  Xr Chest Pa Lat    Result Date: 3/20/2019  IMPRESSION: 1. Small pleural effusions cause bibasilar atelectasis. Follow-up to resolution is suggested. Mra Brain Wo Cont    Result Date: 3/24/2019  IMPRESSION: No major vessel occlusion identified. . 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 Mri Brain Wo Cont    Result Date: 3/23/2019  IMPRESSION:  1. Likely punctate acute ischemic focus, right cerebral white matter. 2. Chronic microangiopathy. 3. No bleed or shift. Ct Head Wo Cont    Result Date: 3/22/2019  Impression: 1. No evidence of acute infarct or intracranial hemorrhage. 2. Minimal periventricular white matter disease is likely secondary to chronic small vessel ischemic changes. Xr Chest Port    Result Date: 3/23/2019  IMPRESSION: 1. Unchanged mild pulmonary interstitial edema. Unchanged small bilateral pleural effusions and bibasilar atelectasis. Xr Chest Port    Result Date: 3/19/2019  IMPRESSION: No pneumothorax. Decreased small left pleural effusion and decreased left basilar opacity. Stable small right pleural effusion.      Xr Chest Port    Result Date: 3/18/2019  IMPRESSION: Interval enlargement of a left pleural effusion, now moderate to large in size. Us Thoracentesis Lt Ndl W Image    Result Date: 3/19/2019  IMPRESSION: Successful left thoracentesis. Chest x-ray ordered. ADMITTING DIAGNOSES & HOSPITAL COURSE:   # Acute Hypoxic respiratory failure likely from fluid overload and large L pleural effusion vs pulm hypertension   - BNP elevated. - On bumex reduced dose to 1mg BID on discharge   - S/P  Thoracentesis 3/19 - Cytology neg For malignancy   - Influenza - neg  - Echo Showing Normal EF at this admission   - wean off oxygen       # Metabolic encephalopathy   - ? Polypharmacy/UTI vs   - holding tramadol, ativan, gabapentin, oxybutynin  - Check CT head 3/22- neg  - MRI brain - Punctuate ischemia  -Check Carotid US - less than 50% stenosis of the right internal carotid and no stenosis of the left internal carotid. - tele showed no arrhythmia   - ct with Eliquis   - Neuro f/u jamari. 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      # UTI  - Resolved  - U/A positive for UTI - likely  POA  - Blood cultures NGTD  - Rocephin/Doxy - Cover for pneumonia, UTI   - Thoracentesis  - Followup Fluid Cultures - so far NGTD     # Takotsubo cardiomyopathy   - Diuresis, cardiology consult  - I and O, daily weight   - Recheck Echo Showing Normal LVEF        # Hypokalemia  - Adjusted Potassium dosing.       # Depression - on venlafaxine     # Type 2 diabetes with hyperglycemia 8.3%   - POCT glucose checks, long acting insulin, hypoglycemia protocol.    # Iron Def anemia  on iron Infusion    # Early onset Dementia   - I have talked to daughter who was on the room on 3/27. She stated her mother has started forgetting things and since has been going on for some time now. 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      Patient Active Problem List   Diagnosis Code    NSTEMI (non-ST elevated myocardial infarction) ([facility] I21.4    Benign essential hypertension I10    Diabetic peripheral neuropathy ([facility] E11.42    Type II diabetes mellitus, uncontrolled ([facility] E11.65    Hyperlipidemia E78.5    SOB (shortness of breath) R06.02               PENDING TEST RESULTS:   At the time of discharge the following test results are still pending: none     FOLLOW UP APPOINTMENTS:    Follow-up Information     Follow up With Specialties Details Why Contact Info    [facility] Pulmonary Disease In 2 weeks Follow Up pleural Effusion  [facility]      Maye Reid MD Internal Medicine   [address] [place] [address] [phone] [address] [place] [address]   [facility] [facility] 74 [phone]             ADDITIONAL CARE RECOMMENDATIONS: None     DIET:  Healthy heart and diabetic diet     ACTIVITY: as tolerated     WOUND CARE: none     EQUIPMENT needed: None       DISCHARGE MEDICATIONS:  Current Discharge Medication List      START taking these medications    Details   apixaban (ELIQUIS) 2.5 mg tablet Take 1 Tab by mouth two (2) times a day. Qty: 60 Tab, Refills: 1      levoFLOXacin (LEVAQUIN) 750 mg tablet Take 1 Tab by mouth every fourty-eight (48) hours for 3 doses. Qty: 3 Tab, Refills: 0      iron polysaccharides (FERREX 150) 150 mg iron capsule Take 1 Cap by mouth two (2) times a day for 30 days. Qty: 60 Cap, Refills: 0      docusate sodium (COLACE) 50 mg capsule Take 1 Cap by mouth two (2) times daily as needed for Constipation for up to 90 days. Qty: 60 Cap, Refills: 2      potassium chloride SR (K-TAB) 20 mEq tablet Take 1 Tab by mouth daily. Qty: 10 Tab, Refills: 0      Lactobacillus Acidoph & Bulgar (FLORANEX) 1 million cell tab tablet Take 2 Tabs by mouth two (2) times a day for 5 days. Qty: 20 Tab, Refills: 0         CONTINUE these medications which have CHANGED    Details   bumetanide (BUMEX) 1 mg tablet Take 1 Tab by mouth two (2) times a day. Qty: 60 Tab, Refills: 1      !! benzonatate (TESSALON) 100 mg capsule Take 1 Cap by mouth three (3) times daily as needed for Cough. !! - Potential duplicate medications found. Please discuss with provider. CONTINUE these medications which have NOT CHANGED    Details   aspirin delayed-release 81 mg tablet Take 81 mg by mouth daily. !! benzonatate (TESSALON) 100 mg capsule Take 100 mg by mouth three (3) times daily as needed for Cough. carvedilol (COREG) 6.25 mg tablet Take 1 Tab by mouth two (2) times daily (with meals). Qty: 60 Tab, Refills: 0      bisacodyl (DULCOLAX) 5 mg EC tablet Take 1 Tab by mouth daily as needed for Constipation. Qty: 10 Tab, Refills: 0      budesonide (PULMICORT) 0.5 mg/2 mL nbsp 2 mL by Nebulization route two (2) times a day.   Qty: 60 Each, Refills: 1      arformoterol (BROVANA) 15 mcg/2 mL nebu neb solution 2 mL by Nebulization route two (2) times a day.  Qty: 60 Vial, Refills: 0      albuterol (PROVENTIL VENTOLIN) 2.5 mg /3 mL (0.083 %) nebulizer solution 3 mL by Nebulization route every four (4) hours as needed for Wheezing. Qty: 60 Each, Refills: 0      insulin glargine (LANTUS SOLOSTAR U-100 INSULIN) 100 unit/mL (3 mL) inpn 15 units daily  Subcutaneously  Qty: 1 Adjustable Dose Pre-filled Pen Syringe, Refills: 1      rosuvastatin (CRESTOR) 20 mg tablet Take 1 Tab by mouth nightly. Qty: 30 Tab, Refills: 0      oxybutynin chloride XL (DITROPAN XL) 15 mg CR tablet Take 15 mg by mouth daily. therapeutic multivitamin (THERAGRAN) tablet Take 1 Tab by mouth daily. venlafaxine (EFFEXOR) 37.5 mg tablet Take 37.5 mg by mouth two (2) times a day. calcitRIOL (ROCALTROL) 0.5 mcg capsule Take 0.5 mcg by mouth daily. !! - Potential duplicate medications found. Please discuss with provider. STOP taking these medications       gabapentin (NEURONTIN) 300 mg capsule Comments:   Reason for Stopping:         traMADol (ULTRAM) 50 mg tablet Comments:   Reason for Stopping:         LORazepam (ATIVAN) 0.5 mg tablet Comments:   Reason for Stopping:               All Micro Results     Procedure Component Value Units Date/Time    URINE CULTURE HOLD SAMPLE [[id number]] Collected:  03/25/19 0707    Order Status:  Completed Specimen:  Serum Updated:  03/25/19 0717     Urine culture hold       URINE ON HOLD IN MICROBIOLOGY DEPT FOR 3 DAYS. IF UNPRESERVED URINE IS SUBMITTED, IT CANNOT BE USED FOR ADDITIONAL TESTING AFTER 24 HRS, RECOLLECTION WILL BE REQUIRED.           CULTURE, BLOOD, PAIRED [[id number]] Collected:  03/20/19 1100    Order Status:  Completed Specimen:  Blood Updated:  03/25/19 0715     Special Requests: NO SPECIAL REQUESTS        Culture result: NO GROWTH 5 DAYS       CULTURE, BODY FLUID [[id number]] Collected:  03/19/19 1302    Order Status:  Completed Specimen:  Pleural Fluid Updated:  03/23/19 1036     Special Requests: NO SPECIAL REQUESTS GRAM STAIN 2+ WBCS SEEN         NO ORGANISMS SEEN        Culture result: NO GROWTH 4 DAYS       CULTURE, URINE [[id number]]  (Abnormal)  (Susceptibility) Collected:  03/20/19 1202    Order Status:  Completed Specimen:  Urine from Clean catch Updated:  03/22/19 0857     Special Requests: NO SPECIAL REQUESTS        [place] Count --        >100,000  COLONIES/mL       Culture result: Annmarie Chavez [[id number]] Collected:  03/18/19 2331    Order Status:  Completed Specimen:  Nasopharyngeal Updated:  03/19/19 0640     Adenovirus NOT DETECTED        Coronavirus 229E NOT DETECTED        Coronavirus HKU1 NOT DETECTED        Coronavirus CVNL63 NOT DETECTED        Coronavirus OC43 NOT DETECTED        Metapneumovirus NOT DETECTED        Rhinovirus and Enterovirus NOT DETECTED        Influenza A NOT DETECTED        Influenza A, subtype H1 NOT DETECTED        Influenza A, subtype H3 NOT DETECTED        INFLUENZA A H1N1 PCR NOT DETECTED        Influenza B NOT DETECTED        Parainfluenza 1 NOT DETECTED        Parainfluenza 2 NOT DETECTED        Parainfluenza 3 NOT DETECTED        Parainfluenza virus 4 NOT DETECTED        RSV by PCR NOT DETECTED        Bordetella pertussis - PCR NOT DETECTED        Chlamydophila pneumoniae DNA, QL, PCR NOT DETECTED        Mycoplasma pneumoniae DNA, QL, PCR NOT DETECTED       URINE CULTURE HOLD SAMPLE [[id number]]     Order Status:  Sent Specimen:  Urine           Recent Results (from the past 24 hour(s))   GLUCOSE, POC    Collection Time: 03/26/19  4:41 PM   Result Value Ref Range    Glucose (POC) 219 (H) 65 - 100 mg/dL    Performed by HORTENCIA FELICIANO    GLUCOSE, POC    Collection Time: 03/26/19  9:04 PM   Result Value Ref Range    Glucose (POC) 135 (H) 65 - 100 mg/dL    Performed by Radha Foreman    RENAL FUNCTION PANEL    Collection Time: 03/27/19  3:44 AM   Result Value Ref Range    Sodium 145 136 - 145 mmol/L    Potassium 3.8 3.5 - 5.1 mmol/L Chloride 108 97 - 108 mmol/L    CO2 32 21 - 32 mmol/L    Anion gap 5 5 - 15 mmol/L    Glucose 101 (H) 65 - 100 mg/dL    BUN 30 (H) 6 - 20 MG/DL    Creatinine 1.72 (H) 0.55 - 1.02 MG/DL    BUN/Creatinine ratio 17 12 - 20      GFR est AA 35 (L) >60 ml/min/1.73m2    GFR est non-AA 29 (L) >60 ml/min/1.73m2    Calcium 8.9 8.5 - 10.1 MG/DL    Phosphorus 2.2 (L) 2.6 - 4.7 MG/DL    Albumin 2.2 (L) 3.5 - 5.0 g/dL   GLUCOSE, POC    Collection Time: 03/27/19  6:12 AM   Result Value Ref Range    Glucose (POC) 106 (H) 65 - 100 mg/dL    Performed by Mellisa Ashby, POC    Collection Time: 03/27/19 11:47 AM   Result Value Ref Range    Glucose (POC) 169 (H) 65 - 100 mg/dL    Performed by Marquez DELANEY(JESICA)            NOTIFY YOUR PHYSICIAN FOR ANY OF THE FOLLOWING:   Fever over 101 degrees for 24 hours. Chest pain, shortness of breath, fever, chills, nausea, vomiting, diarrhea, change in mentation, falling, weakness, bleeding. Severe pain or pain not relieved by medications. Or, any other signs or symptoms that you may have questions about. DISPOSITION:    Home With:   OT  PT  HH  RN      xx Long term SNF/Inpatient Rehab    Independent/assisted living    Hospice    Other:       PATIENT CONDITION AT DISCHARGE:     Functional status    Poor     Deconditioned     Independent      Cognition     Lucid    x Forgetful    x Dementia      Catheters/lines (plus indication)    Nelson     PICC     PEG    x None      Code status    x Full code     DNR      PHYSICAL EXAMINATION AT DISCHARGE:  Gen:  No distress, alert  HEENT:  Normal cephalic atraumatic, extra-occular movements are intact. Neck:  Supple, No JVD  Lungs:  Clear bilaterally, no wheeze, no rales, normal effort  Heart:  Regular Rate and Rhythm, normal S1 and S2, no edema  Abdomen:  Soft, non tender, normal bowel sounds, no guarding.   Extremities:  Well perfused, no cyanosis or edema  Neurological:  Awake and alert, CN's are intact, normal strength throughout extremities  Skin:  No rashes or moles  Mental Status:  Normal thought process, does not appear anxious      CHRONIC MEDICAL DIAGNOSES:  Problem List as of 3/27/2019 Date Reviewed: 3/18/2019          Codes Class Noted - Resolved    SOB (shortness of breath) ICD-10-CM: R06.02  ICD-9-CM: 786.05  3/18/2019 - Present        NSTEMI (non-ST elevated myocardial infarction) ([facility] ICD-10-CM: I21.4  ICD-9-CM: 410.70  3/2/2019 - Present        Benign essential hypertension ICD-10-CM: I10  ICD-9-CM: 401.1  3/2/2019 - Present        Diabetic peripheral neuropathy ([facility] ICD-10-CM: E11.42  ICD-9-CM: 250.60, 357.2  3/2/2019 - Present        Type II diabetes mellitus, uncontrolled ([facility] ICD-10-CM: E11.65  ICD-9-CM: 250.02  3/2/2019 - Present        Hyperlipidemia ICD-10-CM: E78.5  ICD-9-CM: 272.4  3/2/2019 - Present        RESOLVED: CHF (congestive heart failure) ([facility] ICD-10-CM: I50.9  ICD-9-CM: 428.0  3/2/2019 - 3/11/2019        RESOLVED: Sepsis ([facility] ICD-10-CM: A41.9  ICD-9-CM: 038.9, 995.91  3/1/2019 - 3/11/2019                Discussed with patient and family. Explained the importance of following up, Compliance with medications and recommendations on discharge,Side effect profile of medications were explained. Safety precautions at home and while taking pain medications also explained. All questions answered to the satisfaction of the patient/family. Discussed with consultant(s) who are agreeable to the discharge. Verbal and written instructions on discharge given. Explained about Discharge medications and side effect profile. Advised patient/family to followup with their pcp for medication refills and preauthorization of medications, Home health orders. checkups,screenign programs as appropriate for age.        Thank you Clifton Arreola MD for taking care of your patient, Please call with any questions.       Greater than 35 minutes were spent with the patient on counseling and coordination of care    Signed:   Danny Angelika MD Radha  3/27/2019  1:54 PM

## 2019-03-27 NOTE — PROGRESS NOTES
0800- Bedside and Verbal shift change report given to [facility] RN (oncoming nurse) by Anastasiia Pathak (offgoing nurse).  Report included the following information SBAR, Kardex, Intake/Output, MAR, Recent Results and Cardiac Rhythm SR.

## 2019-03-27 NOTE — CONSULTS
Neurointerventional Surgery Consult    Patient: Albert Sampson MRN: [id number]  SSN: [id number]    YOB: 1940  Age: 78 y.o. Sex: female      Subjective:      Albert Sampson is a 78 y.o. female woman who was admitted March 18th for shortness of breath, hypoglycemia, lethargy and mental status changes. The patient initially was going to be discharged on 3/23 but then started becoming more confused. MRI brain obtained and showed small punctate stroke in right hemisphere. She was also treated for UTI and ASUNCION on CKD treated by nephrology and hospitalist. 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 Past Medical History:   Diagnosis Date    CAD (coronary artery disease)     Diabetes ([facility] [address]     Hypertension      Past Surgical History:   Procedure Laterality Date    HX KNEE REPLACEMENT Bilateral     HX ORTHOPAEDIC      IR INSERT TUNL CVC W/O PORT OVER 5 YR  3/5/2019      No family history on file.   Social History     Tobacco Use    Smoking status: Never Smoker    Smokeless tobacco: Never Used   Substance Use Topics    Alcohol use: No      Current Facility-Administered Medications   Medication Dose Route Frequency Provider Last Rate Last Dose    bumetanide (BUMEX) tablet 1 mg  1 mg Oral DAILY Lopez Leavitt MD   1 mg at 03/27/19 4773    carvedilol (COREG) tablet 12.5 mg  12.5 mg Oral BID WITH MEALS Eusebio Levi MD   12.5 mg at 03/27/19 9725    apixaban (ELIQUIS) tablet 2.5 mg  2.5 mg Oral BID Dede Hernandez MD   2.5 mg at 03/27/19 3429    sodium chloride (NS) flush 5-40 mL  5-40 mL IntraVENous Q8H Nash Rg MD   10 mL at 03/26/19 1351    sodium chloride (NS) flush 5-40 mL  5-40 mL IntraVENous PRN Alessandro Rg MD        rosuvastatin (CRESTOR) tablet 20 mg  20 mg Oral QHS Eldon Segundo MD   20 mg at 03/26/19 2136    nystatin (MYCOSTATIN) 100,000 unit/gram powder Topical BID Soni Quigley MD        potassium chloride SR (KLOR-CON 10) tablet 20 mEq  20 mEq Oral DAILY Soni Quigley MD   20 mEq at 03/27/19 9435    sodium chloride (NS) flush 5-40 mL  5-40 mL IntraVENous Q8H Mesha MCGOVERN MD   10 mL at 03/26/19 0705    sodium chloride (NS) flush 5-40 mL  5-40 mL IntraVENous PRN Mesha MCGOVERN MD   10 mL at 03/23/19 1122    morphine injection 2 mg  2 mg IntraVENous Q4H PRN Mesha Alexis MD        ondansetron [facility]) injection 4 mg  4 mg IntraVENous Q4H PRN Mesha Alexis MD        albuterol (PROVENTIL VENTOLIN) nebulizer solution 2.5 mg  2.5 mg Nebulization Q4H PRN Mesha Alexis MD        benzonatate (TESSALON) capsule 100 mg  100 mg Oral TID PRN Mesha Alexis MD        bisacodyl (DULCOLAX) tablet 5 mg  5 mg Oral DAILY PRN Mesha Alexis MD        insulin glargine (LANTUS) injection 15 Units  15 Units SubCUTAneous DAILY Mesha MCGOVERN MD   15 Units at 03/27/19 0740    venlafaxine (EFFEXOR) tablet 37.5 mg  37.5 mg Oral BID Mesha MCGOVERN MD   37.5 mg at 03/27/19 2909    glucose chewable tablet 16 g  4 Tab Oral PRN Mesha Alexis MD        dextrose (D50W) injection syrg 12.5-25 g  25-50 mL IntraVENous PRN Mohan Davidson MD        glucagon [facility]) injection 1 mg  1 mg IntraMUSCular PRN Mesha Alexis MD        insulin lispro (HUMALOG) injection   SubCUTAneous AC&HS Negar Segura MD   Stopped at 03/26/19 2200        Allergies   Allergen Reactions    Tylenol [Acetaminophen] Other (comments)     Pt told by MD not to take, pt unsure why       Review of Systems:  A comprehensive review of systems was negative.     Objective:     Vitals:    03/27/19 0318 03/27/19 0417 03/27/19 0738 03/27/19 0933   BP: 135/52  145/67    Pulse: 80  75    Resp: 18  18    Temp: 98.4 °F (36.9 °C)  98.3 °F (36.8 °C)    SpO2: 96%  98% 98% Weight:  209 lb 14.1 oz (95.2 kg)     Height:            Physical Exam:  GENERAL: alert, cooperative, no distress, appears stated age  LUNG: clear to auscultation bilaterally  HEART: regular rate and rhythm, S1, S2 normal, no murmur, click, rub or gallop  ABDOMEN: soft, non-tender. Bowel sounds normal. No masses,  no organomegaly  EXTREMITIES:  extremities normal, atraumatic, no cyanosis or edema    Neurologic Exam:  Mental Status:  Alert and oriented x 3 ( wrong month on exam). Patient with flat affect,                                       appears confused at times. Language:    Normal fluency, repetition, comprehension and naming. Cranial Nerves:   Pupils equal, round and reactive to light. Extraocular movements intact. End gaze nystagmus bilaterally, L>R. Facial sensation intact V1 - V3. Full facial strength, no asymmetry. No dysarthria. Tongue protrudes to midline, palate elevates                                     symmetrically. Shoulder shrug 5/5 bilaterally. Motor:    No pronator drift. Bulk and tone normal.      5/5 power in all extremities proximally and distally. No involuntary movements. Sensation:    Sensation intact throughout to light touch    Coordination & Gait: Deferred      Imaging: All imaging reviewed by Dr. Jimmy Cifuentes Contrast: 3/24/2019:     Impression:     No major vessel occlusion identified. . 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 Assessment:     Hospital Problems  Date Reviewed: 3/18/2019          Codes Class Noted POA    SOB (shortness of breath) ICD-10-CM: R06.02  ICD-9-CM: 786.05  3/18/2019 Unknown              Plan: This is a 78year old female who is admitted for UTI, metabolic encephalopathy, kidney failure and punctate infarct in right hemisphere. 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 The aneurysm is not affecting or contributing her current symptoms. Patient is appropriate for office follow up and likely surveillance imaging to follow her aneurysm. No intervention needed at this time. We discussed this plan with the patient, she is in agreement with the plan. She asked me to discuss these findings further with her grand daughter Elissa Lugo, who I attempted to call and left a message for. She can follow up in 6 months with Dr. John Negron in the office. Thank you for this consult and participating in the care of this patient. I have discussed the diagnosis with the patient and the intended plan as seen in the above orders. Patient is in agreement.       Signed By: Leanna Handley NP     March 27, 2019

## 2019-03-27 NOTE — PROGRESS NOTES
CM reviewed previous CM notes- patient has been accepted by [facility] for [facility] rehab- patient is a [facility] patient. CM called and spoke with [place]  ([phone]) and Aletha Salas provided authorization number for SNF #[id number], also authorization for [facility] #[id number]. Aletha Salas stated the authorization was still effective, stated she gave information to facility previously. Aletha Salas confirmed [facility] is a contracted facility. CM called Jocy with [facility], stated they can accept patient when medically ready- Jocy endorsed she previously received authorization information, she stated she would speak with Geoff directly. CM will await medical clearance from MD. ADAM Penn 
 
11:45 a.m.- The CM met with the patient's daughter Rebeca Abbott, endorsed agreement with plan to discharge to [facility] for rehab. Rebeca Abbott endorsed that the plan is for the patient to return home upon discharge from rehab, patient also nodded her head in agreement. The CM called the patient's son Yelena Lee- confirmed discharge plan- family agreeable with discharge to [facility] for rehab, and plan to transition home following rehabilitation. CM called Jocy with [facility]- stated she can accept patient today, will need to [facility]. CM will establish transportation for this afternoon pending patient discharge, CM has authorization from [facility] for [facility] transport. CM confirmed with MD that patient will not need dialysis, no IV antibiotics. ADAM Penn 
 
11:51 a.m.- CM sent referral to [facility] requesting 3:30 p.m. Pick-up pending patient discharge to [facility]. [facility] confirmed transport for today at 3:30 p.m.- CM called and spoke with Jocy with [facility], she stated she can accept patient today- she will call [facility] directly to confirm authorization- facility would like to confirm directly that previous authorization information is still effective. 12:49 p.m.- CM spoke with Yoli with [facility]- she stated she spoke with Valentino Bihari, Liaison with [facility] directly and confirmed authorization. Transport is established with [facility] for 3:30 p.m. CM will notify son Mercy Hurley of time of transport, CM notified daughter Kingsley Poor and agreeable. Envelope on chart for RN to include [facility], [facility], etc. The CM placed free 30-day Eliquis card in envelope to be sent with patient to facility. The CM will present IM letter to patient and notify family of important message from Medicare. ADAM Santamaria CM notified Jocy of estimated time of arrival- aware and agreeable, again confirmed [facility] can accept patient today. CM paged attending MD and stated she would place discharge order. ADAM Santamaria 
 
13:29 p.m.- CM provided the transport time to the patient's son Mercy Hurley- aware and agreeable of discharge plan to [facility]. CM notified Mercy Salm that  would provide Medicare IM letter at bedside. The CM met with patient at bedside, aware and agreeable for discharge to [facility] today. ADAM Santamaria 
 
15:40 p.m.- The CM called [facility]- spoke with Oz Rupesh stated there was an error in transport and patient is not on the schedule. CM showed Joanie Miller the confirmation messages in [employer]. Joanie Miller stated he is getting a crew here within 30-45 minutes to take patient to [facility]. CM asked Joanie Miller if CM needed to create a brand new referral, Joanie Miller stated no- he already spoke with dispatch, aware that patient is going to [facility], [place] again provided Joanie Miller with BLS authorization from [facility] #[id number]. CM notified Jocy with [facility] of transport delay. CM notified patient's son of transport delay. SCOTT romero cruz Joanie Miller at ([phone]), Joanie Miller stated he is getting transport here within 30-45 minutes, again confirmed new referral is not needed- he read back authorization number with Geoff.  ADAM Santamaria

## 2019-05-28 ENCOUNTER — APPOINTMENT (OUTPATIENT)
Dept: GENERAL RADIOLOGY | Age: 79
End: 2019-05-28
Attending: EMERGENCY MEDICINE
Payer: MEDICARE

## 2019-05-28 ENCOUNTER — HOSPITAL ENCOUNTER (EMERGENCY)
Age: 79
Discharge: HOME | End: 2019-05-28
Attending: EMERGENCY MEDICINE | Admitting: EMERGENCY MEDICINE
Payer: MEDICARE

## 2019-05-28 ENCOUNTER — APPOINTMENT (OUTPATIENT)
Dept: CT IMAGING | Age: 79
End: 2019-05-28
Attending: EMERGENCY MEDICINE
Payer: MEDICARE

## 2019-05-28 VITALS
HEIGHT: 65 IN | HEART RATE: 91 BPM | BODY MASS INDEX: 37.15 KG/M2 | WEIGHT: 223 LBS | OXYGEN SATURATION: 99 % | RESPIRATION RATE: 19 BRPM | TEMPERATURE: 99 F | DIASTOLIC BLOOD PRESSURE: 57 MMHG | SYSTOLIC BLOOD PRESSURE: 128 MMHG

## 2019-05-28 DIAGNOSIS — R42 DIZZINESS: Primary | ICD-10-CM

## 2019-05-28 DIAGNOSIS — R07.89 CHEST WALL PAIN: ICD-10-CM

## 2019-05-28 LAB
ALBUMIN SERPL-MCNC: 2.8 G/DL (ref 3.5–5)
ALBUMIN/GLOB SERPL: 0.6 {RATIO} (ref 1.1–2.2)
ALP SERPL-CCNC: 91 U/L (ref 45–117)
ALT SERPL-CCNC: 30 U/L (ref 12–78)
ANION GAP SERPL CALC-SCNC: 4 MMOL/L (ref 5–15)
APTT PPP: 28.7 SEC (ref 22.1–32)
AST SERPL-CCNC: 38 U/L (ref 15–37)
BASOPHILS # BLD: 0 K/UL (ref 0–0.1)
BASOPHILS NFR BLD: 0 % (ref 0–1)
BILIRUB SERPL-MCNC: 0.4 MG/DL (ref 0.2–1)
BUN SERPL-MCNC: 29 MG/DL (ref 6–20)
BUN/CREAT SERPL: 17 (ref 12–20)
CALCIUM SERPL-MCNC: 10 MG/DL (ref 8.5–10.1)
CHLORIDE SERPL-SCNC: 104 MMOL/L (ref 97–108)
CO2 SERPL-SCNC: 32 MMOL/L (ref 21–32)
COMMENT, HOLDF: NORMAL
CREAT SERPL-MCNC: 1.68 MG/DL (ref 0.55–1.02)
D DIMER PPP FEU-MCNC: 0.8 MG/L FEU (ref 0–0.65)
DIFFERENTIAL METHOD BLD: ABNORMAL
EOSINOPHIL # BLD: 0.2 K/UL (ref 0–0.4)
EOSINOPHIL NFR BLD: 1 % (ref 0–7)
ERYTHROCYTE [DISTWIDTH] IN BLOOD BY AUTOMATED COUNT: 17.4 % (ref 11.5–14.5)
GLOBULIN SER CALC-MCNC: 4.9 G/DL (ref 2–4)
GLUCOSE SERPL-MCNC: 235 MG/DL (ref 65–100)
HCT VFR BLD AUTO: 33.4 % (ref 35–47)
HGB BLD-MCNC: 10 G/DL (ref 11.5–16)
IMM GRANULOCYTES # BLD AUTO: 0.1 K/UL (ref 0–0.04)
IMM GRANULOCYTES NFR BLD AUTO: 1 % (ref 0–0.5)
INR PPP: 1.1 (ref 0.9–1.1)
LYMPHOCYTES # BLD: 3 K/UL (ref 0.8–3.5)
LYMPHOCYTES NFR BLD: 22 % (ref 12–49)
MCH RBC QN AUTO: 26.6 PG (ref 26–34)
MCHC RBC AUTO-ENTMCNC: 29.9 G/DL (ref 30–36.5)
MCV RBC AUTO: 88.8 FL (ref 80–99)
MONOCYTES # BLD: 1.1 K/UL (ref 0–1)
MONOCYTES NFR BLD: 8 % (ref 5–13)
NEUTS SEG # BLD: 9.3 K/UL (ref 1.8–8)
NEUTS SEG NFR BLD: 68 % (ref 32–75)
NRBC # BLD: 0 K/UL (ref 0–0.01)
NRBC BLD-RTO: 0 PER 100 WBC
PLATELET # BLD AUTO: 247 K/UL (ref 150–400)
PMV BLD AUTO: 11 FL (ref 8.9–12.9)
POTASSIUM SERPL-SCNC: 4.5 MMOL/L (ref 3.5–5.1)
PROT SERPL-MCNC: 7.7 G/DL (ref 6.4–8.2)
PROTHROMBIN TIME: 11.1 SEC (ref 9–11.1)
RBC # BLD AUTO: 3.76 M/UL (ref 3.8–5.2)
SAMPLES BEING HELD,HOLD: NORMAL
SODIUM SERPL-SCNC: 140 MMOL/L (ref 136–145)
THERAPEUTIC RANGE,PTTT: NORMAL SECS (ref 58–77)
TROPONIN I SERPL-MCNC: <0.05 NG/ML
WBC # BLD AUTO: 13.6 K/UL (ref 3.6–11)

## 2019-05-28 PROCEDURE — 70450 CT HEAD/BRAIN W/O DYE: CPT

## 2019-05-28 PROCEDURE — 71046 X-RAY EXAM CHEST 2 VIEWS: CPT

## 2019-05-28 PROCEDURE — 99285 EMERGENCY DEPT VISIT HI MDM: CPT

## 2019-05-28 PROCEDURE — 93005 ELECTROCARDIOGRAM TRACING: CPT

## 2019-05-28 PROCEDURE — 84484 ASSAY OF TROPONIN QUANT: CPT

## 2019-05-28 PROCEDURE — 85025 COMPLETE CBC W/AUTO DIFF WBC: CPT

## 2019-05-28 PROCEDURE — 85730 THROMBOPLASTIN TIME PARTIAL: CPT

## 2019-05-28 PROCEDURE — 36415 COLL VENOUS BLD VENIPUNCTURE: CPT

## 2019-05-28 PROCEDURE — 85379 FIBRIN DEGRADATION QUANT: CPT

## 2019-05-28 PROCEDURE — 80053 COMPREHEN METABOLIC PANEL: CPT

## 2019-05-28 PROCEDURE — 85610 PROTHROMBIN TIME: CPT

## 2019-05-28 NOTE — ED PROVIDER NOTES
78 y.o. female with past medical history significant for diabetes, HTN, and CAD who presents from home via EMS with chief complaint of chest pain. Patient states onset of left sided chest pain ~3 days ago when she had a GLF. Patient reports she parked her walker and had a GLF in her kitchen hitting her head, patient endorses LOC at time of GLF. Patient states her left sided chest pain subsided but returned yesterday. Patient states yesterday she was evaluated at [facility], and discharged home with an unremarkable w/u. Patient presents today [facility] ED today with persisting chest pain. Of note patient also c/o accompanying constant dizziness that began ~1 month ago after a GLF. Patient denies N/V/D. There are no other acute medical concerns at this time. Social hx: No Tobacco use, No EtOH use, No illicit drug use. PCP: Toi Deluna MD    Note written by Iris Lezama, as dictated by Jenny Tse MD 7:00 PM       The history is provided by the patient. No  was used. Past Medical History:   Diagnosis Date    CAD (coronary artery disease)     Diabetes ([facility] [address]     Hypertension        Past Surgical History:   Procedure Laterality Date    HX KNEE REPLACEMENT Bilateral     HX ORTHOPAEDIC      IR INSERT TUNL CVC W/O PORT OVER 5 YR  3/5/2019         No family history on file.     Social History     Socioeconomic History    Marital status:      Spouse name: Not on file    Number of children: Not on file    Years of education: Not on file    Highest education level: Not on file   Occupational History    Not on file   Social Needs    Financial resource strain: Not on file    Food insecurity:     Worry: Not on file     Inability: Not on file    Transportation needs:     Medical: Not on file     Non-medical: Not on file   Tobacco Use    Smoking status: Never Smoker    Smokeless tobacco: Never Used   Substance and Sexual Activity    Alcohol use: No    Drug use: No    Sexual activity: Not on file   Lifestyle    Physical activity:     Days per week: Not on file     Minutes per session: Not on file    Stress: Not on file   Relationships    Social connections:     Talks on phone: Not on file     Gets together: Not on file     Attends Restorationist service: Not on file     Active member of club or organization: Not on file     Attends meetings of clubs or organizations: Not on file     Relationship status: Not on file    Intimate partner violence:     Fear of current or ex partner: Not on file     Emotionally abused: Not on file     Physically abused: Not on file     Forced sexual activity: Not on file   Other Topics Concern    Not on file   Social History Narrative    Not on file         ALLERGIES: Tylenol [acetaminophen]    Review of Systems   Constitutional: Negative for activity change, chills and fever. HENT: Negative for nosebleeds, sore throat, trouble swallowing and voice change. Eyes: Negative for visual disturbance. Respiratory: Negative for shortness of breath. Cardiovascular: Positive for chest pain. Negative for palpitations. Gastrointestinal: Negative for abdominal pain, constipation, diarrhea and nausea. Genitourinary: Negative for difficulty urinating, dysuria, hematuria and urgency. Musculoskeletal: Negative for back pain, neck pain and neck stiffness. Skin: Negative for color change. Allergic/Immunologic: Negative for immunocompromised state. Neurological: Positive for dizziness. Negative for seizures, syncope, weakness, light-headedness, numbness and headaches. Psychiatric/Behavioral: Negative for behavioral problems, confusion, hallucinations, self-injury and suicidal ideas. All other systems reviewed and are negative. There were no vitals filed for this visit. Physical Exam   Constitutional: She is oriented to person, place, and time. Vital signs are normal. She appears well-nourished. She is active.   Non-toxic appearance. She does not have a sickly appearance. She does not appear ill. No distress. Well appearing, elderly. HENT:   Head: Normocephalic. Eyes: Pupils are equal, round, and reactive to light. Conjunctivae and EOM are normal.   Neck: Trachea normal, normal range of motion, full passive range of motion without pain and phonation normal. Neck supple. No JVD present. No spinous process tenderness and no muscular tenderness present. No neck rigidity. No tracheal deviation and normal range of motion present. Cardiovascular: Normal rate, regular rhythm, normal heart sounds, intact distal pulses and normal pulses. Exam reveals no gallop and no friction rub. No murmur heard. Warm and well perfused   Pulmonary/Chest: Effort normal and breath sounds normal. No accessory muscle usage or stridor. No apnea, no tachypnea and no bradypnea. No respiratory distress. She has no decreased breath sounds. She has no wheezes. She has no rhonchi. She has no rales. Mild tenderness to palpation over lateral chest and left flank without crepitus or ecchymosis. Abdominal: Soft. Normal appearance and bowel sounds are normal. She exhibits no distension. There is no tenderness. There is no rebound and no guarding. Musculoskeletal: Normal range of motion. Moving all extremities without pain   Neurological: She is alert and oriented to person, place, and time. She is not disoriented. She displays no atrophy. No cranial nerve deficit or sensory deficit. She exhibits normal muscle tone. She displays a negative Romberg sign. She displays no seizure activity. Gait normal. GCS eye subscore is 4. GCS verbal subscore is 5. GCS motor subscore is 6. Skin: Skin is warm and dry. No rash noted. She is not diaphoretic. Psychiatric: She has a normal mood and affect. Her speech is normal and behavior is normal. Judgment and thought content normal. Cognition and memory are normal.   Nursing note and vitals reviewed.   Note written by Iris Simon, as dictated by Yvonne Moran MD 7:00 PM      MDM     This is a 75-year-old female with past medical history, review of systems, physical exam as above, presenting with complaints of lightheadedness, dizziness, left-sided chest pain. Patient states lightheadedness and dizziness have been ongoing for greater than one year, since she had a ground-level fall with head injury. The patient states she fell again 3 days ago, states she was not using her walker at the time, but she fell forward, striking the left side of her head on a cabinet, without loss of consciousness. She states since then she's experienced the same lightheadedness, as well as left shoulder, and left sided chest pain. The patient states she was evaluated at another emergency department yesterday evening for similar complaints, and discharged home. Physical exam is remarkable for well-appearing elderly female, in no acute distress, with clear breath sounds, soft abdomen, nonfocal neurologic exam. She has mild tenderness to palpation over the lateral chest, and left flank without crepitus, or ecchymosis. Suspect chronic dizziness, and balance problems, made worse by noncompliance with walker, with recent head injury and soft tissue left shoulder left chest injury. Plan to obtain plain films, CMP, CBC, head CT. We will reevaluate, and make a disposition. Procedures  ED EKG interpretation:  Rhythm: normal sinus rhythm; and regular . Rate (approx.): 94 bpm; ST/T wave: upright T wave, no ST abnormality. No acute ischemia. Note written by Iris Simon, as dictated by Yvonne Moran MD 7:00 PM      8:35 PM  Small left effusion on CXR, otherwise unremarkable lab work and imaging, mildly elevated dimer, age appropriate w/o tachycardia or hypoxia. Will Dc home with instructions for better walker use, PCP f/u and return precautions.

## 2019-05-28 NOTE — ED TRIAGE NOTES
Patient clarifies location of pain is below left breast, specifically the left torso region. Surinder Zhou MD and multiple family members at bedside. Family members report that patient had GLF yesterday with head injury, family denies LOC. Family reports patient was seen at [facility] after [facility] yesterday.

## 2019-05-28 NOTE — ED NOTES
Verbal shift change report given to Ko Levin (oncoming nurse) by Valentino Serum (offgoing nurse). Report included the following information SBAR and ED Summary.

## 2019-05-28 NOTE — ED TRIAGE NOTES
Pt arrives via ems from   Chest pain 9/10 since 8am left side. Pt called b/c when she woke up from nap at 5pm she felt lightheaded and dizzy. Diabetic.   . HR 96, 103/82, R16, %.    Denies N/V  324 ASA given by EMS

## 2019-05-29 NOTE — DISCHARGE INSTRUCTIONS
Patient Education        Musculoskeletal Chest Pain: Care Instructions  Your Care Instructions    Chest pain is not always a sign that something is wrong with your heart or that you have another serious problem. The doctor thinks your chest pain is caused by strained muscles or ligaments, inflamed chest cartilage, or another problem in your chest, rather than by your heart. You may need more tests to find the cause of your chest pain. Follow-up care is a key part of your treatment and safety. Be sure to make and go to all appointments, and call your doctor if you are having problems. It's also a good idea to know your test results and keep a list of the medicines you take. How can you care for yourself at home? · Take pain medicines exactly as directed. ? If the doctor gave you a prescription medicine for pain, take it as prescribed. ? If you are not taking a prescription pain medicine, ask your doctor if you can take an over-the-counter medicine. · Rest and protect the sore area. · Stop, change, or take a break from any activity that may be causing your pain or soreness. · Put ice or a cold pack on the sore area for 10 to 20 minutes at a time. Try to do this every 1 to 2 hours for the next 3 days (when you are awake) or until the swelling goes down. Put a thin cloth between the ice and your skin. · After 2 or 3 days, apply a heating pad set on low or a warm cloth to the area that hurts. Some doctors suggest that you go back and forth between hot and cold. · Do not wrap or tape your ribs for support. This may cause you to take smaller breaths, which could increase your risk of lung problems. · Mentholated creams such as Bengay or Icy Hot may soothe sore muscles. Follow the instructions on the package. · Follow your doctor's instructions for exercising. · Gentle stretching and massage may help you get better faster.  Stretch slowly to the point just before pain begins, and hold the stretch for at least 15 to 30 seconds. Do this 3 or 4 times a day. Stretch just after you have applied heat. · As your pain gets better, slowly return to your normal activities. Any increased pain may be a sign that you need to rest a while longer. When should you call for help? Call 911 anytime you think you may need emergency care. For example, call if:    · You have chest pain or pressure. This may occur with:  ? Sweating. ? Shortness of breath. ? Nausea or vomiting. ? Pain that spreads from the chest to the neck, jaw, or one or both shoulders or arms. ? Dizziness or lightheadedness. ? A fast or uneven pulse. After calling 911, chew 1 adult-strength aspirin. Wait for an ambulance. Do not try to drive yourself.     · You have sudden chest pain and shortness of breath, or you cough up blood.    Call your doctor now or seek immediate medical care if:    · You have any trouble breathing.     · Your chest pain gets worse.     · Your chest pain occurs consistently with exercise and is relieved by rest.    Watch closely for changes in your health, and be sure to contact your doctor if:    · Your chest pain does not get better after 1 week. Where can you learn more? Go to http://kenrick-oswaldo.info/. Enter V293 in the search box to learn more about \"Musculoskeletal Chest Pain: Care Instructions. \"  Current as of: September 23, 2018  Content Version: 11.9  © 2201-5697 [employer]. Care instructions adapted under license by [employer] (which disclaims liability or warranty for this information). If you have questions about a medical condition or this instruction, always ask your healthcare professional. [place] [address] any warranty or liability for your use of this information. Patient Education        Dizziness: Care Instructions  Your Care Instructions  Dizziness is the feeling of unsteadiness or fuzziness in your head.  It is different than having vertigo, which is a feeling that the room is spinning or that you are moving or falling. It is also different from lightheadedness, which is the feeling that you are about to faint. It can be hard to know what causes dizziness. Some people feel dizzy when they have migraine headaches. Sometimes bouts of flu can make you feel dizzy. Some medical conditions, such as heart problems or high blood pressure, can make you feel dizzy. Many medicines can cause dizziness, including medicines for high blood pressure, pain, or anxiety. If a medicine causes your symptoms, your doctor may recommend that you stop or change the medicine. If it is a problem with your heart, you may need medicine to help your heart work better. If there is no clear reason for your symptoms, your doctor may suggest watching and waiting for a while to see if the dizziness goes away on its own. Follow-up care is a key part of your treatment and safety. Be sure to make and go to all appointments, and call your doctor if you are having problems. It's also a good idea to know your test results and keep a list of the medicines you take. How can you care for yourself at home? · If your doctor recommends or prescribes medicine, take it exactly as directed. Call your doctor if you think you are having a problem with your medicine. · Do not drive while you feel dizzy. · Try to prevent falls. Steps you can take include:  ? Using nonskid mats, adding grab bars near the tub, and using night-lights. ? Clearing your home so that walkways are free of anything you might trip on.  ? Letting family and friends know that you have been feeling dizzy. This will help them know how to help you. When should you call for help? Call 911 anytime you think you may need emergency care. For example, call if:    · You passed out (lost consciousness).     · You have dizziness along with symptoms of a heart attack. These may include:  ?  Chest pain or pressure, or a strange feeling in the chest.  ? Sweating. ? Shortness of breath. ? Nausea or vomiting. ? Pain, pressure, or a strange feeling in the back, neck, jaw, or upper belly or in one or both shoulders or arms. ? Lightheadedness or sudden weakness. ? A fast or irregular heartbeat.     · You have symptoms of a stroke. These may include:  ? Sudden numbness, tingling, weakness, or loss of movement in your face, arm, or leg, especially on only one side of your body. ? Sudden vision changes. ? Sudden trouble speaking. ? Sudden confusion or trouble understanding simple statements. ? Sudden problems with walking or balance. ? A sudden, severe headache that is different from past headaches.    Call your doctor now or seek immediate medical care if:    · You feel dizzy and have a fever, headache, or ringing in your ears.     · You have new or increased nausea and vomiting.     · Your dizziness does not go away or comes back.    Watch closely for changes in your health, and be sure to contact your doctor if:    · You do not get better as expected. Where can you learn more? Go to http://kenrick-oswaldo.info/. Enter I326 in the search box to learn more about \"Dizziness: Care Instructions. \"  Current as of: September 23, 2018  Content Version: 11.9  © 1433-8831 "[employer]. Care instructions adapted under license by [employer] (which disclaims liability or warranty for this information). If you have questions about a medical condition or this instruction, always ask your healthcare professional. [place] [address] any warranty or liability for your use of this information.

## 2019-05-30 LAB
ATRIAL RATE: 94 BPM
CALCULATED P AXIS, ECG09: 72 DEGREES
CALCULATED R AXIS, ECG10: 37 DEGREES
CALCULATED T AXIS, ECG11: 46 DEGREES
DIAGNOSIS, 93000: NORMAL
P-R INTERVAL, ECG05: 156 MS
Q-T INTERVAL, ECG07: 338 MS
QRS DURATION, ECG06: 70 MS
QTC CALCULATION (BEZET), ECG08: 422 MS
VENTRICULAR RATE, ECG03: 94 BPM

## 2019-12-14 NOTE — DISCHARGE SUMMARY
Discharge Summary       PATIENT ID: Chetan Phelan  MRN: [id number]   YOB: 1940    DATE OF ADMISSION: 3/1/2019  7:52 AM    DATE OF DISCHARGE: 3/11/2019    PRIMARY CARE PROVIDER: Vivian Ricci MD     ATTENDING PHYSICIAN: Ej Guillaume MD   DISCHARGING PROVIDER: Ej Guillaume MD    To contact this individual call [phone] and ask the  to page. If unavailable ask to be transferred the Adult Hospitalist Department. CONSULTATIONS: IP CONSULT TO NEPHROLOGY  IP CONSULT TO NEPHROLOGY    PROCEDURES/SURGERIES: Procedure(s):  PERCUTANEOUS CORONARY INTERVENTION  Left And Right Heart Cath / Coronary Angiography    ADMITTING [address] [place] [address] COURSE:     3/11/2019  11:05 AM   Up date per nephrology; ASUNCION on CKD :  · Multifactorial.  contrast Induced Nephropathy w/ ATN   · S/p 1st HD 3/5, 3/6, 3/8   · Recovering renal function  · D/c permacath today  · No further dialysis planned  · Will need f/u in 1 week post d/c       Per recent PN:       Admission Summary:    This is a 28-year-old woman with a past medical history significant for type 2 diabetes, hypertension, was in her usual state of health until early this morning when the patient woke up with sudden onset of shortness of breath.  The shortness of breath is progressive associated with fever, diaphoresis as well as chest discomfort.  The patient denies history of congestive heart failure or asthma or COPD.  When EMS arrived at the scene, the patient's oxygen saturation was 89% on room air.  The patient was treated with DuoNeb with improvement in oxygen saturation.  The shortness of breath associated with cough which is nonproductive.  The patient also stated that she has gained couple of pounds in the last couple of days.  The patient traveled to [place] 3 months ago and stated that she has been sick since then.  The patient is taking Lasix.  It is not clear why the patient is taking Lasix.  Stated that she has no history of congestive heart failure.  No record of prior admission to this hospital. Mohan Luu the patient arrived at the emergency room, the patient was found to have fever, leukocytosis, clinical presentation symptoms triggered the Code Sepsis.  The Code Sepsis protocol was initiated.  The patient received antibiotics.  The chest x-ray did not show any evidence of pneumonia but shows evidence of vascular congestion.  The patient was subsequently referred to the hospitalist service for evaluation for admission.     Interval history / Subjective:       3/5/2019 :  Cr up to 6; for HD today.      3/6/2019 :  Pt \"feels like a new person\" ,no n/v/no cp no sob.      3/7/2019 :   Medically cleared for dc, to out pt hd for 2-3 week . Case advised and aware per notes of needs.      3/8/2019 :  Main complaint is wheezing and cough though night, is on nebs/tesselon pearls, no adjustements, minimal wheezing noted 3/8/2019      3/9:   Cont cough/wheeze, adding LABA and neb steroid 3/10/2019      3/10:  Lungs the clearest they have been with use of Pulmicort/LABA nebs,   As per NEphrology:   ASUNCION on CKD :  · Multifactorial.  contrast Induced Nephropathy w/ ATN   · S/p 1st HD 3/5, 3/6, 3/8   · Watch for recovery over the weekend   · Bumex 2 mg BID. · If Cr > 3 tomorrow, dialysis and d/c if out pt dialysis set up at [address]:      Sepsis ([address] (3/1/2019)  -source?  -Urinalysis 3/1 unremarkable  -CTA chest 3/1 No evidence of acute pulmonary thromboembolism. Mild pulmonary edema and small bilateral pleural effusions  -On Zosyn, don't think the patient needs antibiotics.  Also, in light of worsening renal function will stop 3/4  -Follow cultures, no growth so far  -Leukocytosis improving  -resolved      Copd w ashtma, for nebs/laba/ Pulmicort; change duoneb to prn      Type 2 diabetes mellitus with hyperglycemia        Lab Results   Component Value Date/Time     Glucose 152 (H) 03/10/2019 02:45 AM     Glucose (POC) 262 (H) 03/10/2019 11:39 AM    incrased basal dose lantus      Acute respiratory failure, unspecified whether with hypoxia or hypercapnia; see above copd/asthma      NSTEMI (non-ST elevated myocardial infarction) ([facility] [address] (3/2/2019)   -No acute  changes in ECG  -S/P cath 3/2: No significant CAD with Cx dominant,Normal LV size with moderate antrolateral and apical hypokinesis in setting of elevated biomarkere c/w Takotsubo cardiomyopthy. -Elevated Rt an Lt ventricular filling pressures; no cp Emily Saunas 3/6/2019 - 3/10/2019         Afib RVR  -Echo with EF 61-65%. No RWMA  -Now in normal sinus  -Was on heparin/Cardizem, now off both  -on Coreg, continue; rate controled 3/6/2019 - 3/10/2019      Pulmonary edema with pleural effusion  -sec to volume overload  -Was on lasix, currently on hold  - no s/s of hf 3/6/2019 - 3/10/2019      Probable CHF (congestive heart failure)  -Not sure if the patient has CHF     Anemia  -likely chronic  -Follow work up         [employer] Value Date/Time     HGB 8.1 (L) 03/08/2019 01:35 AM         Benign essential hypertension (3/2/2019)  -Continue home meds       BP Readings from Last 1 Encounters:   03/10/19 157/79      COPD: nebs adjsuted 3/9/=  Pul wheezing, multifactorial, likely has a component of copd from age alone. On duonebs,, continue consider LABA/pulmicort      Diabetic peripheral neuropathy ([facility] [address] (3/2/2019)  -stable on home meds         Lab Results   Component Value Date/Time     Glucose 152 (H) 03/10/2019 02:45 AM     Glucose (POC) 262 (H) 03/10/2019 11:39 AM         DM type 2  -SSI        Lab Results   Component Value Date/Time     Glucose 152 (H) 03/10/2019 02:45 AM     Glucose (POC) 262 (H) 03/10/2019 11:39 AM         ASUNCION  -sec to likely dye load  -US renal No hydronephrosis.  A 2.9 cm simple cyst in the right kidney  -Appreciate discussion with Nephrology, started on IV fluids  -Monitor for volume overload        Lab Results   Component Value Date/Time     Creatinine 2.82 (H) 03/10/2019 02:45 AM         Anxiety/Depression  -on Effexor, continue; no anxiety/depression noted on rounds 3/6/2019 - 3/10/2019      Morbid obesity  -Counseled  Body mass index is 40.52 kg/m².      Cardiac diet     Code status: Full  Prophylaxis: Lovenox     Care Plan discussed with: Patient/Family  Disposition: case:per Nephrologoy:   ·    · If Cr > 3 tomorrow, dialysis and d/c if out pt dialysis set up at [address] [place] [address] Results   Component Value Date/Time    Creatinine 2.74 (H) 03/11/2019 02:50 AM    has chair for HD with [facility] per                           DISCHARGE DIAGNOSES / PLAN:      1.  as above      ADDITIONAL CARE RECOMMENDATIONS: na    PENDING TEST RESULTS:   At the time of discharge the following test results are still pending: na    FOLLOW UP APPOINTMENTS:    Follow-up Information     Follow up With Specialties Details Why Contact Info    [address] [place] [address] [facility]  On 3/15/2019 Cardiology follow up appointment on Friday 3/15/19 at 11:30 AM.  Dr. Ruiz Leader is on vacation. Dr. Ricki Monterroso will see you.   [facility]  [address], [address], [facility] [address]  [phone]    [facility] Ratliff on [address]   Appointment scheduled for 3/13 at 3:45 PM  Dialysis will Monday, Wednesday and Friday at 4:30 PM [address] [facility], [address] [place] [address]  [phone]      [place] [address] [facility] on [address]    Ailin Tipton MD Nephrology In 1 week  [facility]  [phone]               DIET: Diabetic Diet and Renal Diet       ACTIVITY: Activity as tolerated    WOUND CARE: na    EQUIPMENT needed: na      DISCHARGE MEDICATIONS:  Current Discharge Medication List      START taking these medications    Details   carvedilol (COREG) 6.25 mg tablet Take 1 Tab by mouth two (2) times daily (with meals). Qty: 60 Tab, Refills: 0      bumetanide (BUMEX) 2 mg tablet Take 1 Tab by mouth two (2) times a day. Qty: 60 Tab, Refills: 0      bisacodyl (DULCOLAX) 5 mg EC tablet Take 1 Tab by mouth daily as needed for Constipation. Qty: 10 Tab, Refills: 0      budesonide (PULMICORT) 0.5 mg/2 mL nbsp 2 mL by Nebulization route two (2) times a day. Qty: 60 Each, Refills: 1      arformoterol (BROVANA) 15 mcg/2 mL nebu neb solution 2 mL by Nebulization route two (2) times a day. Qty: 60 Vial, Refills: 0      albuterol (PROVENTIL VENTOLIN) 2.5 mg /3 mL (0.083 %) nebulizer solution 3 mL by Nebulization route every four (4) hours as needed for Wheezing. Qty: 60 Each, Refills: 0      Nebulizers misc Needs nebulizer, lines/tubing and mask for neb solutions dispensing/nebulization to use as directed  Qty: 1 Each, Refills: 1      insulin glargine (LANTUS SOLOSTAR U-100 INSULIN) 100 unit/mL (3 mL) inpn 15 units daily  Subcutaneously  Qty: 1 Adjustable Dose Pre-filled Pen Syringe, Refills: 1      Diabetic Supplies, Miscellan. misc Needs needles for multidose pen, needs glucometer strips, lancets, alcohol pads. Qty: 1 Each, Refills: 12      Blood-Glucose Meter monitoring kit Glucometer of choice with supplies  Qty: 1 Kit, Refills: 12      rosuvastatin (CRESTOR) 20 mg tablet Take 1 Tab by mouth nightly. Qty: 30 Tab, Refills: 0         CONTINUE these medications which have NOT CHANGED    Details   oxybutynin chloride XL (DITROPAN XL) 15 mg CR tablet Take 15 mg by mouth daily. therapeutic multivitamin (THERAGRAN) tablet Take 1 Tab by mouth daily. gabapentin (NEURONTIN) 300 mg capsule Take 300 mg by mouth three (3) times daily as needed. venlafaxine (EFFEXOR) 37.5 mg tablet Take 37.5 mg by mouth two (2) times a day. traMADol (ULTRAM) 50 mg tablet Take 1 Tab by mouth every eight (8) hours as needed for Pain.  Max Daily Amount: 150 mg.  Qty: 20 Tab, Refills: 0    Associated Diagnoses: Neck pain      LORazepam (ATIVAN) 0.5 mg tablet Take 0.5 mg by mouth every eight (8) hours as needed for Anxiety. calcitRIOL (ROCALTROL) 0.5 mcg capsule Take 0.5 mcg by mouth daily. aspirin 81 mg tablet Take 81 mg by mouth daily. STOP taking these medications       metFORMIN (GLUCOPHAGE) 1,000 mg tablet Comments:   Reason for Stopping:         metoprolol succinate (TOPROL XL) 25 mg XL tablet Comments:   Reason for Stopping:         SITagliptin (JANUVIA) 100 mg tablet Comments:   Reason for Stopping:         furosemide (LASIX) 40 mg tablet Comments:   Reason for Stopping:         potassium chloride (K-DUR, KLOR-CON) 10 mEq tablet Comments:   Reason for Stopping:         simvastatin (ZOCOR) 20 mg tablet Comments:   Reason for Stopping:         metFORMIN (GLUCOPHAGE) 500 mg tablet Comments:   Reason for Stopping:                 NOTIFY YOUR PHYSICIAN FOR ANY OF THE FOLLOWING:   Fever over 101 degrees for 24 hours. Chest pain, shortness of breath, fever, chills, nausea, vomiting, diarrhea, change in mentation, falling, weakness, bleeding. Severe pain or pain not relieved by medications. Or, any other signs or symptoms that you may have questions about.     DISPOSITION:    Home With:   OT  PT  HH  RN       Long term SNF/Inpatient Rehab   x Independent/assisted living= family support     Hospice    Other:       PATIENT CONDITION AT DISCHARGE:     Functional status    Poor    x Deconditioned     Independent      Cognition    x Lucid     Forgetful     Dementia      Catheters/lines (plus indication)    Nelson    x Archie cath     PEG      None      Code status   x  Full code     DNR      PHYSICAL EXAMINATION AT DISCHARGE:   Refer to Progress Note  Visit Vitals  /81 (BP 1 Location: Left arm, BP Patient Position: At rest)   Pulse 90   Temp 98.1 °F (36.7 °C)   Resp 16   Ht 5' 5\" (1.651 m)   Wt 111.8 kg (246 lb 7.6 oz)   SpO2 96%   BMI 41.02 kg/m²      Pul: clear  CV: controled rate, no gallop  Abd; soft non tender  Ext: trace edema         CHRONIC MEDICAL DIAGNOSES:  Problem List as of 3/11/2019 Date Reviewed: 3/11/2019          Codes Class Noted - Resolved    NSTEMI (non-ST elevated myocardial infarction) ([facility] ICD-10-CM: I21.4  ICD-9-CM: 410.70  3/2/2019 - Present        Benign essential hypertension ICD-10-CM: I10  ICD-9-CM: 401.1  3/2/2019 - Present        Diabetic peripheral neuropathy ([facility] ICD-10-CM: E11.42  ICD-9-CM: 250.60, 357.2  3/2/2019 - Present        Type II diabetes mellitus, uncontrolled ([facility] ICD-10-CM: E11.65  ICD-9-CM: 250.02  3/2/2019 - Present        Hyperlipidemia ICD-10-CM: E78.5  ICD-9-CM: 272.4  3/2/2019 - Present        RESOLVED: CHF (congestive heart failure) ([facility] ICD-10-CM: I50.9  ICD-9-CM: 428.0  3/2/2019 - 3/11/2019        * (Principal) RESOLVED: Sepsis ([facility] ICD-10-CM: A41.9  ICD-9-CM: 038.9, 995.91  3/1/2019 - 3/11/2019              Greater than  30  minutes were spent with the patient on counseling and coordination of care    Signed:   Kaur Bond MD  3/11/2019  9:13 AM 14-Dec-2019

## 2020-09-03 NOTE — PROGRESS NOTES
Assessment completed.  Pt A&Ox4. Pt complains of 3/10 chest pain, medicated per MAR. Sinus rhythm on telemetry monitor.  POC discussed; communication board updated.    Bed in locked, lowest position.  Call light and belongings within reach.  Needs met, will continue to monitor.    Hospitalist Progress Note Glorya Nageotte, MD 
Answering service: [phone] OR [phone] from in house phone Date of Service:  3/22/2019 NAME:  Michele Velazquez :  1940 MRN:  [id number] Admission Summary:  
Michele Velazquez is a 78 y.o. female with pmh of newly diagnosed CKD s/p HD (had not needed HD on discharge) , recently diagnosed NSTEMI and Tokostubos cardiomyopathy (with recent LHC without evidence of CAD), ?COPD (had wheezing prior admission and started on inhalers) who presented to the ED initially for hyperglycemia and SOB. Per family (daughters) at bedside, patient lives alone and had been altered and more sleepy than usual since discharge from the hospital . Today she had high blood sugars at home > 500 which were noted by nursing. She also was hypoxic with saturations of 88 or so at home on room air. Subsequently 911 was called. Although she lives alone she has family at her side . They have noted intermittent altered mental status, which has not improved since discharge from the hospital. Deny any F/C. Has had some intermittnet wheezing for which she has been taking her grandchildren's nebulizer treatments without improvement. No abdominal pain, N/V/D/C. In the ED was hypoxic to 88-86% on RA and admitted for further workup. Interval history / Subjective:  
   
Pt seen in room Denies any complaints On Room air Sitting up in bed. Assessment & Plan: SOB - likely from fluid overload and large L pleural effusion, BNP elevated. -  On bumex - S/P  Thoracentesis 3/19 - Cytology neg For malignancy - Influenza - neg 
-Echo Showing Normal EF at this admission Leukocytosis Resolved U/A positive for UTI Blood cultures NGTD Rocephin/Doxy - Cover for pneumonia, UTI Thoracentesis  - Followup Fluid Cultures - so far NGTD 
  
Takotsubo cardiomyopathy  
- Diuresis, cardiology consult - I and O, daily weight  
-Recheck Echo Showing Normal LVEF 
  
Newly diagnosed CKD, Cr actually stable, and no acute need for HD as far as I can tell. Did require HD during prior admission - Diuresis as above - Follow I and O  
- Nephrology consulted 
-C/w home dosing of diuretics Recheck BMP in 1 week Hypokalemia Adjusted Potassium dosing.  
  
Metabolic encephalopathy - ? Polypharmacy/UTI - holding tramadol, ativan, gabapentin, oxybutynin 
-Check CT head now 
  
Depression - on venlafaxine Type 2 diabetes with hyperglycemia 8.3%  
- POCT glucose checks, long acting insulin, hypoglycemia protocol.  
  
DVT PPX - heparin Iron Def anemia 
on iron Infusion CODE STATUS: FULL, I discussed with patient and daughter and she wanted full code. - 3/19 Risk of deterioration: medium Total time spent with patient: 32 Minutes Care Plan discussed with: Patient/Family and Nurse Disposition: HH PT, OT, RN and PT eval - d/c today if ct head ok Hospital Problems  Date Reviewed: 3/18/2019 Codes Class Noted POA  
 SOB (shortness of breath) ICD-10-CM: R06.02 
ICD-9-CM: 786.05  3/18/2019 Unknown Review of Systems:  
Pertinent items are noted in HPI. Vital Signs:  
 Last 24hrs VS reviewed since prior progress note. Most recent are: 
Visit Vitals /73 (BP 1 Location: Right arm, BP Patient Position: At rest) Pulse 86 Temp 97.2 °F (36.2 °C) Resp 18 Ht 5' 5\" (1.651 m) Wt 94.3 kg (207 lb 12.8 oz) SpO2 93% BMI 34.58 kg/m² No intake or output data in the 24 hours ending 03/22/19 1325 Physical Examination:  
 
 
     
Constitutional:  No acute distress, ENT:  Oral mucous moist, oropharynx benign. Neck supple, Resp:  Decreased at bases. No accessory muscle use CV:  Regular rhythm, normal rate, no murmurs, gallops, rubs GI:  Soft, non distended, non tender. normoactive bowel sounds, no hepatosplenomegaly Musculoskeletal:  No edema, warm, 2+ pulses throughout Neurologic:  Moves all extremities. AAOx3, CN II-XII reviewed Psych:  Appears depressed, Denies Suicide or Homicide Data Review:  
 Review and/or order of clinical lab test 
Review and/or order of tests in the radiology section of CPT Review and/or order of tests in the medicine section of CPT Labs:  
 
Recent Labs  
  03/22/19 
0555 03/21/19 
0348 WBC 10.5 10.2 HGB 10.9* 9.6* HCT 35.1 31.1*  
* 375 Recent Labs  
  03/22/19 
0555 03/21/19 
3698 03/20/19 
2698 03/20/19 
7518  138 133* 135* K 3.7 4.5 3.2* 3.5 CL 95* 97 90* 90* CO2 38* 37* 36* 36* BUN 36* 43* 38* 39* CREA 1.75* 1.84* 1.93* 1.90* * 174* 203* 233* CA 10.4* 9.2 9.2 9.1 MG  --   --  1.9  --   
PHOS  --   --   --  2.7 Recent Labs  
  03/20/19 
6295 ALB 2.7* No results for input(s): INR, PTP, APTT in the last 72 hours. No lab exists for component: INREXT, INREXT Recent Labs  
  03/20/19 
0165 TIBC 188* PSAT 7*  
FERR 1,375* No results found for: FOL, RBCF No results for input(s): PH, PCO2, PO2 in the last 72 hours. No results for input(s): CPK, CKNDX, TROIQ in the last 72 hours. No lab exists for component: CPKMB No results found for: CHOL, CHOLX, CHLST, CHOLV, HDL, LDL, LDLC, DLDLP, TGLX, TRIGL, TRIGP, CHHD, CHHDX Lab Results Component Value Date/Time Glucose (POC) 211 (H) 03/22/2019 11:39 AM  
 Glucose (POC) 135 (H) 03/22/2019 06:31 AM  
 Glucose (POC) 113 (H) 03/21/2019 10:31 PM  
 Glucose (POC) 155 (H) 03/21/2019 04:40 PM  
 Glucose (POC) 232 (H) 03/21/2019 11:43 AM  
 
Lab Results Component Value Date/Time  Color YELLOW/STRAW 03/18/2019 12:49 PM  
 Appearance CLEAR 03/18/2019 12:49 PM  
 Specific gravity 1.014 03/18/2019 12:49 PM  
 pH (UA) 5.5 03/18/2019 12:49 PM  
 Protein 30 (A) 03/18/2019 12:49 PM  
 Glucose 100 (A) 03/18/2019 12:49 PM  
 Ketone NEGATIVE  03/18/2019 12:49 PM  
 Bilirubin NEGATIVE  03/18/2019 12:49 PM  
 Urobilinogen 0.2 03/18/2019 12:49 PM  
 Nitrites NEGATIVE  03/18/2019 12:49 PM  
 Leukocyte Esterase TRACE (A) 03/18/2019 12:49 PM  
 Epithelial cells FEW 03/18/2019 12:49 PM  
 Bacteria NEGATIVE  03/18/2019 12:49 PM  
 WBC 5-10 03/18/2019 12:49 PM  
 RBC 0-5 03/18/2019 12:49 PM  
 
 
 
Medications Reviewed:  
 
Current Facility-Administered Medications Medication Dose Route Frequency  levoFLOXacin (LEVAQUIN) 750 mg in D5W IVPB  750 mg IntraVENous Q48H  potassium chloride SR (KLOR-CON 10) tablet 20 mEq  20 mEq Oral DAILY  iron sucrose (VENOFER) 200 mg in 0.9% sodium chloride 100 mL IVPB  200 mg IntraVENous Q24H  
 bumetanide (BUMEX) tablet 2 mg  2 mg Oral BID  sodium chloride (NS) flush 5-40 mL  5-40 mL IntraVENous Q8H  
 sodium chloride (NS) flush 5-40 mL  5-40 mL IntraVENous PRN  
 morphine injection 2 mg  2 mg IntraVENous Q4H PRN  
 ondansetron (ZOFRAN) injection 4 mg  4 mg IntraVENous Q4H PRN  
 albuterol (PROVENTIL VENTOLIN) nebulizer solution 2.5 mg  2.5 mg Nebulization Q4H PRN  
 aspirin delayed-release tablet 81 mg  81 mg Oral DAILY  benzonatate (TESSALON) capsule 100 mg  100 mg Oral TID PRN  
 bisacodyl (DULCOLAX) tablet 5 mg  5 mg Oral DAILY PRN  
 calcitRIOL (ROCALTROL) capsule 0.5 mcg  0.5 mcg Oral DAILY  carvedilol (COREG) tablet 6.25 mg  6.25 mg Oral BID WITH MEALS  insulin glargine (LANTUS) injection 15 Units  15 Units SubCUTAneous DAILY  venlafaxine (EFFEXOR) tablet 37.5 mg  37.5 mg Oral BID  
 glucose chewable tablet 16 g  4 Tab Oral PRN  
 dextrose (D50W) injection syrg 12.5-25 g  25-50 mL IntraVENous PRN  
 glucagon (GLUCAGEN) injection 1 mg  1 mg IntraMUSCular PRN  
 heparin (porcine) injection 5,000 Units  5,000 Units SubCUTAneous Q8H  
 insulin lispro (HUMALOG) injection   SubCUTAneous AC&HS  
 
______________________________________________________________________ EXPECTED LENGTH OF STAY: 4d 19h ACTUAL LENGTH OF STAY:          4 
 
            
Juli Preston MD  
Patient has given Verbal permission to discuss medical care with  
persons present in the room and and also with contact as listed on face sheet.

## 2020-10-13 ENCOUNTER — HOSPITAL ENCOUNTER (OUTPATIENT)
Age: 80
Setting detail: OBSERVATION
Discharge: HOME | End: 2020-10-13
Attending: INTERNAL MEDICINE | Admitting: INTERNAL MEDICINE
Payer: MEDICARE

## 2020-10-13 VITALS
SYSTOLIC BLOOD PRESSURE: 176 MMHG | HEART RATE: 68 BPM | BODY MASS INDEX: 36.49 KG/M2 | OXYGEN SATURATION: 99 % | HEIGHT: 65 IN | TEMPERATURE: 97.7 F | DIASTOLIC BLOOD PRESSURE: 59 MMHG | WEIGHT: 219 LBS | RESPIRATION RATE: 20 BRPM

## 2020-10-13 DIAGNOSIS — I35.0 AORTIC VALVE STENOSIS, ETIOLOGY OF CARDIAC VALVE DISEASE UNSPECIFIED: ICD-10-CM

## 2020-10-13 PROBLEM — Z98.890 S/P CARDIAC CATH: Status: ACTIVE | Noted: 2020-10-13

## 2020-10-13 LAB
ANION GAP SERPL CALC-SCNC: 5 MMOL/L (ref 5–15)
BUN SERPL-MCNC: 25 MG/DL (ref 6–20)
BUN/CREAT SERPL: 19 (ref 12–20)
CALCIUM SERPL-MCNC: 10.1 MG/DL (ref 8.5–10.1)
CHLORIDE SERPL-SCNC: 104 MMOL/L (ref 97–108)
CO2 SERPL-SCNC: 32 MMOL/L (ref 21–32)
CREAT SERPL-MCNC: 1.34 MG/DL (ref 0.55–1.02)
ERYTHROCYTE [DISTWIDTH] IN BLOOD BY AUTOMATED COUNT: 13.7 % (ref 11.5–14.5)
GLUCOSE BLD STRIP.AUTO-MCNC: 135 MG/DL (ref 65–100)
GLUCOSE SERPL-MCNC: 124 MG/DL (ref 65–100)
HCT VFR BLD AUTO: 32.7 % (ref 35–47)
HGB BLD-MCNC: 10.4 G/DL (ref 11.5–16)
MCH RBC QN AUTO: 28 PG (ref 26–34)
MCHC RBC AUTO-ENTMCNC: 31.8 G/DL (ref 30–36.5)
MCV RBC AUTO: 87.9 FL (ref 80–99)
NRBC # BLD: 0 K/UL (ref 0–0.01)
NRBC BLD-RTO: 0 PER 100 WBC
PLATELET # BLD AUTO: 221 K/UL (ref 150–400)
PMV BLD AUTO: 10.5 FL (ref 8.9–12.9)
POTASSIUM SERPL-SCNC: 4.2 MMOL/L (ref 3.5–5.1)
RBC # BLD AUTO: 3.72 M/UL (ref 3.8–5.2)
SERVICE CMNT-IMP: ABNORMAL
SODIUM SERPL-SCNC: 141 MMOL/L (ref 136–145)
WBC # BLD AUTO: 7.4 K/UL (ref 3.6–11)

## 2020-10-13 PROCEDURE — 77030013406 HC CATH CTRL EDWD -B: Performed by: INTERNAL MEDICINE

## 2020-10-13 PROCEDURE — 80048 BASIC METABOLIC PNL TOTAL CA: CPT

## 2020-10-13 PROCEDURE — 82962 GLUCOSE BLOOD TEST: CPT

## 2020-10-13 PROCEDURE — 74011250636 HC RX REV CODE- 250/636: Performed by: INTERNAL MEDICINE

## 2020-10-13 PROCEDURE — C1769 GUIDE WIRE: HCPCS | Performed by: INTERNAL MEDICINE

## 2020-10-13 PROCEDURE — C1894 INTRO/SHEATH, NON-LASER: HCPCS | Performed by: INTERNAL MEDICINE

## 2020-10-13 PROCEDURE — 77030013744: Performed by: INTERNAL MEDICINE

## 2020-10-13 PROCEDURE — 85027 COMPLETE CBC AUTOMATED: CPT

## 2020-10-13 PROCEDURE — 99153 MOD SED SAME PHYS/QHP EA: CPT | Performed by: INTERNAL MEDICINE

## 2020-10-13 PROCEDURE — C1751 CATH, INF, PER/CENT/MIDLINE: HCPCS | Performed by: INTERNAL MEDICINE

## 2020-10-13 PROCEDURE — 93460 R&L HRT ART/VENTRICLE ANGIO: CPT | Performed by: INTERNAL MEDICINE

## 2020-10-13 PROCEDURE — 77030004532 HC CATH ANGI DX IMP BSC -A: Performed by: INTERNAL MEDICINE

## 2020-10-13 PROCEDURE — 99218 HC RM OBSERVATION: CPT

## 2020-10-13 PROCEDURE — 99152 MOD SED SAME PHYS/QHP 5/>YRS: CPT | Performed by: INTERNAL MEDICINE

## 2020-10-13 PROCEDURE — 77030019569 HC BND COMPR RAD TERU -B: Performed by: INTERNAL MEDICINE

## 2020-10-13 PROCEDURE — 36140 INTRO NDL ICATH UPR/LXTR ART: CPT | Performed by: INTERNAL MEDICINE

## 2020-10-13 PROCEDURE — 77030004533 HC CATH ANGI DX IMP BSC -B: Performed by: INTERNAL MEDICINE

## 2020-10-13 PROCEDURE — 36415 COLL VENOUS BLD VENIPUNCTURE: CPT

## 2020-10-13 PROCEDURE — 74011000636 HC RX REV CODE- 636: Performed by: INTERNAL MEDICINE

## 2020-10-13 PROCEDURE — C1892 INTRO/SHEATH,FIXED,PEEL-AWAY: HCPCS | Performed by: INTERNAL MEDICINE

## 2020-10-13 PROCEDURE — 74011000250 HC RX REV CODE- 250: Performed by: INTERNAL MEDICINE

## 2020-10-13 PROCEDURE — 77030016699 HC CATH ANGI DX INFN1 CARD -A: Performed by: INTERNAL MEDICINE

## 2020-10-13 RX ORDER — HEPARIN SODIUM 200 [USP'U]/100ML
INJECTION, SOLUTION INTRAVENOUS
Status: COMPLETED | OUTPATIENT
Start: 2020-10-13 | End: 2020-10-13

## 2020-10-13 RX ORDER — HEPARIN SODIUM 1000 [USP'U]/ML
INJECTION, SOLUTION INTRAVENOUS; SUBCUTANEOUS AS NEEDED
Status: DISCONTINUED | OUTPATIENT
Start: 2020-10-13 | End: 2020-10-13 | Stop reason: HOSPADM

## 2020-10-13 RX ORDER — METFORMIN HYDROCHLORIDE 500 MG/1
500 TABLET ORAL 2 TIMES DAILY WITH MEALS
COMMUNITY
End: 2020-10-13

## 2020-10-13 RX ORDER — MIDAZOLAM HYDROCHLORIDE 1 MG/ML
INJECTION, SOLUTION INTRAMUSCULAR; INTRAVENOUS AS NEEDED
Status: DISCONTINUED | OUTPATIENT
Start: 2020-10-13 | End: 2020-10-13 | Stop reason: HOSPADM

## 2020-10-13 RX ORDER — VERAPAMIL HYDROCHLORIDE 2.5 MG/ML
INJECTION, SOLUTION INTRAVENOUS AS NEEDED
Status: DISCONTINUED | OUTPATIENT
Start: 2020-10-13 | End: 2020-10-13 | Stop reason: HOSPADM

## 2020-10-13 RX ORDER — HYDRALAZINE HYDROCHLORIDE 20 MG/ML
INJECTION INTRAMUSCULAR; INTRAVENOUS AS NEEDED
Status: DISCONTINUED | OUTPATIENT
Start: 2020-10-13 | End: 2020-10-13 | Stop reason: HOSPADM

## 2020-10-13 RX ORDER — FENTANYL CITRATE 50 UG/ML
INJECTION, SOLUTION INTRAMUSCULAR; INTRAVENOUS AS NEEDED
Status: DISCONTINUED | OUTPATIENT
Start: 2020-10-13 | End: 2020-10-13 | Stop reason: HOSPADM

## 2020-10-13 RX ORDER — LIDOCAINE HYDROCHLORIDE 10 MG/ML
INJECTION INFILTRATION; PERINEURAL AS NEEDED
Status: DISCONTINUED | OUTPATIENT
Start: 2020-10-13 | End: 2020-10-13 | Stop reason: HOSPADM

## 2020-10-13 NOTE — Clinical Note
artery. using ultrasound guidance.  Right carotid accessed and wire and needle pulled and pressure held by MD

## 2020-10-13 NOTE — PROGRESS NOTES
Cardiac Cath Lab Recovery Arrival Note:      Lul Loaiza arrived to Cardiac Cath Lab, Recovery Area. Staff introduced to patient. Patient identifiers verified with NAME and DATE OF BIRTH. Procedure verified with patient. Consent forms reviewed and signed by patient or authorized representative and verified. Allergies verified. Patient and family oriented to department. Patient and family informed of procedure and plan of care. Questions answered with review. Patient prepped for procedure, per orders from physician, prior to arrival.    Patient on cardiac monitor, non-invasive blood pressure, SPO2 monitor. On room air . Patient is A&Ox 4. Patient reports no complaints. Patient in stretcher, in low position, with side rails up, call bell within reach, patient instructed to call if assistance as needed. Patient prep in: [address], [place] [address].    Patient family has pager # 0  Family in: outside hospital.   Prep by: Saint Lins RN, Delfina Stevens RN and Sabi Herrmann RN

## 2020-10-13 NOTE — PROGRESS NOTES
Cardiac Cath Lab Procedure Area Arrival Note:    Gaston Martinez arrived to Cardiac Cath Lab, Procedure Area. Patient identifiers verified with NAME and DATE OF BIRTH. Procedure verified with patient. Consent forms verified. Allergies verified. Patient informed of procedure and plan of care. Questions answered with review. Patient voiced understanding of procedure and plan of care. Patient on cardiac monitor, non-invasive blood pressure, SPO2 monitor. On RA and left on RA for procedure per MD.  IV of NSS on pump at 25 ml/hr. Patient status doing well without problems. Patient is A&Ox 4. Patient reports no complaints of chest pain or shortness of breath. Patient medicated during procedure with orders obtained and verified by Dr. Mely Fung. Refer to patients Cardiac Cath Lab PROCEDURE REPORT for vital signs, assessment, status, and response during procedure, printed at end of case. Printed report on chart or scanned into chart. 1657 Transfer to [facility] RR from Procedure Area    Verbal report given to Kim Bautista CVT on Gaston Martinez being transferred to Cardiac Cath Lab  for routine progression of care   Patient is post [facility] procedure. Patient stable upon transfer to . Report consisted of patients Situation, Background, Assessment and   Recommendations(SBAR). Information from the following report(s) SBAR, Procedure Summary and MAR was reviewed with the receiving nurse. Opportunity for questions and clarification was provided. Patient medicated during procedure with orders obtained and verified by Dr. Mely Fung. Refer to patient PROCEDURE REPORT for vital signs, assessment, status, and response during procedure.

## 2020-10-13 NOTE — DISCHARGE INSTRUCTIONS
CARDIAC CATHETERIZATION/ CATH    DISCHARGE INSTRUCTIONS    If possible, have someone stay with you for the first night. It is normal to feel tired for the first couple of days. Take it easy and follow your physicians instructions on activity. CHECK THE CATHETER INSERTION SITE DAILY:    If bleeding at the cath site occurs, take a clean washcloth and apply direct pressure just above the puncture site for at least 15 minutes. Call 911 immediately if the bleeding is not controlled. Continue to apply direct pressure until an ambulance gets to your location. Do not try to drive yourself or have someone else drive you to the hospital.  Have the ambulance bring you to the emergency room. You may shower 24 hours after your procedure. Gently remove the bandage during showering. Wash with soap and water and pat dry. To prevent infection, keep the groin area/insertion site clean and dry. Do not apply powders, creams, lotions, or ointments to the site for 5 days. You may cover the site with a fresh Band-Aid each day until well healed. You may notice a small lump at the site. This is normal and may last up to 6 weeks. CALL THE PHYSICIAN:  ? If the site becomes red, swollen, or feels warm to the touch, or is healing poorly    ? If you note any large/extending bruise, fever >101.0 or chills  ? If your extremity has numbness, tingling, discoloration, abnormal swelling, tightness or pain   ? If you have difficulty with urination or develop nausea, vomiting, or severe abdominal pain    ACTIVITY for the first 24-48 hours, or as instructed by your physician:  ? No lifting, pushing or pulling over 10 pounds and no straining the insertion site. Do not lift grocery bags or the garbage can; do not run the vacuum  or  for 7 days. ? You may start walking short distances the day of your procedure. Gradually increase as tolerated each day.   Current activity recommendations are 30 minutes of exercise at least 5 days a week. Work up to this as you recover. ? Avoid walking outside in extremes of heat or cold. Walk inside (at home, at the mall, or at a large store) when it is cold and windy or hot and humid. THINGS TO KEEP IN MIND:   ? Do not drive, operate any machinery, or sign any legal documents for 24 hours after your procedure, or as directed by your physician. You must have someone drive you home after your procedure. ? Drink plenty of fluids for 24-48 hours after your procedure to flush the contrast dye from your kidneys. ? Limit the number of times you go up and down the stairs  ? Take rests and pace yourself with activity  ? Be careful and do not strain with bowel movements    MEDICATIONS:  ? Take all medications as prescribed  ? Call your physician if you have any questions  ? Keep an updated list of your medications with you at all times and give a list to your primary physician and pharmacist  ? You may use Tylenol 325mg 1-2 tablets every 6 hours as needed for pain or discomfort, unless otherwise instructed. If you have significant discomfort more than 48 hours after your procedure, please call your physicians office. SIGNS AND SYMPTOMS:  ? Notify your physician for new or recurrent symptoms of chest discomfort, unusual shortness of breath or fatigue. These could be signs of a problem and should be discussed with your physician. ? For significant chest pain or symptoms of angina not relieved with rest:  if you have been prescribed Nitroglycerin, take as directed (taken under the tongue, one at a time 5 minutes apart for a total of 3 doses, sitting down). If the discomfort is not relieved after the 3rd Nitroglycerin, call 911. If you have not been prescribed Nitroglycerin and your chest discomfort is significant, call 911. Take the ambulance, do not try to drive yourself or have someone else drive you to the hospital.     AFTER CARE:  ? Follow up with your physician as instructed  ?  Follow a heart healthy diet with proper portion control, daily stress management, daily exercise, blood pressure and cholesterol control, and smoking cessation. The success of your stent, if you had one placed, and the prevention of future catheterizations heavily depends on your lifestyle changes you make now! ? You may start walking short distances the day of your procedure. Gradually increase as tolerated each day. Current activity recommendations are 30 minutes of exercise at least 5 days a week. Work up to this as you recover. Walk, ride a bike, or choose any other activity you enjoy to reach this activity goal.   ? Avoid walking outside in extremes of heat or cold. Walk inside (at home, at the mall, or at a large store) when it is cold and windy or hot and humid. ? If you had a stent placed, consider [facility] as a resource to help you make the needed lifestyle changes to live a heart healthy lifestyle. Discuss your candidacy with your physician. If you have questions, call your physicians office or the Cardiac Cath Lab at [phone]. The Cath Lab is operational from 6:30 a.m. to 5:00 p.m., Monday through Friday. After hours, notify your physician. [address] [place] [address] can be reached at [phone]. [facility] is operational Monday-Thursday 8:30 a.m. to 5:00 p.m. and Friday 8:30 a.m. to 12:00 p.m. Remember:  IN CASE OF BLEEDING: KEEP FIRM PRESSURE ON THE PROCEDURE SITE AND CALL 911 IF NOT CONTROLLED      Radial Cardiac Catheterization / Angiography Discharge Instructions    It is normal to feel tired the first couple days. Take it easy and follow the physicians instructions. CHECK THE CATHETER INSERTION SITE DAILY:  Remove the wrist dressing 24 hours after the procedure. You may shower 24 hours after the procedure. Wash with soap and water and pat dry. Gentle cleaning of the site with soap and water is sufficient, cover with a dry clean dressing or bandage.   Do not apply creams or powders to the area. No soaking the wrist for 3 days. Leave the puncture site open to air after 24 hours post-procedure. CALL THE PHYSICIAN:  If the site becomes red, swollen or feels warm to the touch. If there is bleeding or drainage or if there is unusual pain at the radial site. If there is any minor oozing, you may apply a band-aid and remove after 12 hours. If the bleeding continues, hold pressure with the middle finger against the puncture site and the thumb against the back of the wrist, call 911 to be transported to the hospital.  DO NOT DRIVE YOURSELF, Renetta [address]. ACTIVITY:  For the first 24 hours do not manipulate the wrist.  No lifting, pushing or pulling over 3-5 pounds with the affected wrist for 7 days and no straining the insertion site. Do not lift grocery bags or the garbage can, do not run the vacuum  or  for 7 days. Start with short walks as in the hospital and gradually increase as tolerated each day. It is recommended to walk 30 minutes 5-7 days per week. Follow your physicians instructions on activity. Avoid walking outside in extremes of heat or cold. Walk inside when it is cold and windy or hot and humid. THINGS TO KEEP IN MIND:  No driving for at least 24 hours, or as designated by your physician. Limit the number of times you go up and down the stairs  Take rests and pace yourself with activity. Be careful and do not strain with bowel movements. MEDICATIONS:  Take all medications as prescribed. Call your physician if you have any questions. Keep an updated list of your medications with you at all times and give a list to your physician and pharmacist.    SIGNS AND SYMPTOMS:  Be cautions of symptoms of angina or recurrent symptoms such as chest discomfort, unusual shortness of breath or fatigue. These could be symptoms of restenosis, a new blockage or a heart attack.   If your symptoms are relieved with rest it is still recommended that you notify your physician of recurrent chest pain or discomfort. For CHEST PAIN or symptoms of angina not relieved with rest:  If the discomfort is not relieved with rest and you have been prescribed Nitroglycerin, take as directed (taken under the tongue, one at a time 5 minutes apart for a total of 3 doses). If the discomfort is not relieved after the 3rd nitroglycerin, call 911. AFTER CARE:  Follow up with you physician as instructed. Follow a heart healthy diet with proper portion control, daily stress management, daily      exercise, blood pressure and cholesterol control, and smoking cessation. Patient Discharge Instructions    Prasad Pardo / [id number] : 1940    Admitted 10/13/2020 Discharged: 10/13/2020     Take Home Medications          · It is important that you take the medication exactly as they are prescribed. · Keep your medication in the bottles provided by the pharmacist and keep a list of the medication names, dosages, and times to be taken in your wallet. · Do not take other medications without consulting your doctor. · Do not take Metformin (glucophage) until Friday 10/16/2020  · Do not take Eliquis until Thursday 10/15/2020    BRING ALL OF YOUR MEDICINES TO YOUR OFFICE VISIT with Dr. Rhona Taveras. Follow-up with Ginny Cox MD in 1 week. The office will call to make your appointment. Cardiac Catheterization  Discharge Instructions     Do not drive, operate any machinery, or sign any legal documents for 24 hours after your procedure. You must have someone to drive you home.  You may take a shower 24 hours after your cardiac catheterization. Be sure to get the dressing wet and then remove it; gently wash the area with warm soapy water. Pat dry and leave open to air. To help prevent infections, be sure to keep the cath site clean and dry.   No lotions, creams, powders, ointments, etc. in the cath site for approximately 1 week.     Do not take a tub bath, get in a hot tub or swimming pool for approximately 5 days or until the cath site is completely healed.  No strenuous activity or heavy lifting over 10 lbs. for 7 days.  Drink plenty of fluids for 24-48 hours after your cath to flush the contrast dye from your kidneys. No alcoholic beverages for 24 hours. You may resume your previous diet (low fat, low cholesterol) after your cath.  After your cath, some bruising or discomfort is common during the healing process. Tylenol, 1-2 tablets every 6 hours as needed, is recommended if you experience any discomfort. If you experience any signs or symptoms of infection such as fever, chills, or poorly healing incision, persistent tenderness or swelling in the groin, redness and/or warmth to the touch, numbness, significant tingling or pain at the groin site or affected extremity, rash, drainage from the cath site, or if the leg feels tight or swollen, call your physician right away.  If bleeding at the cath site occurs, take a clean gauze pad and apply direct pressure to the groin just above the puncture site. Call 911 immediately, and continue to apply direct pressure until an ambulance gets to your location.  You may return to work  2  days after your cardiac cath if no groin bleeding. Information obtained by :  I understand that if any problems occur once I am at home I am to contact my physician. I understand and acknowledge receipt of the instructions indicated above.                                                                                                                                            R.N.'s Signature                                                                  Date/Time                                                                                                                                              Patient or Representative Signature Date/Time      Lissy Ulloa MD

## 2020-10-13 NOTE — Clinical Note
[place] catheter inserted. Measurements taken: pressures, sats and thermodilutions. Not Applicable

## 2020-10-13 NOTE — PROGRESS NOTES
Pt tolerated food and drink  Discharge instructions over the phone with the grand daughter Rakesh Schwartz  [phone]

## 2020-10-13 NOTE — PROGRESS NOTES
TRANSFER - IN REPORT:    Verbal report received from Will CVT on [address]  being received from procedure area for routine progression of care. Report consisted of patients Situation, Background, Assessment and Recommendations(SBAR). Information from the following report(s) SBAR, Procedure Summary, MAR, Recent Results and Cardiac Rhythm NSR was reviewed with the receiving clinician. Opportunity for questions and clarification was provided. Assessment completed upon patients arrival to [address] [place] [address] and care assumed. Cardiac Cath Lab Recovery Arrival Note:    Kunal Rao arrived to [facility] recovery area. Patient procedure= LHC/RHC. Patient on cardiac monitor, non-invasive blood pressure, SPO2 monitor. On Room Air . IV  of NS on pump at 25 ml/hr. Patient status doing well without problems. Patient is A&Ox 4. Patient reports No Pain. PROCEDURE SITE CHECK:    Procedure site:without any bleeding and No Hematoma, No pain/discomfort reported at procedure site. No change in patient status. Continue to monitor patient and status.

## 2020-10-13 NOTE — Clinical Note
6 fr. Sheath pulled out of left radial artery, nitrex wire advanced and new 6 fr. glidesheath advanced in to left radial artery

## 2020-10-14 NOTE — PROGRESS NOTES
Ambulated 50 ft, gait steady with cane, voided, back to stretcher assisted with dressing. Right groin left radial cath site and right IJ site dsg D&I, areas soft. 2010  Discharged via w/c with granddaughter, instructions and belongings.

## 2020-10-15 LAB — CRD SYSTOLIC BP: 200

## 2021-01-06 ENCOUNTER — OFFICE VISIT (OUTPATIENT)
Dept: CARDIOLOGY CLINIC | Age: 81
End: 2021-01-06
Payer: MEDICARE

## 2021-01-06 VITALS
HEART RATE: 68 BPM | OXYGEN SATURATION: 97 % | RESPIRATION RATE: 16 BRPM | DIASTOLIC BLOOD PRESSURE: 70 MMHG | SYSTOLIC BLOOD PRESSURE: 148 MMHG

## 2021-01-06 DIAGNOSIS — I35.0 NONRHEUMATIC AORTIC VALVE STENOSIS: Primary | ICD-10-CM

## 2021-01-06 PROCEDURE — G8428 CUR MEDS NOT DOCUMENT: HCPCS | Performed by: THORACIC SURGERY (CARDIOTHORACIC VASCULAR SURGERY)

## 2021-01-06 PROCEDURE — G8417 CALC BMI ABV UP PARAM F/U: HCPCS | Performed by: THORACIC SURGERY (CARDIOTHORACIC VASCULAR SURGERY)

## 2021-01-06 PROCEDURE — 99205 OFFICE O/P NEW HI 60 MIN: CPT | Performed by: THORACIC SURGERY (CARDIOTHORACIC VASCULAR SURGERY)

## 2021-01-06 PROCEDURE — 1090F PRES/ABSN URINE INCON ASSESS: CPT | Performed by: THORACIC SURGERY (CARDIOTHORACIC VASCULAR SURGERY)

## 2021-01-06 PROCEDURE — G8536 NO DOC ELDER MAL SCRN: HCPCS | Performed by: THORACIC SURGERY (CARDIOTHORACIC VASCULAR SURGERY)

## 2021-01-06 PROCEDURE — G8432 DEP SCR NOT DOC, RNG: HCPCS | Performed by: THORACIC SURGERY (CARDIOTHORACIC VASCULAR SURGERY)

## 2021-01-06 PROCEDURE — G8754 DIAS BP LESS 90: HCPCS | Performed by: THORACIC SURGERY (CARDIOTHORACIC VASCULAR SURGERY)

## 2021-01-06 PROCEDURE — 1101F PT FALLS ASSESS-DOCD LE1/YR: CPT | Performed by: THORACIC SURGERY (CARDIOTHORACIC VASCULAR SURGERY)

## 2021-01-06 PROCEDURE — G8753 SYS BP > OR = 140: HCPCS | Performed by: THORACIC SURGERY (CARDIOTHORACIC VASCULAR SURGERY)

## 2021-01-06 PROCEDURE — G8399 PT W/DXA RESULTS DOCUMENT: HCPCS | Performed by: THORACIC SURGERY (CARDIOTHORACIC VASCULAR SURGERY)

## 2021-01-06 NOTE — PROGRESS NOTES
Patient: Adrienne Betancourt   Age: [de-identified] y.o. Patient Care Team:  Harpreet Lucio MD as PCP - General (Internal Medicine)    PCP: Harpreet Lucio MD    Cardiologist: Dr. Corinne Kail    Diagnosis/Reason for Consultation: The encounter diagnosis was Nonrheumatic aortic valve stenosis. Problem List:   Patient Active Problem List   Diagnosis Code    NSTEMI (non-ST elevated myocardial infarction) ([facility] [address] I21.4    Benign essential hypertension I10    Diabetic peripheral neuropathy (HCC) E11.42    Type II diabetes mellitus, uncontrolled ([facility] [address] E11.65    Hyperlipidemia E78.5    SOB (shortness of breath) R06.02    S/P cardiac cath Z98.890    Nonrheumatic aortic valve stenosis I35.0         HPI: [de-identified] y.o. female with PMHx of AS, HTN, HLD, DM, CAD-LAD stenosis of 60-70%, CKD stage 3, that is referred to the [address] [place] [address] by Dr. Ellis Barbour for interventional evaluation of  Aortic Stenosis. Patient had a CVA about 6 months ago and since that time her symptoms have gotten worse. She gets significant shortness of breath even when walking to the bathroom. She notes fatigue and has chest pain with activity about 1-2 times per week. Denies syncope but has fallen a couple of times due to weakness of her left leg. She does have some lower extremity edema at times but this has not been bad recently. She sleeps in her recliner at times and uses 2-3 pillows when sleeping in bed. She reports PND a time or two. No hospitalizations for HF in the last year. She is retired. She lives alone but has multiple family members that help her. She is accompanied today by her granddaughter who aids in giving information and will plan to go home with the patient following surgery to care for her in the immediate postoperative period. SOB/MARTINEZ: Yes   Fatigue: Yes   Palpitations: No:    Chest pain: Yes   Chest tightness with activity: Yes   Lightheadedness: Yes when getting up from her chair  Syncope: No:    Falls:  Yes Orthopnea: Yes 2-3 pillows and sometimes in the recliner  PND: Yes   LE edema: Yes   Medication changes in past 3 months: No:    Blood/Blackness/Tarriness of stools: No:    Heart Failure Admission w/in past year:  No:    Heart Failure Admission w/in past 2 weeks: No:     NYHA Classification: IIIB   Class I (Mild): No limitation of physical activity. Ordinary physical activity does not cause undue fatigue, palpitation, or dyspnea. Class II (Mild): Slight limitation of physical activity. Comfortable at rest, but ordinary physical activity results in fatigue, palpitation, or dyspnea. Class III (Moderate): Marked limitation of physical activity. Comfortable at rest, but less than ordinary activity causes fatigue, palpitation, or dyspnea   Class IV (Severe): Unable to carry out any physical activity without discomfort. Symptoms  of cardiac insufficiency at rest.  If any physical activity is undertaken, discomfort is increased. Angina Classification: 1   Class 0: No symptoms   Class 1: Angina with strenuous exercise   Class 2: Angina with moderate exercise   Class 3: Angina with mild exertion   Walking 1-2 level blocks at normal pace; Climbing 1 flight of stairs at normal pace  Class 4: Angina at any level of physical exertion       Past Medical History:   Diagnosis Date    CAD (coronary artery disease)     Diabetes ([facility] [address] [facility]     Hypertension      Endocarditis: No:    Pulmonary HTN:  Yes Greater than 2/3 systemic: No:   Immunocompromised/Steroids: No:   Liver Dz/Cirrhosis: No:    If yes, MELD score:  n/a  Last Dental Visit:  2 years ago   Any dental pain/concerns: None    Past Surgical History:   Procedure Laterality Date    HX KNEE REPLACEMENT Bilateral     HX ORTHOPAEDIC      IR INSERT TUNL CVC W/O PORT OVER 5 YR  3/5/2019      Social History     Tobacco Use    Smoking status: Never Smoker    Smokeless tobacco: Never Used   Substance Use Topics    Alcohol use: No      No family history on file.   Prior to Admission medications    Medication Sig Start Date End Date Taking? Authorizing Provider   aspirin delayed-release 81 mg tablet Take 81 mg by mouth daily. Yes Provider, Historical   carvedilol (COREG) 6.25 mg tablet Take 1 Tab by mouth two (2) times daily (with meals). 3/11/19  Yes Coy Babcock MD   bisacodyl (DULCOLAX) 5 mg EC tablet Take 1 Tab by mouth daily as needed for Constipation. 3/11/19  Yes Coy Babcock MD   budesonide (PULMICORT) 0.5 mg/2 mL nbsp 2 mL by Nebulization route two (2) times a day. 3/11/19  Yes Coy Babcock MD   albuterol (PROVENTIL VENTOLIN) 2.5 mg /3 mL (0.083 %) nebulizer solution 3 mL by Nebulization route every four (4) hours as needed for Wheezing. 3/11/19  Yes Coy Babcock MD   rosuvastatin (CRESTOR) 20 mg tablet Take 1 Tab by mouth nightly. 3/11/19  Yes Coy Babcock MD   oxybutynin chloride XL (DITROPAN XL) 15 mg CR tablet Take 15 mg by mouth daily. Yes Provider, Historical   therapeutic multivitamin (THERAGRAN) tablet Take 1 Tab by mouth daily. Yes Provider, Historical   venlafaxine (EFFEXOR) 37.5 mg tablet Take 37.5 mg by mouth two (2) times a day. Yes Provider, Historical   calcitRIOL (ROCALTROL) 0.5 mcg capsule Take 0.5 mcg by mouth daily. Yes Provider, Historical       No Known Allergies    Current Medications:   Current Outpatient Medications   Medication Sig Dispense Refill    aspirin delayed-release 81 mg tablet Take 81 mg by mouth daily.  carvedilol (COREG) 6.25 mg tablet Take 1 Tab by mouth two (2) times daily (with meals). 60 Tab 0    bisacodyl (DULCOLAX) 5 mg EC tablet Take 1 Tab by mouth daily as needed for Constipation. 10 Tab 0    budesonide (PULMICORT) 0.5 mg/2 mL nbsp 2 mL by Nebulization route two (2) times a day. 60 Each 1    albuterol (PROVENTIL VENTOLIN) 2.5 mg /3 mL (0.083 %) nebulizer solution 3 mL by Nebulization route every four (4) hours as needed for Wheezing.  60 Each 0    rosuvastatin (CRESTOR) 20 mg tablet Take 1 Tab by mouth nightly. 30 Tab 0    oxybutynin chloride XL (DITROPAN XL) 15 mg CR tablet Take 15 mg by mouth daily.  therapeutic multivitamin (THERAGRAN) tablet Take 1 Tab by mouth daily.  venlafaxine (EFFEXOR) 37.5 mg tablet Take 37.5 mg by mouth two (2) times a day.  calcitRIOL (ROCALTROL) 0.5 mcg capsule Take 0.5 mcg by mouth daily. Vitals: Blood pressure (!) 148/70, pulse 68, resp. rate 16, SpO2 97 %. Allergies: has No Known Allergies. Review of Systems: Pertinent Positives per HPI   [] Unable to obtain  ROS due to  []mental status change  []sedated   []intubated   [x]Total of 13 systems reviewed as follows:  Constitutional: Negative fever, negative chills, +fatigue, +weight gain  Eyes:   Negative for amauroses fugax, +glasses  ENT:   Negative sore throat,oral absecess  Endocrine Negative for thyroid replacement Rx; goiter; DM  Respiratory:  Negative chronic cough,sputum production, +asthma, +shortness of breath on exertion  Cards:   Negative for palpitations, lower extremity edema, varicosities, claudication  GI:   Negative for dysphagia, bleeding, nausea, vomiting, diarrhea, and abdominal pain  Genitourinary: Negative for frequency, dysuria  Integument:  Negative for rash and pruritus  Hematologic:  Negative for easy bruising; bleeding dyscarsia  Musculoskel: Negative for muscle weakness inhibiting ambulation  Neurological:  Negative for syncope, +dizziness, +stroke, +HA, +memory loss  Behavl/Psych: Negative for feelings of anxiety, +depression     Cardiovascular Testing:   EKG: 10/14/20  Rhythm strip showing SR. Will need an updated 12 lead prior to procedure    TTE:  Completed 8/21/20 at [facility] and scanned in  Findings:  1. Technical details[de-identified] Rhythm normal sinus rhythm. Technical quality fair  2. Chamber sizes: The left ventricle is small. The left atrium is moderately dilated. The right ventricle is normal. The right atrium is normal.  3. Left ventricular thickness:  The posterior wall thickness is moderately increased. The septal thickness is severely increased. 4. Left ventricular systolic function: Normal LV wall motion. 5. Grade III diastolic dysfunction: restrictive filling (reversible)  6. Right ventricular systolic pressure: The right ventricular systolic pressure is estimated at 77.24 mmHg. 8. Valves: The aortic valve is severely calcified. Mitral annular calcification is noted. The tricuspid valve is structurally normal. The pulmonic valve is structurally normal.  9. Color flow and doppler interrogation reveals: Moderate to severe non-rheumatic mitral regurgitation; Moderate to severe non-rheumatic tricuspid regurgitation; Mild non-rheumatic pulmonic regurgitation; Severe non-rheumatic aortic stenosis  10. Other findings: There is no pericardial effusion. The IVC is normal. Aortic root is normal    Cardiac catheterization: 10/13/20  Conclusion    · Severe single vessel CAD: early mid LAD  · Moderately severe nonrheumatic aortic stenosis  · Severe post capillary pulmonary hypertension  · Severely elevated LV end diastolic filling pressure  · Moderately elevated PVR  · Normal TDCO and TDCI  · Severe systemic hypertension      Complications    Complications documented before study signed (10/15/2020 40:43 PM)     No complications were associated with this study. Documented by Ramón Marroquin MD - 10/14/2020 10:59 AM      Coronary Findings    Diagnostic  Dominance: Co-dominant  Left Anterior Descending   Mid LAD lesion, 75% stenosed. Diagnostic coronary angiography shows negative for . Lesion is the culprit lesion. The lesion is type B1, smooth and tubular. The lesion was not previously treated. Intervention    No interventions have been documented. Measurements    SBP: 200             Left Heart    Left Ventricle LV systolic pressure is elevated. LV end diastolic pressure is severely elevated. LV EDP is: 25. Aortic Valve There is severe aortic valve stenosis.  Aortic valve gradient = 32 mmHg. Right Heart    Right Heart Cath [place] catheter inserted via right internal jugular vein. Severe pulmonary hypertension noted. Severe aortic stenosis was noted. No evidence of mitral stenosis was noted. No shunt was noted. PA pressure = 70/27 mmHg. PA mean = 42 mmHg. Mid RA mean = 8 mmHg. RV pressure = 71/9 mmHg. Wedge pressure = 27 mmHg. LVEDP pressure = 25 mmHg. TDCO = 5.27 L/min. PVR = 3.49. Carotid Dopplers: 3/23/19  Interpretation Summary    · Consistent with less than 50% stenosis of the right internal carotid and no stenosis of the left internal carotid. · Vertebrals are patent with antegrade flow. Cerebrovascular Findings    Right Carotid    The right CCA is patent. There is mild stenosis in the right ICA (<50%). The right ICA has heterogeneous plaque. The right ECA is patent. The right vertebral is antegrade. Left Carotid    The left CCA is patent. The left ICA is normal. The left ECA is patent. The left vertebral is antegrade. PFTs: FEV1/Predicted:  None  Normal FEV1 > 1 Liter, Predicted = 100%, DLCO > 80%    Mild Lung Disease (60-70% Predicted)    Moderate Lung Disease (50-55% Predicted)    Severe Lung Disease (< 50% Predicted or PO2 < 60 or pCO2>50 on RA)    Gated C/A/P CTA: Done    Physical Exam:  General: Well nourished well groomed female appearing stated age accompanied by her granddaughter  Neuro: A&OX3. HEREDIA. PERRL. Steady cane assisted gait  Head:Normocephalic. Atraumatic. Symmetrical  Neck: Trachea Midline  Resp: CTA B. No Adv BS/cough/sputum/tachypnea with seated conversation  CV: S1S2 RRR. DIONNA III/VI. No JVD/carotid bruits. Pink/warm/dry extremities. +1 LE peripheral edema  GI:Benign ab. Soft. NT/ND. Active BS  : Voids  Integ: No obvious s/s of infection or breakdown  Musculo/Skeletal: FROM in all major joints.  Fair muscle tone    Clinic Evaluation:   KCCQ-12: scanned into EMR    5 meter gait: 8.6 seconds with Rollator    Dylan Leary Survey:  Gordon Mess Index ADL - 6/6  scanned into EMR     STS 2.9 Risk Score / Predicted 30 day mortality: - calculations scanned into EMR    Assessment/Plan:   1. Aortic Stenosis severe and symptomatic: Intermediate risk for surgery per Heart Team Review. Plan for TAVR with RTF #26 Evolut Pro on 1/20/21 with Mar[facility] Coyle and [facility]    2. CAD: On ASA, Statin, BB. Likely plan for staged plan for PCI following TAVR    3. HTN: Elevated in the clinic today but patient states she is anxious. On Coreg    4. HLD: On statin    5. DM: Diet controlled. Will get updated A1C at PAT    6. CKD stage 3: Avoid hypotension and nephrotoxic agents. Will get updated labs at PAT. 7. Depression: On Effexor    8. COPD: On home Nebs    9. Diastolic HF grade III: BP management    10. MR: Echo reviewed by Dr. Lico Velázquez.     Further plan/care by Dr. Lico Velázquze    Pt examined and HPI reviewed with pt and daughter  She has progressive MARTINEZ  Exam reveals DIONNA and clear lungs  Echo and cath reviewed  CTA reviewed  [facility] has indications for TAVR  CAD of LAD can be deferred but will leave to Dr Cheyanne Smith  Risks and hospital course reviewed

## 2021-01-08 ENCOUNTER — TRANSCRIBE ORDER (OUTPATIENT)
Dept: REGISTRATION | Age: 81
End: 2021-01-08

## 2021-01-08 DIAGNOSIS — I35.0 NONRHEUMATIC AORTIC VALVE STENOSIS: Primary | ICD-10-CM

## 2021-01-08 DIAGNOSIS — Z01.812 PRE-PROCEDURE LAB EXAM: Primary | ICD-10-CM

## 2021-01-13 ENCOUNTER — HOSPITAL ENCOUNTER (OUTPATIENT)
Dept: PREADMISSION TESTING | Age: 81
Discharge: HOME | End: 2021-01-13
Attending: NURSE PRACTITIONER
Payer: MEDICARE

## 2021-01-13 ENCOUNTER — HOSPITAL ENCOUNTER (OUTPATIENT)
Dept: VASCULAR SURGERY | Age: 81
Discharge: HOME | End: 2021-01-13
Attending: NURSE PRACTITIONER
Payer: MEDICARE

## 2021-01-13 ENCOUNTER — HOSPITAL ENCOUNTER (OUTPATIENT)
Dept: GENERAL RADIOLOGY | Age: 81
Discharge: HOME | End: 2021-01-13
Attending: NURSE PRACTITIONER
Payer: MEDICARE

## 2021-01-13 VITALS
TEMPERATURE: 97.6 F | OXYGEN SATURATION: 97 % | WEIGHT: 217.37 LBS | HEART RATE: 72 BPM | RESPIRATION RATE: 16 BRPM | SYSTOLIC BLOOD PRESSURE: 145 MMHG | DIASTOLIC BLOOD PRESSURE: 69 MMHG | HEIGHT: 65 IN | BODY MASS INDEX: 36.22 KG/M2

## 2021-01-13 DIAGNOSIS — I35.0 NONRHEUMATIC AORTIC VALVE STENOSIS: ICD-10-CM

## 2021-01-13 LAB
ALBUMIN SERPL-MCNC: 3.4 G/DL (ref 3.5–5)
ALBUMIN/GLOB SERPL: 0.9 {RATIO} (ref 1.1–2.2)
ALP SERPL-CCNC: 60 U/L (ref 45–117)
ALT SERPL-CCNC: 20 U/L (ref 12–78)
ANION GAP SERPL CALC-SCNC: 4 MMOL/L (ref 5–15)
APPEARANCE UR: CLEAR
APTT PPP: 29.1 SEC (ref 22.1–31)
ARTERIAL PATENCY WRIST A: YES
ARTERIAL PATENCY WRIST A: YES
AST SERPL-CCNC: 18 U/L (ref 15–37)
BACTERIA URNS QL MICRO: NEGATIVE /HPF
BASE EXCESS BLD CALC-SCNC: 5 MMOL/L
BASOPHILS # BLD: 0.1 K/UL (ref 0–0.1)
BASOPHILS NFR BLD: 1 % (ref 0–1)
BDY SITE: ABNORMAL
BDY SITE: ABNORMAL
BILIRUB SERPL-MCNC: 0.7 MG/DL (ref 0.2–1)
BILIRUB UR QL: NEGATIVE
BNP SERPL-MCNC: 3083 PG/ML
BUN SERPL-MCNC: 26 MG/DL (ref 6–20)
BUN/CREAT SERPL: 17 (ref 12–20)
CA-I BLD-SCNC: 1.38 MMOL/L (ref 1.12–1.32)
CA-I BLD-SCNC: 1.46 MMOL/L (ref 1.12–1.32)
CALCIUM SERPL-MCNC: 10.7 MG/DL (ref 8.5–10.1)
CHLORIDE SERPL-SCNC: 107 MMOL/L (ref 97–108)
CO2 SERPL-SCNC: 30 MMOL/L (ref 21–32)
COLOR UR: ABNORMAL
CREAT SERPL-MCNC: 1.53 MG/DL (ref 0.55–1.02)
DIFFERENTIAL METHOD BLD: ABNORMAL
EOSINOPHIL # BLD: 0.3 K/UL (ref 0–0.4)
EOSINOPHIL NFR BLD: 4 % (ref 0–7)
EPITH CASTS URNS QL MICRO: ABNORMAL /LPF
ERYTHROCYTE [DISTWIDTH] IN BLOOD BY AUTOMATED COUNT: 13.4 % (ref 11.5–14.5)
EST. AVERAGE GLUCOSE BLD GHB EST-MCNC: 171 MG/DL
GAS FLOW.O2 O2 DELIVERY SYS: ABNORMAL L/MIN
GAS FLOW.O2 O2 DELIVERY SYS: ABNORMAL L/MIN
GLOBULIN SER CALC-MCNC: 3.8 G/DL (ref 2–4)
GLUCOSE SERPL-MCNC: 198 MG/DL (ref 65–100)
GLUCOSE UR STRIP.AUTO-MCNC: NEGATIVE MG/DL
HBA1C MFR BLD: 7.6 % (ref 4–5.6)
HCO3 BLD-SCNC: 29.3 MMOL/L (ref 22–26)
HCT VFR BLD AUTO: 32.9 % (ref 35–47)
HGB BLD-MCNC: 10.5 G/DL (ref 11.5–16)
HGB UR QL STRIP: ABNORMAL
HISTORY CHECKED?,CKHIST: NORMAL
HYALINE CASTS URNS QL MICRO: ABNORMAL /LPF (ref 0–5)
IMM GRANULOCYTES # BLD AUTO: 0 K/UL (ref 0–0.04)
IMM GRANULOCYTES NFR BLD AUTO: 1 % (ref 0–0.5)
INR PPP: 1.1 (ref 0.9–1.1)
KETONES UR QL STRIP.AUTO: NEGATIVE MG/DL
LEUKOCYTE ESTERASE UR QL STRIP.AUTO: ABNORMAL
LYMPHOCYTES # BLD: 1.9 K/UL (ref 0.8–3.5)
LYMPHOCYTES NFR BLD: 25 % (ref 12–49)
MAGNESIUM SERPL-MCNC: 1.4 MG/DL (ref 1.6–2.4)
MCH RBC QN AUTO: 27.2 PG (ref 26–34)
MCHC RBC AUTO-ENTMCNC: 31.9 G/DL (ref 30–36.5)
MCV RBC AUTO: 85.2 FL (ref 80–99)
MONOCYTES # BLD: 0.7 K/UL (ref 0–1)
MONOCYTES NFR BLD: 10 % (ref 5–13)
NEUTS SEG # BLD: 4.5 K/UL (ref 1.8–8)
NEUTS SEG NFR BLD: 59 % (ref 32–75)
NITRITE UR QL STRIP.AUTO: NEGATIVE
NRBC # BLD: 0 K/UL (ref 0–0.01)
NRBC BLD-RTO: 0 PER 100 WBC
O2/TOTAL GAS SETTING VFR VENT: 21 %
O2/TOTAL GAS SETTING VFR VENT: 21 %
PCO2 BLD: 46.9 MMHG (ref 35–45)
PH BLD: 7.41 [PH] (ref 7.35–7.45)
PH UR STRIP: 5 [PH] (ref 5–8)
PLATELET # BLD AUTO: 230 K/UL (ref 150–400)
PMV BLD AUTO: 10.4 FL (ref 8.9–12.9)
PO2 BLD: 71 MMHG (ref 80–100)
PO2 BLD: 78 MMHG (ref 80–100)
POTASSIUM SERPL-SCNC: 4.6 MMOL/L (ref 3.5–5.1)
PROT SERPL-MCNC: 7.2 G/DL (ref 6.4–8.2)
PROT UR STRIP-MCNC: ABNORMAL MG/DL
PROTHROMBIN TIME: 11.9 SEC (ref 9–11.1)
RBC # BLD AUTO: 3.86 M/UL (ref 3.8–5.2)
RBC #/AREA URNS HPF: ABNORMAL /HPF (ref 0–5)
SAO2 % BLD: 95 % (ref 92–97)
SODIUM SERPL-SCNC: 141 MMOL/L (ref 136–145)
SP GR UR REFRACTOMETRY: 1.02 (ref 1–1.03)
SPECIMEN TYPE: ABNORMAL
SPECIMEN TYPE: ABNORMAL
THERAPEUTIC RANGE,PTTT: NORMAL SECS (ref 58–77)
TOTAL RESP. RATE, ITRR: 12
TOTAL RESP. RATE, ITRR: 12
TSH SERPL DL<=0.05 MIU/L-ACNC: 0.84 UIU/ML (ref 0.36–3.74)
UA: UC IF INDICATED,UAUC: ABNORMAL
UROBILINOGEN UR QL STRIP.AUTO: 0.2 EU/DL (ref 0.2–1)
WBC # BLD AUTO: 7.5 K/UL (ref 3.6–11)
WBC URNS QL MICRO: ABNORMAL /HPF (ref 0–4)

## 2021-01-13 PROCEDURE — 84443 ASSAY THYROID STIM HORMONE: CPT

## 2021-01-13 PROCEDURE — 36600 WITHDRAWAL OF ARTERIAL BLOOD: CPT

## 2021-01-13 PROCEDURE — 71046 X-RAY EXAM CHEST 2 VIEWS: CPT

## 2021-01-13 PROCEDURE — 86923 COMPATIBILITY TEST ELECTRIC: CPT

## 2021-01-13 PROCEDURE — 93880 EXTRACRANIAL BILAT STUDY: CPT

## 2021-01-13 PROCEDURE — 85730 THROMBOPLASTIN TIME PARTIAL: CPT

## 2021-01-13 PROCEDURE — 86901 BLOOD TYPING SEROLOGIC RH(D): CPT

## 2021-01-13 PROCEDURE — 85610 PROTHROMBIN TIME: CPT

## 2021-01-13 PROCEDURE — 83735 ASSAY OF MAGNESIUM: CPT

## 2021-01-13 PROCEDURE — 83036 HEMOGLOBIN GLYCOSYLATED A1C: CPT

## 2021-01-13 PROCEDURE — 83880 ASSAY OF NATRIURETIC PEPTIDE: CPT

## 2021-01-13 PROCEDURE — 93005 ELECTROCARDIOGRAM TRACING: CPT

## 2021-01-13 PROCEDURE — 36415 COLL VENOUS BLD VENIPUNCTURE: CPT

## 2021-01-13 PROCEDURE — 81001 URINALYSIS AUTO W/SCOPE: CPT

## 2021-01-13 PROCEDURE — 80053 COMPREHEN METABOLIC PANEL: CPT

## 2021-01-13 PROCEDURE — 85025 COMPLETE CBC W/AUTO DIFF WBC: CPT

## 2021-01-13 PROCEDURE — 82803 BLOOD GASES ANY COMBINATION: CPT

## 2021-01-13 NOTE — H&P
CSS   History and Physical    Subjective:      Stewart Bailey is a [de-identified] y.o. female with PMHx of AS, HTN, HLD, DM, CAD-LAD stenosis of 60-70%, CKD stage 3, that is referred to the [address] [place] [address] by Dr. Archie Brooks for interventional evaluation of  Aortic Stenosis.     Patient had a CVA about 6 months ago and since that time her symptoms have gotten worse. She gets significant shortness of breath even when walking to the bathroom. She notes fatigue and has chest pain with activity about 1-2 times per week. Denies syncope but has fallen a couple of times due to weakness of her left leg. She does have some lower extremity edema at times but this has not been bad recently. She sleeps in her recliner at times and uses 2-3 pillows when sleeping in bed. She reports PND a time or two. No hospitalizations for HF in the last year.      She is retired. She lives alone but has multiple family members that help her. She is accompanied today by her granddaughter who aids in giving information and will plan to go home with the patient following surgery to care for her in the immediate postoperative period. Cardiac Testing    Cardiac catheterization: 10/13/20  Conclusion    · Severe single vessel CAD: early mid LAD  · Moderately severe nonrheumatic aortic stenosis  · Severe post capillary pulmonary hypertension  · Severely elevated LV end diastolic filling pressure  · Moderately elevated PVR  · Normal TDCO and TDCI  · Severe systemic hypertension      Complications    Complications documented before study signed (10/15/2020 27:77 PM)     No complications were associated with this study. Documented by Lynn Bennett MD - 10/14/2020 10:59 AM      Coronary Findings    Diagnostic  Dominance: Co-dominant  Left Anterior Descending   Mid LAD lesion, 75% stenosed. Diagnostic coronary angiography shows negative for . Lesion is the culprit lesion. The lesion is type B1, smooth and tubular.  The lesion was not previously treated. Intervention    No interventions have been documented. Measurements    SBP: 200             Left Heart    Left Ventricle LV systolic pressure is elevated. LV end diastolic pressure is severely elevated. LV EDP is: 25. Aortic Valve There is severe aortic valve stenosis. Aortic valve gradient = 32 mmHg. Right Heart    Right Heart Cath [place]-Althea catheter inserted via right internal jugular vein. Severe pulmonary hypertension noted. Severe aortic stenosis was noted. No evidence of mitral stenosis was noted. No shunt was noted. PA pressure = 70/27 mmHg. PA mean = 42 mmHg. Mid RA mean = 8 mmHg. RV pressure = 71/9 mmHg. Wedge pressure = 27 mmHg. LVEDP pressure = 25 mmHg. TDCO = 5.27 L/min. PVR = 3.49. ECHO: Completed 8/21/20 at [facility] and scanned in  Findings:  1. Technical details[de-identified] Rhythm normal sinus rhythm. Technical quality fair  2. Chamber sizes: The left ventricle is small. The left atrium is moderately dilated. The right ventricle is normal. The right atrium is normal.  3. Left ventricular thickness: The posterior wall thickness is moderately increased. The septal thickness is severely increased. 4. Left ventricular systolic function: Normal LV wall motion. 5. Grade III diastolic dysfunction: restrictive filling (reversible)  6. Right ventricular systolic pressure: The right ventricular systolic pressure is estimated at 77.24 mmHg. 8. Valves: The aortic valve is severely calcified. Mitral annular calcification is noted. The tricuspid valve is structurally normal. The pulmonic valve is structurally normal.  9. Color flow and doppler interrogation reveals: Moderate to severe non-rheumatic mitral regurgitation; Moderate to severe non-rheumatic tricuspid regurgitation; Mild non-rheumatic pulmonic regurgitation; Severe non-rheumatic aortic stenosis  10. Other findings: There is no pericardial effusion.  The IVC is normal. Aortic root is normal       Past Medical History:   Diagnosis Date    CAD (coronary artery disease)     Diabetes ([facility] [address]     Hypertension      Past Surgical History:   Procedure Laterality Date    HX KNEE REPLACEMENT Bilateral     HX ORTHOPAEDIC      IR INSERT TUNL CVC W/O PORT OVER 5 YR  3/5/2019      Social History     Tobacco Use    Smoking status: Never Smoker    Smokeless tobacco: Never Used   Substance Use Topics    Alcohol use: No      No family history on file. Prior to Admission medications    Medication Sig Start Date End Date Taking? Authorizing Provider   aspirin delayed-release 81 mg tablet Take 81 mg by mouth daily. Provider, Historical   carvedilol (COREG) 6.25 mg tablet Take 1 Tab by mouth two (2) times daily (with meals). 3/11/19   Mila Calderon MD   bisacodyl (DULCOLAX) 5 mg EC tablet Take 1 Tab by mouth daily as needed for Constipation. 3/11/19   Mila Calderon MD   budesonide (PULMICORT) 0.5 mg/2 mL nbsp 2 mL by Nebulization route two (2) times a day. 3/11/19   Mila Calderon MD   albuterol (PROVENTIL VENTOLIN) 2.5 mg /3 mL (0.083 %) nebulizer solution 3 mL by Nebulization route every four (4) hours as needed for Wheezing. 3/11/19   Mila Calderon MD   rosuvastatin (CRESTOR) 20 mg tablet Take 1 Tab by mouth nightly. 3/11/19   Mila Calderon MD   oxybutynin chloride XL (DITROPAN XL) 15 mg CR tablet Take 15 mg by mouth daily. Provider, Historical   therapeutic multivitamin (THERAGRAN) tablet Take 1 Tab by mouth daily. Provider, Historical   venlafaxine (EFFEXOR) 37.5 mg tablet Take 37.5 mg by mouth two (2) times a day. Provider, Historical   calcitRIOL (ROCALTROL) 0.5 mcg capsule Take 0.5 mcg by mouth daily. Provider, Historical       No Known Allergies      Review of Systems:   Consititutional: Denies fever or chills. Eyes:  Denies use of glasses or vision problems(cataracts). ENT:  Denies hearing or swallowing difficulty. CV: Denies CP, claudication, HTN. Resp: Denies dyspnea, productive cough. : Denies dialysis or kidney problems.   GI: Denies ulcers, esophageal strictures, liver problems. M/S: Denies joint or bone problems, or implanted artificial hardware. Skin: Denies varicose veins, edema. Neuro: Denies strokes, or TIAs. Psych: Denies anxiety or depression. Endocrine: Denies thyroid problems or diabetes. Heme/Lymphatic: Denies easy bruising or lymphedema. Objective:     VS:   Height: 5' 5\"  Weight: 217 lbs  Temp: 97.6 F  BP: 145/69  HR: 72  RR: 16  O2: 97 % room air    Physical Exam:    General appearance: alert, cooperative, no distress  Head: normocephalic, without obvious abnormality; atraumatic  Eyes: conjunctivae/corneas clear; EOM's intact. Nose: nares normal; no drainage. Neck: no carotid bruit and no JVD  Lungs: clear to auscultation bilaterally  Heart: regular rate and rhythm; +murmur  Abdomen: soft, non-tender; bowel sounds normal  Extremities: moves all extremities; no weakness. Skin: Skin color normal; No varicose veins or edema. Neurologic: Grossly normal      Labs:   Recent Labs     01/13/21  1041   WBC 7.5   HGB 10.5*   HCT 32.9*         K 4.6   BUN 26*   CREA 1.53*   *   INR 1.1       Diagnostics:   PA and lateral:   CXR Results  (Last 48 hours)               01/13/21 1139  XR CHEST PA LAT Final result    Impression:  Impression: No acute process or change compared to the prior exam.           Narrative:  Exam:  2 view chest       Indication: Preoperative evaluation, aortic valve stenosis, history of diabetes,   hypertension, coronary artery disease       Comparison to 5/28/2019. PA and lateral views demonstrate normal heart size. There is no acute process in   the lung fields. Degenerative changes are seen in the thoracic spine. Carotid doppler: 01/13/21   Interpretation Summary    · Evidence of mild, less than 50%, stenosis in the right ICA. · No evidence of stenosis in the left ICA. · Verterbals are patent with antegrade flow.   · Incidental finding of left thyroid nodule measuring 1.3 x 0.8 cm. PFTS-FEV1: None    EK21   Sinus rhythm with PACs at 69 bpm    Assessment:     Active Problems:    Nonrheumatic aortic valve stenosis (2021)        Plan:   The risk and benefit of surgery were reviewed with patient and family and all questions answered and the patient wishes to proceed. Risk include infection, bleeding, stroke, heart attack, irregular heart rhythm, kidney failure and death. The patient was given instructions. The patient was instructed to stop Coreg, Eliquis, Metformin. Surgery is scheduled for 21. STS 2.9 Risk Score / Predicted 30 day mortality: - calculations scanned into EMR    Treatment Plan:      1. Aortic Stenosis severe and symptomatic: Intermediate risk for surgery per Heart Team Review. Plan for TAVR with RTF #26 Evolut Pro on 21 with Marilee Coyle and [employer] [place]     2. CAD: On ASA, Statin, BB. Likely plan for staged plan for PCI following TAVR     3. HTN: Elevated in the clinic today but patient states she is anxious. On Coreg     4. HLD: On statin     5. DM: Diet controlled. Will get updated A1C at PAT     6. CKD stage 3: Avoid hypotension and nephrotoxic agents. Will get updated labs at PAT.     7. Depression: On Effexor     8. COPD: On home Nebs     9. Diastolic HF grade III: BP management     10. MR: Echo reviewed by Dr. [employer] [place].       Signed By: Lazaro Rodriguez NP     2021

## 2021-01-14 LAB
ATRIAL RATE: 69 BPM
CALCULATED P AXIS, ECG09: 88 DEGREES
CALCULATED R AXIS, ECG10: 49 DEGREES
CALCULATED T AXIS, ECG11: -64 DEGREES
DIAGNOSIS, 93000: NORMAL
P-R INTERVAL, ECG05: 196 MS
Q-T INTERVAL, ECG07: 386 MS
QRS DURATION, ECG06: 76 MS
QTC CALCULATION (BEZET), ECG08: 413 MS
VENTRICULAR RATE, ECG03: 69 BPM

## 2021-01-15 LAB
LEFT CCA DIST DIAS: 13.9 CM/S
LEFT CCA DIST SYS: 62.2 CM/S
LEFT CCA PROX DIAS: 12.2 CM/S
LEFT CCA PROX SYS: 57.8 CM/S
LEFT ECA DIAS: 0 CM/S
LEFT ECA SYS: 44.6 CM/S
LEFT ICA DIST DIAS: 16.6 CM/S
LEFT ICA DIST SYS: 70.9 CM/S
LEFT ICA MID DIAS: 24.8 CM/S
LEFT ICA MID SYS: 73.2 CM/S
LEFT ICA PROX DIAS: 11.7 CM/S
LEFT ICA PROX SYS: 47.9 CM/S
LEFT ICA/CCA SYS: 1.18
LEFT VERTEBRAL DIAS: 9.45 CM/S
LEFT VERTEBRAL SYS: 50.1 CM/S
RIGHT CCA DIST DIAS: 9.9 CM/S
RIGHT CCA DIST SYS: 54.4 CM/S
RIGHT CCA PROX DIAS: 15.1 CM/S
RIGHT CCA PROX SYS: 62.2 CM/S
RIGHT ECA DIAS: 0 CM/S
RIGHT ECA SYS: 45.7 CM/S
RIGHT ICA DIST DIAS: 19.3 CM/S
RIGHT ICA DIST SYS: 82 CM/S
RIGHT ICA MID DIAS: 20.4 CM/S
RIGHT ICA MID SYS: 78.7 CM/S
RIGHT ICA PROX DIAS: 12.8 CM/S
RIGHT ICA PROX SYS: 53.4 CM/S
RIGHT ICA/CCA SYS: 1.5
RIGHT VERTEBRAL DIAS: 10.12 CM/S
RIGHT VERTEBRAL SYS: 52.9 CM/S

## 2021-01-16 ENCOUNTER — HOSPITAL ENCOUNTER (OUTPATIENT)
Dept: PREADMISSION TESTING | Age: 81
Discharge: HOME | End: 2021-01-16
Payer: MEDICARE

## 2021-01-16 DIAGNOSIS — Z01.812 PRE-PROCEDURE LAB EXAM: ICD-10-CM

## 2021-01-16 PROCEDURE — U0003 INFECTIOUS AGENT DETECTION BY NUCLEIC ACID (DNA OR RNA); SEVERE ACUTE RESPIRATORY SYNDROME CORONAVIRUS 2 (SARS-COV-2) (CORONAVIRUS DISEASE [COVID-19]), AMPLIFIED PROBE TECHNIQUE, MAKING USE OF HIGH THROUGHPUT TECHNOLOGIES AS DESCRIBED BY CMS-2020-01-R: HCPCS

## 2021-01-18 LAB — SARS-COV-2, COV2NT: NOT DETECTED

## 2021-01-19 RX ORDER — DEXMEDETOMIDINE HYDROCHLORIDE 4 UG/ML
.1-1.5 INJECTION, SOLUTION INTRAVENOUS
Status: DISCONTINUED | OUTPATIENT
Start: 2021-01-20 | End: 2021-01-22 | Stop reason: HOSPADM

## 2021-01-19 RX ORDER — SODIUM CHLORIDE 9 MG/ML
1.5-3 INJECTION, SOLUTION INTRAVENOUS
Status: DISPENSED | OUTPATIENT
Start: 2021-01-20 | End: 2021-01-20

## 2021-01-20 ENCOUNTER — APPOINTMENT (OUTPATIENT)
Dept: NON INVASIVE DIAGNOSTICS | Age: 81
DRG: 267 | End: 2021-01-20
Attending: NURSE PRACTITIONER
Payer: MEDICARE

## 2021-01-20 ENCOUNTER — HOSPITAL ENCOUNTER (INPATIENT)
Age: 81
LOS: 2 days | Discharge: HOME | DRG: 267 | End: 2021-01-22
Attending: THORACIC SURGERY (CARDIOTHORACIC VASCULAR SURGERY) | Admitting: THORACIC SURGERY (CARDIOTHORACIC VASCULAR SURGERY)
Payer: MEDICARE

## 2021-01-20 ENCOUNTER — APPOINTMENT (OUTPATIENT)
Dept: NON INVASIVE DIAGNOSTICS | Age: 81
DRG: 267 | End: 2021-01-20
Attending: THORACIC SURGERY (CARDIOTHORACIC VASCULAR SURGERY)
Payer: MEDICARE

## 2021-01-20 ENCOUNTER — ANESTHESIA EVENT (OUTPATIENT)
Dept: CARDIOTHORACIC SURGERY | Age: 81
DRG: 267 | End: 2021-01-20
Payer: MEDICARE

## 2021-01-20 ENCOUNTER — ANESTHESIA (OUTPATIENT)
Dept: CARDIOTHORACIC SURGERY | Age: 81
DRG: 267 | End: 2021-01-20
Payer: MEDICARE

## 2021-01-20 ENCOUNTER — APPOINTMENT (OUTPATIENT)
Dept: GENERAL RADIOLOGY | Age: 81
DRG: 267 | End: 2021-01-20
Attending: NURSE PRACTITIONER
Payer: MEDICARE

## 2021-01-20 ENCOUNTER — APPOINTMENT (OUTPATIENT)
Dept: GENERAL RADIOLOGY | Age: 81
DRG: 267 | End: 2021-01-20
Attending: THORACIC SURGERY (CARDIOTHORACIC VASCULAR SURGERY)
Payer: MEDICARE

## 2021-01-20 DIAGNOSIS — Z95.2 S/P TAVR (TRANSCATHETER AORTIC VALVE REPLACEMENT): Primary | ICD-10-CM

## 2021-01-20 DIAGNOSIS — I35.0 NONRHEUMATIC AORTIC VALVE STENOSIS: ICD-10-CM

## 2021-01-20 LAB
ABO + RH BLD: NORMAL
ADMINISTERED INITIALS, ADMINIT: NORMAL
ADMINISTERED INITIALS, ADMINIT: NORMAL
ALBUMIN SERPL-MCNC: 3.1 G/DL (ref 3.5–5)
ALBUMIN/GLOB SERPL: 0.8 {RATIO} (ref 1.1–2.2)
ALP SERPL-CCNC: 58 U/L (ref 45–117)
ALT SERPL-CCNC: 24 U/L (ref 12–78)
ANION GAP SERPL CALC-SCNC: 6 MMOL/L (ref 5–15)
APTT PPP: 26.2 SEC (ref 22.1–31)
AST SERPL-CCNC: 20 U/L (ref 15–37)
ATRIAL RATE: 64 BPM
BASOPHILS # BLD: 0.1 K/UL (ref 0–0.1)
BASOPHILS NFR BLD: 1 % (ref 0–1)
BILIRUB SERPL-MCNC: 0.3 MG/DL (ref 0.2–1)
BLD PROD TYP BPU: NORMAL
BLD PROD TYP BPU: NORMAL
BLOOD GROUP ANTIBODIES SERPL: NORMAL
BPU ID: NORMAL
BPU ID: NORMAL
BUN SERPL-MCNC: 22 MG/DL (ref 6–20)
BUN/CREAT SERPL: 18 (ref 12–20)
CALCIUM SERPL-MCNC: 9.6 MG/DL (ref 8.5–10.1)
CALCULATED P AXIS, ECG09: 85 DEGREES
CALCULATED R AXIS, ECG10: 64 DEGREES
CALCULATED T AXIS, ECG11: -49 DEGREES
CHLORIDE SERPL-SCNC: 109 MMOL/L (ref 97–108)
CO2 SERPL-SCNC: 27 MMOL/L (ref 21–32)
CREAT SERPL-MCNC: 1.23 MG/DL (ref 0.55–1.02)
CROSSMATCH RESULT,%XM: NORMAL
CROSSMATCH RESULT,%XM: NORMAL
D50 ADMINISTERED, D50ADM: 0 ML
D50 ADMINISTERED, D50ADM: 0 ML
D50 ORDER, D50ORD: 0 ML
D50 ORDER, D50ORD: 0 ML
DIAGNOSIS, 93000: NORMAL
DIFFERENTIAL METHOD BLD: ABNORMAL
ECHO AV AREA PEAK VELOCITY: 1.53 CM2
ECHO AV AREA VTI: 1.76 CM2
ECHO AV AREA/BSA PEAK VELOCITY: 0.7 CM2/M2
ECHO AV AREA/BSA VTI: 0.9 CM2/M2
ECHO AV MEAN GRADIENT: 6 MMHG
ECHO AV MEAN GRADIENT: 7.08 MMHG
ECHO AV PEAK GRADIENT: 12 MMHG
ECHO AV PEAK GRADIENT: 14.53 MMHG
ECHO AV PEAK VELOCITY: 190.59 CM/S
ECHO AV VTI: 31.6 CM
ECHO AV VTI: 41.43 CM
ECHO EST RA PRESSURE: 3 MMHG
ECHO LA AREA 4C: 32.75 CM2
ECHO LA VOL 4C: 136.73 ML (ref 22–52)
ECHO LA VOLUME INDEX A4C: 66.77 ML/M2 (ref 16–28)
ECHO LV INTERNAL DIMENSION DIASTOLIC: 3.98 CM (ref 3.9–5.3)
ECHO LV INTERNAL DIMENSION SYSTOLIC: 1.87 CM
ECHO LV IVSD: 1.03 CM (ref 0.6–0.9)
ECHO LV MASS 2D: 145.5 G (ref 67–162)
ECHO LV MASS INDEX 2D: 71 G/M2 (ref 43–95)
ECHO LV POSTERIOR WALL DIASTOLIC: 1.18 CM (ref 0.6–0.9)
ECHO LVOT DIAM: 1.8 CM
ECHO LVOT PEAK GRADIENT: 5.22 MMHG
ECHO LVOT PEAK VELOCITY: 114.27 CM/S
ECHO LVOT SV: 73 ML
ECHO LVOT VTI: 28.56 CM
ECHO MV A VELOCITY: 55.07 CM/S
ECHO MV AREA PHT: 2.16 CM2
ECHO MV AREA VTI: 1.45 CM2
ECHO MV E VELOCITY: 142.67 CM/S
ECHO MV E/A RATIO: 2.59
ECHO MV MAX VELOCITY: 174.55 CM/S
ECHO MV MEAN GRADIENT: 3.44 MMHG
ECHO MV PEAK GRADIENT: 12.19 MMHG
ECHO MV PRESSURE HALF TIME (PHT): 102 MS
ECHO MV VTI: 50.17 CM
ECHO PV REGURGITANT MAX VELOCITY: 173 CM/S
ECHO RIGHT VENTRICULAR SYSTOLIC PRESSURE (RVSP): 37.83 MMHG
ECHO RV INTERNAL DIMENSION: 4.1 CM
ECHO RV TAPSE: 2.3 CM (ref 1.5–2)
ECHO TV REGURGITANT MAX VELOCITY: 295.08 CM/S
ECHO TV REGURGITANT PEAK GRADIENT: 34.83 MMHG
EOSINOPHIL # BLD: 0.3 K/UL (ref 0–0.4)
EOSINOPHIL NFR BLD: 3 % (ref 0–7)
ERYTHROCYTE [DISTWIDTH] IN BLOOD BY AUTOMATED COUNT: 13.5 % (ref 11.5–14.5)
GLOBULIN SER CALC-MCNC: 4.1 G/DL (ref 2–4)
GLSCOM COMMENTS: NORMAL
GLSCOM COMMENTS: NORMAL
GLUCOSE BLD STRIP.AUTO-MCNC: 163 MG/DL (ref 65–100)
GLUCOSE BLD STRIP.AUTO-MCNC: 180 MG/DL (ref 65–100)
GLUCOSE BLD STRIP.AUTO-MCNC: 184 MG/DL (ref 65–100)
GLUCOSE SERPL-MCNC: 124 MG/DL (ref 65–100)
GLUCOSE, GLC: 131 MG/DL
GLUCOSE, GLC: 176 MG/DL
HCT VFR BLD AUTO: 31.6 % (ref 35–47)
HGB BLD-MCNC: 10.2 G/DL (ref 11.5–16)
HIGH TARGET, HITG: 140 MG/DL
HIGH TARGET, HITG: 140 MG/DL
IMM GRANULOCYTES # BLD AUTO: 0.1 K/UL (ref 0–0.04)
IMM GRANULOCYTES NFR BLD AUTO: 1 % (ref 0–0.5)
INR PPP: 1.2 (ref 0.9–1.1)
INSULIN ADMINSTERED, INSADM: 2.1 UNITS/HOUR
INSULIN ADMINSTERED, INSADM: 3.5 UNITS/HOUR
INSULIN ORDER, INSORD: 2.1 UNITS/HOUR
INSULIN ORDER, INSORD: 3.5 UNITS/HOUR
LOW TARGET, LOT: 100 MG/DL
LOW TARGET, LOT: 100 MG/DL
LYMPHOCYTES # BLD: 2.8 K/UL (ref 0.8–3.5)
LYMPHOCYTES NFR BLD: 30 % (ref 12–49)
MAGNESIUM SERPL-MCNC: 1.5 MG/DL (ref 1.6–2.4)
MCH RBC QN AUTO: 27.7 PG (ref 26–34)
MCHC RBC AUTO-ENTMCNC: 32.3 G/DL (ref 30–36.5)
MCV RBC AUTO: 85.9 FL (ref 80–99)
MINUTES UNTIL NEXT BG, NBG: 60 MIN
MINUTES UNTIL NEXT BG, NBG: 60 MIN
MONOCYTES # BLD: 0.9 K/UL (ref 0–1)
MONOCYTES NFR BLD: 10 % (ref 5–13)
MULTIPLIER, MUL: 0.03
MULTIPLIER, MUL: 0.03
NEUTS SEG # BLD: 5.3 K/UL (ref 1.8–8)
NEUTS SEG NFR BLD: 55 % (ref 32–75)
NRBC # BLD: 0 K/UL (ref 0–0.01)
NRBC BLD-RTO: 0 PER 100 WBC
ORDER INITIALS, ORDINIT: NORMAL
ORDER INITIALS, ORDINIT: NORMAL
P-R INTERVAL, ECG05: 192 MS
PLATELET # BLD AUTO: 222 K/UL (ref 150–400)
PMV BLD AUTO: 10.1 FL (ref 8.9–12.9)
POTASSIUM SERPL-SCNC: 4 MMOL/L (ref 3.5–5.1)
PROT SERPL-MCNC: 7.2 G/DL (ref 6.4–8.2)
PROTHROMBIN TIME: 12.1 SEC (ref 9–11.1)
Q-T INTERVAL, ECG07: 426 MS
QRS DURATION, ECG06: 80 MS
QTC CALCULATION (BEZET), ECG08: 439 MS
RBC # BLD AUTO: 3.68 M/UL (ref 3.8–5.2)
SERVICE CMNT-IMP: ABNORMAL
SODIUM SERPL-SCNC: 142 MMOL/L (ref 136–145)
SPECIMEN EXP DATE BLD: NORMAL
STATUS OF UNIT,%ST: NORMAL
STATUS OF UNIT,%ST: NORMAL
THERAPEUTIC RANGE,PTTT: NORMAL SECS (ref 58–77)
UNIT DIVISION, %UDIV: 0
UNIT DIVISION, %UDIV: 0
VENTRICULAR RATE, ECG03: 64 BPM
WBC # BLD AUTO: 9.5 K/UL (ref 3.6–11)

## 2021-01-20 PROCEDURE — 77030005401 HC CATH RAD ARRO -A

## 2021-01-20 PROCEDURE — 76060000037 HC ANESTHESIA 3 TO 3.5 HR: Performed by: INTERNAL MEDICINE

## 2021-01-20 PROCEDURE — C1769 GUIDE WIRE: HCPCS | Performed by: INTERNAL MEDICINE

## 2021-01-20 PROCEDURE — 74011250636 HC RX REV CODE- 250/636: Performed by: ANESTHESIOLOGY

## 2021-01-20 PROCEDURE — 83735 ASSAY OF MAGNESIUM: CPT

## 2021-01-20 PROCEDURE — 74011250636 HC RX REV CODE- 250/636: Performed by: THORACIC SURGERY (CARDIOTHORACIC VASCULAR SURGERY)

## 2021-01-20 PROCEDURE — 74011636637 HC RX REV CODE- 636/637: Performed by: THORACIC SURGERY (CARDIOTHORACIC VASCULAR SURGERY)

## 2021-01-20 PROCEDURE — 77030005402 HC CATH RAD ART LN KT TELE -B

## 2021-01-20 PROCEDURE — 93321 DOPPLER ECHO F-UP/LMTD STD: CPT

## 2021-01-20 PROCEDURE — 77030041294 HC VLV AORT BIOPROS EVOLUT PP2 MEDT -L: Performed by: INTERNAL MEDICINE

## 2021-01-20 PROCEDURE — 74011636637 HC RX REV CODE- 636/637: Performed by: NURSE PRACTITIONER

## 2021-01-20 PROCEDURE — C1760 CLOSURE DEV, VASC: HCPCS | Performed by: INTERNAL MEDICINE

## 2021-01-20 PROCEDURE — 74011250636 HC RX REV CODE- 250/636: Performed by: NURSE PRACTITIONER

## 2021-01-20 PROCEDURE — 71045 X-RAY EXAM CHEST 1 VIEW: CPT

## 2021-01-20 PROCEDURE — 33361 REPLACE AORTIC VALVE PERQ: CPT | Performed by: THORACIC SURGERY (CARDIOTHORACIC VASCULAR SURGERY)

## 2021-01-20 PROCEDURE — 85610 PROTHROMBIN TIME: CPT

## 2021-01-20 PROCEDURE — 74011000258 HC RX REV CODE- 258: Performed by: NURSE ANESTHETIST, CERTIFIED REGISTERED

## 2021-01-20 PROCEDURE — 74011250637 HC RX REV CODE- 250/637: Performed by: NURSE PRACTITIONER

## 2021-01-20 PROCEDURE — 77030039825 HC MSK NSL PAP SUPERNO2VA VYRM -B: Performed by: ANESTHESIOLOGY

## 2021-01-20 PROCEDURE — 02RF38Z REPLACEMENT OF AORTIC VALVE WITH ZOOPLASTIC TISSUE, PERCUTANEOUS APPROACH: ICD-10-PCS | Performed by: INTERNAL MEDICINE

## 2021-01-20 PROCEDURE — 93306 TTE W/DOPPLER COMPLETE: CPT

## 2021-01-20 PROCEDURE — 76010000110 HC CV SURG 3 TO 3.5 HR: Performed by: INTERNAL MEDICINE

## 2021-01-20 PROCEDURE — 77030041244 HC CBL PACE EXT TEMP REMG -B: Performed by: INTERNAL MEDICINE

## 2021-01-20 PROCEDURE — 85730 THROMBOPLASTIN TIME PARTIAL: CPT

## 2021-01-20 PROCEDURE — 80053 COMPREHEN METABOLIC PANEL: CPT

## 2021-01-20 PROCEDURE — 74011000250 HC RX REV CODE- 250: Performed by: NURSE PRACTITIONER

## 2021-01-20 PROCEDURE — 74011250636 HC RX REV CODE- 250/636: Performed by: NURSE ANESTHETIST, CERTIFIED REGISTERED

## 2021-01-20 PROCEDURE — 74011000258 HC RX REV CODE- 258: Performed by: THORACIC SURGERY (CARDIOTHORACIC VASCULAR SURGERY)

## 2021-01-20 PROCEDURE — 74011000250 HC RX REV CODE- 250: Performed by: NURSE ANESTHETIST, CERTIFIED REGISTERED

## 2021-01-20 PROCEDURE — 93355 ECHO TRANSESOPHAGEAL (TEE): CPT | Performed by: INTERNAL MEDICINE

## 2021-01-20 PROCEDURE — 74011000258 HC RX REV CODE- 258: Performed by: NURSE PRACTITIONER

## 2021-01-20 PROCEDURE — 74011000636 HC RX REV CODE- 636: Performed by: THORACIC SURGERY (CARDIOTHORACIC VASCULAR SURGERY)

## 2021-01-20 PROCEDURE — 77030018729 HC ELECTRD DEFIB PAD CARD -B

## 2021-01-20 PROCEDURE — C1894 INTRO/SHEATH, NON-LASER: HCPCS | Performed by: INTERNAL MEDICINE

## 2021-01-20 PROCEDURE — 85025 COMPLETE CBC W/AUTO DIFF WBC: CPT

## 2021-01-20 PROCEDURE — 36415 COLL VENOUS BLD VENIPUNCTURE: CPT

## 2021-01-20 PROCEDURE — 77030004532 HC CATH ANGI DX IMP BSC -A: Performed by: INTERNAL MEDICINE

## 2021-01-20 PROCEDURE — 74011000250 HC RX REV CODE- 250: Performed by: INTERNAL MEDICINE

## 2021-01-20 PROCEDURE — 77030019702 HC WRP THER MENM -C: Performed by: INTERNAL MEDICINE

## 2021-01-20 PROCEDURE — 77030004522 HC CATH ANGI DX EXPO BSC -A: Performed by: INTERNAL MEDICINE

## 2021-01-20 PROCEDURE — B4101ZZ FLUOROSCOPY OF ABDOMINAL AORTA USING LOW OSMOLAR CONTRAST: ICD-10-PCS | Performed by: INTERNAL MEDICINE

## 2021-01-20 PROCEDURE — 93005 ELECTROCARDIOGRAM TRACING: CPT

## 2021-01-20 PROCEDURE — 77030040922 HC BLNKT HYPOTHRM STRY -A

## 2021-01-20 PROCEDURE — 2709999900 HC NON-CHARGEABLE SUPPLY: Performed by: INTERNAL MEDICINE

## 2021-01-20 PROCEDURE — 65610000003 HC RM ICU SURGICAL

## 2021-01-20 PROCEDURE — 82962 GLUCOSE BLOOD TEST: CPT

## 2021-01-20 PROCEDURE — B41C1ZZ FLUOROSCOPY OF PELVIC ARTERIES USING LOW OSMOLAR CONTRAST: ICD-10-PCS | Performed by: INTERNAL MEDICINE

## 2021-01-20 PROCEDURE — C1892 INTRO/SHEATH,FIXED,PEEL-AWAY: HCPCS | Performed by: INTERNAL MEDICINE

## 2021-01-20 DEVICE — VLV EVPROPLUS-26 COMM US
Type: IMPLANTABLE DEVICE | Site: AORTIC VALVE | Status: FUNCTIONAL
Brand: EVOLUT™ PRO+

## 2021-01-20 RX ORDER — NALOXONE HYDROCHLORIDE 0.4 MG/ML
0.4 INJECTION, SOLUTION INTRAMUSCULAR; INTRAVENOUS; SUBCUTANEOUS AS NEEDED
Status: DISCONTINUED | OUTPATIENT
Start: 2021-01-20 | End: 2021-01-22 | Stop reason: HOSPADM

## 2021-01-20 RX ORDER — SODIUM CHLORIDE 0.9 % (FLUSH) 0.9 %
5-40 SYRINGE (ML) INJECTION EVERY 8 HOURS
Status: DISCONTINUED | OUTPATIENT
Start: 2021-01-20 | End: 2021-01-20 | Stop reason: HOSPADM

## 2021-01-20 RX ORDER — BUPIVACAINE HYDROCHLORIDE 5 MG/ML
INJECTION, SOLUTION EPIDURAL; INTRACAUDAL AS NEEDED
Status: DISCONTINUED | OUTPATIENT
Start: 2021-01-20 | End: 2021-01-20 | Stop reason: HOSPADM

## 2021-01-20 RX ORDER — SODIUM CHLORIDE 9 MG/ML
50 INJECTION, SOLUTION INTRAVENOUS CONTINUOUS
Status: DISCONTINUED | OUTPATIENT
Start: 2021-01-20 | End: 2021-01-20 | Stop reason: HOSPADM

## 2021-01-20 RX ORDER — FENTANYL CITRATE 50 UG/ML
50 INJECTION, SOLUTION INTRAMUSCULAR; INTRAVENOUS AS NEEDED
Status: DISCONTINUED | OUTPATIENT
Start: 2021-01-20 | End: 2021-01-20 | Stop reason: HOSPADM

## 2021-01-20 RX ORDER — INSULIN LISPRO 100 [IU]/ML
INJECTION, SOLUTION INTRAVENOUS; SUBCUTANEOUS
Status: DISCONTINUED | OUTPATIENT
Start: 2021-01-20 | End: 2021-01-22 | Stop reason: HOSPADM

## 2021-01-20 RX ORDER — SODIUM CHLORIDE, SODIUM LACTATE, POTASSIUM CHLORIDE, CALCIUM CHLORIDE 600; 310; 30; 20 MG/100ML; MG/100ML; MG/100ML; MG/100ML
75 INJECTION, SOLUTION INTRAVENOUS CONTINUOUS
Status: DISCONTINUED | OUTPATIENT
Start: 2021-01-20 | End: 2021-01-20

## 2021-01-20 RX ORDER — ACETAMINOPHEN 325 MG/1
650 TABLET ORAL
Status: DISCONTINUED | OUTPATIENT
Start: 2021-01-20 | End: 2021-01-22 | Stop reason: HOSPADM

## 2021-01-20 RX ORDER — LANOLIN ALCOHOL/MO/W.PET/CERES
400 CREAM (GRAM) TOPICAL 2 TIMES DAILY
Status: DISCONTINUED | OUTPATIENT
Start: 2021-01-21 | End: 2021-01-22 | Stop reason: HOSPADM

## 2021-01-20 RX ORDER — FAMOTIDINE 20 MG/1
20 TABLET, FILM COATED ORAL DAILY
Status: DISCONTINUED | OUTPATIENT
Start: 2021-01-21 | End: 2021-01-22 | Stop reason: HOSPADM

## 2021-01-20 RX ORDER — ONDANSETRON 2 MG/ML
4 INJECTION INTRAMUSCULAR; INTRAVENOUS AS NEEDED
Status: DISCONTINUED | OUTPATIENT
Start: 2021-01-20 | End: 2021-01-20

## 2021-01-20 RX ORDER — TRAMADOL HYDROCHLORIDE 50 MG/1
50-100 TABLET ORAL
Status: DISCONTINUED | OUTPATIENT
Start: 2021-01-20 | End: 2021-01-22 | Stop reason: HOSPADM

## 2021-01-20 RX ORDER — SODIUM CHLORIDE 9 MG/ML
INJECTION, SOLUTION INTRAVENOUS
Status: DISCONTINUED | OUTPATIENT
Start: 2021-01-20 | End: 2021-01-20 | Stop reason: HOSPADM

## 2021-01-20 RX ORDER — MIDAZOLAM HYDROCHLORIDE 1 MG/ML
1 INJECTION, SOLUTION INTRAMUSCULAR; INTRAVENOUS AS NEEDED
Status: DISCONTINUED | OUTPATIENT
Start: 2021-01-20 | End: 2021-01-20 | Stop reason: HOSPADM

## 2021-01-20 RX ORDER — DEXTROSE 50 % IN WATER (D50W) INTRAVENOUS SYRINGE
12.5-25 AS NEEDED
Status: DISCONTINUED | OUTPATIENT
Start: 2021-01-20 | End: 2021-01-22 | Stop reason: HOSPADM

## 2021-01-20 RX ORDER — PROPOFOL 10 MG/ML
INJECTION, EMULSION INTRAVENOUS AS NEEDED
Status: DISCONTINUED | OUTPATIENT
Start: 2021-01-20 | End: 2021-01-20 | Stop reason: HOSPADM

## 2021-01-20 RX ORDER — SODIUM CHLORIDE 0.9 % (FLUSH) 0.9 %
5-40 SYRINGE (ML) INJECTION AS NEEDED
Status: DISCONTINUED | OUTPATIENT
Start: 2021-01-20 | End: 2021-01-20

## 2021-01-20 RX ORDER — CALCITRIOL 0.25 UG/1
0.5 CAPSULE ORAL DAILY
Status: DISCONTINUED | OUTPATIENT
Start: 2021-01-21 | End: 2021-01-22 | Stop reason: HOSPADM

## 2021-01-20 RX ORDER — ALBUTEROL SULFATE 0.83 MG/ML
2.5 SOLUTION RESPIRATORY (INHALATION)
Status: DISCONTINUED | OUTPATIENT
Start: 2021-01-20 | End: 2021-01-22 | Stop reason: HOSPADM

## 2021-01-20 RX ORDER — VENLAFAXINE 37.5 MG/1
37.5 TABLET ORAL 2 TIMES DAILY
Status: DISCONTINUED | OUTPATIENT
Start: 2021-01-20 | End: 2021-01-22 | Stop reason: HOSPADM

## 2021-01-20 RX ORDER — OXYBUTYNIN CHLORIDE 5 MG/1
15 TABLET, EXTENDED RELEASE ORAL DAILY
Status: DISCONTINUED | OUTPATIENT
Start: 2021-01-21 | End: 2021-01-22 | Stop reason: HOSPADM

## 2021-01-20 RX ORDER — FENTANYL CITRATE 50 UG/ML
INJECTION, SOLUTION INTRAMUSCULAR; INTRAVENOUS AS NEEDED
Status: DISCONTINUED | OUTPATIENT
Start: 2021-01-20 | End: 2021-01-20 | Stop reason: HOSPADM

## 2021-01-20 RX ORDER — SODIUM CHLORIDE 0.9 % (FLUSH) 0.9 %
5-40 SYRINGE (ML) INJECTION EVERY 8 HOURS
Status: DISCONTINUED | OUTPATIENT
Start: 2021-01-20 | End: 2021-01-20

## 2021-01-20 RX ORDER — HYDRALAZINE HYDROCHLORIDE 20 MG/ML
10 INJECTION INTRAMUSCULAR; INTRAVENOUS
Status: DISCONTINUED | OUTPATIENT
Start: 2021-01-20 | End: 2021-01-22 | Stop reason: HOSPADM

## 2021-01-20 RX ORDER — SODIUM CHLORIDE 0.9 % (FLUSH) 0.9 %
5-40 SYRINGE (ML) INJECTION AS NEEDED
Status: DISCONTINUED | OUTPATIENT
Start: 2021-01-20 | End: 2021-01-20 | Stop reason: HOSPADM

## 2021-01-20 RX ORDER — KETAMINE HYDROCHLORIDE 10 MG/ML
INJECTION, SOLUTION INTRAMUSCULAR; INTRAVENOUS AS NEEDED
Status: DISCONTINUED | OUTPATIENT
Start: 2021-01-20 | End: 2021-01-20 | Stop reason: HOSPADM

## 2021-01-20 RX ORDER — HEPARIN SODIUM 1000 [USP'U]/ML
INJECTION, SOLUTION INTRAVENOUS; SUBCUTANEOUS AS NEEDED
Status: DISCONTINUED | OUTPATIENT
Start: 2021-01-20 | End: 2021-01-20 | Stop reason: HOSPADM

## 2021-01-20 RX ORDER — MAGNESIUM SULFATE 100 %
4 CRYSTALS MISCELLANEOUS AS NEEDED
Status: DISCONTINUED | OUTPATIENT
Start: 2021-01-20 | End: 2021-01-22 | Stop reason: HOSPADM

## 2021-01-20 RX ORDER — PROTAMINE SULFATE 10 MG/ML
INJECTION, SOLUTION INTRAVENOUS AS NEEDED
Status: DISCONTINUED | OUTPATIENT
Start: 2021-01-20 | End: 2021-01-20 | Stop reason: HOSPADM

## 2021-01-20 RX ORDER — GUAIFENESIN 100 MG/5ML
81 LIQUID (ML) ORAL DAILY
Status: DISCONTINUED | OUTPATIENT
Start: 2021-01-21 | End: 2021-01-22 | Stop reason: HOSPADM

## 2021-01-20 RX ORDER — ALBUTEROL SULFATE 0.83 MG/ML
2.5 SOLUTION RESPIRATORY (INHALATION)
Status: DISCONTINUED | OUTPATIENT
Start: 2021-01-20 | End: 2021-01-20 | Stop reason: SDUPTHER

## 2021-01-20 RX ORDER — ONDANSETRON 2 MG/ML
4 INJECTION INTRAMUSCULAR; INTRAVENOUS
Status: DISCONTINUED | OUTPATIENT
Start: 2021-01-20 | End: 2021-01-22 | Stop reason: HOSPADM

## 2021-01-20 RX ORDER — OXYCODONE AND ACETAMINOPHEN 5; 325 MG/1; MG/1
1-2 TABLET ORAL
Status: DISCONTINUED | OUTPATIENT
Start: 2021-01-20 | End: 2021-01-22 | Stop reason: HOSPADM

## 2021-01-20 RX ORDER — MORPHINE SULFATE 2 MG/ML
2 INJECTION, SOLUTION INTRAMUSCULAR; INTRAVENOUS
Status: DISCONTINUED | OUTPATIENT
Start: 2021-01-20 | End: 2021-01-22 | Stop reason: HOSPADM

## 2021-01-20 RX ORDER — MORPHINE SULFATE 10 MG/ML
2 INJECTION, SOLUTION INTRAMUSCULAR; INTRAVENOUS
Status: DISCONTINUED | OUTPATIENT
Start: 2021-01-20 | End: 2021-01-20

## 2021-01-20 RX ORDER — HYDROMORPHONE HYDROCHLORIDE 1 MG/ML
0.2 INJECTION, SOLUTION INTRAMUSCULAR; INTRAVENOUS; SUBCUTANEOUS
Status: DISCONTINUED | OUTPATIENT
Start: 2021-01-20 | End: 2021-01-20

## 2021-01-20 RX ORDER — AMOXICILLIN 250 MG
1 CAPSULE ORAL 2 TIMES DAILY
Status: DISCONTINUED | OUTPATIENT
Start: 2021-01-21 | End: 2021-01-22 | Stop reason: HOSPADM

## 2021-01-20 RX ORDER — SODIUM CHLORIDE 9 MG/ML
50 INJECTION, SOLUTION INTRAVENOUS CONTINUOUS
Status: DISCONTINUED | OUTPATIENT
Start: 2021-01-20 | End: 2021-01-20

## 2021-01-20 RX ORDER — SODIUM CHLORIDE, SODIUM LACTATE, POTASSIUM CHLORIDE, CALCIUM CHLORIDE 600; 310; 30; 20 MG/100ML; MG/100ML; MG/100ML; MG/100ML
75 INJECTION, SOLUTION INTRAVENOUS CONTINUOUS
Status: DISCONTINUED | OUTPATIENT
Start: 2021-01-20 | End: 2021-01-20 | Stop reason: HOSPADM

## 2021-01-20 RX ORDER — ROSUVASTATIN CALCIUM 10 MG/1
20 TABLET, COATED ORAL
Status: DISCONTINUED | OUTPATIENT
Start: 2021-01-20 | End: 2021-01-22 | Stop reason: HOSPADM

## 2021-01-20 RX ORDER — MAGNESIUM SULFATE 1 G/100ML
INJECTION INTRAVENOUS
Status: DISPENSED
Start: 2021-01-20 | End: 2021-01-21

## 2021-01-20 RX ORDER — FACIAL-BODY WIPES
10 EACH TOPICAL DAILY PRN
Status: DISCONTINUED | OUTPATIENT
Start: 2021-01-20 | End: 2021-01-22 | Stop reason: HOSPADM

## 2021-01-20 RX ORDER — SODIUM CHLORIDE 0.9 % (FLUSH) 0.9 %
5-40 SYRINGE (ML) INJECTION EVERY 8 HOURS
Status: DISCONTINUED | OUTPATIENT
Start: 2021-01-20 | End: 2021-01-22 | Stop reason: HOSPADM

## 2021-01-20 RX ORDER — SODIUM CHLORIDE 9 MG/ML
9 INJECTION, SOLUTION INTRAVENOUS CONTINUOUS
Status: DISCONTINUED | OUTPATIENT
Start: 2021-01-20 | End: 2021-01-22 | Stop reason: HOSPADM

## 2021-01-20 RX ORDER — LIDOCAINE HYDROCHLORIDE 10 MG/ML
0.1 INJECTION, SOLUTION EPIDURAL; INFILTRATION; INTRACAUDAL; PERINEURAL AS NEEDED
Status: DISCONTINUED | OUTPATIENT
Start: 2021-01-20 | End: 2021-01-20 | Stop reason: HOSPADM

## 2021-01-20 RX ORDER — DIPHENHYDRAMINE HYDROCHLORIDE 50 MG/ML
12.5 INJECTION, SOLUTION INTRAMUSCULAR; INTRAVENOUS AS NEEDED
Status: DISCONTINUED | OUTPATIENT
Start: 2021-01-20 | End: 2021-01-20

## 2021-01-20 RX ORDER — PROPOFOL 10 MG/ML
INJECTION, EMULSION INTRAVENOUS
Status: DISCONTINUED | OUTPATIENT
Start: 2021-01-20 | End: 2021-01-20 | Stop reason: HOSPADM

## 2021-01-20 RX ORDER — METFORMIN HYDROCHLORIDE 500 MG/1
TABLET ORAL 2 TIMES DAILY WITH MEALS
COMMUNITY

## 2021-01-20 RX ORDER — SODIUM CHLORIDE 450 MG/100ML
10 INJECTION, SOLUTION INTRAVENOUS CONTINUOUS
Status: DISCONTINUED | OUTPATIENT
Start: 2021-01-20 | End: 2021-01-22 | Stop reason: HOSPADM

## 2021-01-20 RX ORDER — OXYCODONE AND ACETAMINOPHEN 5; 325 MG/1; MG/1
1 TABLET ORAL AS NEEDED
Status: DISCONTINUED | OUTPATIENT
Start: 2021-01-20 | End: 2021-01-20

## 2021-01-20 RX ORDER — SODIUM CHLORIDE, SODIUM LACTATE, POTASSIUM CHLORIDE, CALCIUM CHLORIDE 600; 310; 30; 20 MG/100ML; MG/100ML; MG/100ML; MG/100ML
INJECTION, SOLUTION INTRAVENOUS
Status: DISCONTINUED | OUTPATIENT
Start: 2021-01-20 | End: 2021-01-20 | Stop reason: HOSPADM

## 2021-01-20 RX ORDER — MIDAZOLAM HYDROCHLORIDE 1 MG/ML
0.5 INJECTION, SOLUTION INTRAMUSCULAR; INTRAVENOUS
Status: DISCONTINUED | OUTPATIENT
Start: 2021-01-20 | End: 2021-01-20

## 2021-01-20 RX ORDER — FENTANYL CITRATE 50 UG/ML
25 INJECTION, SOLUTION INTRAMUSCULAR; INTRAVENOUS
Status: DISCONTINUED | OUTPATIENT
Start: 2021-01-20 | End: 2021-01-20

## 2021-01-20 RX ORDER — SODIUM CHLORIDE 0.9 % (FLUSH) 0.9 %
5-40 SYRINGE (ML) INJECTION AS NEEDED
Status: DISCONTINUED | OUTPATIENT
Start: 2021-01-20 | End: 2021-01-22 | Stop reason: HOSPADM

## 2021-01-20 RX ADMIN — CEFAZOLIN 2 G: 1 INJECTION, POWDER, FOR SOLUTION INTRAMUSCULAR; INTRAVENOUS at 18:17

## 2021-01-20 RX ADMIN — SODIUM CHLORIDE 10 ML/HR: 4.5 INJECTION, SOLUTION INTRAVENOUS at 14:45

## 2021-01-20 RX ADMIN — WATER 2 G: 1 INJECTION INTRAMUSCULAR; INTRAVENOUS; SUBCUTANEOUS at 11:12

## 2021-01-20 RX ADMIN — KETAMINE HYDROCHLORIDE 15 MG: 10 INJECTION, SOLUTION INTRAMUSCULAR; INTRAVENOUS at 10:53

## 2021-01-20 RX ADMIN — HEPARIN SODIUM 8000 UNITS: 1000 INJECTION, SOLUTION INTRAVENOUS; SUBCUTANEOUS at 11:42

## 2021-01-20 RX ADMIN — SODIUM CHLORIDE 3 ML/KG/HR: 900 INJECTION, SOLUTION INTRAVENOUS at 09:44

## 2021-01-20 RX ADMIN — ROSUVASTATIN 20 MG: 10 TABLET, FILM COATED ORAL at 21:05

## 2021-01-20 RX ADMIN — PROPOFOL 50 MCG/KG/MIN: 10 INJECTION, EMULSION INTRAVENOUS at 10:46

## 2021-01-20 RX ADMIN — FENTANYL CITRATE 12.5 MCG: 50 INJECTION, SOLUTION INTRAMUSCULAR; INTRAVENOUS at 12:31

## 2021-01-20 RX ADMIN — DEXMEDETOMIDINE HYDROCHLORIDE 4 MCG: 100 INJECTION, SOLUTION, CONCENTRATE INTRAVENOUS at 10:42

## 2021-01-20 RX ADMIN — SODIUM CHLORIDE 3.5 UNITS/HR: 9 INJECTION, SOLUTION INTRAVENOUS at 11:15

## 2021-01-20 RX ADMIN — PROPOFOL 20 MG: 10 INJECTION, EMULSION INTRAVENOUS at 11:56

## 2021-01-20 RX ADMIN — FENTANYL CITRATE 12.5 MCG: 50 INJECTION, SOLUTION INTRAMUSCULAR; INTRAVENOUS at 11:36

## 2021-01-20 RX ADMIN — DEXTROSE 5 MG/HR: 5 SOLUTION INTRAVENOUS at 13:57

## 2021-01-20 RX ADMIN — HYDRALAZINE HYDROCHLORIDE 10 MG: 20 INJECTION INTRAMUSCULAR; INTRAVENOUS at 19:11

## 2021-01-20 RX ADMIN — SODIUM CHLORIDE 9 ML/HR: 9 INJECTION, SOLUTION INTRAVENOUS at 14:52

## 2021-01-20 RX ADMIN — FENTANYL CITRATE 100 MCG: 50 INJECTION, SOLUTION INTRAMUSCULAR; INTRAVENOUS at 10:30

## 2021-01-20 RX ADMIN — PROTAMINE SULFATE 150 MG: 10 INJECTION, SOLUTION INTRAVENOUS at 12:23

## 2021-01-20 RX ADMIN — PROPOFOL 20 MG: 10 INJECTION, EMULSION INTRAVENOUS at 11:54

## 2021-01-20 RX ADMIN — SODIUM CHLORIDE: 900 INJECTION, SOLUTION INTRAVENOUS at 10:00

## 2021-01-20 RX ADMIN — SODIUM CHLORIDE 15 MG/HR: 9 INJECTION, SOLUTION INTRAVENOUS at 14:11

## 2021-01-20 RX ADMIN — MIDAZOLAM 2 MG: 1 INJECTION INTRAMUSCULAR; INTRAVENOUS at 10:30

## 2021-01-20 RX ADMIN — VENLAFAXINE 37.5 MG: 37.5 TABLET ORAL at 21:06

## 2021-01-20 RX ADMIN — Medication 10 ML: at 15:45

## 2021-01-20 RX ADMIN — DEXMEDETOMIDINE HYDROCHLORIDE 4 MCG: 100 INJECTION, SOLUTION, CONCENTRATE INTRAVENOUS at 10:47

## 2021-01-20 RX ADMIN — DEXMEDETOMIDINE HYDROCHLORIDE 0.6 MCG/KG/HR: 100 INJECTION, SOLUTION, CONCENTRATE INTRAVENOUS at 10:46

## 2021-01-20 RX ADMIN — SODIUM CHLORIDE 10 MG/HR: 9 INJECTION, SOLUTION INTRAVENOUS at 17:01

## 2021-01-20 RX ADMIN — INSULIN LISPRO 2 UNITS: 100 INJECTION, SOLUTION INTRAVENOUS; SUBCUTANEOUS at 17:52

## 2021-01-20 RX ADMIN — PHENYLEPHRINE HYDROCHLORIDE 20 MCG/MIN: 10 INJECTION INTRAVENOUS at 10:50

## 2021-01-20 RX ADMIN — SODIUM CHLORIDE, POTASSIUM CHLORIDE, SODIUM LACTATE AND CALCIUM CHLORIDE 75 ML/HR: 600; 310; 30; 20 INJECTION, SOLUTION INTRAVENOUS at 09:51

## 2021-01-20 RX ADMIN — DEXMEDETOMIDINE HYDROCHLORIDE 4 MCG: 100 INJECTION, SOLUTION, CONCENTRATE INTRAVENOUS at 10:51

## 2021-01-20 RX ADMIN — SODIUM CHLORIDE, SODIUM LACTATE, POTASSIUM CHLORIDE, AND CALCIUM CHLORIDE: 600; 310; 30; 20 INJECTION, SOLUTION INTRAVENOUS at 10:38

## 2021-01-20 RX ADMIN — Medication 10 ML: at 21:11

## 2021-01-20 NOTE — Clinical Note
Sheath #1: Sheath: inserted. Sheath inserted/placed in the right radial  artery. Hemostasis not achieved. Upon evaluation of the common femoral artery stick using fluoroscopy, the access site puncture was within the safe zone.  4FR SHEATH- 6FR SHEATH

## 2021-01-20 NOTE — ROUTINE PROCESS
Occupational Therapy Orders received, chart review completed. Note patient POD #0 s/p TRANSCATHETER AORTIC VALVE REPLACEMENT. OT will follow up tomorrow for evaluation. Recommend OOB to chair three times a day for meals, self-completion of ADLs as able and medically stable. Thank you.

## 2021-01-20 NOTE — Clinical Note
Sheath #2: Sheath: inserted. Sheath inserted/placed in the left femoral  artery. Hemostasis not achieved. Upon evaluation of the common femoral artery stick using fluoroscopy, the access site puncture was within the safe zone.

## 2021-01-20 NOTE — Clinical Note
Sheath #1: Sheath: removed. Hemostasis not achieved. Upon evaluation of the common femoral artery stick using fluoroscopy, the access site puncture was within the safe zone.  2 PERCLOSED DELIVERED TO [facility]

## 2021-01-20 NOTE — ROUTINE PROCESS
Occupational Therapy Screening: 
Services are indicated. Order is required. An InBasket screening referral was triggered for occupational therapy based on results obtained during the nursing admission assessment. The patients chart was reviewed and the patient is appropriate for a skilled therapy evaluation. Please order a consult for occupational therapy if you are in agreement and would like an evaluation to be completed. Thank you.  
 
Prakash Salazar OT

## 2021-01-20 NOTE — ANESTHESIA PROCEDURE NOTES
Arterial Line Placement    Performed by: Kandy Pavon DO  Authorized by: Kandy Pavon DO     Pre-Procedure  Indications:  Arterial pressure monitoring and blood sampling  Preanesthetic Checklist: patient identified, risks and benefits discussed, anesthesia consent, site marked, patient being monitored, timeout performed and patient being monitored      Procedure:   Location:  Radial artery  Catheter size:  20 G  Number of attempts:  2    Assessment:   Post-procedure:  Line secured and sterile dressing applied  Patient Tolerance:  Patient tolerated the procedure well with no immediate complications  Comment:   Collateral perfusion verified

## 2021-01-20 NOTE — OP NOTES
PREOPERATIVE DIAGNOSIS:  Symptomatic severe aortic valve stenosis    POSTOPERATIVE DIAGNOSIS:  Symptomatic severe aortic valve stenosis    PROCEDURES PERFORMED:   1. Implantation of catheter-delivered prosthetic aortic heart valve (26 mm Evolut PRO+); percutaneous right femoral artery approach (CPT 38761). 2. Percutaneous left femoral artery access under fluoroscopic and ultrasound guidance. 3. Percutaneous right femoral artery access (CPT Z7298287) under fluoroscopic and ultrasound guidance (CPT 65805). 4. Introduction of catheter aorta, bilateral (CPT 98780-12). 5. Aortoiliac arteriogram (CPT 36128). 6. Percutaneous left femoral venous access under fluoroscopic guidance. 7. Insertion of temporary transvenous pacemaker (CPT 93144)     CASE SUMMARY:  1. Successful percutaneous right transfemoral TAVR using a 26 mm Evolut PRO+ TAV  2. No change in native conduction intra-operatively and immediately post-TAVR    CO-SURGEONS:   Dr. Angel Duran. Janie Marie    ASSISTING:  None    ANESTHESIA:   MAC    CLINICAL HISTORY:   [facility] a [de-identified] y.o. female with severe, symptomatic aortic stenosis. She was evaluated and determined to be intermediate risk for conventional aortic valve replacement surgery. As such, she has been consented for FDA-approved commercial TAVR use, and is now taken to the operating room for endovascular transcatheter aortic valve replacement. The risks of the procedure including death, stroke, vascular injury, the need for conversion to open surgery, transfusion and the need for permanent pacemaker were discussed with the patient and her family in detail. She signed informed consent. Surgical priority is elective. DESCRIPTION OF PROCEDURE:   After informed consent was obtained, the patient was brought to the operating room and positioned supine on the hybrid OR table. Prophylactic antibiotics were administered preoperatively.  Invasive monitoring lines were placed by the cardiothoracic anesthesia team. The patient was prepped and draped from the chin to mid-thighs. Surgical time-out was performed. Under ultrasound guidance, the right common femoral artery is accessed percutaneously and a #6-[place] sheath placed. A J-tipped wire is placed in the descending thoracic aorta. Two separate Perclose devices are deployed for pre-closure. Then a #14-[place] Cook sheath is inserted over an Amplatz extra stiff wire. The left common femoral artery is accessed via micropuncture technique with radiographic and ultrasound guidance and a #6-[place] sheath placed there.      The left femoral vein is accessed percutaneously, and a #6-[place] venous sheath is placed under fluoroscopic guidance there.  The patient is systemically heparinized with 100 units per kg of IV heparin to goal ACT greater than 250. A transvenous pacing lead is advanced from the left femoral venous sheath up to the right ventricle. The pacemaker is tested and has good pacing capture at <1 mA. A pigtail catheter is advanced via the left femoral artery sheath over a J-tipped wire and positioned in the non-coronary sinus of Valsalva. A thoracic aortogram is obtained to determine the cusp-overlap view for valve implantation. Calcification of the aortic valve leaflets is evident. The aortic valve is then crossed using an AL-1 catheter and an 0.035\" straight tip guidewire. Simultaneous LV and ascending aortic pressures are recorded. A Double Curved [place] 0.035\" guidewire is positioned in the LV apex. Care is taken to avoid contact with the ventricular wall by the transition point of the wire to avoid LV perforation.      A balloon aortic valvuloplasty is not performed.     Next, the #14-[place] Cook sheath is removed from the right common femoral artery over the Double Curved Lunderquist 0.035\" guidewire and this is exchanged for a 26 mm Evolut PRO+ device which is advanced via the #14-[place] in-line sheath. The camera is then placed [place] and the valve is carefully advanced across the aortic arch to prevent trauma to the aortic arch. The camera is then placed back into the cusp-overlap view and the transcatheter valve is positioned across the native aortic valve. There is no parallax in the distal sheath marker on the Evolut delivery sheath. The ideal landing zone for the transcatheter valve is identified based on the pigtail catheter in the non-coronary sinus and the calcification of the native valve.      The valve is carefully and deliberately deployed with 1 partial recapture and redeployment(s).   Control ventricular pacing at 140 bpm is performed while making annular contact to reduce movement of the transcatheter valve. After 80% deployment, a root angiogram is taken to assess the position of the transcatheter valve on the non-coronary cusp.  This shows good position at 2 mm.  Then, the camera is moved to an [place] caudal projection to assess the depth of the transcatheter valve on the left coronary cusp side and to assess the position of the transcatheter delivery system in the ascending aorta.  A root angiogram shows that the transcatheter valve is in good position on the left coronary cusp side.  With no forward pressure on the transcatheter valve delivery system, the valve is fully deployed and seats in an excellent position at a depth of 3 mm on the non-coronary cusp side and 5 mm on the left coronary cusp side of the annulus.  The Evolut PRO+ delivery system for the device is removed and a #14-[place] sheath is re-introduced.     The patient is noted to have no change in conduction after initial transcatheter valve deployment.      After deployment, transvalvular gradient is measured and is minimal. The left ventricular end-diastolic pressure is similar from predeployment, with a well preserved end-diastolic pressure gradient comparing the central aortic diastolic pressure simultaneously with the LVEDP, suggesting physiologically insignificant aortic insufficiency. Transthoracic echocardiography demonstrates no significant paravalvular and no valvular aortic insufficiency. Thoracic aortography is performed and shows no evidence for significant aortic insufficiency. Satisfied with the valve position and function, we turn our attention to the access sites. The #14-[place] [facility] sheath on the right is removed and the two Perclose devices deployed. Next, the pigtail catheter is pulled down into the abdominal aorta and an iliofemoral pelvic arteriogram shot. This demonstrates good flow bilaterally without dissection. There is an excellent pulse in the artery after repair completion. The #6-Cypriot left femoral arterial sheath is removed and successful hemostasis is obtained with a #6-Cypriot AngioSeal.    The #6-Cypriot left femoral venous sheath is removed and successful hemostasis is obtained with manual compression in the hybrid OR per standard protocol. Protamine is administered and hemostasis is obtained. The intraoperative ECG monitoring at the end of the case shows NSR with a narrow QRS. No high degree AV block is present. Estimated blood loss: < 81 cc     Complications:  None     Disposition:  CVICU     All sponge, needle, and instrument counts are reported correct at the end of the case.  The patient is taken to the CVICU in stable condition at the end of procedure

## 2021-01-20 NOTE — OP NOTES
INTERVENTIONAL CARDIOLOGY OPERATIVE NOTE: TRANSFEMORAL EVOLUT PRO+ TAVR    PREOPERATIVE DIAGNOSIS:  Symptomatic severe aortic valve stenosis    POSTOPERATIVE DIAGNOSIS:  Symptomatic severe aortic valve stenosis    PROCEDURES PERFORMED:   1. Implantation of catheter-delivered prosthetic aortic heart valve (26 mm Evolut PRO+); percutaneous right femoral artery approach (CPT 36653). 2. Percutaneous left femoral artery access under fluoroscopic and ultrasound guidance. 3. Percutaneous right femoral artery access (CPT [id number]) under fluoroscopic and ultrasound guidance (CPT 97018). 4. Introduction of catheter aorta, bilateral (CPT 38897-33). 5. Aortoiliac arteriogram (CPT 19958). 6. Percutaneous left femoral venous access under fluoroscopic guidance. 7. Insertion of temporary transvenous pacemaker (CPT 31017)     CASE SUMMARY:  1. Successful percutaneous right transfemoral TAVR using a 26 mm Evolut PRO+ TAV  2. No change in native conduction intra-operatively and immediately post-TAVR    CO-SURGEONS:   Dr. Antoinette Morales    ASSISTING:  None    ANESTHESIA:   MAC    CLINICAL HISTORY:   Ishmael Vasquez is a [de-identified] y.o. female with severe, symptomatic aortic stenosis. She was evaluated and determined to be intermediate risk for conventional aortic valve replacement surgery. As such, she has been consented for FDA-approved commercial TAVR use, and is now taken to the operating room for endovascular transcatheter aortic valve replacement. The risks of the procedure including death, stroke, vascular injury, the need for conversion to open surgery, transfusion and the need for permanent pacemaker were discussed with the patient and her family in detail. She signed informed consent. Surgical priority is elective. DESCRIPTION OF PROCEDURE:   After informed consent was obtained, the patient was brought to the operating room and positioned supine on the hybrid OR table.  Prophylactic antibiotics were administered preoperatively. Invasive monitoring lines were placed by the cardiothoracic anesthesia team. The patient was prepped and draped from the chin to mid-thighs. Surgical time-out was performed. Under ultrasound guidance, the right common femoral artery is accessed percutaneously and a #6-[place] sheath placed. A J-tipped wire is placed in the descending thoracic aorta. Two separate Perclose devices are deployed for pre-closure. Then a #14-[place] Cook sheath is inserted over an Amplatz extra stiff wire. The left common femoral artery is accessed via micropuncture technique with radiographic and ultrasound guidance and a #6-[place] sheath placed there.      The left femoral vein is accessed percutaneously, and a #6-[place] venous sheath is placed under fluoroscopic guidance there. The patient is systemically heparinized with 100 units per kg of IV heparin to goal ACT greater than 250. A transvenous pacing lead is advanced from the left femoral venous sheath up to the right ventricle. The pacemaker is tested and has good pacing capture at <1 mA. A pigtail catheter is advanced via the left femoral artery sheath over a J-tipped wire and positioned in the non-coronary sinus of Valsalva. A thoracic aortogram is obtained to determine the cusp-overlap view for valve implantation. Calcification of the aortic valve leaflets is evident. The aortic valve is then crossed using an AL-1 catheter and an 0.035\" straight tip guidewire. Simultaneous LV and ascending aortic pressures are recorded. A Double Curved [place] 0.035\" guidewire is positioned in the LV apex. Care is taken to avoid contact with the ventricular wall by the transition point of the wire to avoid LV perforation.      A balloon aortic valvuloplasty is not performed.     Next, the #14-[place] Cook sheath is removed from the right common femoral artery over the Double Curved Lunderquist 0.035\" guidewire and this is exchanged for a 26 mm Evolut PRO+ device which is advanced via the #14-[place] in-line sheath. The camera is then placed [place] and the valve is carefully advanced across the aortic arch to prevent trauma to the aortic arch. The camera is then placed back into the cusp-overlap view and the transcatheter valve is positioned across the native aortic valve. There is no parallax in the distal sheath marker on the Evolut delivery sheath. The ideal landing zone for the transcatheter valve is identified based on the pigtail catheter in the non-coronary sinus and the calcification of the native valve.      The valve is carefully and deliberately deployed with 1 partial recapture and redeployment(s).   Control ventricular pacing at 140 bpm is performed while making annular contact to reduce movement of the transcatheter valve. After 80% deployment, a root angiogram is taken to assess the position of the transcatheter valve on the non-coronary cusp. This shows good position at 2 mm. Then, the camera is moved to an [place] caudal projection to assess the depth of the transcatheter valve on the left coronary cusp side and to assess the position of the transcatheter delivery system in the ascending aorta.  A root angiogram shows that the transcatheter valve is in good position on the left coronary cusp side. With no forward pressure on the transcatheter valve delivery system, the valve is fully deployed and seats in an excellent position at a depth of 3 mm on the non-coronary cusp side and 5 mm on the left coronary cusp side of the annulus.  The Evolut PRO+ delivery system for the device is removed and a #14-[place] Cook sheath is re-introduced.     The patient is noted to have no change in conduction after initial transcatheter valve deployment.      After deployment, transvalvular gradient is measured and is minimal. The left ventricular end-diastolic pressure is similar from predeployment, with a well preserved end-diastolic pressure gradient comparing the central aortic diastolic pressure simultaneously with the LVEDP, suggesting physiologically insignificant aortic insufficiency. Transthoracic echocardiography demonstrates no significant paravalvular and no valvular aortic insufficiency. Thoracic aortography is performed and shows no evidence for significant aortic insufficiency. Satisfied with the valve position and function, we turn our attention to the access sites. The #14-Swiss [facility] sheath on the right is removed and the two Perclose devices deployed. Next, the pigtail catheter is pulled down into the abdominal aorta and an iliofemoral pelvic arteriogram shot. This demonstrates good flow bilaterally without dissection. There is an excellent pulse in the artery after repair completion. The #6-Swiss left femoral arterial sheath is removed and successful hemostasis is obtained with a #6-Swiss AngioSeal.    The #6-Swiss left femoral venous sheath is removed and successful hemostasis is obtained with manual compression in the hybrid OR per standard protocol. Protamine is administered and hemostasis is obtained. The intraoperative ECG monitoring at the end of the case shows NSR with a narrow QRS. No high degree AV block is present. Estimated blood loss: < 30 cc     Complications:  None     Disposition:  CVICU     All sponge, needle, and instrument counts are reported correct at the end of the case. The patient is taken to the CVICU in stable condition at the end of procedure. Dr. Angelika Bloom and I jointly performed the procedure and all of the critical components. I discussed the immediate results of the valve surgery with the patient's family. I, as co-surgeon, was scrubbed and present for the entire procedure.      Lashawn Wyman.  Ellis Barbour, [address] [facility] / [address] [place] [address] [facility]  01/20/21

## 2021-01-20 NOTE — ANESTHESIA PREPROCEDURE EVALUATION
Relevant Problems   No relevant active problems       Anesthetic History   No history of anesthetic complications            Review of Systems / Medical History  Patient summary reviewed, nursing notes reviewed and pertinent labs reviewed    Pulmonary          Shortness of breath         Neuro/Psych   Within defined limits          Comments: Diabetic peripheral neuropathy ([address] Cardiovascular    Hypertension  Valvular problems/murmurs: aortic stenosis        CAD and hyperlipidemia    Exercise tolerance: >4 METS  Comments: pulmonary HTN   GI/Hepatic/Renal  Within defined limits              Endo/Other    Diabetes         Other Findings              Physical Exam    Airway  Mallampati: III  TM Distance: 4 - 6 cm  Neck ROM: decreased range of motion   Mouth opening: Diminished (comment)     Cardiovascular    Rhythm: regular  Rate: normal    Murmur     Dental  No notable dental hx       Pulmonary      Decreased breath sounds: bilateral           Abdominal  GI exam deferred       Other Findings            Anesthetic Plan    ASA: 4  Anesthesia type: MAC and general - backup    Monitoring Plan: Arterial line      Induction: Intravenous  Anesthetic plan and risks discussed with: Patient

## 2021-01-20 NOTE — Clinical Note
Sheath #2: Sheath: removed. Hemostasis achieved. Upon evaluation of the common femoral artery stick using fluoroscopy, the access site puncture was within the safe zone.

## 2021-01-20 NOTE — ANESTHESIA POSTPROCEDURE EVALUATION
Procedure(s):  TRANSCATHETER AORTIC VALVE REPLACEMENT, RIGHT TRANSFEMORAL 26 EVOLUT PRO. MAC, general - backup    Anesthesia Post Evaluation      Multimodal analgesia: multimodal analgesia used between 6 hours prior to anesthesia start to PACU discharge  Patient location during evaluation: ICU  Patient participation: complete - patient participated  Level of consciousness: awake  Pain management: adequate  Airway patency: patent  Anesthetic complications: no  Cardiovascular status: acceptable  Respiratory status: acceptable  Hydration status: acceptable  Comments: Seen, no complaints   Post anesthesia nausea and vomiting:  none  Final Post Anesthesia Temperature Assessment:  Normothermia (36.0-37.5 degrees C)      INITIAL Post-op Vital signs:   Vitals Value Taken Time   /66 01/20/21 1406   Temp 36.4 °C (97.6 °F) 01/20/21 1406   Pulse 63 01/20/21 1426   Resp 17 01/20/21 1426   SpO2 100 % 01/20/21 1426   Vitals shown include unvalidated device data.

## 2021-01-20 NOTE — Clinical Note
Right femoral artery. Accessed successfully. using fluoro and ultrasound guidance.  4FR SHEATH- 6FR SHEATH

## 2021-01-20 NOTE — Clinical Note
Sheath #3: Sheath: inserted. Sheath inserted/placed in the left femoral  vein. Hemostasis not achieved. Upon evaluation of the common femoral artery stick using fluoroscopy, the access site puncture was within the safe zone.  4FR SHEATH- [id number] SHEATH

## 2021-01-20 NOTE — Clinical Note
Left femoral vein. Accessed successfully. using fluoro and ultrasound guidance.  4FR SHEATH- [id number] SHEATH

## 2021-01-20 NOTE — PROGRESS NOTES
Clinical Update:    Cherelle Pedraza arrived to CVICU from OR s/p transcatheter aortic valve replacement with 26 mm Evolut PRO+ via the right femoral artery approach by Marilee Villafuerte. Her procedure was uncomplicated     Patient is groggy but wakes to speech. Bilateral groin sites soft with gauze dressing-clean, dry, intact. PPP. Anticoagulation plan for ASA, Eliquis. Plan for TTE and EKG tomorrow morning.      Visit Vitals  /66 (BP 1 Location: Right arm, BP Patient Position: At rest)   Pulse 63   Temp 97.6 °F (36.4 °C)   Resp 22   Ht 5' 5\" (1.651 m)   Wt 217 lb (98.4 kg)   SpO2 92%   BMI 36.11 kg/m²       Jluis Choudhury NP

## 2021-01-20 NOTE — Clinical Note
Sheath #1: Sheath: upsized Hemostasis not achieved. Upon evaluation of the common femoral artery stick using fluoroscopy, the access site puncture was within the safe zone.  14FR

## 2021-01-20 NOTE — Clinical Note
Left femoral artery. Accessed successfully. using fluoro and ultrasound guidance.  4FR SHEATH- 6FR SHEATH

## 2021-01-20 NOTE — Clinical Note
Temporary pacemaker inserted. Inserted through the left groin. Device secured using: tegaderm. Threshold = 2 mA. Electrical capture obtained.  LFV

## 2021-01-20 NOTE — Clinical Note
Sheath #3: Sheath: removed. Hemostasis achieved. Upon evaluation of the common femoral artery stick using fluoroscopy, the access site puncture was within the safe zone.

## 2021-01-20 NOTE — PROGRESS NOTES
TRANSFER - IN REPORT:    Verbal report received from Dr. Bryant Garcia MD (name) on [facility]  being received from [place] [address] (unit) for routine post - op      Report consisted of patients Situation, Background, Assessment and   Recommendations(SBAR). Information from the following report(s) SBAR, OR Summary and Cardiac Rhythm NSR was reviewed with the receiving nurse. Opportunity for questions and clarification was provided. Assessment completed upon patients arrival to unit and care assumed. Per Shiv Martinster, keep Map above 65, groin checks and B/L LE assessments per protocol, keep patient flat and legs straight for 4-6 hours. 1411:  Cardene at 15mg/hr for maps in the 90s    1600:  Cardene weaned to 12.5 for Maps in the 60s    1626  Cardene weaned to 10 for Maps in the 60s    1911 Hydralazine given for SBP over 150    1930: Cardene stopped for Map of 65    1800:  Patients groin sites are clean dry and intact. Pulses palpable in BL LE, BL LE are warm and sensation present. Dr. Ludwig Fothergill at bedside to assess. Patient states she feel she can breath easier. 2000: Bedside and Verbal shift change report given to Mackenzie Howard RN (oncoming nurse) by Jeanette Iglesias RN (offgoing nurse). Report included the following information SBAR, Kardex, ED Summary, OR Summary, Procedure Summary, Intake/Output, MAR, Accordion, Recent Results and Cardiac Rhythm NSR.

## 2021-01-20 NOTE — PROGRESS NOTES
Physical Therapy 1/20/2021    Orders received and chart reviewed up to date. Pt POD 0 TAVR. Will follow-up tomorrow for PT evaluation as appropriate. Recommend with nursing patient to complete as able in order to maintain strength, endurance and independence: OOB to chair 3x/day. Thank you.   Mary Fleming, PT, DPT

## 2021-01-21 ENCOUNTER — APPOINTMENT (OUTPATIENT)
Dept: GENERAL RADIOLOGY | Age: 81
DRG: 267 | End: 2021-01-21
Attending: NURSE PRACTITIONER
Payer: MEDICARE

## 2021-01-21 LAB
ALBUMIN SERPL-MCNC: 2.9 G/DL (ref 3.5–5)
ALBUMIN/GLOB SERPL: 0.8 {RATIO} (ref 1.1–2.2)
ALP SERPL-CCNC: 57 U/L (ref 45–117)
ALT SERPL-CCNC: 17 U/L (ref 12–78)
ANION GAP SERPL CALC-SCNC: 7 MMOL/L (ref 5–15)
AST SERPL-CCNC: 16 U/L (ref 15–37)
BILIRUB SERPL-MCNC: 0.4 MG/DL (ref 0.2–1)
BUN SERPL-MCNC: 21 MG/DL (ref 6–20)
BUN/CREAT SERPL: 15 (ref 12–20)
CALCIUM SERPL-MCNC: 9.5 MG/DL (ref 8.5–10.1)
CHLORIDE SERPL-SCNC: 108 MMOL/L (ref 97–108)
CO2 SERPL-SCNC: 23 MMOL/L (ref 21–32)
CREAT SERPL-MCNC: 1.44 MG/DL (ref 0.55–1.02)
ERYTHROCYTE [DISTWIDTH] IN BLOOD BY AUTOMATED COUNT: 13.7 % (ref 11.5–14.5)
GLOBULIN SER CALC-MCNC: 3.8 G/DL (ref 2–4)
GLUCOSE BLD STRIP.AUTO-MCNC: 168 MG/DL (ref 65–100)
GLUCOSE BLD STRIP.AUTO-MCNC: 177 MG/DL (ref 65–100)
GLUCOSE BLD STRIP.AUTO-MCNC: 232 MG/DL (ref 65–100)
GLUCOSE BLD STRIP.AUTO-MCNC: 309 MG/DL (ref 65–100)
GLUCOSE SERPL-MCNC: 227 MG/DL (ref 65–100)
HCT VFR BLD AUTO: 29.8 % (ref 35–47)
HGB BLD-MCNC: 9.8 G/DL (ref 11.5–16)
MAGNESIUM SERPL-MCNC: 1.7 MG/DL (ref 1.6–2.4)
MCH RBC QN AUTO: 27.8 PG (ref 26–34)
MCHC RBC AUTO-ENTMCNC: 32.9 G/DL (ref 30–36.5)
MCV RBC AUTO: 84.4 FL (ref 80–99)
NRBC # BLD: 0 K/UL (ref 0–0.01)
NRBC BLD-RTO: 0 PER 100 WBC
PLATELET # BLD AUTO: 227 K/UL (ref 150–400)
PMV BLD AUTO: 10.5 FL (ref 8.9–12.9)
POTASSIUM SERPL-SCNC: 4.1 MMOL/L (ref 3.5–5.1)
PROT SERPL-MCNC: 6.7 G/DL (ref 6.4–8.2)
RBC # BLD AUTO: 3.53 M/UL (ref 3.8–5.2)
SERVICE CMNT-IMP: ABNORMAL
SODIUM SERPL-SCNC: 138 MMOL/L (ref 136–145)
WBC # BLD AUTO: 9.7 K/UL (ref 3.6–11)

## 2021-01-21 PROCEDURE — 74011250636 HC RX REV CODE- 250/636: Performed by: THORACIC SURGERY (CARDIOTHORACIC VASCULAR SURGERY)

## 2021-01-21 PROCEDURE — 74011636637 HC RX REV CODE- 636/637: Performed by: NURSE PRACTITIONER

## 2021-01-21 PROCEDURE — 85027 COMPLETE CBC AUTOMATED: CPT

## 2021-01-21 PROCEDURE — 97535 SELF CARE MNGMENT TRAINING: CPT

## 2021-01-21 PROCEDURE — 71045 X-RAY EXAM CHEST 1 VIEW: CPT

## 2021-01-21 PROCEDURE — 82962 GLUCOSE BLOOD TEST: CPT

## 2021-01-21 PROCEDURE — 97161 PT EVAL LOW COMPLEX 20 MIN: CPT

## 2021-01-21 PROCEDURE — 97165 OT EVAL LOW COMPLEX 30 MIN: CPT

## 2021-01-21 PROCEDURE — 93005 ELECTROCARDIOGRAM TRACING: CPT

## 2021-01-21 PROCEDURE — 97116 GAIT TRAINING THERAPY: CPT

## 2021-01-21 PROCEDURE — 74011250637 HC RX REV CODE- 250/637: Performed by: NURSE PRACTITIONER

## 2021-01-21 PROCEDURE — 92610 EVALUATE SWALLOWING FUNCTION: CPT

## 2021-01-21 PROCEDURE — 94760 N-INVAS EAR/PLS OXIMETRY 1: CPT

## 2021-01-21 PROCEDURE — 36415 COLL VENOUS BLD VENIPUNCTURE: CPT

## 2021-01-21 PROCEDURE — 83735 ASSAY OF MAGNESIUM: CPT

## 2021-01-21 PROCEDURE — 99232 SBSQ HOSP IP/OBS MODERATE 35: CPT | Performed by: THORACIC SURGERY (CARDIOTHORACIC VASCULAR SURGERY)

## 2021-01-21 PROCEDURE — 74011000250 HC RX REV CODE- 250: Performed by: NURSE PRACTITIONER

## 2021-01-21 PROCEDURE — 74011250636 HC RX REV CODE- 250/636: Performed by: NURSE PRACTITIONER

## 2021-01-21 PROCEDURE — 65610000003 HC RM ICU SURGICAL

## 2021-01-21 PROCEDURE — 80053 COMPREHEN METABOLIC PANEL: CPT

## 2021-01-21 PROCEDURE — 74011250637 HC RX REV CODE- 250/637: Performed by: INTERNAL MEDICINE

## 2021-01-21 RX ORDER — CARVEDILOL 6.25 MG/1
6.25 TABLET ORAL 2 TIMES DAILY WITH MEALS
Status: DISCONTINUED | OUTPATIENT
Start: 2021-01-21 | End: 2021-01-22 | Stop reason: HOSPADM

## 2021-01-21 RX ORDER — MAGNESIUM SULFATE 1 G/100ML
1 INJECTION INTRAVENOUS ONCE
Status: COMPLETED | OUTPATIENT
Start: 2021-01-21 | End: 2021-01-21

## 2021-01-21 RX ORDER — GUAIFENESIN 600 MG/1
600 TABLET, EXTENDED RELEASE ORAL EVERY 12 HOURS
Status: DISCONTINUED | OUTPATIENT
Start: 2021-01-21 | End: 2021-01-22 | Stop reason: HOSPADM

## 2021-01-21 RX ADMIN — INSULIN LISPRO 7 UNITS: 100 INJECTION, SOLUTION INTRAVENOUS; SUBCUTANEOUS at 11:37

## 2021-01-21 RX ADMIN — GUAIFENESIN 600 MG: 600 TABLET, EXTENDED RELEASE ORAL at 11:17

## 2021-01-21 RX ADMIN — APIXABAN 2.5 MG: 2.5 TABLET, FILM COATED ORAL at 18:24

## 2021-01-21 RX ADMIN — CARVEDILOL 6.25 MG: 6.25 TABLET, FILM COATED ORAL at 18:24

## 2021-01-21 RX ADMIN — ACETAMINOPHEN 650 MG: 325 TABLET ORAL at 10:04

## 2021-01-21 RX ADMIN — FAMOTIDINE 20 MG: 20 TABLET, FILM COATED ORAL at 08:45

## 2021-01-21 RX ADMIN — VENLAFAXINE 37.5 MG: 37.5 TABLET ORAL at 08:45

## 2021-01-21 RX ADMIN — Medication 10 ML: at 21:22

## 2021-01-21 RX ADMIN — HYDRALAZINE HYDROCHLORIDE 10 MG: 20 INJECTION INTRAMUSCULAR; INTRAVENOUS at 05:07

## 2021-01-21 RX ADMIN — INSULIN LISPRO 2 UNITS: 100 INJECTION, SOLUTION INTRAVENOUS; SUBCUTANEOUS at 18:34

## 2021-01-21 RX ADMIN — DOCUSATE SODIUM 50 MG AND SENNOSIDES 8.6 MG 1 TABLET: 8.6; 5 TABLET, FILM COATED ORAL at 08:45

## 2021-01-21 RX ADMIN — ROSUVASTATIN 20 MG: 10 TABLET, FILM COATED ORAL at 21:20

## 2021-01-21 RX ADMIN — SODIUM CHLORIDE 2.5 MG/HR: 9 INJECTION, SOLUTION INTRAVENOUS at 01:35

## 2021-01-21 RX ADMIN — CARVEDILOL 6.25 MG: 6.25 TABLET, FILM COATED ORAL at 08:45

## 2021-01-21 RX ADMIN — ASPIRIN 81 MG: 81 TABLET, CHEWABLE ORAL at 08:46

## 2021-01-21 RX ADMIN — CALCITRIOL CAPSULES 0.25 MCG 0.5 MCG: 0.25 CAPSULE ORAL at 08:46

## 2021-01-21 RX ADMIN — APIXABAN 2.5 MG: 2.5 TABLET, FILM COATED ORAL at 08:45

## 2021-01-21 RX ADMIN — INSULIN LISPRO 3 UNITS: 100 INJECTION, SOLUTION INTRAVENOUS; SUBCUTANEOUS at 08:45

## 2021-01-21 RX ADMIN — MAGNESIUM SULFATE HEPTAHYDRATE 1 G: 1 INJECTION, SOLUTION INTRAVENOUS at 07:38

## 2021-01-21 RX ADMIN — OXYBUTYNIN CHLORIDE 15 MG: 5 TABLET, EXTENDED RELEASE ORAL at 08:46

## 2021-01-21 RX ADMIN — Medication 400 MG: at 08:45

## 2021-01-21 RX ADMIN — GUAIFENESIN 600 MG: 600 TABLET, EXTENDED RELEASE ORAL at 21:21

## 2021-01-21 RX ADMIN — ACETAMINOPHEN 650 MG: 325 TABLET ORAL at 21:20

## 2021-01-21 RX ADMIN — Medication 10 ML: at 18:24

## 2021-01-21 RX ADMIN — DOCUSATE SODIUM 50 MG AND SENNOSIDES 8.6 MG 1 TABLET: 8.6; 5 TABLET, FILM COATED ORAL at 18:24

## 2021-01-21 RX ADMIN — VENLAFAXINE 37.5 MG: 37.5 TABLET ORAL at 21:21

## 2021-01-21 RX ADMIN — CEFAZOLIN 2 G: 1 INJECTION, POWDER, FOR SOLUTION INTRAMUSCULAR; INTRAVENOUS at 11:17

## 2021-01-21 RX ADMIN — INSULIN LISPRO 2 UNITS: 100 INJECTION, SOLUTION INTRAVENOUS; SUBCUTANEOUS at 21:30

## 2021-01-21 RX ADMIN — CEFAZOLIN 2 G: 1 INJECTION, POWDER, FOR SOLUTION INTRAMUSCULAR; INTRAVENOUS at 04:07

## 2021-01-21 RX ADMIN — Medication 400 MG: at 18:24

## 2021-01-21 NOTE — PROGRESS NOTES
2000 Assumed care of patient from [address]    2058 Cardene restarted for SBP >160    0300 Cardene stopped- SBP 130s    0630 Waveform on arterial line dampened, no blood return noted. Line removed without issue, pressure held x5 minutes    0750 Bedside and Verbal shift change report given to [employer] (oncoming nurse) by Khushboo Miller (offgoing nurse). Report included the following information SBAR, Kardex, MAR, Recent Results and Cardiac Rhythm NSR.

## 2021-01-21 NOTE — PROGRESS NOTES
0800: Report received from Camilo Harry, [address] [place] [address]. Patient care assumed. 1000: Pt. Noticed to be coughing after drinking water and taking her morning medication. Speech consult placed. 1100: Speech at bedside. No issues noted. 1230: Bedside and Verbal shift change report given to GUDELIA Menchaca (oncoming nurse) by Catracho Moya RN (offgoing nurse). Report included the following information SBAR, Kardex, OR Summary, Procedure Summary, Intake/Output, MAR, Recent Results, Cardiac Rhythm NSR and Alarm Parameters .

## 2021-01-21 NOTE — PROGRESS NOTES
[facility] Cardiology Progress Note    Date of Service: 1/21/2021    Subjective:  Fever overnight. Complaints of productive cough. No chest pain, dyspnea, lightheadeness, groin pain. Objective:    Visit Vitals  BP (!) 165/57 (BP Patient Position: Supine; Post activity)   Pulse 93   Temp 98 °F (36.7 °C)   Resp 22   Ht 5' 5\" (1.651 m)   Wt 100.1 kg (220 lb 10.9 oz)   SpO2 95%   BMI 36.72 kg/m²         Intake/Output Summary (Last 24 hours) at 1/21/2021 1056  Last data filed at 1/21/2021 0800  Gross per 24 hour   Intake 2338.03 ml   Output 375 ml   Net 1963.03 ml        Physical Exam  GEN: NAD, appears stated age  HEENT: EOMI, MMM, OP clear  NECK: Normal JVP  CV: RRR, normal S1 and S2, II/VI systolic flow murmur at LUSB  LUNGS: Minimal bibasilar inspiratory crackles  ABD: NABS, soft, NT/ND  EXT: No edema, 2+ distal pulses  PSYCH: Mood and affect normal  NEURO: AAO, MAEW, face symmetrical, speech intact    Current Facility-Administered Medications   Medication Dose Route Frequency    carvediloL (COREG) tablet 6.25 mg  6.25 mg Oral BID WITH MEALS    guaiFENesin ER (MUCINEX) tablet 600 mg  600 mg Oral Q12H    apixaban (ELIQUIS) tablet 2.5 mg  2.5 mg Oral BID    calcitRIOL (ROCALTROL) capsule 0.5 mcg  0.5 mcg Oral DAILY    oxybutynin chloride XL (DITROPAN XL) tablet 15 mg  15 mg Oral DAILY    rosuvastatin (CRESTOR) tablet 20 mg  20 mg Oral QHS    venlafaxine (EFFEXOR) tablet 37.5 mg  37.5 mg Oral BID    sodium chloride (NS) flush 5-40 mL  5-40 mL IntraVENous Q8H    sodium chloride (NS) flush 5-40 mL  5-40 mL IntraVENous PRN    0.45% sodium chloride infusion  10 mL/hr IntraVENous CONTINUOUS    0.9% sodium chloride infusion  9 mL/hr IntraVENous CONTINUOUS    acetaminophen (TYLENOL) tablet 650 mg  650 mg Oral Q4H PRN    traMADoL (ULTRAM) tablet  mg   mg Oral Q6H PRN    oxyCODONE-acetaminophen (PERCOCET) 5-325 mg per tablet 1-2 Tab  1-2 Tab Oral Q4H PRN    morphine injection 2 mg  2 mg IntraVENous Q2H PRN    naloxone (NARCAN) injection 0.4 mg  0.4 mg IntraVENous PRN    ceFAZolin (ANCEF) 2 g in sterile water (preservative free) 20 mL IV syringe  2 g IntraVENous Q8H    ondansetron (ZOFRAN) injection 4 mg  4 mg IntraVENous Q4H PRN    aspirin chewable tablet 81 mg  81 mg Oral DAILY    famotidine (PEPCID) tablet 20 mg  20 mg Oral DAILY    calcium chloride 1 g in 0.9% sodium chloride 250 mL IVPB  1 g IntraVENous PRN    bisacodyL (DULCOLAX) suppository 10 mg  10 mg Rectal DAILY PRN    senna-docusate (PERICOLACE) 8.6-50 mg per tablet 1 Tab  1 Tab Oral BID    ELECTROLYTE REPLACEMENT NOTE: Nurse to review Serum Potassium and Magnesuim levels and Initiate Electrolyte Replacement Protocol as needed  1 Each Other PRN    magnesium oxide (MAG-OX) tablet 400 mg  400 mg Oral BID    insulin lispro (HUMALOG) injection   SubCUTAneous AC&HS    glucose chewable tablet 16 g  4 Tab Oral PRN    dextrose (D50W) injection syrg 12.5-25 g  12.5-25 g IntraVENous PRN    glucagon (GLUCAGEN) injection 1 mg  1 mg IntraMUSCular PRN    albuterol (PROVENTIL VENTOLIN) nebulizer solution 2.5 mg  2.5 mg Nebulization Q4H PRN    hydrALAZINE (APRESOLINE) 20 mg/mL injection 10 mg  10 mg IntraVENous Q6H PRN    insulin regular (NOVOLIN R, HUMULIN R) 100 Units in 0.9% sodium chloride 100 mL infusion  0-50 Units/hr IntraVENous TITRATE    niCARdipine (CARDENE) 25 mg in 0.9% sodium chloride 250 mL infusion  0-15 mg/hr IntraVENous TITRATE    PHENYLephrine (JANAE-SYNEPHRINE) 30 mg in 0.9% sodium chloride 250 mL infusion   mcg/min IntraVENous TITRATE    dexmedeTOMidine in 0.9 % NaCl (PRECEDEX) 400 mcg/100 mL (4 mcg/mL) infusion soln  0.1-1.5 mcg/kg/hr IntraVENous TITRATE       Data Reviewed:  Recent Labs     01/21/21  0414 01/20/21  1415    142   K 4.1 4.0    109*   CO2 23 27   * 124*   BUN 21* 22*   CREA 1.44* 1.23*   CA 9.5 9.6   MG 1.7 1.5*   ALB 2.9* 3.1*   ALT 17 24   INR  --  1.2*     Recent Labs     01/21/21  0417 01/20/21  1415   WBC 9.7 9.5   HGB 9.8* 10.2*   HCT 29.8* 31.6*    222     Telemetry (personally reviewed): normal sinus rhythm, frequent APDs    Echocardiogram:  · LV: Estimated LVEF is >70%. Normal cavity size. Mild concentric hypertrophy. Hyperdynamic systolic function. Moderate (grade 2) left ventricular diastolic dysfunction. · MV: Mitral valve leaflet calcification. Mitral annular calcification. Mitral valve mean gradient is 3.4 mmHg. Mild mitral valve stenosis is present. MV MG 3.44 mmHg at HR 60 bpm. Mild mitral valve regurgitation is present. · PV: Mild pulmonic valve regurgitation is present. · LA: Severely dilated left atrium. · TV: Mild tricuspid valve regurgitation is present. · AV: Prosthetic aortic valve. Aortic valve mean gradient is 6.6 mmHg. Aortic valve area is 1.8 cm2. There is a 26 mm Medtronic Evolut PRO+ prosthetic aortic valve from prior TAVR procedure. Prosthesis is normal. Prosthetic valve function is sufficient  · RA: Mildly dilated right atrium. · IAS: There was no shunting at baseline. · PA: Mild pulmonary hypertension. Pulmonary arterial systolic pressure is 39 mmHg. LVOTd 1.8 cm  LVOT/AV VTI 28.6/40.7 cm  DI 0.70  EOA 1.79 cm2  EOAi 0.87 cm2/m2  BSA 2.05 m2    Assessment:  1. Symptomatic, severe aortic stenosis s/p successful percutaneous right transfemoral TAVR using a 26 mm Evolut PRO+ transcatheter valve on 1/20/21  2. Post-op fever; no clinical evidence for infection  3. Hypertension  4. Hyperlipidemia  5. DM2  6. Stage III CKD  7. COPD  8.  Chronic diastolic CHF    Plan:  - PT/OT, incentive spirometry  - Guaifenesin for productive cough  - Continue aspirin and apixaban  - Medical management of CAD; Dr. Angelita Holm to consider LAD PCI if she has anginal symptoms  - Restart outpatient anti-hypertensives  - SBE prophylaxis prior to dental procedures  - Repeat TTE at 1 month  - F/u with me in 6-8 weeks  - F/u long-term with Dr. Angelita Holm (primary cardiologist)  - Follow up with cardiothoracic surgery team in 1 week after discharge    Possible discharge tomorrow. Thank you for the opportunity to participate in the care of Kindra Memos and please do not hesitate to contact us should you have any questions. Signed:  Dmitry Varghese.  Angelica Vasquez, [address] [place] [address] [facility]  01/21/21

## 2021-01-21 NOTE — CARDIO/PULMONARY
Cardiac Rehab: Transcatheter education folder to the bedside of [facility]. Multiple attempts made to see [facility] to discuss cardiac rehab enrollment. Patient has been unavailable. Will continue to follow.  Aneudy Murphy RN

## 2021-01-21 NOTE — PROGRESS NOTES
SPEECH PATHOLOGY BEDSIDE SWALLOW EVALUATION/DISCHARGE  Patient: Chantal Martinez (34 y.o. female)  Date: 1/21/2021  Primary Diagnosis: Aortic stenosis [I35.0]  Procedure(s) (LRB):  TRANSCATHETER AORTIC VALVE REPLACEMENT, RIGHT TRANSFEMORAL 26 EVOLUT PRO (Right) 1 Day Post-Op   Precautions:   Fall    ASSESSMENT :  Based on the objective data described below, the patient presents with functional oropharyngeal phases of swallow without any overt difficulty nor overt s/s of aspiration. Recommend pt continue baseline diet as outlined below. Skilled acute therapy provided by a speech-language pathologist is not indicated at this time. PLAN :  Recommendations:  --regular diet/thin liquids  --general aspiration precautions  --meds in applesauce  Discharge Recommendations: None     SUBJECTIVE:   Patient stated I drink and eat all the time! .     OBJECTIVE:     Past Medical History:   Diagnosis Date    CAD (coronary artery disease)     pulmonary HTN    Diabetes ([facility] [address]     Hypertension      Past Surgical History:   Procedure Laterality Date    HX KNEE REPLACEMENT Bilateral     HX ORTHOPAEDIC      IR INSERT TUNL CVC W/O PORT OVER 5 YR  3/5/2019     Prior Level of Function/Home Situation:   Home Situation  Home Environment: Apartment(senior living)  One/Two Story Residence: One story  Living Alone: Yes  Support Systems: Child(jonn)(son comes to help her every day)  Current DME Used/Available at Home: Ronnald Real, rollator, [place], straight, Grab bars, Raised toilet seat, Safety frame toliet(built-in shower seat)  Tub or Shower Type: Shower  Diet prior to admission: Regular diet/thin liquids  Current Diet:  Regular diet/thin liquids   Cognitive and Communication Status:  Neurologic State: Alert  Orientation Level: Oriented X4  Cognition: Follows commands  Perception: Appears intact  Perseveration: No perseveration noted  Safety/Judgement: Decreased insight into deficits  Oral Assessment:  Oral Assessment  Labial: No impairment  Dentition: Natural  Oral Hygiene: oral mucosa moist and clear of secretions  Lingual: No impairment  Velum: No impairment  Mandible: No impairment  P.O. Trials:  Patient Position: upright in bed  Vocal quality prior to P.O.: No impairment  Consistency Presented: Thin liquid; Solid  How Presented: Self-fed/presented;Cup/sip;Spoon;Straw;Successive swallows     Bolus Acceptance: No impairment  Bolus Formation/Control: No impairment     Propulsion: No impairment  Oral Residue: None  Initiation of Swallow: No impairment  Laryngeal Elevation: Functional  Aspiration Signs/Symptoms: None  Pharyngeal Phase Characteristics: No impairment, issues, or problems              Oral Phase Severity: No impairment  Pharyngeal Phase Severity : No impairment  NOMS:   The NOMS functional outcome measure was used to quantify this patient's level of swallowing impairment. Based on the NOMS, the patient was determined to be at level 7 for swallow function     NOMS Swallowing Levels:  Level 1 (CN): NPO  Level 2 (CM): NPO but takes consistency in therapy  Level 3 (CL): Takes less than 50% of nutrition p.o. and continues with nonoral feedings; and/or safe with mod cues; and/or max diet restriction  Level 4 (CK): Safe swallow but needs mod cues; and/or mod diet restriction; and/or still requires some nonoral feeding/supplements  Level 5 (CJ): Safe swallow with min diet restriction; and/or needs min cues  Level 6 (CI): Independent with p.o.; rare cues; usually self cues; may need to avoid some foods or needs extra time  Level 7 ([address] [place] [address]): Independent for all p.o.  CHUCHO. (2003). National Outcomes Measurement System (NOMS): Adult Speech-Language Pathology User's Guide.        Pain:  Pain Scale 1: Numeric (0 - 10)  Pain Intensity 1: 0     After treatment:   Call bell within reach and Nursing notified    COMMUNICATION/EDUCATION:     The patient's plan of care including recommendations, planned interventions, and recommended diet changes were discussed with: Registered nurse.      Thank you for this referral.  Elizabeth Beltran, SLP  Time Calculation: 12 mins

## 2021-01-21 NOTE — PROGRESS NOTES
SLP Contact Note    SLP evaluation complete. Swallow appears WFL. Meds in applesauce. Full note to follow.       Thank you,  LEVAR ZimmermanEd, [id number] [facility]  Speech-Language Pathologist

## 2021-01-21 NOTE — PROGRESS NOTES
Transitions of Care Plan:   RUR: 16%   TAVR - post op day 1; poss HH post discharge   Baseline: resides alone in apartment; son assists; owns DME   Disposition:  Home vs Home Health    CM attempted to meet with patient - patient resting during attempted assessment. Chart reviewed and CM will follow up tomorrow for disposition needs. Of note, patient previously open to either [facility] or At [address] [place] [address] for home health services. CM will continue to follow.     Schuyler Bello, MPH

## 2021-01-21 NOTE — PROGRESS NOTES
Problem: Mobility Impaired (Adult and Pediatric)  Goal: *Acute Goals and Plan of Care (Insert Text)  Description: FUNCTIONAL STATUS PRIOR TO ADMISSION: Patient was modified independent using a rollator for functional mobility. HOME SUPPORT PRIOR TO ADMISSION: The patient reports living alone \"before I got sick\". He son Marge Jackson" with her for an unknown time frame. Physical Therapy Goals  Initiated 1/21/2021  1. Patient will move from supine to sit and sit to supine  in bed with modified independence within 7 day(s). 2.  Patient will transfer from bed to chair and chair to bed with modified independence using the least restrictive device within 7 day(s). 3.  Patient will perform sit to stand with modified independence within 7 day(s). 4.  Patient will ambulate with modified independence for 250 feet with the least restrictive device within 7 day(s). Outcome: Progressing Towards Goal   PHYSICAL THERAPY EVALUATION  Patient: Cherelle Pedraza (10 y.o. female)  Date: 1/21/2021  Primary Diagnosis: Aortic stenosis [I35.0]  Procedure(s) (LRB):  TRANSCATHETER AORTIC VALVE REPLACEMENT, RIGHT TRANSFEMORAL 26 EVOLUT PRO (Right) 1 Day Post-Op   Precautions:   Fall    ASSESSMENT  Based on the objective data described below, the patient presents with mild confusion, balance deficits, and mild endurance impairment following admission for a TAVR. Patient received in bedside chair and demonstrated decreased attention/concentration but was agreeable to mobilize. Gait training x80' using a RW requiring CGA for balance and device management. She uses a rollator at baseline but required cues to avoid hitting obstacles with the wheels. The patient lives in a 55+ apartment and reports that her son will stay with her post-operatively. If this is accurate, anticipate discharge home with HHPT vs none depending on progress and cognition going forward.     Current Level of Function Impacting Discharge (mobility/balance): CGA, SBA for gait,         Patient will benefit from skilled therapy intervention to address the above noted impairments. PLAN :  Recommendations and Planned Interventions: bed mobility training, transfer training, gait training, therapeutic exercises, and neuromuscular re-education      Frequency/Duration: Patient will be followed by physical therapy:  daily to address goals. Recommendation for discharge: (in order for the patient to meet his/her long term goals)  Physical therapy at least 2 days/week in the home vs none        IF patient discharges home will need the following DME: patient owns DME required for discharge         SUBJECTIVE:   Patient stated Alferd Boom you for your help.     OBJECTIVE DATA SUMMARY:   HISTORY:    Past Medical History:   Diagnosis Date    CAD (coronary artery disease)     pulmonary HTN    Diabetes ([facility] [address]     Hypertension      Past Surgical History:   Procedure Laterality Date    HX KNEE REPLACEMENT Bilateral     HX ORTHOPAEDIC      IR INSERT TUNL CVC W/O PORT OVER 5 YR  3/5/2019       Personal factors and/or comorbidities impacting plan of care:     Home Situation  Home Environment: Apartment(senior living)  One/Two Story Residence: One story  Living Alone: Yes  Support Systems: Child(jonn)(son comes to help her every day)  Current DME Used/Available at Home: Walker, rollator, [place] [employer], straight, Grab bars, Raised toilet seat, Safety frame toliet(built-in shower seat)  Tub or Shower Type: Shower    EXAMINATION/PRESENTATION/DECISION MAKING:   Critical Behavior:  Neurologic State: Alert  Orientation Level: Oriented X4  Cognition: Follows commands, Decreased attention/concentration  Safety/Judgement: Decreased insight into deficits  Hearing:   Auditory  Auditory Impairment: None  Skin:    Edema:   Range Of Motion:  AROM: Generally decreased, functional           PROM: Generally decreased, functional           Strength:    Strength: Generally decreased, functional                    Tone & Sensation:   Tone: Normal              Sensation: Intact               Coordination:  Coordination: Generally decreased, functional  Vision:   Corrective Lenses: Reading glasses  Functional Mobility:  Bed Mobility:        Sit to Supine: Minimum assistance(A for LLE)     Transfers:  Sit to Stand: Minimum assistance  Stand to Sit: Contact guard assistance                       Balance:   Sitting: Intact; Without support  Standing: Impaired; Without support  Standing - Static: Fair;Occasional  Standing - Dynamic : Fair;Constant support(using walker)  Ambulation/Gait Training:  Distance (ft): 120 Feet (ft)  Assistive Device: Gait belt;Walker, rolling  Ambulation - Level of Assistance: Contact guard assistance(cues for obstacle avoidance and posture)     Gait Description (WDL): Exceptions to WDL  Gait Abnormalities: Decreased step clearance        Base of Support: Widened     Speed/Lucy: Shuffled                Physical Therapy Evaluation Charge Determination   History Examination Presentation Decision-Making   MEDIUM  Complexity : 1-2 comorbidities / personal factors will impact the outcome/ POC  MEDIUM Complexity : 3 Standardized tests and measures addressing body structure, function, activity limitation and / or participation in recreation  LOW Complexity : Stable, uncomplicated  LOW Complexity : FOTO score of       Based on the above components, the patient evaluation is determined to be of the following complexity level: LOW     Pain Rating:      Activity Tolerance:   Good    After treatment patient left in no apparent distress:   Sitting in chair and Call bell within reach    COMMUNICATION/EDUCATION:   The patients plan of care was discussed with: Registered nurse. Fall prevention education was provided and the patient/caregiver indicated understanding., Patient/family have participated as able in goal setting and plan of care. , and Patient/family agree to work toward stated goals and plan of care.    Thank you for this referral.  Lilia Jara, PT, DPT   Time Calculation: 22 mins

## 2021-01-21 NOTE — PROGRESS NOTES
[facility] ICU Progress Note    Admit Date: 2021  POD:  1 Day Post-Op    Procedure:  Procedure(s):  TRANSCATHETER AORTIC VALVE REPLACEMENT, RIGHT TRANSFEMORAL 26 EVOLUT PRO        Subjective:   Pt seen with Dr. Chantal Lehman. Tmax 101, room air. Up walking with PT/OT this morning. Somewhat confused when waking up but more oriented now. Objective:   Vitals:  Blood pressure (!) 138/56, pulse (!) 101, temperature (!) 101 °F (38.3 °C), resp. rate 22, height 5' 5\" (1.651 m), weight 220 lb 10.9 oz (100.1 kg), SpO2 90 %. Temp (24hrs), Av.3 °F (36.8 °C), Min:96.7 °F (35.9 °C), Max:101 °F (38.3 °C)    EKG/Rhythm:  SR-ST with FL of 152 and QRS of 80    Oxygen Therapy: room air    CXR:   CXR Results  (Last 48 hours)               21 0551  XR CHEST PORT Final result    Impression:  Resolved interstitial pulmonary edema. Narrative:  INDICATION:  postop heart       EXAM: CXR Portable. FINDINGS: Portable chest shows resolution of interstitial pulmonary edema   pattern since yesterday. There is no apparent pneumothorax. Lungs show no   consolidation. Heart size is stable. TAVR is noted. There is no midline shift.           21 1424  XR CHEST PORT Final result    Impression:  Cardiomegaly. Possible mild pulmonary edema       Narrative:  EXAM: XR CHEST PORT       INDICATION: postop heart       COMPARISON: 2021       FINDINGS: A portable AP radiograph of the chest was obtained at 1416 hours. The   patient is on a cardiac monitor. Diminished vascular clarity suggestive of mild   pulmonary edema. . Cardiomegaly. .  The bones and soft tissues are grossly within   normal limits. Admission Weight: Last Weight   Weight: 217 lb 6 oz (98.6 kg) Weight: 220 lb 10.9 oz (100.1 kg)     Intake / Output / Drain:  Current Shift: No intake/output data recorded.   Last 24 hrs.:     Intake/Output Summary (Last 24 hours) at 2021 1000  Last data filed at 2021 0659  Gross per 24 hour   Intake 2158.03 ml   Output 375 ml   Net 1783.03 ml       EXAM:  General:  No acute distress   Lungs:   Clear to auscultation bilaterally. Groin Incisions:  No erythema, drainage or swelling. Heart:  Regular rate and rhythm, S1, S2 normal, no murmur, click, rub or gallop. Abdomen:   Soft, non-tender. Bowel sounds normal. No masses,  No organomegaly. Extremities:  No edema. PPP. Neurologic:  Gross motor and sensory apparatus intact. Labs:   Recent Labs     21  0837 21  0414 21  1415 21  1415   WBC  --  9.7  --  9.5   HGB  --  9.8*  --  10.2*   HCT  --  29.8*  --  31.6*   PLT  --  227  --  222   NA  --  138  --  142   K  --  4.1  --  4.0   BUN  --  21*  --  22*   CREA  --  1.44*  --  1.23*   GLU  --  227*  --  124*   GLUCPOC 232*  --    < >  --    INR  --   --   --  1.2*    < > = values in this interval not displayed. Assessment:     Active Problems:    Nonrheumatic aortic valve stenosis (2021)      Aortic stenosis (2021)         Plan/Recommendations/Medical Decision Makin. Aortic Stenosis severe and symptomatic: s/p TAVR with RTF #26 Evolut Pro on 21 with Marilee Núñez. Anticoagulation plan for ASA, Eliquis. Postoperative echo completed and reviewed by Dr. Crowder Records. GWYN of 1.8 cm2; MG of 6.6     2. CAD: On ASA, Statin, BB-restarted today. Likely plan for staged plan for PCI following TAVR per Dr. Crowder Records     3. HTN: Required Cardene overnight. EKG this morning without HB. Will restart BB.      4. HLD: On statin     5. DM: Diet controlled. A1C of 7.6. SSI here. Will restart Metformin on DC. Will need to follow up with PCP for BS management     6. CKD stage 3: Avoid hypotension and nephrotoxic agents. Creatinine of 1.4 this morning. (Baseline 1.2-1.6)     7. Depression: On Effexor     8. COPD: On home Nebs     9. Diastolic HF grade III: BP management     10. MR: Echo reviewed by Dr. Bina Rosado.     11. Anemia: Postoperative secondary to acute blood loss.  Currently above transfusion threshold. Continue Iron. Monitor daily CBC. 12. Febrile this morning: Was on Magali Hugger all night-now off. No Leukocytosis. Has been up walking with PT. Will reevaluate in a few hours. Dispo: PT/OT/Cardiac Rehab. Case Management for discharge planning.  Can transfer to [facility]         Signed By: Jorgito Cook NP       Pt seen and examined  Doing well post TAVR no complaints  Echo looks good   Plan to DC today
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
## 2021-01-22 ENCOUNTER — APPOINTMENT (OUTPATIENT)
Dept: GENERAL RADIOLOGY | Age: 81
DRG: 267 | End: 2021-01-22
Attending: NURSE PRACTITIONER
Payer: MEDICARE

## 2021-01-22 VITALS
HEIGHT: 65 IN | TEMPERATURE: 98.8 F | BODY MASS INDEX: 36.73 KG/M2 | HEART RATE: 73 BPM | OXYGEN SATURATION: 94 % | WEIGHT: 220.46 LBS | DIASTOLIC BLOOD PRESSURE: 46 MMHG | SYSTOLIC BLOOD PRESSURE: 128 MMHG | RESPIRATION RATE: 19 BRPM

## 2021-01-22 LAB
ALBUMIN SERPL-MCNC: 3.1 G/DL (ref 3.5–5)
ALBUMIN/GLOB SERPL: 0.7 {RATIO} (ref 1.1–2.2)
ALP SERPL-CCNC: 61 U/L (ref 45–117)
ALT SERPL-CCNC: 14 U/L (ref 12–78)
ANION GAP SERPL CALC-SCNC: 4 MMOL/L (ref 5–15)
AST SERPL-CCNC: 28 U/L (ref 15–37)
ATRIAL RATE: 101 BPM
ATRIAL RATE: 75 BPM
BILIRUB SERPL-MCNC: 0.5 MG/DL (ref 0.2–1)
BUN SERPL-MCNC: 21 MG/DL (ref 6–20)
BUN/CREAT SERPL: 15 (ref 12–20)
CALCIUM SERPL-MCNC: 9.8 MG/DL (ref 8.5–10.1)
CALCULATED P AXIS, ECG09: 55 DEGREES
CALCULATED P AXIS, ECG09: 67 DEGREES
CALCULATED R AXIS, ECG10: 36 DEGREES
CALCULATED R AXIS, ECG10: 59 DEGREES
CALCULATED T AXIS, ECG11: -26 DEGREES
CALCULATED T AXIS, ECG11: -53 DEGREES
CHLORIDE SERPL-SCNC: 107 MMOL/L (ref 97–108)
CO2 SERPL-SCNC: 27 MMOL/L (ref 21–32)
CREAT SERPL-MCNC: 1.36 MG/DL (ref 0.55–1.02)
DIAGNOSIS, 93000: NORMAL
DIAGNOSIS, 93000: NORMAL
ERYTHROCYTE [DISTWIDTH] IN BLOOD BY AUTOMATED COUNT: 14.3 % (ref 11.5–14.5)
GLOBULIN SER CALC-MCNC: 4.3 G/DL (ref 2–4)
GLUCOSE BLD STRIP.AUTO-MCNC: 163 MG/DL (ref 65–100)
GLUCOSE SERPL-MCNC: 137 MG/DL (ref 65–100)
HCT VFR BLD AUTO: 31.3 % (ref 35–47)
HGB BLD-MCNC: 10.1 G/DL (ref 11.5–16)
MAGNESIUM SERPL-MCNC: 2 MG/DL (ref 1.6–2.4)
MCH RBC QN AUTO: 27.4 PG (ref 26–34)
MCHC RBC AUTO-ENTMCNC: 32.3 G/DL (ref 30–36.5)
MCV RBC AUTO: 84.8 FL (ref 80–99)
NRBC # BLD: 0 K/UL (ref 0–0.01)
NRBC BLD-RTO: 0 PER 100 WBC
P-R INTERVAL, ECG05: 152 MS
P-R INTERVAL, ECG05: 182 MS
PLATELET # BLD AUTO: 214 K/UL (ref 150–400)
PMV BLD AUTO: 10.2 FL (ref 8.9–12.9)
POTASSIUM SERPL-SCNC: 4.1 MMOL/L (ref 3.5–5.1)
PROT SERPL-MCNC: 7.4 G/DL (ref 6.4–8.2)
Q-T INTERVAL, ECG07: 348 MS
Q-T INTERVAL, ECG07: 380 MS
QRS DURATION, ECG06: 72 MS
QRS DURATION, ECG06: 80 MS
QTC CALCULATION (BEZET), ECG08: 424 MS
QTC CALCULATION (BEZET), ECG08: 451 MS
RBC # BLD AUTO: 3.69 M/UL (ref 3.8–5.2)
SERVICE CMNT-IMP: ABNORMAL
SODIUM SERPL-SCNC: 138 MMOL/L (ref 136–145)
VENTRICULAR RATE, ECG03: 101 BPM
VENTRICULAR RATE, ECG03: 75 BPM
WBC # BLD AUTO: 10.3 K/UL (ref 3.6–11)

## 2021-01-22 PROCEDURE — 74011250637 HC RX REV CODE- 250/637: Performed by: NURSE PRACTITIONER

## 2021-01-22 PROCEDURE — 97535 SELF CARE MNGMENT TRAINING: CPT

## 2021-01-22 PROCEDURE — 74011636637 HC RX REV CODE- 636/637: Performed by: NURSE PRACTITIONER

## 2021-01-22 PROCEDURE — 74011250637 HC RX REV CODE- 250/637: Performed by: INTERNAL MEDICINE

## 2021-01-22 PROCEDURE — 85027 COMPLETE CBC AUTOMATED: CPT

## 2021-01-22 PROCEDURE — 93005 ELECTROCARDIOGRAM TRACING: CPT

## 2021-01-22 PROCEDURE — 71045 X-RAY EXAM CHEST 1 VIEW: CPT

## 2021-01-22 PROCEDURE — 82962 GLUCOSE BLOOD TEST: CPT

## 2021-01-22 PROCEDURE — 99231 SBSQ HOSP IP/OBS SF/LOW 25: CPT | Performed by: THORACIC SURGERY (CARDIOTHORACIC VASCULAR SURGERY)

## 2021-01-22 PROCEDURE — 80053 COMPREHEN METABOLIC PANEL: CPT

## 2021-01-22 PROCEDURE — 36415 COLL VENOUS BLD VENIPUNCTURE: CPT

## 2021-01-22 PROCEDURE — 83735 ASSAY OF MAGNESIUM: CPT

## 2021-01-22 RX ORDER — ACETAMINOPHEN 325 MG/1
650 TABLET ORAL
Status: SHIPPED | COMMUNITY
Start: 2021-01-22

## 2021-01-22 RX ADMIN — CALCITRIOL CAPSULES 0.25 MCG 0.5 MCG: 0.25 CAPSULE ORAL at 08:35

## 2021-01-22 RX ADMIN — GUAIFENESIN 600 MG: 600 TABLET, EXTENDED RELEASE ORAL at 08:35

## 2021-01-22 RX ADMIN — OXYBUTYNIN CHLORIDE 15 MG: 5 TABLET, EXTENDED RELEASE ORAL at 08:35

## 2021-01-22 RX ADMIN — INSULIN LISPRO 2 UNITS: 100 INJECTION, SOLUTION INTRAVENOUS; SUBCUTANEOUS at 07:13

## 2021-01-22 RX ADMIN — DOCUSATE SODIUM 50 MG AND SENNOSIDES 8.6 MG 1 TABLET: 8.6; 5 TABLET, FILM COATED ORAL at 08:35

## 2021-01-22 RX ADMIN — ASPIRIN 81 MG: 81 TABLET, CHEWABLE ORAL at 08:35

## 2021-01-22 RX ADMIN — Medication 400 MG: at 08:35

## 2021-01-22 RX ADMIN — FAMOTIDINE 20 MG: 20 TABLET, FILM COATED ORAL at 08:35

## 2021-01-22 RX ADMIN — CARVEDILOL 6.25 MG: 6.25 TABLET, FILM COATED ORAL at 07:13

## 2021-01-22 RX ADMIN — VENLAFAXINE 37.5 MG: 37.5 TABLET ORAL at 08:35

## 2021-01-22 RX ADMIN — APIXABAN 2.5 MG: 2.5 TABLET, FILM COATED ORAL at 08:35

## 2021-01-22 NOTE — DISCHARGE INSTRUCTIONS
Cardiac Surgery Specialist    [address] [place] [address]                                       [id number] [place], [place] [address] Road, [address] [facility]  Office- [phone]  Fax- [phone]       Office- [phone]  Fax- [phone]  _____________________________________________________________  Dr. Samia Jefferson ACNP-BC   Natalia Wilkins, AGAP  ENDER Marcano PA-C, PA-C, ENDER Arreola, [address]  _____________________________________________________________     Name:Lisa Fuller     Surgery & Date: Procedure(s):  TRANSCATHETER AORTIC VALVE REPLACEMENT, RIGHT TRANSFEMORAL 26 EVOLUT PRO    Discharge Date: 01/22/21     MEDICATIONS:  Please refer to your After Visit Summary for your medication list.       DO NOT TAKE ANY MEDICATIONS THAT ARE NOT ON THIS LIST    INSTRUCTIONS:  1. NO SMOKING OR TOBACCO PRODUCTS  2. Do not follow the activity/exercise instructions in your discharge book given to you as an inpatient. You have no activity restrictions. 3. You may shower. Wash all incisions twice daily with mild soap and water. No lotions, ointments or powder. 4. Call the office immediately for any redness, swelling, or drainage from your incision. 5. Take your temperature daily and call for a temperature of 101 degrees or higher or for any symptoms that make you think you have and infection. 6. Weigh yourself each morning. Call if you gain more than 5 pounds in 48 hours. 7. Use the incentive spirometer 6-8 times a day-10 breaths each time. 8. Walk several hundred feet several times daily. DIET  Eat an American Heart Association diet. If you are having trouble with your appetite, eat what you can. Try eating small, frequent meals throughout the day. ACTIVITY  1. You have no activity restrictions. You may resume your daily activities at home, based on your comfort level. You may also drive. FOLLOW UP  1. Your first follow up appointment will be on 1/25/21 at 11:30 by telephone. Our office is located in [address] [place] [address]. Your second follow up appointment will be in four weeks, on 2/22/21 at 2pm. You will need to have an ECHO prior to your appointment time. Our office will set that up for you. Please call our office at [phone] if you are unable to make either one of these appointments. 2. You will be receiving a call before your 3 day follow up appointment to begin cardiac rehab. They are located in the [address] [place] [address] on [facility]. Their phone number is [phone]. Please call if you have not been contacted 2-3 weeks after discharge from the hospital.  3. We will make an appointment for you with your cardiologist in 4-5 weeks. 4. Consult you primary care physician regarding your influenza &   pneumovax vaccines. 5.   Please bring all medications with you to your appointment.     Signature:___________________________________________________
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
## 2021-01-22 NOTE — PROGRESS NOTES
1930: Bedside and Verbal shift change report given to Kait Mcpherson, student (oncoming nurse) by Lynne Bernal, RN (offgoing nurse). Report included the following information SBAR, ED Summary, OR Summary, Procedure Summary, Intake/Output, MAR, Recent Results and Cardiac Rhythm NSR.     2100: Tylenol given for temp 99.4, Magali hugger removed    0400: EKG completed per order    4216: Systolic , 8am Coreg given at this time. Shift Summery: Pt stable, no new concerns at this time    Bedside and Verbal shift change report given to Izabel Irwin, GUDELIA (oncoming nurse) by Kait Mcpherson, student (offgoing nurse). Report included the following information SBAR, ED Summary, OR Summary, Procedure Summary, Intake/Output, MAR and Cardiac Rhythm NSR.

## 2021-01-22 NOTE — PROGRESS NOTES
Transitions of Care Plan:   RUR: 17%   S/P TAVR; dc home today - family to transport   Baseline: independent with walker PRN; resides home alone; family nearby   Disposition: home with family    Reason for Admission:   Nonrheumatic aortic valve stenosis                  RUR Score:     17%             PCP: First and Last name:  Bhavna Tripathi MD   Name of Practice:    Are you a current patient: Yes/No:    Approximate date of last visit:    Can you participate in a virtual visit if needed:     Do you (patient/family) have any concerns for transition/discharge? No              Plan for utilizing home health:   No need indicated at time of assessment; previously open with [facility] but was discharged prior to hospital admission for TAVR    Current Advanced Directive/Advance Care Plan:  No            Transition of Care Plan:          Home with granddaughter to transport. Discharge orders are in. Please consult CM if needs arise prior to transport home. Cedric Reina, MPH    Care Management Interventions  PCP Verified by CM: Yes  Palliative Care Criteria Met (RRAT>21 & CHF Dx)?: No  Mode of Transport at Discharge:  Other (see comment)(Family, private car)  Transition of Care Consult (CM Consult): Discharge Planning  MyChart Signup: No  Discharge Durable Medical Equipment: No  Health Maintenance Reviewed: Yes  Physical Therapy Consult: Yes  Occupational Therapy Consult: Yes  Speech Therapy Consult: Yes  Current Support Network: Own Home, Lives Alone  Confirm Follow Up Transport: Family  Discharge Location  Discharge Placement: Home with family assistance

## 2021-01-22 NOTE — PROGRESS NOTES
0800: Report received from [facility], [address] [place] [address], . Patient care assumed. Drea Weiss NP at bedside. Updated on patient status. Orders to discharge pt. Home today. 0830: Dr. Kelsey Espinal at bedside. Updated on patient status. No new orders at this time. Will follow up with pt. In office. 0915: Called granddaughter Howard Luna, will arrive at 1030 to take pt. Home. 1130: I have reviewed discharge instructions with the patient. The patient verbalized understanding. Pt. Discharged from main entrance to granddaughter Howard Luna. Pt. Discharged with belongings via wheelchair and PCT.

## 2021-01-22 NOTE — DISCHARGE SUMMARY
[facility] Discharge Summary     Patient ID:  Suzie Ny  [id number]  67 y.o.  1940    Admit date: 1/20/2021    Discharge date: 1/22/2021     Admitting Physician: Merlin Columbus, MD     Referring Cardiologist:  Dr. Janie Pappas    PCP:  Mateusz Hernandez MD     Admitting Diagnoses: Aortic Stenosis    Discharge Diagnoses: Aortic Stenosis s/p TAVR    Hospital Problems  Date Reviewed: 1/13/2021          Codes Class Noted POA    Aortic stenosis ICD-10-CM: I35.0  ICD-9-CM: 424.1  1/20/2021 Unknown        Nonrheumatic aortic valve stenosis ICD-10-CM: I35.0  ICD-9-CM: 424.1  1/6/2021 Yes              Discharged Condition: stable    Disposition: home, see patient instructions for treatment and plan    Procedures for this admission:  Procedure(s):  TRANSCATHETER AORTIC VALVE REPLACEMENT, RIGHT TRANSFEMORAL 26 EVOLUT PRO    Discharge Medications:      My Medications      START taking these medications      Instructions Each Dose to Equal Morning Noon Evening Bedtime   acetaminophen 325 mg tablet  Commonly known as: TYLENOL    Your last dose was: Your next dose is: Take 2 Tabs by mouth every four (4) hours as needed for Pain. 650 mg                    CONTINUE taking these medications      Instructions Each Dose to Equal Morning Noon Evening Bedtime   albuterol 2.5 mg /3 mL (0.083 %) Nebu  Commonly known as: PROVENTIL VENTOLIN    Your last dose was: Your next dose is:         3 mL by Nebulization route every four (4) hours as needed for Wheezing. 2.5 mg                 aspirin delayed-release 81 mg tablet    Your last dose was: Your next dose is: Take 81 mg by mouth daily. 81 mg                 bisacodyL 5 mg EC tablet  Commonly known as: DULCOLAX    Your last dose was: Your next dose is: Take 1 Tab by mouth daily as needed for Constipation. 5 mg                 budesonide 0.5 mg/2 mL Nbsp  Commonly known as: PULMICORT    Your last dose was:      Your next dose is:         2 mL by Nebulization route two (2) times a day. 500 mcg                 calcitRIOL 0.5 mcg capsule  Commonly known as: ROCALTROL    Your last dose was: Your next dose is: Take 0.5 mcg by mouth daily. 0.5 mcg                 carvediloL 6.25 mg tablet  Commonly known as: COREG    Your last dose was: Your next dose is: Take 1 Tab by mouth two (2) times daily (with meals). 6.25 mg                 Eliquis 2.5 mg tablet  Generic drug: apixaban    Your last dose was: Your next dose is: Take 2.5 mg by mouth two (2) times a day. 2.5 mg                 metFORMIN 500 mg tablet  Commonly known as: GLUCOPHAGE    Your last dose was: Your next dose is: Take  by mouth two (2) times daily (with meals). Unknown dosage                  oxybutynin chloride XL 15 mg CR tablet  Commonly known as: DITROPAN XL    Your last dose was: Your next dose is: Take 15 mg by mouth daily. 15 mg                 rosuvastatin 20 mg tablet  Commonly known as: CRESTOR    Your last dose was: Your next dose is: Take 1 Tab by mouth nightly. 20 mg                 therapeutic multivitamin tablet  Commonly known as: An Ahsan Manuelt 54 last dose was: Your next dose is: Take 1 Tab by mouth daily. 1 Tab                 venlafaxine 37.5 mg tablet  Commonly known as: EFFEXOR    Your last dose was: Your next dose is: Take 37.5 mg by mouth two (2) times a day. 37.5 mg                            HPI: Copied from H&P dated 1/13/20  Gino Lennox is a [de-identified]. o. female with PMHx of AS, HTN, HLD, DM, CAD-LAD stenosis of 60-70%, CKD stage 3, that is referred to the [address] [place] [address] by Dr. Batsheva Marin interventional evaluation of  Aortic Stenosis.     Patient had a CVA about 6 months ago and since that time her symptoms have gotten worse.  She gets significant shortness of breath even when walking to the bathroom. She notes fatigue and has chest pain with activity about 1-2 times per week. Denies syncope but has fallen a couple of times due to weakness of her left leg. She does have some lower extremity edema at times but this has not been bad recently. She sleeps in her recliner at times and uses 2-3 pillows when sleeping in bed. She reports PND a time or two. No hospitalizations for HF in the last year.      She is retired. She lives alone but has multiple family members that help her. She is accompanied today by her granddaughter who aids in giving information and will plan to go home with the patient following surgery to care for her in the immediate postoperative period.      Hospital Course: Fredy Nevarez was taken to the OR on 1/20/21 for a transcatheter aortic valve replacement with 26 mm Evolut PRO+ via the right femoral artery approach by Marilee Linder. Her procedure was uncomplicated. On POD #1 she had a fever after being under the [employer] all night and had some difficulty swallowing her am medications so she was evaluated by ST. By the afternoon she was feeling much better. Plans were made for an am discharge on POD #2 with the following assessment and plan    1. Aortic Stenosis severe and symptomatic: s/p TAVR with RTF #26 Evolut Pro on 1/20/21 with Marilee Linder. Anticoagulation plan for ASA, Eliquis. Postoperative echo completed and reviewed by Dr. Alexander Lobo. GWYN of 1.8 cm2; MG of 6.6     2. CAD: On ASA, Statin, BB-restarted today. Likely plan for staged plan for PCI following TAVR per Dr. Alexandre Lobo     3. HTN: Required Cardene overnight. EKG this morning without HB. Will restart BB.      4. HLD: On statin     5. DM: Diet controlled. A1C of 7.6. SSI here. Will restart Metformin on DC. Will need to follow up with PCP for BS management     6. CKD stage 3: Avoid hypotension and nephrotoxic agents. Creatinine of 1.4 this morning. (Baseline 1.2-1.6)     7. Depression: On Effexor     8. COPD: On home Nebs     9.  Diastolic HF grade III: BP management     10. MR: Echo reviewed by Dr. Loretta Mariscal.     11. Anemia: Postoperative secondary to acute blood loss. Currently above transfusion threshold. Continue Iron. Monitor daily CBC.     12. Febrile: Resolved after removing Magali Hugger. Referral to outpatient cardiac rehab made. Discharge Vital Signs:   Visit Vitals  BP (!) 160/76   Pulse 84   Temp 98.9 °F (37.2 °C)   Resp 21   Ht 5' 5\" (1.651 m)   Wt 220 lb 7.4 oz (100 kg)   SpO2 95%   BMI 36.69 kg/m²       Labs:   Recent Labs     01/22/21  0628 01/22/21  0358 01/20/21  1415 01/20/21  1415   WBC  --  10.3   < > 9.5   HGB  --  10.1*   < > 10.2*   HCT  --  31.3*   < > 31.6*   PLT  --  214   < > 222   NA  --  138   < > 142   K  --  4.1   < > 4.0   BUN  --  21*   < > 22*   CREA  --  1.36*   < > 1.23*   GLU  --  137*   < > 124*   GLUCPOC 163*  --    < >  --    INR  --   --   --  1.2*    < > = values in this interval not displayed. Diagnostics:   CXR Results  (Last 48 hours)               01/21/21 0551  XR CHEST PORT Final result    Impression:  Resolved interstitial pulmonary edema. Narrative:  INDICATION:  postop heart       EXAM: CXR Portable. FINDINGS: Portable chest shows resolution of interstitial pulmonary edema   pattern since yesterday. There is no apparent pneumothorax. Lungs show no   consolidation. Heart size is stable. TAVR is noted. There is no midline shift.           01/20/21 1424  XR CHEST PORT Final result    Impression:  Cardiomegaly. Possible mild pulmonary edema       Narrative:  EXAM: XR CHEST PORT       INDICATION: postop heart       COMPARISON: 1/13/2021       FINDINGS: A portable AP radiograph of the chest was obtained at 1416 hours. The   patient is on a cardiac monitor. Diminished vascular clarity suggestive of mild   pulmonary edema. . Cardiomegaly. .  The bones and soft tissues are grossly within   normal limits.                     Patient Instructions/Follow Up Care:  Discharge instructions were reviewed with the patient and family present. Questions were also answered at this time. Prescriptions and medications were reviewed. The patient has a follow up appointment with the Nurse Practitioner or [de-identified] Assistant on 1/25/21 ar 11:30 by telephone and with Dr. Katina Chinchilla on 2/22 at 2pm. The patient was also instructed to follow up with her primary care physician as needed. The patient and family were encouraged to call with any questions or concerns.        Signed:  Carlo Soliman NP  1/22/2021  7:27 AM

## 2021-01-22 NOTE — PROGRESS NOTES
[facility] Cardiology Progress Note    Date of Service: 1/22/2021    Subjective:  No acute events overnight. Feels well and is excited to go home. No CP, LH, groin pain.     Objective:    Visit Vitals  BP (!) 128/46 (BP 1 Location: Left arm, BP Patient Position: At rest)   Pulse 73   Temp 98.8 °F (37.1 °C)   Resp 19   Ht 5' 5\" (1.651 m)   Wt 100 kg (220 lb 7.4 oz)   SpO2 94%   BMI 36.69 kg/m²         Intake/Output Summary (Last 24 hours) at 1/22/2021 1131  Last data filed at 1/22/2021 1051  Gross per 24 hour   Intake 40 ml   Output 3200 ml   Net -3160 ml        Physical Exam  GEN: NAD, appears stated age  HEENT: EOMI, MMM, OP clear  NECK: Normal JVP  CV: RRR, normal S1 and S2, I-II/VI systolic flow murmur at LUSB  LUNGS: Minimal bibasilar inspiratory crackles  ABD: NABS, soft, NT/ND  EXT: No edema, 2+ distal pulses  PSYCH: Mood and affect normal  NEURO: AAO, MAEW, face symmetrical, speech intact    Current Facility-Administered Medications   Medication Dose Route Frequency    carvediloL (COREG) tablet 6.25 mg  6.25 mg Oral BID WITH MEALS    guaiFENesin ER (MUCINEX) tablet 600 mg  600 mg Oral Q12H    apixaban (ELIQUIS) tablet 2.5 mg  2.5 mg Oral BID    calcitRIOL (ROCALTROL) capsule 0.5 mcg  0.5 mcg Oral DAILY    oxybutynin chloride XL (DITROPAN XL) tablet 15 mg  15 mg Oral DAILY    rosuvastatin (CRESTOR) tablet 20 mg  20 mg Oral QHS    venlafaxine (EFFEXOR) tablet 37.5 mg  37.5 mg Oral BID    sodium chloride (NS) flush 5-40 mL  5-40 mL IntraVENous Q8H    sodium chloride (NS) flush 5-40 mL  5-40 mL IntraVENous PRN    0.45% sodium chloride infusion  10 mL/hr IntraVENous CONTINUOUS    0.9% sodium chloride infusion  9 mL/hr IntraVENous CONTINUOUS    acetaminophen (TYLENOL) tablet 650 mg  650 mg Oral Q4H PRN    traMADoL (ULTRAM) tablet  mg   mg Oral Q6H PRN    oxyCODONE-acetaminophen (PERCOCET) 5-325 mg per tablet 1-2 Tab  1-2 Tab Oral Q4H PRN    morphine injection 2 mg  2 mg IntraVENous Q2H PRN  naloxone (NARCAN) injection 0.4 mg  0.4 mg IntraVENous PRN    ondansetron (ZOFRAN) injection 4 mg  4 mg IntraVENous Q4H PRN    aspirin chewable tablet 81 mg  81 mg Oral DAILY    famotidine (PEPCID) tablet 20 mg  20 mg Oral DAILY    calcium chloride 1 g in 0.9% sodium chloride 250 mL IVPB  1 g IntraVENous PRN    bisacodyL (DULCOLAX) suppository 10 mg  10 mg Rectal DAILY PRN    senna-docusate (PERICOLACE) 8.6-50 mg per tablet 1 Tab  1 Tab Oral BID    ELECTROLYTE REPLACEMENT NOTE: Nurse to review Serum Potassium and Magnesuim levels and Initiate Electrolyte Replacement Protocol as needed  1 Each Other PRN    magnesium oxide (MAG-OX) tablet 400 mg  400 mg Oral BID    insulin lispro (HUMALOG) injection   SubCUTAneous AC&HS    glucose chewable tablet 16 g  4 Tab Oral PRN    dextrose (D50W) injection syrg 12.5-25 g  12.5-25 g IntraVENous PRN    glucagon (GLUCAGEN) injection 1 mg  1 mg IntraMUSCular PRN    albuterol (PROVENTIL VENTOLIN) nebulizer solution 2.5 mg  2.5 mg Nebulization Q4H PRN    hydrALAZINE (APRESOLINE) 20 mg/mL injection 10 mg  10 mg IntraVENous Q6H PRN    insulin regular (NOVOLIN R, HUMULIN R) 100 Units in 0.9% sodium chloride 100 mL infusion  0-50 Units/hr IntraVENous TITRATE    niCARdipine (CARDENE) 25 mg in 0.9% sodium chloride 250 mL infusion  0-15 mg/hr IntraVENous TITRATE    PHENYLephrine (JANAE-SYNEPHRINE) 30 mg in 0.9% sodium chloride 250 mL infusion   mcg/min IntraVENous TITRATE    dexmedeTOMidine in 0.9 % NaCl (PRECEDEX) 400 mcg/100 mL (4 mcg/mL) infusion soln  0.1-1.5 mcg/kg/hr IntraVENous TITRATE       Data Reviewed:  Recent Labs     01/22/21  0358 01/21/21  0414 01/20/21  1415    138 142   K 4.1 4.1 4.0    108 109*   CO2 27 23 27   * 227* 124*   BUN 21* 21* 22*   CREA 1.36* 1.44* 1.23*   CA 9.8 9.5 9.6   MG 2.0 1.7 1.5*   ALB 3.1* 2.9* 3.1*   ALT 14 17 24   INR  --   --  1.2*     Recent Labs     01/22/21  0358 01/21/21  0414 01/20/21  1415   WBC 10.3 9.7 9.5   HGB 10.1* 9.8* 10.2*   HCT 31.3* 29.8* 31.6*    227 222     Telemetry (personally reviewed): normal sinus rhythm, frequent APDs    Echocardiogram:  · LV: Estimated LVEF is >70%. Normal cavity size. Mild concentric hypertrophy. Hyperdynamic systolic function. Moderate (grade 2) left ventricular diastolic dysfunction. · MV: Mitral valve leaflet calcification. Mitral annular calcification. Mitral valve mean gradient is 3.4 mmHg. Mild mitral valve stenosis is present. MV MG 3.44 mmHg at HR 60 bpm. Mild mitral valve regurgitation is present. · PV: Mild pulmonic valve regurgitation is present. · LA: Severely dilated left atrium. · TV: Mild tricuspid valve regurgitation is present. · AV: Prosthetic aortic valve. Aortic valve mean gradient is 6.6 mmHg. Aortic valve area is 1.8 cm2. There is a 26 mm [employer] Evolut PRO+ prosthetic aortic valve from prior TAVR procedure. Prosthesis is normal. Prosthetic valve function is sufficient  · RA: Mildly dilated right atrium. · IAS: There was no shunting at baseline. · PA: Mild pulmonary hypertension. Pulmonary arterial systolic pressure is 39 mmHg. LVOTd 1.8 cm  LVOT/AV VTI 28.6/40.7 cm  DI 0.70  EOA 1.79 cm2  EOAi 0.87 cm2/m2  BSA 2.05 m2    Assessment:  1. Symptomatic, severe aortic stenosis s/p successful percutaneous right transfemoral TAVR using a 26 mm Evolut PRO+ transcatheter valve on 1/20/21  2. Post-op fever; no clinical evidence for infection  3. Hypertension  4. Hyperlipidemia  5. DM2  6. Stage III CKD  7. COPD  8.  Chronic diastolic CHF    Plan:  - PT/OT, incentive spirometry  - Continue aspirin and apixaban  - Medical management of CAD; Dr. Garland Linares to consider LAD PCI if she has anginal symptoms  - Restart outpatient anti-hypertensives  - SBE prophylaxis prior to dental procedures  - Repeat TTE at 1 month  - F/u with me in 6-8 weeks  - F/u long-term with Dr. Garland Linares (primary cardiologist)  - Follow up with cardiothoracic surgery team in 1 week after discharge    Stable for discharge today. Thank you for the opportunity to participate in the care of Geoffery Schwab and please do not hesitate to contact us should you have any questions. Signed:  Garth Garcia.  [facility] Arevalo [facility] / [address] [place], [place] [address]  01/22/21

## 2021-01-22 NOTE — PROGRESS NOTES
[facility] ICU Progress Note    Admit Date: 2021  POD:  2 Day Post-Op    Procedure:  Procedure(s):  TRANSCATHETER AORTIC VALVE REPLACEMENT, RIGHT TRANSFEMORAL 26 EVOLUT PRO        Subjective:   Pt seen with Dr. Osei Meek. Room air. Temp improved after taking Magali Hugger off. Ready to go home     Objective:   Vitals:  Blood pressure (!) 160/76, pulse 84, temperature 98.9 °F (37.2 °C), resp. rate 21, height 5' 5\" (1.651 m), weight 220 lb 7.4 oz (100 kg), SpO2 95 %. Temp (24hrs), Av.3 °F (37.4 °C), Min:98 °F (36.7 °C), Max:101 °F (38.3 °C)    EKG/Rhythm:  SR with IL of 182 and QRS of 72    Oxygen Therapy: room air    CXR:   CXR Results  (Last 48 hours)               21 0551  XR CHEST PORT Final result    Impression:  Resolved interstitial pulmonary edema. Narrative:  INDICATION:  postop heart       EXAM: CXR Portable. FINDINGS: Portable chest shows resolution of interstitial pulmonary edema   pattern since yesterday. There is no apparent pneumothorax. Lungs show no   consolidation. Heart size is stable. TAVR is noted. There is no midline shift.           21 1424  XR CHEST PORT Final result    Impression:  Cardiomegaly. Possible mild pulmonary edema       Narrative:  EXAM: XR CHEST PORT       INDICATION: postop heart       COMPARISON: 2021       FINDINGS: A portable AP radiograph of the chest was obtained at 1416 hours. The   patient is on a cardiac monitor. Diminished vascular clarity suggestive of mild   pulmonary edema. . Cardiomegaly. .  The bones and soft tissues are grossly within   normal limits. Admission Weight: Last Weight   Weight: 217 lb 6 oz (98.6 kg) Weight: 220 lb 7.4 oz (100 kg)     Intake / Output / Drain:  Current Shift: No intake/output data recorded.   Last 24 hrs.:     Intake/Output Summary (Last 24 hours) at 2021 0724  Last data filed at 2021 0600  Gross per 24 hour   Intake 220 ml   Output 3150 ml   Net -2930 ml       EXAM:  General: No acute distress   Lungs:   Clear to auscultation bilaterally. Groin Incisions:  No erythema, drainage or swelling. Heart:  Regular rate and rhythm, S1, S2 normal, no murmur, click, rub or gallop. Abdomen:   Soft, non-tender. Bowel sounds normal. No masses,  No organomegaly. Extremities:  No edema. PPP. Neurologic:  Gross motor and sensory apparatus intact. Labs:   Recent Labs     21  0628 21  0358 21  1415 21  1415   WBC  --  10.3   < > 9.5   HGB  --  10.1*   < > 10.2*   HCT  --  31.3*   < > 31.6*   PLT  --  214   < > 222   NA  --  138   < > 142   K  --  4.1   < > 4.0   BUN  --  21*   < > 22*   CREA  --  1.36*   < > 1.23*   GLU  --  137*   < > 124*   GLUCPOC 163*  --    < >  --    INR  --   --   --  1.2*    < > = values in this interval not displayed. Assessment:     Active Problems:    Nonrheumatic aortic valve stenosis (2021)      Aortic stenosis (2021)         Plan/Recommendations/Medical Decision Makin. Aortic Stenosis severe and symptomatic: s/p TAVR with RTF #26 Evolut Pro on 21 with Marilee Villafuerte. Anticoagulation plan for ASA, Eliquis. Postoperative echo completed and reviewed by Dr. Bernice Garcia. GWYN of 1.8 cm2; MG of 6.6     2. CAD: On ASA, Statin, BB-restarted today. Likely plan for staged plan for PCI following TAVR per Dr. Bernice Garcia     3. HTN: Required Cardene overnight. EKG this morning without HB. Will restart BB.      4. HLD: On statin     5. DM: Diet controlled. A1C of 7.6. SSI here. Will restart Metformin on DC. Will need to follow up with PCP for BS management     6. CKD stage 3: Avoid hypotension and nephrotoxic agents. Creatinine of 1.4 this morning. (Baseline 1.2-1.6)     7. Depression: On Effexor     8. COPD: On home Nebs     9. Diastolic HF grade III: BP management     10. MR: Echo reviewed by Dr. Jasmyne Townsend. 11. Anemia: Postoperative secondary to acute blood loss. Currently above transfusion threshold.   Continue Iron. Monitor daily CBC. 12. Febrile: Resolved after removing Magali Hugger. Dispo: PT/OT/Cardiac Rehab. Case Management for discharge planning.  Home today         Signed By: Ishaan Doyle NP   Patient seen on rounds with care team  Ready for dc no issues overnight

## 2021-01-25 ENCOUNTER — OFFICE VISIT (OUTPATIENT)
Dept: CARDIOLOGY CLINIC | Age: 81
End: 2021-01-25
Payer: MEDICARE

## 2021-01-25 DIAGNOSIS — I35.0 NONRHEUMATIC AORTIC VALVE STENOSIS: Primary | ICD-10-CM

## 2021-01-25 DIAGNOSIS — Z95.2 S/P TAVR (TRANSCATHETER AORTIC VALVE REPLACEMENT): ICD-10-CM

## 2021-01-25 PROCEDURE — G8432 DEP SCR NOT DOC, RNG: HCPCS | Performed by: NURSE PRACTITIONER

## 2021-01-25 PROCEDURE — G8399 PT W/DXA RESULTS DOCUMENT: HCPCS | Performed by: NURSE PRACTITIONER

## 2021-01-25 PROCEDURE — G8536 NO DOC ELDER MAL SCRN: HCPCS | Performed by: NURSE PRACTITIONER

## 2021-01-25 PROCEDURE — G8417 CALC BMI ABV UP PARAM F/U: HCPCS | Performed by: NURSE PRACTITIONER

## 2021-01-25 PROCEDURE — G8427 DOCREV CUR MEDS BY ELIG CLIN: HCPCS | Performed by: NURSE PRACTITIONER

## 2021-01-25 PROCEDURE — G8756 NO BP MEASURE DOC: HCPCS | Performed by: NURSE PRACTITIONER

## 2021-01-25 PROCEDURE — 1090F PRES/ABSN URINE INCON ASSESS: CPT | Performed by: NURSE PRACTITIONER

## 2021-01-25 PROCEDURE — 1111F DSCHRG MED/CURRENT MED MERGE: CPT | Performed by: NURSE PRACTITIONER

## 2021-01-25 PROCEDURE — 99212 OFFICE O/P EST SF 10 MIN: CPT | Performed by: NURSE PRACTITIONER

## 2021-01-25 PROCEDURE — 1101F PT FALLS ASSESS-DOCD LE1/YR: CPT | Performed by: NURSE PRACTITIONER

## 2021-01-25 NOTE — PROGRESS NOTES
Patient: Adrienne Betancourt   Age: [de-identified] y.o. Patient Care Team:  Harpreet Lucio MD as PCP - General (Internal Medicine)  Frank Bernal MD (Cardiology)    PCP: Harpreet Lucio MD    Cardiologist: Dr. Ellis Barbour    Diagnosis/Reason for Consultation: The primary encounter diagnosis was Nonrheumatic aortic valve stenosis. A diagnosis of S/P TAVR (transcatheter aortic valve replacement) was also pertinent to this visit. Problem List:   Patient Active Problem List   Diagnosis Code    NSTEMI (non-ST elevated myocardial infarction) ([facility] [address] I21.4    Benign essential hypertension I10    Diabetic peripheral neuropathy (HCC) E11.42    Type II diabetes mellitus, uncontrolled ([facility] [address] E11.65    Hyperlipidemia E78.5    SOB (shortness of breath) R06.02    S/P cardiac cath Z98.890    Nonrheumatic aortic valve stenosis I35.0    Aortic stenosis I35.0    S/P TAVR (transcatheter aortic valve replacement) Z95.2         HPI: [de-identified] y.o. female s/p transcatheter aortic valve replacement with 26 mm Evolut PRO+ via the right femoral artery approach by Marilee Rojas. Her procedure was uncomplicated. On POD #1 she had a fever after being under the [employer] all night and had some difficulty swallowing her am medications so she was evaluated by ST. By the afternoon she was feeling much better. Plans were made for an am discharge on POD #2. The patient states she has been doing well at home. She denies fever, chills, worsening shortness of breath, chest pain, palpitations, or issues with her incisions. She states that she was unable to get her ASA from the Pharmacy as they pre-fill her medications and didn't sent that one. She seems a little confused about when she needs to take her medications but states that she has been talking to her granddaughter about this. NYHA Classification: IIB   Class I (Mild): No limitation of physical activity.  Ordinary physical activity does not cause undue fatigue, palpitation, or dyspnea. Class II (Mild): Slight limitation of physical activity. Comfortable at rest, but ordinary physical activity results in fatigue, palpitation, or dyspnea. Class III (Moderate): Marked limitation of physical activity. Comfortable at rest, but less than ordinary activity causes fatigue, palpitation, or dyspnea   Class IV (Severe): Unable to carry out any physical activity without discomfort. Symptoms  of cardiac insufficiency at rest.  If any physical activity is undertaken, discomfort is increased. Angina Classification: 0   Class 0: No symptoms   Class 1: Angina with strenuous exercise   Class 2: Angina with moderate exercise   Class 3: Angina with mild exertion   Walking 1-2 level blocks at normal pace; Climbing 1 flight of stairs at normal pace  Class 4: Angina at any level of physical exertion       Past Medical History:   Diagnosis Date    CAD (coronary artery disease)     pulmonary HTN    Diabetes ([facility] [address]     Hypertension          Past Surgical History:   Procedure Laterality Date    HX KNEE REPLACEMENT Bilateral     HX ORTHOPAEDIC      IR INSERT TUNL CVC W/O PORT OVER 5 YR  3/5/2019      Social History     Tobacco Use    Smoking status: Never Smoker    Smokeless tobacco: Never Used   Substance Use Topics    Alcohol use: No      No family history on file. Prior to Admission medications    Medication Sig Start Date End Date Taking? Authorizing Provider   acetaminophen (TYLENOL) 325 mg tablet Take 2 Tabs by mouth every four (4) hours as needed for Pain. 1/22/21   Tanner Byrne, NP   apixaban (Eliquis) 2.5 mg tablet Take 2.5 mg by mouth two (2) times a day. Provider, Historical   metFORMIN (GLUCOPHAGE) 500 mg tablet Take  by mouth two (2) times daily (with meals). Unknown dosage    Provider, Historical   aspirin delayed-release 81 mg tablet Take 81 mg by mouth daily. Provider, Historical   carvedilol (COREG) 6.25 mg tablet Take 1 Tab by mouth two (2) times daily (with meals). 3/11/19   Glynn Every, MD   bisacodyl (DULCOLAX) 5 mg EC tablet Take 1 Tab by mouth daily as needed for Constipation. 3/11/19   Glynn Every, MD   budesonide (PULMICORT) 0.5 mg/2 mL nbsp 2 mL by Nebulization route two (2) times a day. 3/11/19   Glynn Every, MD   albuterol (PROVENTIL VENTOLIN) 2.5 mg /3 mL (0.083 %) nebulizer solution 3 mL by Nebulization route every four (4) hours as needed for Wheezing. 3/11/19   Glynn Every, MD   rosuvastatin (CRESTOR) 20 mg tablet Take 1 Tab by mouth nightly. 3/11/19   Glynn Every, MD   oxybutynin chloride XL (DITROPAN XL) 15 mg CR tablet Take 15 mg by mouth daily. Provider, Historical   therapeutic multivitamin (THERAGRAN) tablet Take 1 Tab by mouth daily. Provider, Historical   venlafaxine (EFFEXOR) 37.5 mg tablet Take 37.5 mg by mouth two (2) times a day. Provider, Historical   calcitRIOL (ROCALTROL) 0.5 mcg capsule Take 0.5 mcg by mouth daily. Provider, Historical       No Known Allergies    Current Medications:   Current Outpatient Medications   Medication Sig Dispense Refill    acetaminophen (TYLENOL) 325 mg tablet Take 2 Tabs by mouth every four (4) hours as needed for Pain.  apixaban (Eliquis) 2.5 mg tablet Take 2.5 mg by mouth two (2) times a day.  metFORMIN (GLUCOPHAGE) 500 mg tablet Take  by mouth two (2) times daily (with meals). Unknown dosage      aspirin delayed-release 81 mg tablet Take 81 mg by mouth daily.  carvedilol (COREG) 6.25 mg tablet Take 1 Tab by mouth two (2) times daily (with meals). 60 Tab 0    bisacodyl (DULCOLAX) 5 mg EC tablet Take 1 Tab by mouth daily as needed for Constipation. 10 Tab 0    budesonide (PULMICORT) 0.5 mg/2 mL nbsp 2 mL by Nebulization route two (2) times a day. 60 Each 1    albuterol (PROVENTIL VENTOLIN) 2.5 mg /3 mL (0.083 %) nebulizer solution 3 mL by Nebulization route every four (4) hours as needed for Wheezing.  60 Each 0    rosuvastatin (CRESTOR) 20 mg tablet Take 1 Tab by mouth nightly. 30 Tab 0    oxybutynin chloride XL (DITROPAN XL) 15 mg CR tablet Take 15 mg by mouth daily.  therapeutic multivitamin (THERAGRAN) tablet Take 1 Tab by mouth daily.  venlafaxine (EFFEXOR) 37.5 mg tablet Take 37.5 mg by mouth two (2) times a day.  calcitRIOL (ROCALTROL) 0.5 mcg capsule Take 0.5 mcg by mouth daily. Vitals: There were no vitals taken for this visit. Allergies: has No Known Allergies.     Review of Systems: Pertinent Positives per HPI   [x]Total of 13 systems reviewed as follows:  Constitutional: Negative fever, negative chills, +fatigue, +weight gain  Eyes:               Negative for amauroses fugax, +glasses  ENT:                Negative sore throat,oral absecess  Endocrine        Negative for thyroid replacement Rx; goiter; DM  Respiratory:     Negative chronic cough,sputum production, +asthma, +shortness of breath on exertion  Cards:              Negative for palpitations, lower extremity edema, varicosities, claudication  GI:                   Negative for dysphagia, bleeding, nausea, vomiting, diarrhea, and abdominal pain  Genitourinary: Negative for frequency, dysuria  Integument:     Negative for rash and pruritus  Hematologic:   Negative for easy bruising; bleeding dyscarsia  Musculoskel:   Negative for muscle weakness inhibiting ambulation  Neurological:   Negative for syncope, +dizziness, +stroke, +HA, +memory loss  Behavl/Psych: Negative for feelings of anxiety, +depression     Cardiovascular Testing:     EK21  Component Value Ref Range & Units Status   Ventricular Rate 75  BPM Final   Atrial Rate 75  BPM Final   P-R Interval 182  ms Final   QRS Duration 72  ms Final   Q-T Interval 380  ms Final   QTC Calculation (Bezet) 424  ms Final   Calculated P Axis 67  degrees Final   Calculated R Axis 59  degrees Final   Calculated T Axis -53  degrees Final   Diagnosis   Final   Normal sinus rhythm   Nonspecific T wave abnormality   When compared with ECG of 21-JAN-2021 04:28,   No significant change was found          TTE:  1/20/21  Interpretation Summary       · LV: Estimated LVEF is >70%. Normal cavity size. Mild concentric hypertrophy. Hyperdynamic systolic function. Moderate (grade 2) left ventricular diastolic dysfunction. · MV: Mitral valve leaflet calcification. Mitral annular calcification. Mitral valve mean gradient is 3.4 mmHg. Mild mitral valve stenosis is present. MV MG 3.44 mmHg at HR 60 bpm. Mild mitral valve regurgitation is present. · PV: Mild pulmonic valve regurgitation is present. · LA: Severely dilated left atrium. · TV: Mild tricuspid valve regurgitation is present. · AV: Prosthetic aortic valve. Aortic valve mean gradient is 6.6 mmHg. Aortic valve area is 1.8 cm2. There is a 26 mm Medtronic Evolut PRO+ prosthetic aortic valve from prior TAVR procedure. Prosthesis is normal. Prosthetic valve function is sufficient  · RA: Mildly dilated right atrium. · IAS: There was no shunting at baseline. · PA: Mild pulmonary hypertension. Pulmonary arterial systolic pressure is 39 mmHg. LVOTd 1.8 cm  LVOT/AV VTI 28.6/40.7 cm  DI 0.70  EOA 1.79 cm2  EOAi 0.87 cm2/m2  BSA 2.05 m2      Echo Findings    Left Ventricle Normal cavity size. Mild concentric hypertrophy. The estimated EF is >70%. Hyperdynamic systolic function. There is moderate (grade 2) left ventricular diastolic dysfunction. Wall Scoring The left ventricular wall motion is globally hyperkinetic. Left Atrium Severely dilated left atrium. Right Ventricle Normal cavity size and global systolic function. Right Atrium Mildly dilated right atrium. Interatrial Septum No interatrial shunt visualized on color doppler. There was no shunting at baseline. Aortic Valve No stenosis and no regurgitation. Prosthetic aortic valve. Aortic valve peak gradient is 13 mmHg. Aortic valve mean gradient is 6.6 mmHg. Aortic valve area is 1.8 cm2. Aortic valve peak velocity is 1.8 cm/s.  There is a 26 mm Medtronic Evolut PRO+ TAVR present. Prosthetic valve insufficiency not present. The prosthetic valve is normal. LVOTd 1.8 cm  LVOT/AV VTI 28.6/40.7 cm  DI 0.70  EOA 1.79 cm2  EOAi 0.87 cm2/m2  BSA 2.05 m2. Mitral Valve Leaflet calcification. Mitral annular calcification. Mitral valve mean gradient is 3.4 mmHg. Mitral valve area is 3.4 cm2. Mild stenosis present. MV MG 3.44 mmHg at HR 60 bpm. Mild regurgitation. Tricuspid Valve Normal valve structure and no stenosis. Mild regurgitation. Pulmonic Valve Normal valve structure and no stenosis. Mild regurgitation. Aorta Normal aortic root. Pulmonary Artery Pulmonary arterial systolic pressure (PASP) is 39 mmHg. Pulmonary hypertension found to be mild. IVC/Hepatic Veins Normal central venous pressure (3 mmHg); IVC diameter is less than 21 mm and collapses more than 50% with respiration. Pericardium Normal pericardium and no evidence of pericardial effusion. Physical Exam:  No physical exam performed due to telephone encounter        Assessment/Plan:     1. Aortic Stenosis severe and symptomatic: s/p TAVR with RTF #26 Evolut Pro on 1/20/21 with Marilee Kenney. Anticoagulation plan for ASA, Eliquis. Postoperative echo completed and reviewed by Dr. Ailin Hernandez. GWYN of 1.8 cm2; MG of 6.6. I called the patient's granddaughter, Sue Beckman, per her request and asked her to  the ASA. She will do this and review the patient's medications. I have asked her to reach out if she has any questions or concerns.      2. CAD: On ASA, Statin, BB. Likely plan for staged plan for PCI following TAVR per Dr. Ailin Hernandez     3. HTN: BB      4. HLD: On statin     5. DM: Diet controlled. A1C of 7.6. Metformin at home     6. CKD stage 3: Avoid hypotension and nephrotoxic agents. Creatinine of 1.4 on DC. (Baseline 1.2-1.6)     7. Depression: On Effexor     8. COPD: On home Nebs     9. Diastolic HF grade III: BP management     10.  MR: Echo reviewed by  Hope.     11. Anemia: Postoperative secondary to acute blood loss.  Above transfusion threshold on DC. SBE prophylax reviewed. Time spent:  12 minutes spent on the telephone with the patient and her granddaughter, Mariella Oconnell. This time is not inclusive on time spent on note preparation, diagnostic testing review, or coordination of care. This service was provided through telehealth:  location of patient(home)  and location of provider([facility]).

## 2021-01-28 ENCOUNTER — TELEPHONE (OUTPATIENT)
Dept: CARDIAC REHAB | Age: 81
End: 2021-01-28

## 2021-01-28 NOTE — TELEPHONE ENCOUNTER
1/28/2021 Cardiac Rehab: Called Ms. Stewart Bailey to discuss participation in the Cardiac Rehab Program following TAVR on 1/20/2021. She currently does not have transportation and I told her I can mail her a list of transportation resources. Ms. Zeyad Campbell verified her address and would be happy to have that list. I will give her another call after her follow up with Dr. Zulay Belle, which is scheduled for 2/22/2021, to see what he had to say. Ms. Stewart Bailey is without questions or concerns.   Jeny Mccoy

## 2021-01-29 ENCOUNTER — TELEPHONE (OUTPATIENT)
Dept: CARDIOLOGY CLINIC | Age: 81
End: 2021-01-29

## 2021-02-05 NOTE — TELEPHONE ENCOUNTER
The patient states that she got her COVID vaccine. She also noticed a couple of floaters in her eye. They are getting better. Denies unilateral symptoms suggestive of CVA, slurred speech, limb weakness. States she feels fine otherwise. I have suggested that she follow up with her eye MD and she will call to get an appointment

## 2021-03-02 NOTE — PROGRESS NOTES
Patient: Geoffery Schwab   Age: 80 y.o. Patient Care Team:  Richelle Stafford MD as PCP - General (Internal Medicine)  Michelle Espinoza MD (Cardiology)    PCP: Richelle Stafford MD    Cardiologist: Dr. Bandar Arevalo    Diagnosis/Reason for Consultation: The primary encounter diagnosis was Nonrheumatic aortic valve stenosis. A diagnosis of S/P TAVR (transcatheter aortic valve replacement) was also pertinent to this visit. Problem List:   Patient Active Problem List   Diagnosis Code    NSTEMI (non-ST elevated myocardial infarction) ([facility] [address] I21.4    Benign essential hypertension I10    Diabetic peripheral neuropathy (HCC) E11.42    Type II diabetes mellitus, uncontrolled ([facility] [address] E11.65    Hyperlipidemia E78.5    SOB (shortness of breath) R06.02    S/P cardiac cath Z98.890    Nonrheumatic aortic valve stenosis I35.0    Aortic stenosis I35.0    S/P TAVR (transcatheter aortic valve replacement) Z95.2         HPI: 80 y. o. female s/p transcatheter aortic valve replacement with 26 mm Evolut PRO+ via the right femoral artery approach by Marilee Coyle and Hope. Her procedure was uncomplicated. she  is here today for their One Month Post Op Appointment    The patient states she has been feeling better since her TAVR. However, she feels like the weather and COVID have kept her isolated in her home and she hasn't been getting much exercise. She does use her walker at home and when going out. She denies fever, chills, worsening shortness of breath, chest pain. She is able to walk about 1/2 block before needing to rest. She does still have some LE edema at times. This is worse if she has been up all day or if she is indiscriminate about her sodium intake. She has been weighing herself and feels that her weight is stable. Her BP is up a little today but they rushed to get to their appointment. She is accompanied by her granddaughter who aids in giving information.  She also has a complaint about seeing floaters in her vision field. We discussed this at her telephone check in and she discussed it with Dr. Wei Lainez. We have both asked her to reach out to her eye doctor. Any dental pain/concerns: None    NYHA Classification: IIB   Class II (Mild): Slight limitation of physical activity. Comfortable at rest, but ordinary physical activity results in fatigue, palpitation, or dyspnea. Angina Classification: 0   Class 0: No symptoms      Past Medical History:   Diagnosis Date    CAD (coronary artery disease)     pulmonary HTN    Diabetes ([facility] [address]     Hypertension          Past Surgical History:   Procedure Laterality Date    HX KNEE REPLACEMENT Bilateral     HX ORTHOPAEDIC      IR INSERT TUNL CVC W/O PORT OVER 5 YR  3/5/2019      Social History     Tobacco Use    Smoking status: Never Smoker    Smokeless tobacco: Never Used   Substance Use Topics    Alcohol use: No      No family history on file. Prior to Admission medications    Medication Sig Start Date End Date Taking? Authorizing Provider   acetaminophen (TYLENOL) 325 mg tablet Take 2 Tabs by mouth every four (4) hours as needed for Pain. 1/22/21   Jose Alvarez, KATHYA   apixaban (Eliquis) 2.5 mg tablet Take 2.5 mg by mouth two (2) times a day. Provider, Historical   metFORMIN (GLUCOPHAGE) 500 mg tablet Take  by mouth two (2) times daily (with meals). Unknown dosage    Provider, Historical   aspirin delayed-release 81 mg tablet Take 81 mg by mouth daily. Provider, Historical   carvedilol (COREG) 6.25 mg tablet Take 1 Tab by mouth two (2) times daily (with meals). 3/11/19   Mylene Oh MD   bisacodyl (DULCOLAX) 5 mg EC tablet Take 1 Tab by mouth daily as needed for Constipation. 3/11/19   Mylene Oh MD   budesonide (PULMICORT) 0.5 mg/2 mL nbsp 2 mL by Nebulization route two (2) times a day. 3/11/19   Mylene Oh MD   albuterol (PROVENTIL VENTOLIN) 2.5 mg /3 mL (0.083 %) nebulizer solution 3 mL by Nebulization route every four (4) hours as needed for Wheezing. 3/11/19   Yusuf May MD   rosuvastatin (CRESTOR) 20 mg tablet Take 1 Tab by mouth nightly. 3/11/19   Yusuf May MD   oxybutynin chloride XL (DITROPAN XL) 15 mg CR tablet Take 15 mg by mouth daily. Provider, Historical   therapeutic multivitamin (THERAGRAN) tablet Take 1 Tab by mouth daily. Provider, Historical   venlafaxine (EFFEXOR) 37.5 mg tablet Take 37.5 mg by mouth two (2) times a day. Provider, Historical   calcitRIOL (ROCALTROL) 0.5 mcg capsule Take 0.5 mcg by mouth daily. Provider, Historical       No Known Allergies    Current Medications:   Current Outpatient Medications   Medication Sig Dispense Refill    acetaminophen (TYLENOL) 325 mg tablet Take 2 Tabs by mouth every four (4) hours as needed for Pain.  apixaban (Eliquis) 2.5 mg tablet Take 2.5 mg by mouth two (2) times a day.  metFORMIN (GLUCOPHAGE) 500 mg tablet Take  by mouth two (2) times daily (with meals). Unknown dosage      aspirin delayed-release 81 mg tablet Take 81 mg by mouth daily.  carvedilol (COREG) 6.25 mg tablet Take 1 Tab by mouth two (2) times daily (with meals). 60 Tab 0    bisacodyl (DULCOLAX) 5 mg EC tablet Take 1 Tab by mouth daily as needed for Constipation. 10 Tab 0    budesonide (PULMICORT) 0.5 mg/2 mL nbsp 2 mL by Nebulization route two (2) times a day. 60 Each 1    albuterol (PROVENTIL VENTOLIN) 2.5 mg /3 mL (0.083 %) nebulizer solution 3 mL by Nebulization route every four (4) hours as needed for Wheezing. 60 Each 0    rosuvastatin (CRESTOR) 20 mg tablet Take 1 Tab by mouth nightly. 30 Tab 0    oxybutynin chloride XL (DITROPAN XL) 15 mg CR tablet Take 15 mg by mouth daily.  therapeutic multivitamin (THERAGRAN) tablet Take 1 Tab by mouth daily.  venlafaxine (EFFEXOR) 37.5 mg tablet Take 37.5 mg by mouth two (2) times a day.  calcitRIOL (ROCALTROL) 0.5 mcg capsule Take 0.5 mcg by mouth daily. Vitals: Blood pressure (!) 146/70, pulse 66, resp. rate 16, SpO2 96 %. Allergies: has No Known Allergies. Review of Systems: Pertinent Positives per HPI   [x]Total of 13 systems reviewed as follows:  Constitutional: Negative fever, negative chills  Eyes:   Negative for amauroses fugax, +floaters  ENT:   Negative sore throat,oral abscess   Endocrine Negative for thyroid replacement Rx; goiter; DM  Respiratory:  Negative chronic cough,sputum production, +short of breath on exertion  Cards:   Negative for palpitations, varicosities, claudication, +lower extremity edema  GI:   Negative for dysphagia, bleeding, nausea, vomiting, diarrhea, and abdominal pain  Genitourinary: Negative for frequency, dysuria  Integument:  Negative for rash and pruritus  Hematologic:  Negative for easy bruising; bleeding dyscrasia   Musculoskel: Negative for muscle weakness inhibiting ambulation  Neurological:  Negative for stroke, TIA, syncope, dizziness  Behavl/Psych: Negative for feelings of anxiety, +depression     Cardiovascular Testing:     EK21  Component Value Ref Range & Units Status   Ventricular Rate 75  BPM Final   Atrial Rate 75  BPM Final   P-R Interval 182  ms Final   QRS Duration 72  ms Final   Q-T Interval 380  ms Final   QTC Calculation (Bezet) 424  ms Final   Calculated P Axis 67  degrees Final   Calculated R Axis 59  degrees Final   Calculated T Axis -53  degrees Final   Diagnosis     Final   Normal sinus rhythm   Nonspecific T wave abnormality   When compared with ECG of 2021 04:28,   No significant change was found             TTE:  21  Interpretation Summary        · LV: Estimated LVEF is >70%. Normal cavity size. Mild concentric hypertrophy. Hyperdynamic systolic function. Moderate (grade 2) left ventricular diastolic dysfunction. · MV: Mitral valve leaflet calcification. Mitral annular calcification. Mitral valve mean gradient is 3.4 mmHg. Mild mitral valve stenosis is present. MV MG 3.44 mmHg at HR 60 bpm. Mild mitral valve regurgitation is present.   · PV: Mild pulmonic valve regurgitation is present. · LA: Severely dilated left atrium. · TV: Mild tricuspid valve regurgitation is present. · AV: Prosthetic aortic valve. Aortic valve mean gradient is 6.6 mmHg. Aortic valve area is 1.8 cm2. There is a 26 mm Medtronic Evolut PRO+ prosthetic aortic valve from prior TAVR procedure. Prosthesis is normal. Prosthetic valve function is sufficient  · RA: Mildly dilated right atrium. · IAS: There was no shunting at baseline. · PA: Mild pulmonary hypertension. Pulmonary arterial systolic pressure is 39 mmHg.     LVOTd 1.8 cm  LVOT/AV VTI 28.6/40.7 cm  DI 0.70  EOA 1.79 cm2  EOAi 0.87 cm2/m2  BSA 2.05 m2      Echo Findings          Left Ventricle Normal cavity size. Mild concentric hypertrophy.  The estimated EF is >70%. Hyperdynamic systolic function. There is moderate (grade 2) left ventricular diastolic dysfunction. Wall Scoring The left ventricular wall motion is globally hyperkinetic.             Left Atrium Severely dilated left atrium. Right Ventricle Normal cavity size and global systolic function. Right Atrium Mildly dilated right atrium. Interatrial Septum No interatrial shunt visualized on color doppler. There was no shunting at baseline. Aortic Valve No stenosis and no regurgitation. Prosthetic aortic valve. Aortic valve peak gradient is 13 mmHg. Aortic valve mean gradient is 6.6 mmHg. Aortic valve area is 1.8 cm2. Aortic valve peak velocity is 1.8 cm/s. There is a 26 mm Medtronic Evolut PRO+ TAVR present. Prosthetic valve insufficiency not present.  The prosthetic valve is normal. LVOTd 1.8 cm  LVOT/AV VTI 28.6/40.7 cm  DI 0.70  EOA 1.79 cm2  EOAi 0.87 cm2/m2  BSA 2.05 m2. Mitral Valve Leaflet calcification. Mitral annular calcification. Mitral valve mean gradient is 3.4 mmHg. Mitral valve area is 3.4 cm2. Mild stenosis present. MV MG 3.44 mmHg at HR 60 bpm. Mild regurgitation. Tricuspid Valve Normal valve structure and no stenosis.  Mild regurgitation. Pulmonic Valve Normal valve structure and no stenosis. Mild regurgitation. Aorta Normal aortic root. Pulmonary Artery Pulmonary arterial systolic pressure (PASP) is 39 mmHg. Pulmonary hypertension found to be mild. IVC/Hepatic Veins Normal central venous pressure (3 mmHg); IVC diameter is less than 21 mm and collapses more than 50% with respiration. Pericardium Normal pericardium and no evidence of pericardial effusion.             TTE:  3/3/21-completed but report pending      Physical Exam:  General: Well nourished well groomed female appearing stated age accompanied by her granddaughter  Neuro: A&OX3. HEREDIA. PERRL. Steady rollator assisted gait  Head:Normocephalic. Atraumatic. Symmetrical  Neck: Trachea Midline  Resp: CTA B. No Adv BS/cough/sputum/tachypnea with seated conversation  CV: S1S2 RRR. No appreciable murmur. No JVD/carotid bruits. Pink/warm/dry extremities. +1 BLE peripheral edema  GI:Benign ab. Soft. NT/ND. Active BS  : Voids  Integ: No obvious s/s of infection or breakdown  Musculo/Skeletal: FROM in all major joints. Fair muscle tone    Clinic Evaluation:   KCCQ-12: scanned into EMR      Assessment/Plan:     1. Aortic Stenosis severe and symptomatic: s/p TAVR with RTF #26 Evolut Pro on 1/20/21 with Marilee May. Anticoagulation plan for ASA, Eliquis. 1 month echo completed today-report pending     2. CAD: On ASA, Statin, BB. Scheduled for staged plan for PCI following TAVR in about 2 weeks per Dr. Kayode Quan     3. HTN: Elevated in the clinic today but patient states she was rushing. Continue BB      4. HLD: On statin     5. DM: Diet controlled. A1C of 7.6. Continue Metformin     6. CKD stage 3: Avoid hypotension and nephrotoxic agents. Creatinine of 1.4 on DC. (Baseline 1.2-1.6)     7. Depression: On Effexor     8. COPD: On home Nebs     9. Diastolic HF grade III: BP management     10. MR: Echo reviewed by Dr. Mica Horowitz.     11.  Anemia: Postoperative secondary to acute blood loss.  Above transfusion threshold on DC.    12. Floaters: I have asked her again to schedule an appointment with her Opthalmologist      SBE prophylax reviewed.                 F/U with primary cardiologist

## 2021-03-03 ENCOUNTER — OFFICE VISIT (OUTPATIENT)
Dept: CARDIOLOGY CLINIC | Age: 81
End: 2021-03-03
Payer: MEDICARE

## 2021-03-03 ENCOUNTER — HOSPITAL ENCOUNTER (OUTPATIENT)
Dept: NON INVASIVE DIAGNOSTICS | Age: 81
Discharge: HOME | End: 2021-03-03
Attending: NURSE PRACTITIONER
Payer: MEDICARE

## 2021-03-03 VITALS
SYSTOLIC BLOOD PRESSURE: 128 MMHG | HEIGHT: 65 IN | BODY MASS INDEX: 36.65 KG/M2 | DIASTOLIC BLOOD PRESSURE: 46 MMHG | WEIGHT: 220 LBS

## 2021-03-03 VITALS
HEART RATE: 66 BPM | DIASTOLIC BLOOD PRESSURE: 70 MMHG | SYSTOLIC BLOOD PRESSURE: 146 MMHG | OXYGEN SATURATION: 96 % | RESPIRATION RATE: 16 BRPM

## 2021-03-03 DIAGNOSIS — Z95.2 S/P TAVR (TRANSCATHETER AORTIC VALVE REPLACEMENT): ICD-10-CM

## 2021-03-03 DIAGNOSIS — I35.0 NONRHEUMATIC AORTIC VALVE STENOSIS: ICD-10-CM

## 2021-03-03 DIAGNOSIS — I35.0 NONRHEUMATIC AORTIC VALVE STENOSIS: Primary | ICD-10-CM

## 2021-03-03 LAB
ECHO AO ROOT DIAM: 3.03 CM
ECHO AV AREA PEAK VELOCITY: 1.48 CM2
ECHO AV AREA/BSA PEAK VELOCITY: 0.7 CM2/M2
ECHO AV PEAK GRADIENT: 9.42 MMHG
ECHO AV PEAK VELOCITY: 153.43 CM/S
ECHO EST RA PRESSURE: 15 MMHG
ECHO LA AREA 4C: 28.28 CM2
ECHO LA VOL 2C: 76.09 ML (ref 22–52)
ECHO LA VOL 4C: 95.33 ML (ref 22–52)
ECHO LA VOL BP: 93.95 ML (ref 22–52)
ECHO LA VOL/BSA BIPLANE: 45.61 ML/M2 (ref 16–28)
ECHO LA VOLUME INDEX A2C: 36.94 ML/M2 (ref 16–28)
ECHO LA VOLUME INDEX A4C: 46.28 ML/M2 (ref 16–28)
ECHO LV INTERNAL DIMENSION DIASTOLIC: 4.56 CM (ref 3.9–5.3)
ECHO LV INTERNAL DIMENSION SYSTOLIC: 3.44 CM
ECHO LV IVSD: 0.96 CM (ref 0.6–0.9)
ECHO LV MASS 2D: 178.7 G (ref 67–162)
ECHO LV MASS INDEX 2D: 86.8 G/M2 (ref 43–95)
ECHO LV POSTERIOR WALL DIASTOLIC: 1.24 CM (ref 0.6–0.9)
ECHO LVOT DIAM: 1.65 CM
ECHO LVOT PEAK GRADIENT: 4.51 MMHG
ECHO LVOT PEAK VELOCITY: 106.14 CM/S
ECHO MV A VELOCITY: 70.48 CM/S
ECHO MV AREA PHT: 3.26 CM2
ECHO MV E DECELERATION TIME (DT): 232.89 MS
ECHO MV E VELOCITY: 140.33 CM/S
ECHO MV E/A RATIO: 1.99
ECHO MV MAX VELOCITY: 161.23 CM/S
ECHO MV MEAN GRADIENT: 3.75 MMHG
ECHO MV PEAK GRADIENT: 10.4 MMHG
ECHO MV PRESSURE HALF TIME (PHT): 67.54 MS
ECHO MV VTI: 36.49 CM
ECHO PV PEAK INSTANTANEOUS GRADIENT SYSTOLIC: 4.69 MMHG
ECHO PV REGURGITANT MAX VELOCITY: 114.53 CM/S
ECHO RIGHT VENTRICULAR SYSTOLIC PRESSURE (RVSP): 59.57 MMHG
ECHO RV INTERNAL DIMENSION: 5.07 CM
ECHO RV TAPSE: 2.19 CM (ref 1.5–2)
ECHO TV REGURGITANT MAX VELOCITY: 333.81 CM/S
ECHO TV REGURGITANT PEAK GRADIENT: 44.57 MMHG

## 2021-03-03 PROCEDURE — G8399 PT W/DXA RESULTS DOCUMENT: HCPCS | Performed by: NURSE PRACTITIONER

## 2021-03-03 PROCEDURE — G8754 DIAS BP LESS 90: HCPCS | Performed by: NURSE PRACTITIONER

## 2021-03-03 PROCEDURE — 93306 TTE W/DOPPLER COMPLETE: CPT

## 2021-03-03 PROCEDURE — G8417 CALC BMI ABV UP PARAM F/U: HCPCS | Performed by: NURSE PRACTITIONER

## 2021-03-03 PROCEDURE — G8536 NO DOC ELDER MAL SCRN: HCPCS | Performed by: NURSE PRACTITIONER

## 2021-03-03 PROCEDURE — 1101F PT FALLS ASSESS-DOCD LE1/YR: CPT | Performed by: NURSE PRACTITIONER

## 2021-03-03 PROCEDURE — G8427 DOCREV CUR MEDS BY ELIG CLIN: HCPCS | Performed by: NURSE PRACTITIONER

## 2021-03-03 PROCEDURE — 1090F PRES/ABSN URINE INCON ASSESS: CPT | Performed by: NURSE PRACTITIONER

## 2021-03-03 PROCEDURE — 99213 OFFICE O/P EST LOW 20 MIN: CPT | Performed by: NURSE PRACTITIONER

## 2021-03-03 PROCEDURE — G8752 SYS BP LESS 140: HCPCS | Performed by: NURSE PRACTITIONER

## 2021-03-03 PROCEDURE — G8432 DEP SCR NOT DOC, RNG: HCPCS | Performed by: NURSE PRACTITIONER

## 2021-03-10 ENCOUNTER — TELEPHONE (OUTPATIENT)
Dept: CARDIAC REHAB | Age: 81
End: 2021-03-10

## 2021-03-10 NOTE — TELEPHONE ENCOUNTER
3/10/2021 Cardiac Rehab: Ms. Mic Husain is scheduled for a CATH Procedure on 3/15/2021 per Dr. Troy Rankin. Made IP aware she will be there on 3/15/21 for CATH. Hayley Hansen     1/28/2021 Cardiac Rehab: Called Ms. Mic Husain to discuss participation in the Cardiac Rehab Program following TAVR on 1/20/2021. She currently does not have transportation and I told her I can mail her a list of transportation resources. Ms. Torres Nicolas verified her address and would be happy to have that list. I will give her another call after her follow up with Dr. Analia Mix, which is scheduled for 2/22/2021, to see what he had to say. Ms. Mic Husain is without questions or concerns.   Hayley Hansen      Electronically signed by Duane Bagley at 01/28/21 2905

## 2021-04-08 NOTE — PROGRESS NOTES
Nephrology Progress Note Juliet Aceves Date of Admission : 3/18/2019 CC:  Follow up for ASUNCION Assessment and Plan ASUNCION on CKD : 
- contrast nephropathy that required dialysis. resolved - Worsening renal function  ==> 2/2 Over diuresis and Hypercalcemia  
- RESOLVED AND stopped IVF -ok for D/C on Bumex 1 mg daily Hypercalcemia : 
- 2/2 calcitriol and dehydration  
- improved L pleural effusion: 
- s/p thoracentesis 3/19 
  
Recent NSTEMI:  
- Cath : Non occlusiVe CAD  
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  
  
Chronic Anemia: 
  
DM-II: 
- on insulin Interval History: 
Feels better Ca and Cr better Denies any worsening SOB, N/V/CP Current Medications: all current  Medications have been eviewed in [facility] Review of Systems: Pertinent items are noted in HPI. Objective: 
Vitals:   
Vitals:  
 03/26/19 2314 03/27/19 4676 03/27/19 3829 03/27/19 8531 BP: 117/46 135/52  145/67 Pulse: 71 80  75 Resp: 18 18  18 Temp: 97.8 °F (36.6 °C) 98.4 °F (36.9 °C)  98.3 °F (36.8 °C) SpO2: 98% 96%  98% Weight:   95.2 kg (209 lb 14.1 oz) Height:      
 
Intake and Output: 
No intake/output data recorded. 03/25 1901 - 03/27 0700 In: 1637.9 [P.O.:425; I.V.:1212.9] Out: 675 [facility] Physical Examination:General: No distress Neck:  Supple, no mass Resp:  Reduced bibasilar breath sounds CV:  RRR,  no murmur or rub, no LE edema GI:  Soft, NT, + BS Neurologic:  AAO X 3 Skin:  No Rash :  No cartagena []    High complexity decision making was performed 
[]    Patient is at high-risk of decompensation with multiple organ involvement Lab Data Personally Reviewed: I have reviewed all the pertinent labs, microbiology data and radiology studies during assessment. Recent Labs  
  03/27/19 
0344 03/26/19 
0414 03/25/19 
7066  142 141  
K 3.8 4.3 3.8  105 100 CO2 32 34* 36* * 181* 121* BUN 30* 44* 56* CREA 1.72* 2.23* 2.71* CA 8.9 9.5 10.4*  
 PHOS 2.2* 2.7 3.1 ALB 2.2* 2.3* 2.6* Recent Labs  
  03/26/19 
0414 WBC 9.2 HGB 9.3* HCT 31.5*  No results found for: SDES Lab Results Component Value Date/Time Culture result: CITROBACTER YOUNGAE (A) 03/20/2019 12:02 PM  
 Culture result: NO GROWTH 5 DAYS 03/20/2019 11:00 AM  
 Culture result: NO GROWTH 4 DAYS 03/19/2019 01:02 PM  
 
Recent Results (from the past 24 hour(s)) GLUCOSE, POC Collection Time: 03/26/19 11:08 AM  
Result Value Ref Range Glucose (POC) 238 (H) 65 - 100 mg/dL Performed by Bertha Owusu, POC Collection Time: 03/26/19  4:41 PM  
Result Value Ref Range Glucose (POC) 219 (H) 65 - 100 mg/dL Performed by Bertha Owusu, POC Collection Time: 03/26/19  9:04 PM  
Result Value Ref Range Glucose (POC) 135 (H) 65 - 100 mg/dL Performed by [facility] Edi RENAL FUNCTION PANEL Collection Time: 03/27/19  3:44 AM  
Result Value Ref Range Sodium 145 136 - 145 mmol/L Potassium 3.8 3.5 - 5.1 mmol/L Chloride 108 97 - 108 mmol/L  
 CO2 32 21 - 32 mmol/L Anion gap 5 5 - 15 mmol/L Glucose 101 (H) 65 - 100 mg/dL BUN 30 (H) 6 - 20 MG/DL Creatinine 1.72 (H) 0.55 - 1.02 MG/DL  
 BUN/Creatinine ratio 17 12 - 20 GFR est AA 35 (L) >60 ml/min/1.73m2 GFR est non-AA 29 (L) >60 ml/min/1.73m2 Calcium 8.9 8.5 - 10.1 MG/DL Phosphorus 2.2 (L) 2.6 - 4.7 MG/DL Albumin 2.2 (L) 3.5 - 5.0 g/dL GLUCOSE, POC Collection Time: 03/27/19  6:12 AM  
Result Value Ref Range Glucose (POC) 106 (H) 65 - 100 mg/dL Performed by Jada Alcaraz MD 
[address] [place] [address], [facility] Phone - [phone] Fax - [phone] 
[email or web address] 
 set-up required/verbal cues/1 person assist

## 2021-05-06 ENCOUNTER — TELEPHONE (OUTPATIENT)
Dept: CARDIAC REHAB | Age: 81
End: 2021-05-06

## 2021-05-06 NOTE — TELEPHONE ENCOUNTER
5/6/2021 Cardiac Rehab: Called Ms. Parag Del Toro  to discuss participation in the Cardiac Rehab Program . Due to fall risk and safety concerns we will not enroll in the OP program. Ricardo Reich, RN      3/10/2021 Cardiac Rehab: Ms. Parag Del Toro is scheduled for a CATH Procedure on 3/15/2021 per Dr. Marysol Rodarte. Made IP aware she will be there on 3/15/21 for CATH. Madison Cabello      9/41/9505 [facility]: Called [address] [place] [address] discuss participation in the Cardiac Rehab Program following Nick Segura 1/20/2021. She currently does not have transportation and I told her I can mail her a list of transportation resources. Ms.  [facility] [employer] verified her address and would be happy to have that list. I will give her another call after her follow up with Dr. Kong Celestin, which is scheduled for 2/22/2021, to see what he had to say. Ms. Lisa Maldonado is without questions or concerns.  Tiarra Neri      Electronically signed by Ekta Zaragoza at 01/28/21 0540

## 2021-07-18 ENCOUNTER — APPOINTMENT (OUTPATIENT)
Dept: CT IMAGING | Age: 81
End: 2021-07-18
Attending: EMERGENCY MEDICINE
Payer: MEDICARE

## 2021-07-18 ENCOUNTER — HOSPITAL ENCOUNTER (EMERGENCY)
Age: 81
Discharge: HOME | End: 2021-07-18
Attending: EMERGENCY MEDICINE
Payer: MEDICARE

## 2021-07-18 VITALS
DIASTOLIC BLOOD PRESSURE: 70 MMHG | RESPIRATION RATE: 18 BRPM | TEMPERATURE: 97 F | SYSTOLIC BLOOD PRESSURE: 155 MMHG | OXYGEN SATURATION: 99 % | HEART RATE: 78 BPM

## 2021-07-18 DIAGNOSIS — S09.90XA TRAUMATIC INJURY OF HEAD, INITIAL ENCOUNTER: Primary | ICD-10-CM

## 2021-07-18 PROCEDURE — 99284 EMERGENCY DEPT VISIT MOD MDM: CPT

## 2021-07-18 PROCEDURE — 70450 CT HEAD/BRAIN W/O DYE: CPT

## 2021-07-18 NOTE — ED PROVIDER NOTES
Date of Service:  (Not on file)    Patient:  Jael Giles    Chief Complaint:  Fall       HPI:  Jael Giles is a 80 y.o.  female who presents for evaluation of fall that occurred around 2:30-3pm. Mechanical trip and fall, fell and hit her head on a table. No LOC, no vomiting. Unknown if on any blood thinners. No headache currently. No neck pain. No back pain. Currently ambulatory with walker, at baseline. Denies fever, chills, dizziness, lightheadedness, chest pain, shortness of breath, abdominal pain, nausea, vomiting, diarrhea, dysuria. Past Medical History:   Diagnosis Date    CAD (coronary artery disease)     pulmonary HTN    Diabetes ([facility] [address]     Hypertension        Past Surgical History:   Procedure Laterality Date    HX KNEE REPLACEMENT Bilateral     HX ORTHOPAEDIC      IR INSERT TUNL CVC W/O PORT OVER 5 YR  3/5/2019         History reviewed. No pertinent family history. Social History     Socioeconomic History    Marital status:      Spouse name: Not on file    Number of children: Not on file    Years of education: Not on file    Highest education level: Not on file   Occupational History    Not on file   Tobacco Use    Smoking status: Never Smoker    Smokeless tobacco: Never Used   Substance and Sexual Activity    Alcohol use: No    Drug use: No    Sexual activity: Not on file   Other Topics Concern    Not on file   Social History Narrative    Not on file     Social Determinants of Health     Financial Resource Strain:     Difficulty of Paying Living Expenses:    Food Insecurity:     Worried About Running Out of Food in the Last Year:     [address] in the Last Year:    Transportation Needs:     Lack of Transportation (Medical):      Lack of Transportation (Non-Medical):    Physical Activity:     Days of Exercise per Week:     Minutes of Exercise per Session:    Stress:     Feeling of Stress :    Social Connections:     Frequency of Communication with Friends and Family:     Frequency of Social Gatherings with Friends and Family:     Attends Confucianist Services:     Active Member of Clubs or Organizations:     Attends Club or Organization Meetings:     Marital Status:    Intimate Partner Violence:     Fear of Current or Ex-Partner:     Emotionally Abused:     Physically Abused:     Sexually Abused: ALLERGIES: Patient has no known allergies. Review of Systems   Constitutional: Negative for chills and fever. HENT: Negative for trouble swallowing. Eyes: Negative for redness. Respiratory: Negative for shortness of breath. Cardiovascular: Negative for chest pain. Gastrointestinal: Negative for abdominal pain, diarrhea, nausea and vomiting. Genitourinary: Negative for dysuria. Musculoskeletal: Negative for gait problem. Skin: Negative for rash. Neurological: Negative for syncope. Vitals:    07/18/21 1731   BP: (!) 158/68   Pulse: 76   Resp: 16   Temp: 97.6 °F (36.4 °C)   SpO2: 98%            Physical Exam  Vitals and nursing note reviewed. Constitutional:       Appearance: Normal appearance. HENT:      Head: Normocephalic and atraumatic. Nose: Nose normal.   Eyes:      Extraocular Movements: Extraocular movements intact. Conjunctiva/sclera: Conjunctivae normal.      Pupils: Pupils are equal, round, and reactive to light. Cardiovascular:      Rate and Rhythm: Normal rate and regular rhythm. Pulses: Normal pulses. Radial pulses are 2+ on the right side and 2+ on the left side. Posterior tibial pulses are 2+ on the right side and 2+ on the left side. Heart sounds: Normal heart sounds. Pulmonary:      Effort: Pulmonary effort is normal.      Breath sounds: Normal breath sounds. Abdominal:      General: Abdomen is flat. Bowel sounds are normal.      Palpations: Abdomen is soft. Musculoskeletal:         General: Normal range of motion.       Cervical back: Normal range of motion and neck supple. Comments: No C/T/L spine tenderness  No chest wall tenderness  Upper and lower extremities fully ranged, visualized, and palpated without tenderness or deformity. No snuff box tenderness  The hips are non-tender with bilateral compression  Pelvis is stable  Ambulating without difficulty     Skin:     General: Skin is warm and dry. Neurological:      General: No focal deficit present. Mental Status: She is alert and oriented to person, place, and time. Mental status is at baseline. Cranial Nerves: Cranial nerves are intact. Sensory: Sensation is intact. Motor: Motor function is intact. Coordination: Coordination is intact. Gait: Gait is intact. Psychiatric:         Mood and Affect: Mood normal.         Behavior: Behavior normal.         Thought Content: Thought content normal.          MDM  Number of Diagnoses or Management Options  Traumatic injury of head, initial encounter  Diagnosis management comments: IMAGING:  CT HEAD WO CONT   Final Result    1. No acute intracranial abnormality    2. Interval but chronic-appearing left occipital and thalamic lacunar infarcts             Medications During Visit:  Medications - No data to display      DECISION MAKING:  Jorge Dove is a 80 y.o. female who comes in as above. Patient is an 80-year-old female presenting today for mechanical ground-level fall, fell backwards hitting her head on a table at a restaurant today. No loss of consciousness, no vomiting. No neck pain, back pain. No other trauma or injury. Denies dizziness, lightheadedness, chest pain, shortness of breath, abdominal pain, nausea, vomiting, diarrhea, numbness, tingling, weakness. Vitals stable, patient resting comfortably no acute distress. No focal neuro deficits. Patient ambulatory with walker, this is her baseline.   Head CT shows no acute intracranial abnormality, chronic appearing left occipital and thalamic lacunar infarcts. All results were discussed with patient. Patient also seen and evaluated by attending physician. Strict ED return precautions discussed. Follow-up with PCP within 1 week. IMPRESSION:  Traumatic injury of head, initial encounter  (primary encounter diagnosis)    DISPOSITION:  Discharged      Discharge Medication List as of 7/18/2021  8:59 PM         Follow-up Information     Follow up With Specialties Details Why Contact Info    Geno Mata MD Internal Medicine Schedule an appointment as soon as   possible for a visit in 1 week  [address] [phone]  [phone]      [address] [facility] Emergency Medicine Go to  If symptoms worsen [address] [facility] [address] [facility]  Schedule an appointment as soon as possible   for a visit in 1 week  Lata Esparza  [address]          The patient is asked to follow-up with their primary care provider in the next several days. They are to call tomorrow for an appointment. The patient is asked to return promptly for any increased concerns or worsening of symptoms. They can return to this emergency department or any other emergency department.       ED Course as of Jul 18 1831   Sun Jul 18, 2021   1745 Called patient from waiting room, no answer    [EK]   1816 Called patient, in restroom    [EK]      ED Course User Index  [EK] Snow Mir PA-C       Procedures

## 2021-07-18 NOTE — ED TRIAGE NOTES
Pt presents to department via EMS from [facility] where she was startled and stumbled backwards. Pt his her head on a table behind her. Pt denies dizziness, LOC. Pt denies headache. Pt unsure of blood thinner usage.

## 2021-11-05 ENCOUNTER — OFFICE VISIT (OUTPATIENT)
Dept: NEUROLOGY | Age: 81
End: 2021-11-05
Payer: MEDICARE

## 2021-11-05 VITALS
BODY MASS INDEX: 36.65 KG/M2 | WEIGHT: 220 LBS | HEIGHT: 65 IN | OXYGEN SATURATION: 98 % | DIASTOLIC BLOOD PRESSURE: 78 MMHG | RESPIRATION RATE: 20 BRPM | HEART RATE: 83 BPM | SYSTOLIC BLOOD PRESSURE: 118 MMHG

## 2021-11-05 DIAGNOSIS — F40.240 FEAR OF CLOSED SPACES: ICD-10-CM

## 2021-11-05 DIAGNOSIS — I67.1 NONRUPTURED CEREBRAL ANEURYSM, INTERNAL CAROTID ARTERY: Primary | ICD-10-CM

## 2021-11-05 PROCEDURE — 99214 OFFICE O/P EST MOD 30 MIN: CPT | Performed by: PSYCHIATRY & NEUROLOGY

## 2021-11-05 PROCEDURE — G8752 SYS BP LESS 140: HCPCS | Performed by: PSYCHIATRY & NEUROLOGY

## 2021-11-05 PROCEDURE — G8427 DOCREV CUR MEDS BY ELIG CLIN: HCPCS | Performed by: PSYCHIATRY & NEUROLOGY

## 2021-11-05 PROCEDURE — 1101F PT FALLS ASSESS-DOCD LE1/YR: CPT | Performed by: PSYCHIATRY & NEUROLOGY

## 2021-11-05 PROCEDURE — 1090F PRES/ABSN URINE INCON ASSESS: CPT | Performed by: PSYCHIATRY & NEUROLOGY

## 2021-11-05 PROCEDURE — G8399 PT W/DXA RESULTS DOCUMENT: HCPCS | Performed by: PSYCHIATRY & NEUROLOGY

## 2021-11-05 PROCEDURE — G8432 DEP SCR NOT DOC, RNG: HCPCS | Performed by: PSYCHIATRY & NEUROLOGY

## 2021-11-05 PROCEDURE — G8417 CALC BMI ABV UP PARAM F/U: HCPCS | Performed by: PSYCHIATRY & NEUROLOGY

## 2021-11-05 PROCEDURE — G8536 NO DOC ELDER MAL SCRN: HCPCS | Performed by: PSYCHIATRY & NEUROLOGY

## 2021-11-05 PROCEDURE — G8754 DIAS BP LESS 90: HCPCS | Performed by: PSYCHIATRY & NEUROLOGY

## 2021-11-05 RX ORDER — LORAZEPAM 0.5 MG/1
TABLET ORAL
Qty: 2 TABLET | Refills: 0 | Status: SHIPPED | OUTPATIENT
Start: 2021-11-05 | End: 2022-06-30

## 2021-11-05 NOTE — PATIENT INSTRUCTIONS
[facility]   Statement to Patients  April 1, 2014      In an effort to ensure the large volume of patient prescription refills is processed in the most efficient and expeditious manner, we are asking our patients to assist us by calling your Pharmacy for all prescription refills, this will include also your  Mail Order Pharmacy. The pharmacy will contact our office electronically to continue the refill process. Please do not wait until the last minute to call your pharmacy. We need at least 48 hours (2days) to fill prescriptions. We also encourage you to call your pharmacy before going to  your prescription to make sure it is ready. With regard to controlled substance prescription refill requests (narcotic refills) that need to be picked up at our office, we ask your cooperation by providing us with at least 72 hours (3days) notice that you will need a refill. We will not refill narcotic prescription refill requests after 4:00pm on any weekday, Monday through Thursday, or after 2:00pm on Fridays, or on the weekends. We encourage everyone to explore another way of getting your prescription refill request processed using [employer], our patient web portal through our electronic medical record system. [employer] is an efficient and effective way to communicate your medication request directly to the office and  downloadable as an jamari on your smart phone . [employer] also features a review functionality that allows you to view your medication list as well as leave messages for your physician. Are you ready to get connected? If so please review the attatched instructions or speak to any of our staff to get you set up right away! Thank you so much for your cooperation. Should you have any questions please contact our Practice Administrator.     The Physicians and Staff,  [facility]

## 2021-11-05 NOTE — PROGRESS NOTES
Chief Complaint   Patient presents with    Neurologic Problem   CC: Follow-up hospitalization stroke and aneurysm    HPI    Ms. Nahed Tripathi is a 22-year-old woman I evaluated in March 2019 when she had mental status changes in the hospital found have a small right MCA infarct. 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 She was recommended for surveillance by neuro interventional.  Ultimately placed on Eliquis for anticoagulation in the setting of arrhythmia. Since I saw her last in the hospital a few years ago she has done well. Main issue is struggling with diabetic control. She is followed closely by cardiology. No unusual headaches. No new strokelike symptoms. Review of Systems   Neurological: Negative for headaches. All other systems reviewed and are negative. Past Medical History:   Diagnosis Date    CAD (coronary artery disease)     pulmonary HTN    Diabetes ([facility] [address]     Hypertension      History reviewed. No pertinent family history. Social History     Socioeconomic History    Marital status:      Spouse name: Not on file    Number of children: Not on file    Years of education: Not on file    Highest education level: Not on file   Occupational History    Not on file   Tobacco Use    Smoking status: Never Smoker    Smokeless tobacco: Never Used   Substance and Sexual Activity    Alcohol use: No    Drug use: No    Sexual activity: Not on file   Other Topics Concern    Not on file   Social History Narrative    Not on file     Social Determinants of Health     Financial Resource Strain:     Difficulty of Paying Living Expenses: Not on file   Food Insecurity:     Worried About Running Out of Food in the Last Year: Not on file    [place] of Food in the Last Year: Not on file   Transportation Needs:     Lack of Transportation (Medical):  Not on file    Lack of Transportation (Non-Medical): Not on file   Physical Activity:     Days of Exercise per Week: Not on file    Minutes of Exercise per Session: Not on file   Stress:     Feeling of Stress : Not on file   Social Connections:     Frequency of Communication with Friends and Family: Not on file    Frequency of Social Gatherings with Friends and Family: Not on file    Attends Worship Services: Not on file    Active Member of [address] [place] [address] or Organizations: Not on file    Attends Club or Organization Meetings: Not on file    Marital Status: Not on file   Intimate Partner Violence:     Fear of Current or Ex-Partner: Not on file    Emotionally Abused: Not on file    Physically Abused: Not on file    Sexually Abused: Not on file   Housing Stability:     Unable to Pay for Housing in the Last Year: Not on file    Number of [place] in the Last Year: Not on file    Unstable Housing in the Last Year: Not on file     No Known Allergies      Current Outpatient Medications   Medication Sig    acetaminophen (TYLENOL) 325 mg tablet Take 2 Tabs by mouth every four (4) hours as needed for Pain.  apixaban (Eliquis) 2.5 mg tablet Take 2.5 mg by mouth two (2) times a day.  metFORMIN (GLUCOPHAGE) 500 mg tablet Take  by mouth two (2) times daily (with meals). Unknown dosage    aspirin delayed-release 81 mg tablet Take 81 mg by mouth daily.  carvedilol (COREG) 6.25 mg tablet Take 1 Tab by mouth two (2) times daily (with meals).  bisacodyl (DULCOLAX) 5 mg EC tablet Take 1 Tab by mouth daily as needed for Constipation.  budesonide (PULMICORT) 0.5 mg/2 mL nbsp 2 mL by Nebulization route two (2) times a day.  albuterol (PROVENTIL VENTOLIN) 2.5 mg /3 mL (0.083 %) nebulizer solution 3 mL by Nebulization route every four (4) hours as needed for Wheezing.  rosuvastatin (CRESTOR) 20 mg tablet Take 1 Tab by mouth nightly.  oxybutynin chloride XL (DITROPAN XL) 15 mg CR tablet Take 15 mg by mouth daily.     therapeutic multivitamin (THERAGRAN) tablet Take 1 Tab by mouth daily.  venlafaxine (EFFEXOR) 37.5 mg tablet Take 37.5 mg by mouth two (2) times a day.  calcitRIOL (ROCALTROL) 0.5 mcg capsule Take 0.5 mcg by mouth daily. No current facility-administered medications for this visit. Neurologic Exam     Mental Status   WD/WN adult in NAD, normal grooming  VSS  A&O pleasant, smiling following commands    PERRL, nonicteric  Face is masked  Speech is  clear  No limb ataxia. No abnl movements. Moving all extemities spontaneously and symmetric  Wide gait with Rollator             Visit Vitals  /78   Pulse 83   Resp 20   Ht 5' 5\" (1.651 m)   Wt 220 lb (99.8 kg)   SpO2 98%   BMI 36.61 kg/m²       Assessment and Plan   Diagnoses and all orders for this visit:    1. Nonruptured cerebral aneurysm, internal carotid artery  -     MRA BRAIN WO CONT; Future      27-year-old woman who has a history of stroke and multiple respecters for stroke who has overall done well since her hospitalization in 2018. She does need repeat imaging for surveillance of the internal carotid artery aneurysms on the right and left. I have placed that order. Premedicate with a little bit of Ativan before scanning. We will be in touch once I have the results. 30 minutes of time was spent in total today reviewing the medical record to include specifically review of the hospitalization records and neuroimaging from 2019, face-to-face time with her and her daughter, and time completing documentation. I reviewed and decided to continue the current medications. This clinical note was dictated with an electronic dictation software that can make unintentional errors. If there are any questions, please contact me directly for clarification.       2 [facility], [facility] [address]  Diplomate JUNAID

## 2021-11-05 NOTE — LETTER
11/5/2021 Patient: Chantal Martinez YOB: 1940 Date of Visit: 11/5/2021 Bear Kerr MD 
[address] [place] [phone] Via Fax: [phone] Dear Bear Kerr MD, Thank you for referring Ms. Giorgio Sewell to [address] [place] [address] for evaluation. My notes for this consultation are attached. If you have questions, please do not hesitate to call me. I look forward to following your patient along with you. Sincerely, 2 [facility], DO 
 
11/5/2021 Patient:  Chantal Martinez YOB: 1940 Date of Visit: 11/5/2021 Dear Bear Kerr MD 
[address] [place] [phone] Via Fax: [phone]: I was requested by Sayda Najera MD to evaluate Ms. Giorgio Sewell  for Chief Complaint Patient presents with  Neurologic Problem [facility] I am recommending the following:  
 
Diagnoses and all orders for this visit: 1. Nonruptured cerebral aneurysm, internal carotid artery -     MRA BRAIN WO CONT; Future 2. Fear of closed spaces -     LORazepam (ATIVAN) 0.5 mg tablet; 1 tab 30-40 min before the MRI, may repeat once, no driving. 
 
 
 
---------------------------------------------------------------------------------------------------------------------- Below is my encounter: Chief Complaint Patient presents with  Neurologic Problem CC: Follow-up hospitalization stroke and aneurysm HPI Ms. Casey Rodrigez is a 66-year-old woman I evaluated in March 2019 when she had mental status changes in the hospital found have a small right MCA infarct. 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 She was recommended for surveillance by neuro interventional.  Ultimately placed on Eliquis for anticoagulation in the setting of arrhythmia.   Since I saw her last in the hospital a few years ago she has done well.  Main issue is struggling with diabetic control. She is followed closely by cardiology. No unusual headaches. No new strokelike symptoms. Review of Systems Neurological: Negative for headaches. All other systems reviewed and are negative. Past Medical History:  
Diagnosis Date  CAD (coronary artery disease) pulmonary HTN  
 Diabetes ([facility] [address]  Hypertension History reviewed. No pertinent family history. Social History Socioeconomic History  Marital status:  Spouse name: Not on file  Number of children: Not on file  Years of education: Not on file  Highest education level: Not on file Occupational History  Not on file Tobacco Use  Smoking status: Never Smoker  Smokeless tobacco: Never Used Substance and Sexual Activity  Alcohol use: No  
 Drug use: No  
 Sexual activity: Not on file Other Topics Concern  Not on file Social History Narrative  Not on file Social Determinants of Health Financial Resource Strain:  Difficulty of Paying Living Expenses: Not on file Food Insecurity:  Worried About Running Out of Food in the Last Year: Not on file  Ran Out of Food in the Last Year: Not on file Transportation Needs:   
 Lack of Transportation (Medical): Not on file  Lack of Transportation (Non-Medical): Not on file Physical Activity:   
 Days of Exercise per Week: Not on file  Minutes of Exercise per Session: Not on file Stress:  Feeling of Stress : Not on file Social Connections:  Frequency of Communication with Friends and Family: Not on file  Frequency of Social Gatherings with Friends and Family: Not on file  Attends Adventism Services: Not on file  Active Member of Clubs or Organizations: Not on file  Attends Club or Organization Meetings: Not on file  Marital Status: Not on file Intimate Partner Violence:  Fear of Current or Ex-Partner: Not on file  Emotionally Abused: Not on file  Physically Abused: Not on file  Sexually Abused: Not on file Housing Stability:   
 Unable to Pay for Housing in the Last Year: Not on file  Number of Places Lived in the Last Year: Not on file  Unstable Housing in the Last Year: Not on file No Known Allergies Current Outpatient Medications Medication Sig  
 acetaminophen (TYLENOL) 325 mg tablet Take 2 Tabs by mouth every four (4) hours as needed for Pain.  apixaban (Eliquis) 2.5 mg tablet Take 2.5 mg by mouth two (2) times a day.  metFORMIN (GLUCOPHAGE) 500 mg tablet Take  by mouth two (2) times daily (with meals). Unknown dosage  aspirin delayed-release 81 mg tablet Take 81 mg by mouth daily.  carvedilol (COREG) 6.25 mg tablet Take 1 Tab by mouth two (2) times daily (with meals).  bisacodyl (DULCOLAX) 5 mg EC tablet Take 1 Tab by mouth daily as needed for Constipation.  budesonide (PULMICORT) 0.5 mg/2 mL nbsp 2 mL by Nebulization route two (2) times a day.  albuterol (PROVENTIL VENTOLIN) 2.5 mg /3 mL (0.083 %) nebulizer solution 3 mL by Nebulization route every four (4) hours as needed for Wheezing.  rosuvastatin (CRESTOR) 20 mg tablet Take 1 Tab by mouth nightly.  oxybutynin chloride XL (DITROPAN XL) 15 mg CR tablet Take 15 mg by mouth daily.  therapeutic multivitamin (THERAGRAN) tablet Take 1 Tab by mouth daily.  venlafaxine (EFFEXOR) 37.5 mg tablet Take 37.5 mg by mouth two (2) times a day.  calcitRIOL (ROCALTROL) 0.5 mcg capsule Take 0.5 mcg by mouth daily. No current facility-administered medications for this visit. Neurologic Exam  
 
Mental Status WD/WN adult in NAD, normal grooming VSS 
A&O pleasant, smiling following commands PERRL, nonicteric Face is masked Speech is  clear No limb ataxia. No abnl movements. Moving all extemities spontaneously and symmetric Wide gait with Rollator Visit Vitals /78 Pulse 83 Resp 20  
Ht 5' 5\" (1.651 m) Wt 220 lb (99.8 kg) SpO2 98% BMI 36.61 kg/m² Assessment and Plan Diagnoses and all orders for this visit: 1. Nonruptured cerebral aneurysm, internal carotid artery -     MRA BRAIN WO CONT; Future 79-year-old woman who has a history of stroke and multiple respecters for stroke who has overall done well since her hospitalization in 2018. She does need repeat imaging for surveillance of the internal carotid artery aneurysms on the right and left. I have placed that order. Premedicate with a little bit of Ativan before scanning. We will be in touch once I have the results. 30 minutes of time was spent in total today reviewing the medical record to include specifically review of the hospitalization records and neuroimaging from 2019, face-to-face time with her and her daughter, and time completing documentation. I reviewed and decided to continue the current medications. This clinical note was dictated with an electronic dictation software that can make unintentional errors. If there are any questions, please contact me directly for clarification Thank you for giving me the opportunity to assist in the care of Ms. Oumou Umaña. If you have questions, please do not hesitate to contact me. Sincerely, [address], DO Neurologist 
Brain Injury Medicine Diplomate JUNAID

## 2022-03-18 PROBLEM — E78.5 HYPERLIPIDEMIA: Status: ACTIVE | Noted: 2019-03-02

## 2022-03-19 PROBLEM — E11.42 DIABETIC PERIPHERAL NEUROPATHY (HCC): Status: ACTIVE | Noted: 2019-03-02

## 2022-03-19 PROBLEM — I10 BENIGN ESSENTIAL HYPERTENSION: Status: ACTIVE | Noted: 2019-03-02

## 2022-03-19 PROBLEM — Z95.2 S/P TAVR (TRANSCATHETER AORTIC VALVE REPLACEMENT): Status: ACTIVE | Noted: 2021-01-25

## 2022-03-19 PROBLEM — I35.0 NONRHEUMATIC AORTIC VALVE STENOSIS: Status: ACTIVE | Noted: 2021-01-06

## 2022-03-19 PROBLEM — I21.4 NSTEMI (NON-ST ELEVATED MYOCARDIAL INFARCTION) (HCC): Status: ACTIVE | Noted: 2019-03-02

## 2022-03-19 PROBLEM — I35.0 AORTIC STENOSIS: Status: ACTIVE | Noted: 2021-01-20

## 2022-03-20 PROBLEM — R06.02 SOB (SHORTNESS OF BREATH): Status: ACTIVE | Noted: 2019-03-18

## 2022-03-20 PROBLEM — Z98.890 S/P CARDIAC CATH: Status: ACTIVE | Noted: 2020-10-13

## 2022-04-08 ENCOUNTER — TELEPHONE (OUTPATIENT)
Dept: CARDIOLOGY CLINIC | Age: 82
End: 2022-04-08

## 2022-04-08 NOTE — TELEPHONE ENCOUNTER
Late entry from 3/7/22:    TVT Registry 1 year follow up:    KCCQ-12 completed over the phone with patient. Forms scanned into EMR. NYHA Class: 1  Medications listed in EMR reviewed with the patient: No changes to the current regimen. Hospitalizations over the past year: None6/11/21  Last TTE:  SBE reviewed with the patient.    Walk and clinic visit not completed due to COVID-19

## 2022-04-24 ENCOUNTER — APPOINTMENT (OUTPATIENT)
Dept: GENERAL RADIOLOGY | Age: 82
End: 2022-04-24
Attending: STUDENT IN AN ORGANIZED HEALTH CARE EDUCATION/TRAINING PROGRAM
Payer: MEDICARE

## 2022-04-24 ENCOUNTER — HOSPITAL ENCOUNTER (EMERGENCY)
Age: 82
Discharge: HOME | End: 2022-04-24
Attending: STUDENT IN AN ORGANIZED HEALTH CARE EDUCATION/TRAINING PROGRAM | Admitting: STUDENT IN AN ORGANIZED HEALTH CARE EDUCATION/TRAINING PROGRAM
Payer: MEDICARE

## 2022-04-24 VITALS
TEMPERATURE: 97.8 F | SYSTOLIC BLOOD PRESSURE: 146 MMHG | HEART RATE: 78 BPM | OXYGEN SATURATION: 97 % | DIASTOLIC BLOOD PRESSURE: 57 MMHG | RESPIRATION RATE: 18 BRPM

## 2022-04-24 DIAGNOSIS — S90.31XA HEMATOMA OF RIGHT FOOT: Primary | ICD-10-CM

## 2022-04-24 DIAGNOSIS — M79.671 BILATERAL FOOT PAIN: ICD-10-CM

## 2022-04-24 DIAGNOSIS — M79.672 BILATERAL FOOT PAIN: ICD-10-CM

## 2022-04-24 PROCEDURE — 73630 X-RAY EXAM OF FOOT: CPT

## 2022-04-24 PROCEDURE — 99283 EMERGENCY DEPT VISIT LOW MDM: CPT

## 2022-04-24 PROCEDURE — 74011250637 HC RX REV CODE- 250/637: Performed by: STUDENT IN AN ORGANIZED HEALTH CARE EDUCATION/TRAINING PROGRAM

## 2022-04-24 RX ORDER — DICLOFENAC SODIUM 10 MG/G
GEL TOPICAL 4 TIMES DAILY
Qty: 50 G | Refills: 0 | Status: SHIPPED | OUTPATIENT
Start: 2022-04-24

## 2022-04-24 RX ORDER — ACETAMINOPHEN 500 MG
1000 TABLET ORAL ONCE
Status: COMPLETED | OUTPATIENT
Start: 2022-04-24 | End: 2022-04-24

## 2022-04-24 RX ADMIN — ACETAMINOPHEN 1000 MG: 500 TABLET ORAL at 17:08

## 2022-04-24 NOTE — ED PROVIDER NOTES
27-year-old female with history of CAD, T2DM and HTN presents to ED with 2 days of bilateral foot pain. She notes that she feels that her feet have been \"bruised\" and dark looking. She soaked her feet in an OTC body wash meant for \"aches and pains\" with no relief. Her daughter notes that this happened last month as well, she went to [facility] where X-rays were done and were normal. Was told these were contusions or hematomas. Her symptoms were resolved and she followed up with her PCP who prescribed her something to soak her feet in. She felt better but now it has come back and now is in both feet. She also notes pain to the touch but not to movement or bearing weight. No inciting trauma or falls. She also notes some numbness and tingling in both of her feet which has been going on since yesterday. Denies any fevers, chills. Patient does take eliquis and does note that she \"bumps my feet on furniture a lot\". The history is provided by the patient. Foot Pain  Pertinent negatives include no chest pain, no headaches and no shortness of breath. Past Medical History:   Diagnosis Date    CAD (coronary artery disease)     pulmonary HTN    Diabetes ([facility] [address]     Hypertension        Past Surgical History:   Procedure Laterality Date    HX KNEE REPLACEMENT Bilateral     HX ORTHOPAEDIC      IR INSERT TUNL CVC W/O PORT OVER 5 YR  3/5/2019         No family history on file.     Social History     Socioeconomic History    Marital status:      Spouse name: Not on file    Number of children: Not on file    Years of education: Not on file    Highest education level: Not on file   Occupational History    Not on file   Tobacco Use    Smoking status: Never Smoker    Smokeless tobacco: Never Used   Substance and Sexual Activity    Alcohol use: No    Drug use: No    Sexual activity: Not on file   Other Topics Concern    Not on file   Social History Narrative    Not on file     Social Determinants of Health     Financial Resource Strain:     Difficulty of Paying Living Expenses: Not on file   Food Insecurity:     Worried About Running Out of Food in the Last Year: Not on file    [place] of Food in the Last Year: Not on file   Transportation Needs:     Lack of Transportation (Medical): Not on file    Lack of Transportation (Non-Medical): Not on file   Physical Activity:     Days of Exercise per Week: Not on file    Minutes of Exercise per Session: Not on file   Stress:     Feeling of Stress : Not on file   Social Connections:     Frequency of Communication with Friends and Family: Not on file    Frequency of Social Gatherings with Friends and Family: Not on file    Attends Restoration Services: Not on file    Active Member of [address] [place] [address] [employer] or Organizations: Not on file    Attends Club or Organization Meetings: Not on file    Marital Status: Not on file   Intimate Partner Violence:     Fear of Current or Ex-Partner: Not on file    Emotionally Abused: Not on file    Physically Abused: Not on file    Sexually Abused: Not on file   Housing Stability:     Unable to Pay for Housing in the Last Year: Not on file    Number of [place] in the Last Year: Not on file    Unstable Housing in the Last Year: Not on file         ALLERGIES: Patient has no known allergies. Review of Systems   Constitutional: Negative for fever. HENT: Negative for congestion and sinus pressure. Respiratory: Negative for shortness of breath. Cardiovascular: Negative for chest pain. Gastrointestinal: Negative for nausea and vomiting. Genitourinary: Negative for dysuria. Musculoskeletal: Negative for myalgias. Skin: Positive for color change. Neurological: Negative for dizziness and headaches. Hematological: Negative for adenopathy. Psychiatric/Behavioral: The patient is not nervous/anxious. All other systems reviewed and are negative.       Vitals:    04/24/22 1623   BP: (!) 146/57   Pulse: 78   Resp: 18 Temp: 97.8 °F (36.6 °C)   SpO2: 97%            Physical Exam  Vitals and nursing note reviewed. Constitutional:       General: She is not in acute distress. Appearance: Normal appearance. She is normal weight. HENT:      Head: Normocephalic and atraumatic. Eyes:      Extraocular Movements: Extraocular movements intact. Pupils: Pupils are equal, round, and reactive to light. Cardiovascular:      Rate and Rhythm: Normal rate and regular rhythm. Heart sounds: Normal heart sounds. Pulmonary:      Breath sounds: Normal breath sounds. Abdominal:      Palpations: Abdomen is soft. Tenderness: There is no abdominal tenderness. Lymphadenopathy:      Cervical: No cervical adenopathy. Skin:     General: Skin is warm and dry. Findings: Ecchymosis present. Comments: Multiple ecchymoses/contusions noted to left foot. One 4 cm round contusion with underlying hematoma noted to right foot over first metatarsal   Neurological:      General: No focal deficit present. Mental Status: She is alert and oriented to person, place, and time. Psychiatric:         Mood and Affect: Mood normal.         Behavior: Behavior normal.         Thought Content: Thought content normal.          MDM  Number of Diagnoses or Management Options  Bilateral foot pain  Hematoma of right foot  Diagnosis management comments: 77-year-old female with history of CAD, T2DM and HTN presents to ED with 2 days of bilateral foot pain. Vital signs stable in triage. Physical exam notable for multiple ecchymoses/contusions to left foot as well as 1 4 cm round contusion with underlying hematoma noted to right foot over first metatarsal.  Tenderness to palpation but no reduced range of motion and patient neurovascularly intact. X-rays of bilateral she feet showed no acute abnormality aside from soft tissue swelling of her first metatarsal of right foot consistent with hematoma.   Suspect that these are contusions and hematomas secondary to patient minor trauma and being on a blood thinner causing worsening bruising than usual.  Patient will be discharged with prescription for diclofenac gel as well as instructions for conservative care, follow-up and return precautions       Amount and/or Complexity of Data Reviewed  Tests in the radiology section of CPT®: ordered and reviewed  Discuss the patient with other providers: yes           Procedures

## 2022-04-24 NOTE — DISCHARGE INSTRUCTIONS
Continue to monitor symptoms. Can ice and elevate your feet as needed and take Tylenol or Advil for pain. Follow-up with your PCP and return with any changes or worsening.

## 2022-04-24 NOTE — ED TRIAGE NOTES
Pt states both feet have turned real dark and they are hurting. Pt states they have been hurting for the last week. Pt denies falling.

## 2022-05-04 ENCOUNTER — APPOINTMENT (OUTPATIENT)
Dept: GENERAL RADIOLOGY | Age: 82
End: 2022-05-04
Attending: EMERGENCY MEDICINE
Payer: MEDICARE

## 2022-05-04 ENCOUNTER — HOSPITAL ENCOUNTER (EMERGENCY)
Age: 82
Discharge: HOME | End: 2022-05-04
Attending: EMERGENCY MEDICINE
Payer: MEDICARE

## 2022-05-04 ENCOUNTER — APPOINTMENT (OUTPATIENT)
Dept: CT IMAGING | Age: 82
End: 2022-05-04
Attending: EMERGENCY MEDICINE
Payer: MEDICARE

## 2022-05-04 VITALS
HEART RATE: 58 BPM | RESPIRATION RATE: 18 BRPM | DIASTOLIC BLOOD PRESSURE: 71 MMHG | SYSTOLIC BLOOD PRESSURE: 171 MMHG | TEMPERATURE: 97.6 F | OXYGEN SATURATION: 99 %

## 2022-05-04 DIAGNOSIS — S83.92XA SPRAIN OF LEFT KNEE, UNSPECIFIED LIGAMENT, INITIAL ENCOUNTER: ICD-10-CM

## 2022-05-04 DIAGNOSIS — S09.90XA INJURY OF HEAD, INITIAL ENCOUNTER: Primary | ICD-10-CM

## 2022-05-04 DIAGNOSIS — Z79.01 ANTICOAGULANT LONG-TERM USE: ICD-10-CM

## 2022-05-04 DIAGNOSIS — S00.83XA FACIAL CONTUSION, INITIAL ENCOUNTER: ICD-10-CM

## 2022-05-04 PROCEDURE — 74011250637 HC RX REV CODE- 250/637: Performed by: EMERGENCY MEDICINE

## 2022-05-04 PROCEDURE — 73562 X-RAY EXAM OF KNEE 3: CPT

## 2022-05-04 PROCEDURE — 70450 CT HEAD/BRAIN W/O DYE: CPT

## 2022-05-04 PROCEDURE — 99284 EMERGENCY DEPT VISIT MOD MDM: CPT

## 2022-05-04 RX ORDER — ACETAMINOPHEN 325 MG/1
650 TABLET ORAL
Status: COMPLETED | OUTPATIENT
Start: 2022-05-04 | End: 2022-05-04

## 2022-05-04 RX ADMIN — ACETAMINOPHEN 650 MG: 325 TABLET ORAL at 17:09

## 2022-05-04 NOTE — ED TRIAGE NOTES
Pt states she tripped and had GLF at home. Hematoma to left eye and left arm. Denies LOC.  Pt is on eliquis

## 2022-05-04 NOTE — ED PROVIDER NOTES
80-year-old female presents from home with complaint of fall and head injury. She fell last night while doing dishes at the kitchen sink. She hit the left side of her head and landed on her left knee. She reports pain to left knee but she was able to walk on it after the fall. She denies any loss of consciousness. No vomiting, focal weakness or numbness, visual changes. She is on Eliquis. No other complaints. Past Medical History:   Diagnosis Date    CAD (coronary artery disease)     pulmonary HTN    Diabetes ([facility] [address]     Hypertension        Past Surgical History:   Procedure Laterality Date    HX KNEE REPLACEMENT Bilateral     HX ORTHOPAEDIC      IR INSERT TUNL CVC W/O PORT OVER 5 YR  3/5/2019         No family history on file. Social History     Socioeconomic History    Marital status:      Spouse name: Not on file    Number of children: Not on file    Years of education: Not on file    Highest education level: Not on file   Occupational History    Not on file   Tobacco Use    Smoking status: Never Smoker    Smokeless tobacco: Never Used   Substance and Sexual Activity    Alcohol use: No    Drug use: No    Sexual activity: Not on file   Other Topics Concern    Not on file   Social History Narrative    Not on file     Social Determinants of Health     Financial Resource Strain:     Difficulty of Paying Living Expenses: Not on file   Food Insecurity:     Worried About Running Out of Food in the Last Year: Not on file    [place] of Food in the Last Year: Not on file   Transportation Needs:     Lack of Transportation (Medical): Not on file    Lack of Transportation (Non-Medical):  Not on file   Physical Activity:     Days of Exercise per Week: Not on file    Minutes of Exercise per Session: Not on file   Stress:     Feeling of Stress : Not on file   Social Connections:     Frequency of Communication with Friends and Family: Not on file    Frequency of Social Gatherings with Friends and Family: Not on file    Attends [employer] Services: Not on file    Active Member of Clubs or Organizations: Not on file    Attends Club or Organization Meetings: Not on file    Marital Status: Not on file   Intimate Partner Violence:     Fear of Current or Ex-Partner: Not on file    Emotionally Abused: Not on file    Physically Abused: Not on file    Sexually Abused: Not on file   Housing Stability:     Unable to Pay for Housing in the Last Year: Not on file    Number of [place] in the Last Year: Not on file    Unstable Housing in the Last Year: Not on file         ALLERGIES: Patient has no known allergies. Review of Systems   Constitutional: Negative for fever. HENT: Negative for facial swelling. Eyes: Negative for visual disturbance. Respiratory: Negative for chest tightness. Cardiovascular: Negative for chest pain. Gastrointestinal: Negative for abdominal pain. Genitourinary: Negative for difficulty urinating and dysuria. Musculoskeletal: Negative for arthralgias. Skin: Negative for rash. Neurological: Negative for headaches. Hematological: Negative for adenopathy. Psychiatric/Behavioral: Negative for suicidal ideas. Vitals:    05/04/22 1404   BP: (!) 168/90   Pulse: (!) 58   Resp: 18   Temp: 97.6 °F (36.4 °C)   SpO2: 99%            Physical Exam  Vitals and nursing note reviewed. Constitutional:       General: She is not in acute distress. Appearance: She is well-developed. HENT:      Head: Normocephalic. Comments: Bruising about the left eye and left face. Extraocular muscles intact. Pupils equal and reactive. Eyes:      General: No scleral icterus. Conjunctiva/sclera: Conjunctivae normal.      Pupils: Pupils are equal, round, and reactive to light. Cardiovascular:      Rate and Rhythm: Normal rate. Heart sounds: No murmur heard. Pulmonary:      Effort: Pulmonary effort is normal. No respiratory distress. Abdominal:      General: There is no distension. Musculoskeletal:         General: Normal range of motion. Cervical back: Normal range of motion and neck supple. Comments: Mild swelling to the left knee. No obvious deformities. No focal tenderness. Skin:     General: Skin is warm and dry. Findings: No rash. Neurological:      Mental Status: She is alert and oriented to person, place, and time.           MDM       Procedures

## 2022-05-04 NOTE — ED NOTES
Patient (s)  given copy of dc instructions. Patient (s)  verbalized understanding of instructions. Patient given a current medication reconciliation form and verbalized understanding of their medications. Patient (s) verbalized understanding of the importance of discussing medications with  his or her physician or clinic they will be following up with. Patient alert and oriented and in no acute distress. Patient discharged home ambulatory with all belongings. Pt here with c/o RLQ pain that radiates into his R groin since 0900 today. Hx stones, but states this feels different. No nausea now, but had an episodes of emesis earlier. 2 BMs yesterday. No CP or SOB. ABC intact. A&O x4.

## 2022-06-24 ENCOUNTER — APPOINTMENT (OUTPATIENT)
Dept: GENERAL RADIOLOGY | Age: 82
DRG: 065 | End: 2022-06-24
Attending: EMERGENCY MEDICINE
Payer: MEDICARE

## 2022-06-24 ENCOUNTER — APPOINTMENT (OUTPATIENT)
Dept: CT IMAGING | Age: 82
DRG: 065 | End: 2022-06-24
Attending: EMERGENCY MEDICINE
Payer: MEDICARE

## 2022-06-24 ENCOUNTER — APPOINTMENT (OUTPATIENT)
Dept: MRI IMAGING | Age: 82
DRG: 065 | End: 2022-06-24
Attending: FAMILY MEDICINE
Payer: MEDICARE

## 2022-06-24 ENCOUNTER — HOSPITAL ENCOUNTER (INPATIENT)
Age: 82
LOS: 6 days | Discharge: NURSING, REHAB OR LONG-TERM CARE | DRG: 065 | End: 2022-06-30
Attending: EMERGENCY MEDICINE | Admitting: FAMILY MEDICINE
Payer: MEDICARE

## 2022-06-24 ENCOUNTER — APPOINTMENT (OUTPATIENT)
Dept: CT IMAGING | Age: 82
DRG: 065 | End: 2022-06-24
Attending: FAMILY MEDICINE
Payer: MEDICARE

## 2022-06-24 DIAGNOSIS — R47.81 SLURRED SPEECH: Primary | ICD-10-CM

## 2022-06-24 DIAGNOSIS — R41.0 CONFUSION: ICD-10-CM

## 2022-06-24 PROBLEM — I63.9 CVA (CEREBRAL VASCULAR ACCIDENT) (HCC): Status: ACTIVE | Noted: 2022-06-24

## 2022-06-24 LAB
ALBUMIN SERPL-MCNC: 3.4 G/DL (ref 3.5–5)
ALBUMIN/GLOB SERPL: 0.8 {RATIO} (ref 1.1–2.2)
ALP SERPL-CCNC: 57 U/L (ref 45–117)
ALT SERPL-CCNC: 20 U/L (ref 12–78)
ANION GAP SERPL CALC-SCNC: 3 MMOL/L (ref 5–15)
APPEARANCE UR: CLEAR
AST SERPL-CCNC: 19 U/L (ref 15–37)
BASOPHILS # BLD: 0.1 K/UL (ref 0–0.1)
BASOPHILS NFR BLD: 1 % (ref 0–1)
BILIRUB SERPL-MCNC: 0.5 MG/DL (ref 0.2–1)
BILIRUB UR QL: NEGATIVE
BUN SERPL-MCNC: 31 MG/DL (ref 6–20)
BUN/CREAT SERPL: 20 (ref 12–20)
CALCIUM SERPL-MCNC: 10.4 MG/DL (ref 8.5–10.1)
CHLORIDE SERPL-SCNC: 106 MMOL/L (ref 97–108)
CO2 SERPL-SCNC: 32 MMOL/L (ref 21–32)
COLOR UR: NORMAL
COMMENT, HOLDF: NORMAL
CREAT SERPL-MCNC: 1.53 MG/DL (ref 0.55–1.02)
DIFFERENTIAL METHOD BLD: ABNORMAL
EOSINOPHIL # BLD: 0.2 K/UL (ref 0–0.4)
EOSINOPHIL NFR BLD: 3 % (ref 0–7)
ERYTHROCYTE [DISTWIDTH] IN BLOOD BY AUTOMATED COUNT: 14.6 % (ref 11.5–14.5)
GLOBULIN SER CALC-MCNC: 4.3 G/DL (ref 2–4)
GLUCOSE BLD STRIP.AUTO-MCNC: 164 MG/DL (ref 65–117)
GLUCOSE SERPL-MCNC: 171 MG/DL (ref 65–100)
GLUCOSE UR STRIP.AUTO-MCNC: NEGATIVE MG/DL
HCT VFR BLD AUTO: 33.8 % (ref 35–47)
HGB BLD-MCNC: 10.7 G/DL (ref 11.5–16)
HGB UR QL STRIP: NEGATIVE
IMM GRANULOCYTES # BLD AUTO: 0 K/UL (ref 0–0.04)
IMM GRANULOCYTES NFR BLD AUTO: 0 % (ref 0–0.5)
INR PPP: 1.1 (ref 0.9–1.1)
KETONES UR QL STRIP.AUTO: NEGATIVE MG/DL
LEUKOCYTE ESTERASE UR QL STRIP.AUTO: NEGATIVE
LYMPHOCYTES # BLD: 2.6 K/UL (ref 0.8–3.5)
LYMPHOCYTES NFR BLD: 36 % (ref 12–49)
MCH RBC QN AUTO: 27.4 PG (ref 26–34)
MCHC RBC AUTO-ENTMCNC: 31.7 G/DL (ref 30–36.5)
MCV RBC AUTO: 86.7 FL (ref 80–99)
MONOCYTES # BLD: 0.6 K/UL (ref 0–1)
MONOCYTES NFR BLD: 9 % (ref 5–13)
NEUTS SEG # BLD: 3.7 K/UL (ref 1.8–8)
NEUTS SEG NFR BLD: 51 % (ref 32–75)
NITRITE UR QL STRIP.AUTO: NEGATIVE
NRBC # BLD: 0 K/UL (ref 0–0.01)
NRBC BLD-RTO: 0 PER 100 WBC
PH UR STRIP: 7.5 [PH] (ref 5–8)
PLATELET # BLD AUTO: 200 K/UL (ref 150–400)
PMV BLD AUTO: 10.1 FL (ref 8.9–12.9)
POTASSIUM SERPL-SCNC: 3.9 MMOL/L (ref 3.5–5.1)
PROT SERPL-MCNC: 7.7 G/DL (ref 6.4–8.2)
PROT UR STRIP-MCNC: NEGATIVE MG/DL
PROTHROMBIN TIME: 11.8 SEC (ref 9–11.1)
RBC # BLD AUTO: 3.9 M/UL (ref 3.8–5.2)
SAMPLES BEING HELD,HOLD: NORMAL
SERVICE CMNT-IMP: ABNORMAL
SODIUM SERPL-SCNC: 141 MMOL/L (ref 136–145)
SP GR UR REFRACTOMETRY: 1.01 (ref 1–1.03)
TROPONIN-HIGH SENSITIVITY: 30 NG/L (ref 0–51)
UROBILINOGEN UR QL STRIP.AUTO: 0.2 EU/DL (ref 0.2–1)
WBC # BLD AUTO: 7.2 K/UL (ref 3.6–11)

## 2022-06-24 PROCEDURE — 99285 EMERGENCY DEPT VISIT HI MDM: CPT

## 2022-06-24 PROCEDURE — 81003 URINALYSIS AUTO W/O SCOPE: CPT

## 2022-06-24 PROCEDURE — 80053 COMPREHEN METABOLIC PANEL: CPT

## 2022-06-24 PROCEDURE — 93005 ELECTROCARDIOGRAM TRACING: CPT

## 2022-06-24 PROCEDURE — 65270000046 HC RM TELEMETRY

## 2022-06-24 PROCEDURE — 70551 MRI BRAIN STEM W/O DYE: CPT

## 2022-06-24 PROCEDURE — 85610 PROTHROMBIN TIME: CPT

## 2022-06-24 PROCEDURE — 70450 CT HEAD/BRAIN W/O DYE: CPT

## 2022-06-24 PROCEDURE — 74011250637 HC RX REV CODE- 250/637: Performed by: FAMILY MEDICINE

## 2022-06-24 PROCEDURE — 4A03X5D MEASUREMENT OF ARTERIAL FLOW, INTRACRANIAL, EXTERNAL APPROACH: ICD-10-PCS | Performed by: HOSPITALIST

## 2022-06-24 PROCEDURE — 74011250636 HC RX REV CODE- 250/636: Performed by: FAMILY MEDICINE

## 2022-06-24 PROCEDURE — 82962 GLUCOSE BLOOD TEST: CPT

## 2022-06-24 PROCEDURE — 70496 CT ANGIOGRAPHY HEAD: CPT

## 2022-06-24 PROCEDURE — 84484 ASSAY OF TROPONIN QUANT: CPT

## 2022-06-24 PROCEDURE — 71045 X-RAY EXAM CHEST 1 VIEW: CPT

## 2022-06-24 PROCEDURE — 74011000636 HC RX REV CODE- 636: Performed by: FAMILY MEDICINE

## 2022-06-24 PROCEDURE — 36415 COLL VENOUS BLD VENIPUNCTURE: CPT

## 2022-06-24 PROCEDURE — 85025 COMPLETE CBC W/AUTO DIFF WBC: CPT

## 2022-06-24 RX ORDER — HYDRALAZINE HYDROCHLORIDE 20 MG/ML
20 INJECTION INTRAMUSCULAR; INTRAVENOUS
Status: DISCONTINUED | OUTPATIENT
Start: 2022-06-24 | End: 2022-06-30 | Stop reason: HOSPADM

## 2022-06-24 RX ORDER — VENLAFAXINE 37.5 MG/1
37.5 TABLET ORAL 2 TIMES DAILY
Status: DISCONTINUED | OUTPATIENT
Start: 2022-06-24 | End: 2022-06-30 | Stop reason: HOSPADM

## 2022-06-24 RX ORDER — OXYBUTYNIN CHLORIDE 5 MG/1
15 TABLET, EXTENDED RELEASE ORAL DAILY
Status: DISCONTINUED | OUTPATIENT
Start: 2022-06-25 | End: 2022-06-30 | Stop reason: HOSPADM

## 2022-06-24 RX ORDER — FAMOTIDINE 20 MG/1
20 TABLET, FILM COATED ORAL DAILY
Status: DISCONTINUED | OUTPATIENT
Start: 2022-06-25 | End: 2022-06-30 | Stop reason: HOSPADM

## 2022-06-24 RX ORDER — ASPIRIN 81 MG/1
81 TABLET ORAL DAILY
Status: DISCONTINUED | OUTPATIENT
Start: 2022-06-25 | End: 2022-06-30 | Stop reason: HOSPADM

## 2022-06-24 RX ORDER — CARVEDILOL 3.12 MG/1
6.25 TABLET ORAL 2 TIMES DAILY WITH MEALS
Status: DISCONTINUED | OUTPATIENT
Start: 2022-06-24 | End: 2022-06-30 | Stop reason: HOSPADM

## 2022-06-24 RX ORDER — SODIUM CHLORIDE 9 MG/ML
75 INJECTION, SOLUTION INTRAVENOUS CONTINUOUS
Status: DISPENSED | OUTPATIENT
Start: 2022-06-24 | End: 2022-06-25

## 2022-06-24 RX ORDER — ROSUVASTATIN CALCIUM 10 MG/1
20 TABLET, COATED ORAL
Status: DISCONTINUED | OUTPATIENT
Start: 2022-06-24 | End: 2022-06-30 | Stop reason: HOSPADM

## 2022-06-24 RX ORDER — CALCITRIOL 0.25 UG/1
0.5 CAPSULE ORAL DAILY
Status: DISCONTINUED | OUTPATIENT
Start: 2022-06-25 | End: 2022-06-30 | Stop reason: HOSPADM

## 2022-06-24 RX ORDER — ACETAMINOPHEN 325 MG/1
650 TABLET ORAL
Status: DISCONTINUED | OUTPATIENT
Start: 2022-06-24 | End: 2022-06-30 | Stop reason: HOSPADM

## 2022-06-24 RX ORDER — ACETAMINOPHEN 650 MG/1
650 SUPPOSITORY RECTAL
Status: DISCONTINUED | OUTPATIENT
Start: 2022-06-24 | End: 2022-06-30 | Stop reason: HOSPADM

## 2022-06-24 RX ADMIN — SODIUM CHLORIDE 75 ML/HR: 9 INJECTION, SOLUTION INTRAVENOUS at 18:41

## 2022-06-24 RX ADMIN — IOPAMIDOL 100 ML: 755 INJECTION, SOLUTION INTRAVENOUS at 18:01

## 2022-06-24 RX ADMIN — HYDRALAZINE HYDROCHLORIDE 20 MG: 20 INJECTION INTRAMUSCULAR; INTRAVENOUS at 18:42

## 2022-06-24 RX ADMIN — CARVEDILOL 6.25 MG: 3.12 TABLET, FILM COATED ORAL at 18:42

## 2022-06-24 NOTE — ED PROVIDER NOTES
This is an 80-year-old female      With a history of diabetes and hypertension as well as coronary artery disease. She is brought in by family member who states that she is little more confused and weak. She also says that her speech is not quite right. The symptoms started yesterday. There was no complaint of headache, fever or chills, nausea or vomiting and no GI or  symptoms otherwise noted. She has had no chest pain or shortness of breath. Today, the patient was noted to have perhaps may be slight slurring of her speech and some confusion. She continues to complain of fatigue. No other acute symptomatology is noted. Past Medical History:   Diagnosis Date    CAD (coronary artery disease)     pulmonary HTN    Diabetes ([facility] [address]     Hypertension        Past Surgical History:   Procedure Laterality Date    HX KNEE REPLACEMENT Bilateral     HX ORTHOPAEDIC      IR INSERT TUNL CVC W/O PORT OVER 5 YR  3/5/2019         No family history on file. Social History     Socioeconomic History    Marital status:      Spouse name: Not on file    Number of children: Not on file    Years of education: Not on file    Highest education level: Not on file   Occupational History    Not on file   Tobacco Use    Smoking status: Never Smoker    Smokeless tobacco: Never Used   Substance and Sexual Activity    Alcohol use: No    Drug use: No    Sexual activity: Not on file   Other Topics Concern    Not on file   Social History Narrative    Not on file     Social Determinants of Health     Financial Resource Strain:     Difficulty of Paying Living Expenses: Not on file   Food Insecurity:     Worried About Running Out of Food in the Last Year: Not on file    [place] of Food in the Last Year: Not on file   Transportation Needs:     Lack of Transportation (Medical): Not on file    Lack of Transportation (Non-Medical):  Not on file   Physical Activity:     Days of Exercise per Week: Not on file    Minutes of Exercise per Session: Not on file   Stress:     Feeling of Stress : Not on file   Social Connections:     Frequency of Communication with Friends and Family: Not on file    Frequency of Social Gatherings with Friends and Family: Not on file    Attends Religion Services: Not on file    Active Member of Clubs or Organizations: Not on file    Attends Club or Organization Meetings: Not on file    Marital Status: Not on file   Intimate Partner Violence:     Fear of Current or Ex-Partner: Not on file    Emotionally Abused: Not on file    Physically Abused: Not on file    Sexually Abused: Not on file   Housing Stability:     Unable to Pay for Housing in the Last Year: Not on file    Number of [place] in the Last Year: Not on file    Unstable Housing in the Last Year: Not on file         ALLERGIES: Patient has no known allergies. Review of Systems   Constitutional: Positive for fatigue. Negative for activity change, appetite change, chills and fever. HENT: Negative for ear pain, facial swelling, sore throat and trouble swallowing. Eyes: Negative for pain, discharge and visual disturbance. Respiratory: Negative for chest tightness, shortness of breath and wheezing. Cardiovascular: Negative for chest pain and palpitations. Gastrointestinal: Negative for abdominal pain, blood in stool, nausea and vomiting. Genitourinary: Negative for difficulty urinating, flank pain and hematuria. Musculoskeletal: Negative for arthralgias, joint swelling, myalgias and neck pain. Skin: Negative for color change and rash. Neurological: Positive for weakness. Negative for dizziness, numbness and headaches. Some slurring of speech   Hematological: Negative for adenopathy. Does not bruise/bleed easily. Psychiatric/Behavioral: Positive for hallucinations (  Speaking with dad and relatives). Negative for behavioral problems, confusion and sleep disturbance.    All other systems reviewed and are negative. There were no vitals filed for this visit. Physical Exam  Vitals and nursing note reviewed. Constitutional:       General: She is not in acute distress. Appearance: She is well-developed. Comments: This is an 27-year-old female who presents with some altered mental status and slight slurring of speech. Vital signs are as noted. HENT:      Head: Normocephalic and atraumatic. Nose: Nose normal.   Eyes:      General: No scleral icterus. Conjunctiva/sclera: Conjunctivae normal.      Pupils: Pupils are equal, round, and reactive to light. Neck:      Thyroid: No thyromegaly. Vascular: No JVD. Trachea: No tracheal deviation. Comments: No carotid bruits noted. Cardiovascular:      Rate and Rhythm: Normal rate and regular rhythm. Heart sounds: Normal heart sounds. No murmur heard. No friction rub. No gallop. Pulmonary:      Effort: Pulmonary effort is normal. No respiratory distress. Breath sounds: Normal breath sounds. No wheezing or rales. Chest:      Chest wall: No tenderness. Abdominal:      General: Bowel sounds are normal. There is no distension. Palpations: Abdomen is soft. There is no mass. Tenderness: There is no abdominal tenderness. There is no guarding or rebound. Musculoskeletal:         General: No tenderness. Normal range of motion. Cervical back: Normal range of motion and neck supple. Lymphadenopathy:      Cervical: No cervical adenopathy. Skin:     General: Skin is warm and dry. Capillary Refill: Capillary refill takes 2 to 3 seconds. Findings: No erythema or rash. Neurological:      General: No focal deficit present. Mental Status: She is alert and oriented to person, place, and time. Cranial Nerves: No cranial nerve deficit. Coordination: Coordination normal.      Deep Tendon Reflexes: Reflexes are normal and symmetric. Comments: Patient is alert and communicative. She has slight slurring of some words. She follows commands appropriately and is nonfocal.  She is able to stand up with some difficulty but is able to pivot and get on the CT table without difficulty. Psychiatric:         Behavior: Behavior normal.         Thought Content: Thought content normal.         Judgment: Judgment normal.          MDM  Number of Diagnoses or Management Options  Confusion: new and requires workup  Slurred speech: new and requires workup     Amount and/or Complexity of Data Reviewed  Clinical lab tests: ordered and reviewed  Tests in the radiology section of CPT®: ordered and reviewed  Decide to obtain previous medical records or to obtain history from someone other than the patient: yes  Review and summarize past medical records: yes  Discuss the patient with other providers: yes  Independent visualization of images, tracings, or specimens: yes    Risk of Complications, Morbidity, and/or Mortality  Presenting problems: high  Diagnostic procedures: high  Management options: high    Patient Progress  Patient progress: stable         Procedures    This is an 70-year-old female who presents to the ED with some altered mental status, fatigue, not acting right and talking to her  relatives. She started yesterday acting a bit abnormally. There was no indication of headache or specific focal neurologic deficit. It continued into today and the patient developed some slurring of the speech which was noticed this morning. There may have been some yesterday according to the family member but she was not sure. Patient presents here with those symptoms. She is being evaluated with labs and CT. A level 2 code stroke was called and triage. Plain CT has been done and neuro has been consulted. Awaiting for his interview of the patient. Labs and EKG have been ordered. 1:36 PM  Discussd with Dr. Moi Moran and he will see.  Doubt CVA    Dr. Ravindra Constantino has seen the patient and feels that she should be admitted for further work-up with at least an MRI and evaluation by neurology to be sure she is not having some type of stroke. This may well be since some dementia. She does not have any abnormality in her labs that might suggest an encephalopathy. We will consult the hospitalist to evaluate for possible admission. Perfect Serve Consult for Admission  3:38 PM    ED Room Number: ER22/22  Patient Name and age:  Bora Mosley 80 y.o.  female  Working Diagnosis:   1. Slurred speech    2. Confusion        COVID-19 Suspicion:  no  Sepsis present:  no  Reassessment needed: no  Code Status:  Full Code  Readmission: no  Isolation Requirements:  no  Recommended Level of Care:  telemetry  Department:[facility] Adult ED - 21   Other:  Penne Mention suggesting admission. ED MD EKG interpretation: NSR with rate 67 BPM.     Total critical care time spent exclusive of procedures:  Total critical care time spent exclusive of procedures: 40 minutes

## 2022-06-24 NOTE — LETTER
[address] [facility] accompanied Modesta Toro to the emergency department on 6/24/2022. Please excuse her from work on this date as she handled this family matter. If you have any questions or concerns, please don't hesitate to call.           Ciara Ramos, BRADLEYN, RN, [facility], [facility] [employer]

## 2022-06-24 NOTE — ED TRIAGE NOTES
Pt arrives with granddaughter from home. Woke up this morning approx 10 with slurred speech, confusion and increased lethargy. Pt was \"talking to a dead relative\". C/o lethargy last night.  BP elevated this am at home

## 2022-06-24 NOTE — PROGRESS NOTES
Famotidine - Renal dosing by Pharmacy  Current regimen:  20 mg  Q 12 hr  Recent Labs     06/24/22  1333   CREA 1.53*   BUN 31*     Estimated CrCl:  44.7 ml/min    Plan:   Change to 20 mg Q 24 hr with respect to estimated creatinine clearance (CrCl 44.7 mL/min) per [facility]/[employer]-approved Renal Dosing Adjustment protocol. Pharmacy will continue to monitor this patient daily for changes in clinical status and renal function.     Please contact pharmacy with any questions/clarifications at .         ------  Famotidine (PO/IV) Renal Dosing:      >50 mL/min - 20 mg Q 12hrs      <50 mL/min - 20 mg Q 24hrs                    HD - 20 mg Q 24hrs               CRRT - n/a

## 2022-06-25 ENCOUNTER — APPOINTMENT (OUTPATIENT)
Dept: NON INVASIVE DIAGNOSTICS | Age: 82
DRG: 065 | End: 2022-06-25
Attending: FAMILY MEDICINE
Payer: MEDICARE

## 2022-06-25 LAB
ANION GAP SERPL CALC-SCNC: 4 MMOL/L (ref 5–15)
ATRIAL RATE: 67 BPM
BUN SERPL-MCNC: 27 MG/DL (ref 6–20)
BUN/CREAT SERPL: 20 (ref 12–20)
CALCIUM SERPL-MCNC: 10.4 MG/DL (ref 8.5–10.1)
CALCULATED P AXIS, ECG09: 82 DEGREES
CALCULATED R AXIS, ECG10: 34 DEGREES
CALCULATED T AXIS, ECG11: 12 DEGREES
CHLORIDE SERPL-SCNC: 105 MMOL/L (ref 97–108)
CHOLEST SERPL-MCNC: 159 MG/DL
CO2 SERPL-SCNC: 30 MMOL/L (ref 21–32)
COMMENT, HOLDF: NORMAL
CREAT SERPL-MCNC: 1.35 MG/DL (ref 0.55–1.02)
CRP SERPL HS-MCNC: 6.3 MG/L
DIAGNOSIS, 93000: NORMAL
ERYTHROCYTE [DISTWIDTH] IN BLOOD BY AUTOMATED COUNT: 14.4 % (ref 11.5–14.5)
ERYTHROCYTE [SEDIMENTATION RATE] IN BLOOD: 48 MM/HR (ref 0–30)
EST. AVERAGE GLUCOSE BLD GHB EST-MCNC: 163 MG/DL
GLUCOSE BLD STRIP.AUTO-MCNC: 181 MG/DL (ref 65–117)
GLUCOSE BLD STRIP.AUTO-MCNC: 199 MG/DL (ref 65–117)
GLUCOSE SERPL-MCNC: 143 MG/DL (ref 65–100)
HBA1C MFR BLD: 7.3 % (ref 4–5.6)
HCT VFR BLD AUTO: 35.9 % (ref 35–47)
HDLC SERPL-MCNC: 73 MG/DL
HDLC SERPL: 2.2 {RATIO} (ref 0–5)
HGB BLD-MCNC: 11.5 G/DL (ref 11.5–16)
LDLC SERPL CALC-MCNC: 70 MG/DL (ref 0–100)
MAGNESIUM SERPL-MCNC: 1.5 MG/DL (ref 1.6–2.4)
MCH RBC QN AUTO: 27.4 PG (ref 26–34)
MCHC RBC AUTO-ENTMCNC: 32 G/DL (ref 30–36.5)
MCV RBC AUTO: 85.5 FL (ref 80–99)
NRBC # BLD: 0 K/UL (ref 0–0.01)
NRBC BLD-RTO: 0 PER 100 WBC
P-R INTERVAL, ECG05: 254 MS
PHOSPHATE SERPL-MCNC: 2.6 MG/DL (ref 2.6–4.7)
PLATELET # BLD AUTO: 207 K/UL (ref 150–400)
PMV BLD AUTO: 10.4 FL (ref 8.9–12.9)
POTASSIUM SERPL-SCNC: 4.1 MMOL/L (ref 3.5–5.1)
Q-T INTERVAL, ECG07: 402 MS
QRS DURATION, ECG06: 74 MS
QTC CALCULATION (BEZET), ECG08: 424 MS
RBC # BLD AUTO: 4.2 M/UL (ref 3.8–5.2)
SAMPLES BEING HELD,HOLD: NORMAL
SERVICE CMNT-IMP: ABNORMAL
SERVICE CMNT-IMP: ABNORMAL
SODIUM SERPL-SCNC: 139 MMOL/L (ref 136–145)
TRIGL SERPL-MCNC: 80 MG/DL (ref ?–150)
TROPONIN-HIGH SENSITIVITY: 62 NG/L (ref 0–51)
TSH SERPL DL<=0.05 MIU/L-ACNC: 2.57 UIU/ML (ref 0.36–3.74)
VENTRICULAR RATE, ECG03: 67 BPM
VLDLC SERPL CALC-MCNC: 16 MG/DL
WBC # BLD AUTO: 8.5 K/UL (ref 3.6–11)

## 2022-06-25 PROCEDURE — 36415 COLL VENOUS BLD VENIPUNCTURE: CPT

## 2022-06-25 PROCEDURE — 97116 GAIT TRAINING THERAPY: CPT | Performed by: PHYSICAL THERAPIST

## 2022-06-25 PROCEDURE — 86141 C-REACTIVE PROTEIN HS: CPT

## 2022-06-25 PROCEDURE — 83735 ASSAY OF MAGNESIUM: CPT

## 2022-06-25 PROCEDURE — 83036 HEMOGLOBIN GLYCOSYLATED A1C: CPT

## 2022-06-25 PROCEDURE — 80048 BASIC METABOLIC PNL TOTAL CA: CPT

## 2022-06-25 PROCEDURE — 99223 1ST HOSP IP/OBS HIGH 75: CPT | Performed by: PSYCHIATRY & NEUROLOGY

## 2022-06-25 PROCEDURE — 74011250637 HC RX REV CODE- 250/637: Performed by: FAMILY MEDICINE

## 2022-06-25 PROCEDURE — 97161 PT EVAL LOW COMPLEX 20 MIN: CPT | Performed by: PHYSICAL THERAPIST

## 2022-06-25 PROCEDURE — 85652 RBC SED RATE AUTOMATED: CPT

## 2022-06-25 PROCEDURE — 85027 COMPLETE CBC AUTOMATED: CPT

## 2022-06-25 PROCEDURE — 97535 SELF CARE MNGMENT TRAINING: CPT

## 2022-06-25 PROCEDURE — 84443 ASSAY THYROID STIM HORMONE: CPT

## 2022-06-25 PROCEDURE — 84100 ASSAY OF PHOSPHORUS: CPT

## 2022-06-25 PROCEDURE — 80061 LIPID PANEL: CPT

## 2022-06-25 PROCEDURE — 84484 ASSAY OF TROPONIN QUANT: CPT

## 2022-06-25 PROCEDURE — 74011636637 HC RX REV CODE- 636/637: Performed by: INTERNAL MEDICINE

## 2022-06-25 PROCEDURE — 97165 OT EVAL LOW COMPLEX 30 MIN: CPT

## 2022-06-25 PROCEDURE — 74011250636 HC RX REV CODE- 250/636: Performed by: INTERNAL MEDICINE

## 2022-06-25 PROCEDURE — 82962 GLUCOSE BLOOD TEST: CPT

## 2022-06-25 PROCEDURE — 65270000046 HC RM TELEMETRY

## 2022-06-25 RX ORDER — INSULIN LISPRO 100 [IU]/ML
INJECTION, SOLUTION INTRAVENOUS; SUBCUTANEOUS
Status: DISCONTINUED | OUTPATIENT
Start: 2022-06-25 | End: 2022-06-30 | Stop reason: HOSPADM

## 2022-06-25 RX ORDER — MAGNESIUM SULFATE 100 %
4 CRYSTALS MISCELLANEOUS AS NEEDED
Status: DISCONTINUED | OUTPATIENT
Start: 2022-06-25 | End: 2022-06-30 | Stop reason: HOSPADM

## 2022-06-25 RX ORDER — MAGNESIUM SULFATE HEPTAHYDRATE 40 MG/ML
4 INJECTION, SOLUTION INTRAVENOUS ONCE
Status: COMPLETED | OUTPATIENT
Start: 2022-06-25 | End: 2022-06-25

## 2022-06-25 RX ADMIN — ASPIRIN 81 MG: 81 TABLET, COATED ORAL at 10:02

## 2022-06-25 RX ADMIN — MAGNESIUM SULFATE HEPTAHYDRATE 4 G: 4 INJECTION, SOLUTION INTRAVENOUS at 11:29

## 2022-06-25 RX ADMIN — CARVEDILOL 6.25 MG: 3.12 TABLET, FILM COATED ORAL at 10:02

## 2022-06-25 RX ADMIN — APIXABAN 2.5 MG: 2.5 TABLET, FILM COATED ORAL at 00:16

## 2022-06-25 RX ADMIN — CALCITRIOL CAPSULES 0.25 MCG 0.5 MCG: 0.25 CAPSULE ORAL at 10:02

## 2022-06-25 RX ADMIN — ROSUVASTATIN 20 MG: 10 TABLET, FILM COATED ORAL at 00:17

## 2022-06-25 RX ADMIN — FAMOTIDINE 20 MG: 20 TABLET ORAL at 10:02

## 2022-06-25 RX ADMIN — ROSUVASTATIN 20 MG: 10 TABLET, FILM COATED ORAL at 21:34

## 2022-06-25 RX ADMIN — OXYBUTYNIN CHLORIDE 15 MG: 5 TABLET, EXTENDED RELEASE ORAL at 10:01

## 2022-06-25 RX ADMIN — APIXABAN 2.5 MG: 2.5 TABLET, FILM COATED ORAL at 10:02

## 2022-06-25 RX ADMIN — APIXABAN 2.5 MG: 2.5 TABLET, FILM COATED ORAL at 17:49

## 2022-06-25 RX ADMIN — VENLAFAXINE 37.5 MG: 37.5 TABLET ORAL at 17:49

## 2022-06-25 RX ADMIN — ACETAMINOPHEN 650 MG: 325 TABLET ORAL at 06:41

## 2022-06-25 RX ADMIN — Medication 2 UNITS: at 16:51

## 2022-06-25 RX ADMIN — VENLAFAXINE 37.5 MG: 37.5 TABLET ORAL at 10:01

## 2022-06-25 RX ADMIN — CARVEDILOL 6.25 MG: 3.12 TABLET, FILM COATED ORAL at 17:49

## 2022-06-25 NOTE — PROGRESS NOTES
Problem: Self Care Deficits Care Plan (Adult)  Goal: *Acute Goals and Plan of Care (Insert Text)  Description: FUNCTIONAL STATUS PRIOR TO ADMISSION: Patient was modified independent using a rollator for functional mobility, was modified independent for basic and instrumental ADLs. HOME SUPPORT: The patient lived alone with a son in the area to check in on her. Occupational Therapy Goals  Initiated 6/25/2022   1. Patient will perform grooming at the sink with supervision/set-up within 7 day(s). 2.  Patient will perform bathing with supervision/set-up within 7 day(s). 3.  Patient will perform upper and lower body dressing with supervision/set-up within 7 day(s). 4.  Patient will perform toilet transfers with supervision/set-up within 7 day(s). 5.  Patient will perform all aspects of toileting with supervision/set-up within 7 day(s). 6.  Patient will participate in upper extremity therapeutic exercise/activities with supervision/set-up within 7 day(s). 7.  Patient will utilize energy conservation techniques during functional activities with verbal and visual cues within 7 day(s). 8.  Patient will complete the Vero Davis within 7 day(s). Outcome: Not Met     OCCUPATIONAL THERAPY EVALUATION  Patient: Adrienne Betancourt (18 y.o. female)  Date: 6/25/2022  Primary Diagnosis: CVA (cerebral vascular accident) [facility]) [I63.9]        Precautions: Fall    ASSESSMENT  Based on the objective data described below, the patient presents with decreased balance, strength, coordination, executive function, and safety awareness s/p admission for AMS & slurred speech, MR+ for acute R cerebellar infarct. At baseline, she is Mod I for ADLs and mobility with rollator use, lives alone in a 55+ community, has a son who checks in on her. She now presents below her baseline, requiring up to Min A for ADLs and mobility with mod cues for safety, environmental management, and coordination with fair carryover.  Increased time for complex processing with fluctuating carryover, requiring mod redirection to task with activity with very slight RUE weakness noted with assessment (patient reporting she is L handed). Given her high PLOF, acute CVA, and that she lives alone, recommend d/c to IPR. Current Level of Function Impacting Discharge (ADLs/self-care): CGA-Min A for ADLs & OOB mobility    Functional Outcome Measure: The patient scored 50/100 on the Barthel Index indicating she is partially dependent in basic self care and mobility    Other factors to consider for discharge: fall risk, PMH, PLOF, lives alone     Patient will benefit from skilled therapy intervention to address the above noted impairments. PLAN :  Recommendations and Planned Interventions: self care training, functional mobility training, therapeutic exercise, balance training, visual/perceptual training, therapeutic activities, cognitive retraining, endurance activities, neuromuscular re-education, patient education, home safety training, and family training/education    Frequency/Duration: Patient will be followed by occupational therapy 5 times a week to address goals. Recommendation for discharge: (in order for the patient to meet his/her long term goals)  Therapy 3 hours per day 5-7 days per week    This discharge recommendation:  A follow-up discussion with the attending provider and/or case management is planned    IF patient discharges home will need the following DME: To be determined (TBD)         SUBJECTIVE:   Patient stated My son comes and walks with me every couple of days.     OBJECTIVE DATA SUMMARY:   HISTORY:   Past Medical History:   Diagnosis Date    CAD (coronary artery disease)     pulmonary HTN    Diabetes ([facility] [address]     Hypertension      Past Surgical History:   Procedure Laterality Date    HX KNEE REPLACEMENT Bilateral     HX ORTHOPAEDIC      IR INSERT TUNL CVC W/O PORT OVER 5 YR  3/5/2019     Expanded or extensive additional review of patient history:     Home Situation  Home Environment: Apartment  # Steps to Enter: 0  One/Two Story Residence: One story  Living Alone: Yes  Support Systems: Child(jonn),Other Family Member(s)  Patient Expects to be Discharged to[de-identified] Home with family assistance  Current DME Used/Available at Home: Shower chair,Walker, rollator  Tub or Shower Type: Shower    Hand dominance: Left    EXAMINATION OF PERFORMANCE DEFICITS:  Cognitive/Behavioral Status:  Neurologic State: Alert  Orientation Level: Oriented X4  Cognition: Decreased attention/concentration; Follows commands;Poor safety awareness  Perception: Appears intact  Perseveration: No perseveration noted  Safety/Judgement: Awareness of environment;Decreased awareness of need for assistance;Decreased insight into deficits; Decreased awareness of need for safety    Skin: Appears intact    Edema: None    Hearing:       Vision/Perceptual:    Tracking: Able to track stimulus in all quadrants w/o difficulty                 Diplopia: No    Acuity: Impaired near vision    Corrective Lenses: Reading glasses    Range of Motion:  AROM: Generally decreased, functional                         Strength:  Strength: Generally decreased, functional                Coordination:  Coordination: Generally decreased, functional  Fine Motor Skills-Upper: Left Intact; Right Intact    Gross Motor Skills-Upper: Left Intact; Right Intact    Tone & Sensation:  Tone: Normal  Sensation: Intact                      Balance:  Sitting: Intact  Standing: Impaired; With support (rollator)  Standing - Static: Constant support;Good  Standing - Dynamic : Constant support; Fair    Functional Mobility and Transfers for ADLs:  Bed Mobility:  Supine to Sit: Supervision  Scooting: Supervision    Transfers:  Sit to Stand: Contact guard assistance  Stand to Sit: Contact guard assistance  Bathroom Mobility: Contact guard assistance;Minimum assistance  Toilet Transfer : Contact guard assistance  Assistive Device : Gait Belt;Walker, rollator    ADL Assessment:  Feeding: Setup    Oral Facial Hygiene/Grooming: Contact guard assistance    Bathing: Minimum assistance         Upper Body Dressing: Setup    Lower Body Dressing: Minimum assistance    Toileting: Minimum assistance                ADL Intervention and task modifications:       Grooming  Grooming Assistance: Contact guard assistance  Position Performed: Standing (at sink)  Washing Hands: Contact guard assistance  Cues: Tactile cues provided;Verbal cues provided;Visual cues provided      Lower Body Dressing Assistance  Dressing Assistance: Minimum assistance  Protective Undergarmet: Minimum assistance; Compensatory technique training  Socks: Minimum assistance  Leg Crossed Method Used: No  Position Performed: Seated in chair;Standing  Cues: Physical assistance; Tactile cues provided;Verbal cues provided;Visual cues provided    Toileting  Toileting Assistance: Minimum assistance  Bladder Hygiene: Stand-by assistance  Bowel Hygiene: Contact guard assistance  Clothing Management: Minimum assistance  Cues: Visual cues provided;Verbal cues provided; Tactile cues provided  Adaptive Equipment: Grab bars (rollator)    Cognitive Retraining  Safety/Judgement: Awareness of environment;Decreased awareness of need for assistance;Decreased insight into deficits; Decreased awareness of need for safety    Functional Measure:    Barthel Index:  Bathin  Bladder: 5  Bowels: 10  Groomin  Dressin  Feeding: 10  Mobility: 0  Stairs: 0  Toilet Use: 5  Transfer (Bed to Chair and Back): 10  Total: 50/100      The Barthel ADL Index: Guidelines  1. The index should be used as a record of what a patient does, not as a record of what a patient could do. 2. The main aim is to establish degree of independence from any help, physical or verbal, however minor and for whatever reason. 3. The need for supervision renders the patient not independent.   4. A patient's performance should be established using the best available evidence. Asking the patient, friends/relatives and nurses are the usual sources, but direct observation and common sense are also important. However direct testing is not needed. 5. Usually the patient's performance over the preceding 24-48 hours is important, but occasionally longer periods will be relevant. 6. Middle categories imply that the patient supplies over 50 per cent of the effort. 7. Use of aids to be independent is allowed. Score Interpretation (from [address] [facility] [address])    Independent   60-79 Minimally independent   40-59 Partially dependent   20-39 Very dependent   <20 Totally dependent     -Bárbara Khanna., Barthel, D.W. (1965). Functional evaluation: the Barthel Index. [address] [facility] ([address] (1997). The Barthel activities of daily living index: self-reporting versus actual performance in the old (> or = 75 years). Journal of [address] [place] [address] 45(7), [facility], GALILEO, Isidro Myles., Car Pedraza. (1999). Measuring the change in disability after inpatient rehabilitation; comparison of the responsiveness of the Barthel Index and Functional [place] Measure. Journal of Neurology, Neurosurgery, and Psychiatry, 66(4), 277-898. Jhon Tafoya, N.[place].A, CARMITA Rachel.JUDD, & Adam Moyer, M.A. (2004) Assessment of post-stroke quality of life in cost-effectiveness studies: The usefulness of the Barthel Index and the EuroQoL-5D.  Quality of Life Research, 15, 273-93         Occupational Therapy Evaluation Charge Determination   History Examination Decision-Making   LOW Complexity : Brief history review  MEDIUM Complexity : 3-5 performance deficits relating to physical, cognitive , or psychosocial skils that result in activity limitations and / or participation restrictions LOW Complexity : No comorbidities that affect functional and no verbal or physical assistance needed to complete eval tasks       Based on the above components, the patient evaluation is determined to be of the following complexity level: LOW   Pain Rating:  None    Activity Tolerance:   Good    After treatment patient left in no apparent distress:    Sitting in chair, Call bell within reach, and Bed / chair alarm activated    COMMUNICATION/EDUCATION:   The patients plan of care was discussed with: Physical therapist and Registered nurse. Patient was educated regarding her deficit(s) stated above as this relates to her diagnosis of CVA. She demonstrated Fair understanding as evidenced by verbal discussion & carryover, mild confusion noted throughout. Home safety education was provided and the patient/caregiver indicated understanding., Patient/family have participated as able in goal setting and plan of care. , and Patient/family agree to work toward stated goals and plan of care. This patients plan of care is appropriate for delegation to [facility].     Thank you for this referral.  JARRET Patrick, OTR/L  Time Calculation: 26 mins

## 2022-06-25 NOTE — PROGRESS NOTES
Problem: Mobility Impaired (Adult and Pediatric)  Goal: *Acute Goals and Plan of Care (Insert Text)  Description:   FUNCTIONAL STATUS PRIOR TO ADMISSION: Patient was modified independent using a rollator for functional mobility. HOME SUPPORT PRIOR TO ADMISSION: The patient lived alone with daughter in the area to provide assistance. Physical Therapy Goals  Initiated 6/25/2022  1. Patient will move from supine to sit and sit to supine  in bed with modified independence within 7 day(s). 2.  Patient will transfer from bed to chair and chair to bed with supervision/set-up using the least restrictive device within 7 day(s). 3.  Patient will perform sit to stand with supervision/set-up within 7 day(s). 4.  Patient will ambulate with supervision/set-up for 200 feet with the least restrictive device within 7 day(s). 5.  Patient will ascend/descend 4 stairs with 1 handrail(s) with supervision within 7 day(s). 6.  Patient will improve Weiner Balance score by 7 points within 7 days. Outcome: Progressing Towards Goal   PHYSICAL THERAPY EVALUATION  Patient: Kanwal Anderson (66 y.o. female)  Date: 6/25/2022  Primary Diagnosis: CVA (cerebral vascular accident) [facility]) [I63.9]        Precautions:   Fall    ASSESSMENT  Based on the objective data described below, the patient presents with decreased functional mobility from baseline level of function. Patient currently limited by mild confusion, gait instability, impaired balance and decreased activity tolerance. Currently needing supervision to come to EOB and CGA for transfers. Amb approx 50 x 2 feet with rollator and CGA with mild path deviations and generally unstable. Patient is below her baseline and lives alone. Anticipate she may benefit from short IPR stay to progress her to more independent level of function.     Other factors to consider for discharge: at risk for falls, below baseline     Patient will benefit from skilled therapy intervention to address the above noted impairments. PLAN :  Recommendations and Planned Interventions: bed mobility training, transfer training, gait training, therapeutic exercises, neuromuscular re-education, patient and family training/education, and therapeutic activities      Frequency/Duration: Patient will be followed by physical therapy:  5 times a week to address goals. Recommendation for discharge: (in order for the patient to meet his/her long term goals)  Therapy 3 hours per day 5-7 days per week      IF patient discharges home will need the following DME: patient owns DME required for discharge         SUBJECTIVE:   Patient stated I have been using the rollator since my friend passed away.     OBJECTIVE DATA SUMMARY:   HISTORY:    Past Medical History:   Diagnosis Date    CAD (coronary artery disease)     pulmonary HTN    Diabetes ([facility] [address]     Hypertension      Past Surgical History:   Procedure Laterality Date    HX KNEE REPLACEMENT Bilateral     HX ORTHOPAEDIC      IR INSERT TUNL CVC W/O PORT OVER 5 YR  3/5/2019       Personal factors and/or comorbidities impacting plan of care:     Home Situation  Home Environment: Apartment  # Steps to Enter: 0  One/Two Story Residence: One story  Living Alone: Yes  Support Systems: Child(jonn),Other Family Member(s)  Patient Expects to be Discharged to[de-identified] Home with family assistance  Current DME Used/Available at Home: Shower chair,Walker, rollator  Tub or Shower Type: Shower    EXAMINATION/PRESENTATION/DECISION MAKING:   Critical Behavior:  Neurologic State: Alert  Orientation Level: Oriented X4  Cognition: Follows commands       Range Of Motion:  AROM: Generally decreased, functional                       Strength:    Strength: Generally decreased, functional       Functional Mobility:  Bed Mobility:  Rolling: Supervision  Supine to Sit: Supervision     Scooting: Supervision  Transfers:  Sit to Stand: Contact guard assistance  Stand to Sit: Contact guard assistance Balance:   Sitting: Intact  Standing: Impaired  Standing - Static: Constant support;Good  Standing - Dynamic : Constant support; Fair  Ambulation/Gait Training:  Distance (ft): 100 Feet (ft)  Assistive Device: Gait belt;Walker, rollator  Ambulation - Level of Assistance: Contact guard assistance     Gait Description (WDL): Exceptions to WDL  Gait Abnormalities: Decreased step clearance; Path deviations; Shuffling gait              Speed/Lucy: Pace decreased (<100 feet/min); Slow  Step Length: Left shortened;Right shortened       Pain Rating:  No c/o pain    Activity Tolerance:   Fair and requires rest breaks    After treatment patient left in no apparent distress:   Sitting in chair, Call bell within reach, and Caregiver / family present    COMMUNICATION/EDUCATION:   The patients plan of care was discussed with: Physical therapist, Occupational therapist, and Registered nurse. Fall prevention education was provided and the patient/caregiver indicated understanding., Patient/family have participated as able in goal setting and plan of care. , and Patient/family agree to work toward stated goals and plan of care.     Thank you for this referral.  Yissel Madrigal, PT, DPT   Time Calculation: 26 mins

## 2022-06-25 NOTE — CONSULTS
[address]    Name:  Kassie Noriega  MR#:  [id number]  :  1940  ACCOUNT #:  [de-identified]  DATE OF SERVICE:  2022    REQUESTING PHYSICIAN:  Dr. Sohail Melgoza EVALUATION:  Stroke. HISTORY OF PRESENT ILLNESS:  The patient is an 19-year-old female with history of coronary artery disease; diabetes; hypertension; aortic stenosis, status post TAVR procedure last year, who is currently on oral anticoagulation along with aspirin and a statin. She has a previous history of stroke in 2022, and her imaging shows encephalomalacia in the left occipital area. This has left her with some right-sided visual field deficit. She was at her baseline, but reportedly felt tired and weak when she woke up yesterday morning. She was noted to be somewhat confused and appeared to be talking to one of her dead relatives. There is a report that she was slurring her words as well and was brought into the hospital.  There is a reported history of small right carotid artery aneurysm, for which she has followed up with Neurology as outpatient. Her workup has revealed that she has a small infarct in the right cerebellum. Currently she appears to be back to her baseline, and denies any new motor or sensory deficits or any further problems with speech or mentation. PAST MEDICAL HISTORY:  As mentioned above. HOME MEDICATIONS:  1. Apixaban. 2.  Metformin. 3.  Aspirin. 4.  Rosuvastatin. ALLERGIES:  NONE. SOCIAL HISTORY:  No history of smoking or alcohol use. Lives with her family. PHYSICAL EXAMINATION:  GENERAL:  The patient is sitting in the chair in no acute distress. Answers simple questions. She has somewhat slowed thought processing and delayed reaction time. Follows all commands. VITAL SIGNS:  Blood pressure 123/62, temperature 97.7, pulse is 79. NEUROLOGIC:  Speech is clear. Comprehension is intact. Pupils are equal, round, and reactive.   She appears to have some residual visual field issues on the right. Face is symmetric. Facial sensation is intact. Hearing is intact. Muscle tone and bulk normal.  She is able to raise both arms and legs against gravity. DTRs 2/2, symmetric. Toes downgoing. Sensation is intact. Gait exam is deferred. HEART:  Regular rate and rhythm. CHEST:  Clear. ABDOMEN:  Soft, nontender. Positive bowel sounds. EXTREMITIES:  No edema. LABORATORY DATA:  CBC is unremarkable. Sed rate 48. Urinalysis negative for infection. Chemistry:  Sodium 139, potassium 4.1, BUN 27, creatinine 1.35. Lipid profile:  Triglycerides 80, HDL 73, LDL 70. Hemoglobin A1c 7.3.  TSH is 2.57. DIAGNOSTIC DATA:  CT of the head and neck does not show any intracranial aneurysm or hemodynamically significant stenosis intracranially or extracranially. There is evidence of left occipital lobe encephalomalacia. MRI of the brain shows a small lacunar infarct in the right cerebellum. There is moderate white matter ischemic disease. 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 Echocardiogram in the past has shown normal ejection fraction and severely dilated left atrium. She has a prosthetic aortic valve. ASSESSMENT AND PLAN:  An 80-year-old female with history of a previous left occipital infarct, with residual right-sided visual field deficit; aortic stenosis, status post transaortic valve replacement procedure over a year ago, who is currently on aspirin, apixaban, and a statin. She is admitted for symptoms of confusion, feeling weak all over, and reported slurring of speech. Currently, she appears to be back to her baseline. MRI shows a small lacunar-sized infarct in the right cerebellum. Difficult to explain her presentation based on this infarct and cannot exclude underlying metabolic reasons/acute on chronic renal insufficiency as a cause of her symptoms. From a secondary stroke prophylaxis standpoint, she is currently taking aspirin, apixaban, and a statin. Overall management in this regard would not change. Due to previous infarcts and this new infarct in the setting of normal-appearing circulation, underlying cardiac dysrhythmia/atrial fibrillation remains a possibility. 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 We may have her follow up with Neuro-Interventional Surgery  as an outpatient for detailed review of imaging. We can initiate discharge planning. Thank you for this consultation.       Mani Capone MD      [id number]  D:  06/25/2022 15:12  T:  06/25/2022 19:21  JOB #:  [id number]

## 2022-06-25 NOTE — CONSULTS
Consult dictated. 80-year-old female with history of previous left occipital infarct with right-sided visual field deficit, TAVR procedure for aortic stenosis on aspirin and apixaban, admitted for confusion and feeling weak, reported slurring of speech. Currently she appears back to baseline. MRI shows a small infarct in the right cerebellum. Difficult explain her presentation based on this infarct and cannot exclude underlying metabolic reasons for her symptoms. From a secondary stroke prophylaxis standpoint, she is currently taking aspirin, apixaban and a statin and overall management essentially would not change. Consider extended cardiac monitoring to rule out cardiac dysrhythmia/atrial fibrillation.      nIes Hernandez MD

## 2022-06-25 NOTE — PROGRESS NOTES
Transition of Care Plan   RUR: 11%   Disposition: The disposition plan is pending medical progression   F/U with PCP/Specialist     Transport: family     Reason for Admission:  CVA                     RUR Score:     11%                Plan for utilizing home health:     TBD     PCP: First and Last name:  Ann-Marie Cabrera MD     Name of Practice:    Are you a current patient: Yes/No:    Approximate date of last visit:    Can you participate in a virtual visit with your PCP:                     Current Advanced Directive/Advance Care Plan: Full Code      Healthcare Decision Maker:                     Transition of Care Plan:                      CRM spoke with patient's son, Hussain Montes De Oca, introduced self, explained role, verified demographics, and offered assistance. The patient lives in an apartment alone. There are no steps to access the apartment. The patient requires some assistance with her ADL's/IADL's and her son and grand daughter assist her. The patient has a rollator to assist with ambulation. The patient uses [facility] on [facility] ([place], [place]) AT [facility] for her prescriptions. Care Management Interventions  PCP Verified by CM: Yes  Palliative Care Criteria Met (RRAT>21 & CHF Dx)?: No  Mode of Transport at Discharge:  Other (see comment) (grand daughter)  Transition of Care Consult (CM Consult): Discharge Planning  MyChart Signup: No  Discharge Durable Medical Equipment: No  Health Maintenance Reviewed: Yes  Physical Therapy Consult: Yes  Occupational Therapy Consult: Yes  Speech Therapy Consult: No  Support Systems: Child(jonn),Other Family Member(s)  Confirm Follow Up Transport: Family  [employer] Information Provided?: No  Discharge Location  Patient Expects to be Discharged to[de-identified] Home with family assistance    11:06 AM  Dianah Moritz, CRM

## 2022-06-25 NOTE — PROGRESS NOTES
Bedside and Verbal shift change report given to [employer] (oncoming nurse) by Dilan Schuler RN (offgoing nurse). Report included the following information SBAR, Kardex, Intake/Output, MAR, Recent Results and Med Rec Status.

## 2022-06-25 NOTE — PROGRESS NOTES
Problem: Diabetes Self-Management  Goal: *Disease process and treatment process  Description: Define diabetes and identify own type of diabetes; list 3 options for treating diabetes. Outcome: Progressing Towards Goal  Goal: *Incorporating nutritional management into lifestyle  Description: Describe effect of type, amount and timing of food on blood glucose; list 3 methods for planning meals. Outcome: Progressing Towards Goal  Goal: *Incorporating physical activity into lifestyle  Description: State effect of exercise on blood glucose levels. Outcome: Progressing Towards Goal  Goal: *Developing strategies to promote health/change behavior  Description: Define the ABC's of diabetes; identify appropriate screenings, schedule and personal plan for screenings. Outcome: Progressing Towards Goal  Goal: *Using medications safely  Description: State effect of diabetes medications on diabetes; name diabetes medication taking, action and side effects. Outcome: Progressing Towards Goal  Goal: *Monitoring blood glucose, interpreting and using results  Description: Identify recommended blood glucose targets  and personal targets. Outcome: Progressing Towards Goal  Goal: *Prevention, detection, treatment of acute complications  Description: List symptoms of hyper- and hypoglycemia; describe how to treat low blood sugar and actions for lowering  high blood glucose level. Outcome: Progressing Towards Goal  Goal: *Prevention, detection and treatment of chronic complications  Description: Define the natural course of diabetes and describe the relationship of blood glucose levels to long term complications of diabetes.   Outcome: Progressing Towards Goal  Goal: *Developing strategies to address psychosocial issues  Description: Describe feelings about living with diabetes; identify support needed and support network  Outcome: Progressing Towards Goal  Goal: *Insulin pump training  Outcome: Progressing Towards Goal  Goal: *Sick day guidelines  Outcome: Progressing Towards Goal  Goal: *Patient Specific Goal (EDIT GOAL, INSERT TEXT)  Outcome: Progressing Towards Goal     Problem: Patient Education: Go to Patient Education Activity  Goal: Patient/Family Education  Outcome: Progressing Towards Goal     Problem: Discharge Planning  Goal: *Discharge to safe environment  Outcome: Progressing Towards Goal     Problem: Patient Education: Go to Patient Education Activity  Goal: Patient/Family Education  Outcome: Progressing Towards Goal     Problem: TIA/CVA Stroke: 0-24 hours  Goal: Off Pathway (Use only if patient is Off Pathway)  Outcome: Progressing Towards Goal  Goal: Activity/Safety  Outcome: Progressing Towards Goal  Goal: Consults, if ordered  Outcome: Progressing Towards Goal  Goal: Diagnostic Test/Procedures  Outcome: Progressing Towards Goal  Goal: Nutrition/Diet  Outcome: Progressing Towards Goal  Goal: Discharge Planning  Outcome: Progressing Towards Goal  Goal: Medications  Outcome: Progressing Towards Goal  Goal: Respiratory  Outcome: Progressing Towards Goal  Goal: Treatments/Interventions/Procedures  Outcome: Progressing Towards Goal  Goal: Minimize risk of bleeding post-thrombolytic infusion  Outcome: Progressing Towards Goal  Goal: Monitor for complications post-thrombolytic infusion  Outcome: Progressing Towards Goal  Goal: Psychosocial  Outcome: Progressing Towards Goal  Goal: *Hemodynamically stable  Outcome: Progressing Towards Goal  Goal: *Neurologically stable  Description: Absence of additional neurological deficits    Outcome: Progressing Towards Goal  Goal: *Verbalizes anxiety and depression are reduced or absent  Outcome: Progressing Towards Goal  Goal: *Absence of Signs of Aspiration on Current Diet  Outcome: Progressing Towards Goal  Goal: *Absence of deep venous thrombosis signs and symptoms(Stroke Metric)  Outcome: Progressing Towards Goal  Goal: *Ability to perform ADLs and demonstrates progressive mobility and function  Outcome: Progressing Towards Goal  Goal: *Stroke education started(Stroke Metric)  Outcome: Progressing Towards Goal  Goal: *Dysphagia screen performed(Stroke Metric)  Outcome: Progressing Towards Goal  Goal: *Rehab consulted(Stroke Metric)  Outcome: Progressing Towards Goal     Problem: Ischemic Stroke: Discharge Outcomes  Goal: *Verbalizes anxiety and depression are reduced or absent  Outcome: Progressing Towards Goal  Goal: *Verbalize understanding of risk factor modification(Stroke Metric)  Outcome: Progressing Towards Goal  Goal: *Hemodynamically stable  Outcome: Progressing Towards Goal  Goal: *Absence of aspiration pneumonia  Outcome: Progressing Towards Goal  Goal: *Aware of needed dietary changes  Outcome: Progressing Towards Goal  Goal: *Verbalize understanding of prescribed medications including anti-coagulants, anti-lipid, and/or anti-platelets(Stroke Metric)  Outcome: Progressing Towards Goal  Goal: *Tolerating diet  Outcome: Progressing Towards Goal  Goal: *Aware of follow-up diagnostics related to anticoagulants  Outcome: Progressing Towards Goal  Goal: *Ability to perform ADLs and demonstrates progressive mobility and function  Outcome: Progressing Towards Goal  Goal: *Absence of DVT(Stroke Metric)  Outcome: Progressing Towards Goal  Goal: *Absence of aspiration  Outcome: Progressing Towards Goal  Goal: *Optimal pain control at patient's stated goal  Outcome: Progressing Towards Goal  Goal: *Home safety concerns addressed  Outcome: Progressing Towards Goal  Goal: *Describes available resources and support systems  Outcome: Progressing Towards Goal  Goal: *Verbalizes understanding of activation of EMS(911) for stroke symptoms(Stroke Metric)  Outcome: Progressing Towards Goal  Goal: *Understands and describes signs and symptoms to report to providers(Stroke Metric)  Outcome: Progressing Towards Goal  Goal: *Neurolgocially stable (absence of additional neurological deficits)  Outcome: Progressing Towards Goal  Goal: *Verbalizes importance of follow-up with primary care physician(Stroke Metric)  Outcome: Progressing Towards Goal  Goal: *Smoking cessation discussed,if applicable(Stroke Metric)  Outcome: Progressing Towards Goal  Goal: *Depression screening completed(Stroke Metric)  Outcome: Progressing Towards Goal     Problem: Pressure Injury - Risk of  Goal: *Prevention of pressure injury  Description: Document Mohan Scale and appropriate interventions in the flowsheet. Outcome: Progressing Towards Goal  Note: Pressure Injury Interventions:       Moisture Interventions: Absorbent underpads,Internal/External urinary devices,Limit adult briefs,Minimize layers    Activity Interventions: Increase time out of bed,Pressure redistribution bed/mattress(bed type),PT/OT evaluation    Mobility Interventions: HOB 30 degrees or less,Pressure redistribution bed/mattress (bed type),PT/OT evaluation    Nutrition Interventions: Document food/fluid/supplement intake                     Problem: Patient Education: Go to Patient Education Activity  Goal: Patient/Family Education  Outcome: Progressing Towards Goal     Problem: Falls - Risk of  Goal: *Absence of Falls  Description: Document Omer Fall Risk and appropriate interventions in the flowsheet.   Outcome: Progressing Towards Goal  Note: Fall Risk Interventions:  Mobility Interventions: Bed/chair exit alarm,Communicate number of staff needed for ambulation/transfer,OT consult for ADLs,Patient to call before getting OOB,PT Consult for mobility concerns,PT Consult for assist device competence,Strengthening exercises (ROM-active/passive),Utilize gait belt for transfers/ambulation,Utilize walker, cane, or other assistive device              Elimination Interventions: Bed/chair exit alarm,Call light in reach,Patient to call for help with toileting needs,Stay With Me (per policy),Toilet paper/wipes in reach,Toileting schedule/hourly rounds              Problem: Patient Education: Go to Patient Education Activity  Goal: Patient/Family Education  Outcome: Progressing Towards Goal

## 2022-06-25 NOTE — PROGRESS NOTES
[id number]  Hospitalist Group                                                                                          Hospitalist Progress Note  Fred Murray MD  Answering service: [phone] from in house phone        Date of Service:  2022  NAME:  Johnnie Choi  :  1940  MRN:  [id number]      Admission Summary:   Johnnie Choi is a 80 y.o. female who presents with slurred speech     History was primarily Obtained from the patient   Patient reports that last night she felt tired and weak, woke up this morning, had slurred speech, some confusion and was brought to the hospital apparently patient was brought by her granddaughter and they were concerned that patient was talking to her \"dead relative\"   Patient with history of ICA aneurysm, follows up with neurology, patient came in as a code stroke and was requested to be admitted to the hospitalist service. Interval history / Subjective:   Pt feels much better today, slurred speech resolved. NAD     Assessment & Plan:     Dysarthria- small acute CVA in right cerebellum on MRI  H/o CVA  PT/OT rec IPR  Pt already on Eliquis, aspirin and statin  LDL 70  a1c 7.3  TSH wnl  Check B12  Tele  Echo w/ bubble  Neurology consult:  Difficult to explain her presentation based on this infarct and cannot exclude underlying metabolic reasons for her symptoms. From a secondary stroke prophylaxis standpoint, she is currently taking aspirin, apixaban and a statin and overall management essentially would not change.   Consider extended cardiac monitoring to rule out cardiac dysrhythmia/atrial fibrillation    H/o TAVR for AS  Cont asa, low dose eliquis    CKD 3, at baseline ~1.3  Monitor    Diabetes mellitus type 2, a1c 7.3  SSI    Accelerated hypertension, improving  Cont home Coreg    Hypomag, replete      Code status: Full Code  Prophylaxis: Eliquis  Care Plan discussed with: Patient/Family  Anticipated Disposition: IPR  Anticipated Discharge: Greater than 48 hours pending IPR     Hospital Problems  Date Reviewed: 3/3/2021          Codes Class Noted POA    CVA (cerebral vascular accident) [facility]) ICD-10-CM: I63.9  ICD-9-CM: 434.91  6/24/2022 Unknown              Review of Systems:   Pertinent items are noted in HPI    Vital Signs:    Last 24hrs VS reviewed since prior progress note. Most recent are:  Visit Vitals  /62   Pulse 79   Temp 97.7 °F (36.5 °C)   Resp 20   SpO2 97%       No intake or output data in the 24 hours ending 06/25/22 1617     Physical Examination:   I had a face to face encounter with this patient and independently examined them on 6/25/2022 as outlined below:    General: Alert, cooperative, no acute distress    EENT:  EOMI. Anicteric sclerae. MMM  Resp:  CTA bilaterally, no wheezing or rales. No accessory muscle use  CV:  RRR, No audible murmur  GI:  Soft, Non distended, Non tender  Neurologic:  Alert and oriented x3, normal speech, MAEE  Extremities: No edema or cyanosis  Psych:   Not anxious or agitated  Skin:  No rashes. No jaundice       Data Review:   I personally reviewed: vitals, labs, imaging results, notes    Labs:     Recent Labs     06/25/22  0407 06/24/22  1333   WBC 8.5 7.2   HGB 11.5 10.7*   HCT 35.9 33.8*    200     Recent Labs     06/25/22  0407 06/24/22  1333    141   K 4.1 3.9    106   CO2 30 32   BUN 27* 31*   CREA 1.35* 1.53*   * 171*   CA 10.4* 10.4*   MG 1.5*  --    PHOS 2.6  --      Recent Labs     06/24/22  1333   ALT 20   AP 57   TBILI 0.5   TP 7.7   ALB 3.4*   GLOB 4.3*     Recent Labs     06/24/22  1333   INR 1.1   PTP 11.8*      No results for input(s): FE, TIBC, PSAT, FERR in the last 72 hours. No results found for: FOL, RBCF   No results for input(s): PH, PCO2, PO2 in the last 72 hours. No results for input(s): CPK, CKNDX, TROIQ in the last 72 hours.     No lab exists for component: CPKMB  Lab Results   Component Value Date/Time    Cholesterol, total 159 06/25/2022 04:07 AM    HDL Cholesterol 73 06/25/2022 04:07 AM    LDL, calculated 70 06/25/2022 04:07 AM    Triglyceride 80 06/25/2022 04:07 AM    CHOL/HDL Ratio 2.2 06/25/2022 04:07 AM     Lab Results   Component Value Date/Time    Glucose (POC) 164 (H) 06/24/2022 01:05 PM    Glucose (POC) 163 (H) 01/22/2021 06:28 AM    Glucose (POC) 177 (H) 01/21/2021 09:27 PM    Glucose (POC) 168 (H) 01/21/2021 06:23 PM    Glucose (POC) 309 (H) 01/21/2021 11:34 AM     Lab Results   Component Value Date/Time    Color YELLOW/STRAW 06/24/2022 04:07 PM    Appearance CLEAR 06/24/2022 04:07 PM    Specific gravity 1.011 06/24/2022 04:07 PM    pH (UA) 7.5 06/24/2022 04:07 PM    Protein Negative 06/24/2022 04:07 PM    Glucose Negative 06/24/2022 04:07 PM    Ketone Negative 06/24/2022 04:07 PM    Bilirubin Negative 06/24/2022 04:07 PM    Urobilinogen 0.2 06/24/2022 04:07 PM    Nitrites Negative 06/24/2022 04:07 PM    Leukocyte Esterase Negative 06/24/2022 04:07 PM    Epithelial cells FEW 01/13/2021 10:41 AM    Bacteria Negative 01/13/2021 10:41 AM    WBC 0-4 01/13/2021 10:41 AM    RBC 0-5 01/13/2021 10:41 AM       Medications Reviewed:     Current Facility-Administered Medications   Medication Dose Route Frequency    apixaban (ELIQUIS) tablet 2.5 mg  2.5 mg Oral BID    aspirin delayed-release tablet 81 mg  81 mg Oral DAILY    calcitRIOL (ROCALTROL) capsule 0.5 mcg  0.5 mcg Oral DAILY    carvediloL (COREG) tablet 6.25 mg  6.25 mg Oral BID WITH MEALS    oxybutynin chloride XL (DITROPAN XL) tablet 15 mg  15 mg Oral DAILY    rosuvastatin (CRESTOR) tablet 20 mg  20 mg Oral QHS    venlafaxine (EFFEXOR) tablet 37.5 mg  37.5 mg Oral BID    acetaminophen (TYLENOL) tablet 650 mg  650 mg Oral Q4H PRN    Or    acetaminophen (TYLENOL) solution 650 mg  650 mg Per NG tube Q4H PRN    Or    acetaminophen (TYLENOL) suppository 650 mg  650 mg Rectal Q4H PRN    0.9% sodium chloride infusion  75 mL/hr IntraVENous CONTINUOUS    famotidine (PEPCID) tablet 20 mg  20 mg Oral DAILY    hydrALAZINE (APRESOLINE) 20 mg/mL injection 20 mg  20 mg IntraVENous Q6H PRN     ______________________________________________________________________  EXPECTED LENGTH OF STAY: - - -  ACTUAL LENGTH OF STAY:          1                 Santy Flores MD

## 2022-06-25 NOTE — ED NOTES
TRANSFER - OUT REPORT:    Verbal report given to [facility], RN (name) on [address]  being transferred to NSTU (unit) for routine progression of care       Report consisted of patients Situation, Background, Assessment and   Recommendations(SBAR). Information from the following report(s) SBAR, Kardex, ED Summary and Recent Results was reviewed with the receiving nurse. Lines:   Peripheral IV 06/24/22 Left Antecubital (Active)   Site Assessment Clean, dry, & intact 06/24/22 1339   Phlebitis Assessment 0 06/24/22 1339   Infiltration Assessment 0 06/24/22 1339   Dressing Status Clean, dry, & intact 06/24/22 1339   Dressing Type Transparent 06/24/22 1339   Hub Color/Line Status Pink;Patent; Flushed 06/24/22 1339        Opportunity for questions and clarification was provided.       Patient transported with:   Registered Nurse

## 2022-06-26 ENCOUNTER — APPOINTMENT (OUTPATIENT)
Dept: NON INVASIVE DIAGNOSTICS | Age: 82
DRG: 065 | End: 2022-06-26
Attending: FAMILY MEDICINE
Payer: MEDICARE

## 2022-06-26 LAB
ALBUMIN SERPL-MCNC: 2.6 G/DL (ref 3.5–5)
ANION GAP SERPL CALC-SCNC: 3 MMOL/L (ref 5–15)
BUN SERPL-MCNC: 24 MG/DL (ref 6–20)
BUN/CREAT SERPL: 19 (ref 12–20)
CALCIUM SERPL-MCNC: 9.3 MG/DL (ref 8.5–10.1)
CHLORIDE SERPL-SCNC: 109 MMOL/L (ref 97–108)
CO2 SERPL-SCNC: 23 MMOL/L (ref 21–32)
CREAT SERPL-MCNC: 1.29 MG/DL (ref 0.55–1.02)
GLUCOSE BLD STRIP.AUTO-MCNC: 119 MG/DL (ref 65–117)
GLUCOSE BLD STRIP.AUTO-MCNC: 137 MG/DL (ref 65–117)
GLUCOSE BLD STRIP.AUTO-MCNC: 171 MG/DL (ref 65–117)
GLUCOSE BLD STRIP.AUTO-MCNC: 240 MG/DL (ref 65–117)
GLUCOSE SERPL-MCNC: 165 MG/DL (ref 65–100)
MAGNESIUM SERPL-MCNC: 2.3 MG/DL (ref 1.6–2.4)
POTASSIUM SERPL-SCNC: ABNORMAL MMOL/L (ref 3.5–5.1)
SERVICE CMNT-IMP: ABNORMAL
SODIUM SERPL-SCNC: 135 MMOL/L (ref 136–145)
VIT B12 SERPL-MCNC: 592 PG/ML (ref 193–986)

## 2022-06-26 PROCEDURE — 82607 VITAMIN B-12: CPT

## 2022-06-26 PROCEDURE — 83735 ASSAY OF MAGNESIUM: CPT

## 2022-06-26 PROCEDURE — 74011636637 HC RX REV CODE- 636/637: Performed by: INTERNAL MEDICINE

## 2022-06-26 PROCEDURE — 36415 COLL VENOUS BLD VENIPUNCTURE: CPT

## 2022-06-26 PROCEDURE — 74011250637 HC RX REV CODE- 250/637: Performed by: FAMILY MEDICINE

## 2022-06-26 PROCEDURE — 65270000046 HC RM TELEMETRY

## 2022-06-26 PROCEDURE — 93306 TTE W/DOPPLER COMPLETE: CPT

## 2022-06-26 PROCEDURE — 92610 EVALUATE SWALLOWING FUNCTION: CPT

## 2022-06-26 PROCEDURE — 82962 GLUCOSE BLOOD TEST: CPT

## 2022-06-26 PROCEDURE — 80048 BASIC METABOLIC PNL TOTAL CA: CPT

## 2022-06-26 PROCEDURE — 82040 ASSAY OF SERUM ALBUMIN: CPT

## 2022-06-26 RX ADMIN — Medication 2 UNITS: at 08:20

## 2022-06-26 RX ADMIN — CALCITRIOL CAPSULES 0.25 MCG 0.5 MCG: 0.25 CAPSULE ORAL at 08:12

## 2022-06-26 RX ADMIN — APIXABAN 2.5 MG: 2.5 TABLET, FILM COATED ORAL at 08:12

## 2022-06-26 RX ADMIN — CARVEDILOL 6.25 MG: 3.12 TABLET, FILM COATED ORAL at 17:28

## 2022-06-26 RX ADMIN — Medication 3 UNITS: at 11:59

## 2022-06-26 RX ADMIN — VENLAFAXINE 37.5 MG: 37.5 TABLET ORAL at 17:28

## 2022-06-26 RX ADMIN — ASPIRIN 81 MG: 81 TABLET, COATED ORAL at 08:13

## 2022-06-26 RX ADMIN — FAMOTIDINE 20 MG: 20 TABLET ORAL at 08:12

## 2022-06-26 RX ADMIN — VENLAFAXINE 37.5 MG: 37.5 TABLET ORAL at 08:13

## 2022-06-26 RX ADMIN — CARVEDILOL 6.25 MG: 3.12 TABLET, FILM COATED ORAL at 08:13

## 2022-06-26 RX ADMIN — ROSUVASTATIN 20 MG: 10 TABLET, FILM COATED ORAL at 21:15

## 2022-06-26 RX ADMIN — APIXABAN 2.5 MG: 2.5 TABLET, FILM COATED ORAL at 17:28

## 2022-06-26 RX ADMIN — OXYBUTYNIN CHLORIDE 15 MG: 5 TABLET, EXTENDED RELEASE ORAL at 08:12

## 2022-06-26 NOTE — PROGRESS NOTES
SPEECH LANGUAGE PATHOLOGY BEDSIDE SWALLOW AND SPEECH EVALUATIONS WITH DISCHARGE  Patient: Nitin  (74 y.o. female)  Date: 6/26/2022  Primary Diagnosis: CVA (cerebral vascular accident) [facility]) [I63.9]       Precautions:   Fall    ASSESSMENT :  Patient admitted for slurred speech and confusion. Reports she is now back at baseline. MRI done showing small acute lacunar infarct in R cerebellum. Based on the objective data described below, the patient presents with functional oropharyngeal swallow with no difficulties or s/s of aspiration appreciated at bedside with any consistencies. Patient denies any speech or swallowing concerns. Given bedside presentation, recommend regular diet/thin liquids with general aspiration precautions. Suspect pt is at baseline swallow function and therefore, skilled acute therapy provided by a speech-language pathologist is not indicated at this time.     Patient also presents with functional motor speech function. No dysarthria or apraxia appreciated. Patient with good functional intelligibility across contexts. Reports her speech is back at baseline. Acute intervention for speech not indicated at this time. SLP will sign off. Please reconsult with any changes or if SLP can be of any further assistance. PLAN :  Recommendations and Planned Interventions:  -- regular diet/ thin liquids  -- general aspiration precautions including completely upright for all PO   -- SLP will sign off     Discharge Recommendations: None     SUBJECTIVE:   Patient stated Are you from [place]? Do you dance the way they do there? .     OBJECTIVE:     Past Medical History:   Diagnosis Date    CAD (coronary artery disease)     pulmonary HTN    Diabetes ([facility] [address]     Hypertension      Past Surgical History:   Procedure Laterality Date    HX KNEE REPLACEMENT Bilateral     HX ORTHOPAEDIC      IR INSERT TUNL CVC W/O PORT OVER 5 YR  3/5/2019     Prior Level of Function/Home Situation:   Home Situation  Home Environment: Apartment  # Steps to Enter: 0  One/Two Story Residence: One story  Living Alone: Yes  Support Systems: Child(jonn),Other Family Member(s)  Patient Expects to be Discharged to[de-identified] Home with family assistance  Current DME Used/Available at Home: Shower chair,Walker, rollator  Tub or Shower Type: Shower  Diet prior to admission: regular/thin  Current Diet:  Regular/thin   Cognitive and Communication Status:  Neurologic State: Alert  Orientation Level: Oriented X4  Cognition: Follows commands,Decreased attention/concentration  Perception: Appears intact  Perseveration: Perseverates during conversation  Safety/Judgement: Awareness of environment  Swallowing Evaluation:   Oral Assessment:  Oral Assessment  Labial: No impairment  Dentition: Intact; Natural  Oral Hygiene: moist oral mucosa free of secretions  Lingual: No impairment  Velum: No impairment  Mandible: No impairment  P.O. Trials:  Patient Position: upright in bed  Vocal quality prior to P.O.: No impairment  Consistency Presented: All consistencies  How Presented: Self-fed/presented     Bolus Acceptance: No impairment  Bolus Formation/Control: No impairment     Propulsion: No impairment  Oral Residue: None  Initiation of Swallow: No impairment  Laryngeal Elevation: Functional  Aspiration Signs/Symptoms: None  Pharyngeal Phase Characteristics: No impairment, issues, or problems              Oral Phase Severity: No impairment  Pharyngeal Phase Severity : No impairment    NOMS:   The NOMS functional outcome measure was used to quantify this patient's level of swallowing impairment. Based on the NOMS, the patient was determined to be at level 7 for swallow function       NOMS Swallowing Levels:  Level 1 (CN): NPO  Level 2 (CM): NPO but takes consistency in therapy  Level 3 (CL): Takes less than 50% of nutrition p.o. and continues with nonoral feedings; and/or safe with mod cues; and/or max diet restriction  Level 4 (CK):  Safe swallow but needs mod cues; and/or mod diet restriction; and/or still requires some nonoral feeding/supplements  Level 5 (CJ): Safe swallow with min diet restriction; and/or needs min cues  Level 6 (CI): Independent with p.o.; rare cues; usually self cues; may need to avoid some foods or needs extra time  Level 7 ([address] [place] [address]): Independent for all p.o.  CHUCHO. (2003). National Outcomes Measurement System (NOMS): Adult Speech-Language Pathology User's Guide. Speech/Language Evaluation  Motor Speech:  Oral-Motor Structure/Motor Speech  Labial: No impairment  Dentition: Intact; Natural  Oral Hygiene: moist oral mucosa free of secretions  Lingual: No impairment  Velum: No impairment  Mandible: No impairment  Apraxic Characteristics: None  Dysarthric Characteristics: None  Intelligibility: No impairment  Overall Impairment Severity: None        Voice:                 Vocal Quality: No impairment                                 NOMS: The NOMS functional outcome measure was used to quantify this patient's level of motor speech impairment. Based on the NOMS, the patient was determined to be at level 7 for motor speech function. NOMS Motor Speech:  Level 1 (CN):  100% unintelligible  Level 2 (CM):  Communication partner responsible for message; can do CV or automatic words w/ max cues but rarely intelligible in context  Level 3 (CL): communication partner primarily responsible for message but says CV/automatic words intelligibly; mod cues to say simple words/phrases  Level 4 (CK): In structured conversation with familiar listener can say simple words and phrases. Mod cues for simple sentences  Level 5 (CJ):  Uses simple sentences for ADLs with familiar and unfamiliar listener; min cues for complex sentences  Level 6 (CI):  Intelligible in ADLs; difficulty in voc/social activites; rare cues for complex message; uses comp strategies  Level 7 ([address] [place] [address]):  Intelligible in all activities. May occasionally use compensatory strategies. CHUCHO. (2003). National Outcomes Measurement System (NOMS): Adult Speech-Language Pathology User's Guide. After treatment:   Patient left in no apparent distress in bed, Call bell within reach and Nursing notified    COMMUNICATION/EDUCATION:     The patient's plan of care including recommendations, planned interventions, and recommended diet changes were discussed with: Registered nurse. Patient/family have participated as able in goal setting and plan of care. Patient/family agree to work toward stated goals and plan of care.     Thank you for this referral.  Yissel Georges M.S. [employer] Pathologist     Time Calculation: 14 mins

## 2022-06-26 NOTE — PROGRESS NOTES
[facility]  Hospitalist Group                                                                                          Hospitalist Progress Note  Andrew Harden MD  Answering service: [phone] from in house phone        Date of Service:  2022  NAME:  Christina Jewell  :  1940  MRN:  [id number]      Admission Summary:   Christina Jewell is a 80 y.o. female who presents with slurred speech     History was primarily Obtained from the patient   Patient reports that last night she felt tired and weak, woke up this morning, had slurred speech, some confusion and was brought to the hospital apparently patient was brought by her granddaughter and they were concerned that patient was talking to her \"dead relative\"   Patient with history of ICA aneurysm, follows up with neurology, patient came in as a code stroke and was requested to be admitted to the hospitalist service. Interval history / Subjective:   Pt feels well, slurred speech remains resolved  NAD     Assessment & Plan:     Dysarthria resolved- MRI shows small acute CVA in right cerebellum  H/o CVA  PT/OT rec IPR  Pt already on Eliquis, aspirin and statin  LDL 70  a1c 7.3  TSH wnl  B12 wnl  Tele  Echo w/ bubble pending  Neurology consult:  Difficult to explain her presentation based on this infarct and cannot exclude underlying metabolic reasons for her symptoms. From a secondary stroke prophylaxis standpoint, she is currently taking aspirin, apixaban and a statin and overall management essentially would not change. Consider extended cardiac monitoring to rule out cardiac dysrhythmia/atrial fibrillation. 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 We may have her follow up with Neuro-Interventional Surgery  as an outpatient for detailed review of imaging.     H/o TAVR for AS  Cont asa, low dose eliquis    CKD 3, at baseline ~1.3  Monitor    Diabetes mellitus type 2, a1c 7.3  SSI    Accelerated hypertension, improving  Cont home Coreg    Hypomag, repleted      Code status: Full Code  Prophylaxis: Eliquis  Care Plan discussed with: Patient/Family  Anticipated Disposition: IPR  Anticipated Discharge: 24 hours to 48 hours pending echo and needs cardiac monitor prior to dc, pending IPR     Hospital Problems  Date Reviewed: 3/3/2021          Codes Class Noted POA    CVA (cerebral vascular accident) [facility]) ICD-10-CM: I63.9  ICD-9-CM: 434.91  6/24/2022 Unknown              Review of Systems:   Pertinent items are noted in HPI    Vital Signs:    Last 24hrs VS reviewed since prior progress note. Most recent are:  Visit Vitals  /71   Pulse 62   Temp 97.6 °F (36.4 °C)   Resp 19   SpO2 98%         Intake/Output Summary (Last 24 hours) at 6/26/2022 1200  Last data filed at 6/26/2022 0816  Gross per 24 hour   Intake --   Output 1500 ml   Net -1500 ml        Physical Examination:   I had a face to face encounter with this patient and independently examined them on 6/26/2022 as outlined below:    General: Alert, cooperative, no acute distress    EENT:  EOMI. Anicteric sclerae. MMM  Resp:  CTA bilaterally, no wheezing or rales. No accessory muscle use  CV:  RRR, No audible murmur  GI:  Soft, Non distended, Non tender  Neurologic:  Alert and oriented x3, normal speech, MAEE  Extremities: No edema or cyanosis  Psych:   Not anxious or agitated  Skin:  No rashes.  No jaundice       Data Review:   I personally reviewed: vitals, labs, imaging results, notes    Labs:     Recent Labs     06/25/22  0407 06/24/22  1333   WBC 8.5 7.2   HGB 11.5 10.7*   HCT 35.9 33.8*    200     Recent Labs     06/26/22  0218 06/25/22  0407 06/24/22  1333   * 139 141   K Sample is hemolyzed (hemoglobin is 4.1 3.9   * 105 106   CO2 23 30 32   BUN 24* 27* 31*   CREA 1.29* 1.35* 1.53*   * 143* 171*   CA 9.3 10.4* 10.4*   MG 2.3 1.5*  --    PHOS  --  2.6  --      Recent Labs     06/26/22  0218 06/24/22  1333   ALT  --  20   AP  --  57   TBILI  --  0.5   TP  --  7.7   ALB 2.6* 3.4*   GLOB  --  4.3*     Recent Labs     06/24/22  1333   INR 1.1   PTP 11.8*      No results for input(s): FE, TIBC, PSAT, FERR in the last 72 hours. No results found for: FOL, RBCF   No results for input(s): PH, PCO2, PO2 in the last 72 hours. No results for input(s): CPK, CKNDX, TROIQ in the last 72 hours.     No lab exists for component: CPKMB  Lab Results   Component Value Date/Time    Cholesterol, total 159 06/25/2022 04:07 AM    HDL Cholesterol 73 06/25/2022 04:07 AM    LDL, calculated 70 06/25/2022 04:07 AM    Triglyceride 80 06/25/2022 04:07 AM    CHOL/HDL Ratio 2.2 06/25/2022 04:07 AM     Lab Results   Component Value Date/Time    Glucose (POC) 240 (H) 06/26/2022 11:39 AM    Glucose (POC) 171 (H) 06/26/2022 06:54 AM    Glucose (POC) 181 (H) 06/25/2022 09:32 PM    Glucose (POC) 199 (H) 06/25/2022 04:34 PM    Glucose (POC) 164 (H) 06/24/2022 01:05 PM     Lab Results   Component Value Date/Time    Color YELLOW/STRAW 06/24/2022 04:07 PM    Appearance CLEAR 06/24/2022 04:07 PM    Specific gravity 1.011 06/24/2022 04:07 PM    pH (UA) 7.5 06/24/2022 04:07 PM    Protein Negative 06/24/2022 04:07 PM    Glucose Negative 06/24/2022 04:07 PM    Ketone Negative 06/24/2022 04:07 PM    Bilirubin Negative 06/24/2022 04:07 PM    Urobilinogen 0.2 06/24/2022 04:07 PM    Nitrites Negative 06/24/2022 04:07 PM    Leukocyte Esterase Negative 06/24/2022 04:07 PM    Epithelial cells FEW 01/13/2021 10:41 AM    Bacteria Negative 01/13/2021 10:41 AM    WBC 0-4 01/13/2021 10:41 AM    RBC 0-5 01/13/2021 10:41 AM       Medications Reviewed:     Current Facility-Administered Medications   Medication Dose Route Frequency    insulin lispro (HUMALOG) injection   SubCUTAneous AC&HS    glucose chewable tablet 16 g  4 Tablet Oral PRN    glucagon (GLUCAGEN) injection 1 mg  1 mg IntraMUSCular PRN    dextrose 10 % infusion 0-250 mL  0-250 mL IntraVENous PRN    apixaban (ELIQUIS) tablet 2.5 mg  2.5 mg Oral BID    aspirin delayed-release tablet 81 mg  81 mg Oral DAILY    calcitRIOL (ROCALTROL) capsule 0.5 mcg  0.5 mcg Oral DAILY    carvediloL (COREG) tablet 6.25 mg  6.25 mg Oral BID WITH MEALS    oxybutynin chloride XL (DITROPAN XL) tablet 15 mg  15 mg Oral DAILY    rosuvastatin (CRESTOR) tablet 20 mg  20 mg Oral QHS    venlafaxine (EFFEXOR) tablet 37.5 mg  37.5 mg Oral BID    acetaminophen (TYLENOL) tablet 650 mg  650 mg Oral Q4H PRN    Or    acetaminophen (TYLENOL) solution 650 mg  650 mg Per NG tube Q4H PRN    Or    acetaminophen (TYLENOL) suppository 650 mg  650 mg Rectal Q4H PRN    famotidine (PEPCID) tablet 20 mg  20 mg Oral DAILY    hydrALAZINE (APRESOLINE) 20 mg/mL injection 20 mg  20 mg IntraVENous Q6H PRN     ______________________________________________________________________  EXPECTED LENGTH OF STAY: - - -  ACTUAL LENGTH OF STAY:          2                 Lauryn Maradiaga MD

## 2022-06-26 NOTE — PROGRESS NOTES
Transition of Care    RUR: 11%    Mrs. Zeyad Campbell was seen. She was offered choice for acute inpatient rehab and given a list of acute inpatient rehab facilities. She had been to [facility] previously. She chose to return there. A referral was sent through [address] [place] [address] to [facility]. [facility] accepted her pending authorization. Will continue to follow for discharge plannng.   Signed By: Juan A Boucher LCSW     June 26, 2022

## 2022-06-26 NOTE — PROGRESS NOTES
Bedside and Verbal shift change report given to [employer] (oncoming nurse) by Inez Espinosa RN (offgoing nurse). Report included the following information SBAR, Kardex, Intake/Output, MAR, Recent Results and Med Rec Status.

## 2022-06-26 NOTE — PROGRESS NOTES
Problem: Diabetes Self-Management  Goal: *Disease process and treatment process  Description: Define diabetes and identify own type of diabetes; list 3 options for treating diabetes. Outcome: Progressing Towards Goal  Goal: *Incorporating nutritional management into lifestyle  Description: Describe effect of type, amount and timing of food on blood glucose; list 3 methods for planning meals. Outcome: Progressing Towards Goal  Goal: *Incorporating physical activity into lifestyle  Description: State effect of exercise on blood glucose levels. Outcome: Progressing Towards Goal  Goal: *Developing strategies to promote health/change behavior  Description: Define the ABC's of diabetes; identify appropriate screenings, schedule and personal plan for screenings. Outcome: Progressing Towards Goal  Goal: *Using medications safely  Description: State effect of diabetes medications on diabetes; name diabetes medication taking, action and side effects. Outcome: Progressing Towards Goal  Goal: *Monitoring blood glucose, interpreting and using results  Description: Identify recommended blood glucose targets  and personal targets. Outcome: Progressing Towards Goal  Goal: *Prevention, detection, treatment of acute complications  Description: List symptoms of hyper- and hypoglycemia; describe how to treat low blood sugar and actions for lowering  high blood glucose level. Outcome: Progressing Towards Goal  Goal: *Prevention, detection and treatment of chronic complications  Description: Define the natural course of diabetes and describe the relationship of blood glucose levels to long term complications of diabetes.   Outcome: Progressing Towards Goal  Goal: *Developing strategies to address psychosocial issues  Description: Describe feelings about living with diabetes; identify support needed and support network  Outcome: Progressing Towards Goal  Goal: *Insulin pump training  Outcome: Progressing Towards Goal  Goal: *Sick day guidelines  Outcome: Progressing Towards Goal  Goal: *Patient Specific Goal (EDIT GOAL, INSERT TEXT)  Outcome: Progressing Towards Goal     Problem: Patient Education: Go to Patient Education Activity  Goal: Patient/Family Education  Outcome: Progressing Towards Goal     Problem: Discharge Planning  Goal: *Discharge to safe environment  Outcome: Progressing Towards Goal     Problem: Patient Education: Go to Patient Education Activity  Goal: Patient/Family Education  Outcome: Progressing Towards Goal     Problem: TIA/CVA Stroke: Day 2 Until Discharge  Goal: Off Pathway (Use only if patient is Off Pathway)  Outcome: Progressing Towards Goal  Goal: Activity/Safety  Outcome: Progressing Towards Goal  Goal: Diagnostic Test/Procedures  Outcome: Progressing Towards Goal  Goal: Nutrition/Diet  Outcome: Progressing Towards Goal  Goal: Discharge Planning  Outcome: Progressing Towards Goal  Goal: Medications  Outcome: Progressing Towards Goal  Goal: Respiratory  Outcome: Progressing Towards Goal  Goal: Treatments/Interventions/Procedures  Outcome: Progressing Towards Goal  Goal: Psychosocial  Outcome: Progressing Towards Goal  Goal: *Verbalizes anxiety and depression are reduced or absent  Outcome: Progressing Towards Goal  Goal: *Absence of aspiration  Outcome: Progressing Towards Goal  Goal: *Absence of deep venous thrombosis signs and symptoms(Stroke Metric)  Outcome: Progressing Towards Goal  Goal: *Optimal pain control at patient's stated goal  Outcome: Progressing Towards Goal  Goal: *Tolerating diet  Outcome: Progressing Towards Goal  Goal: *Ability to perform ADLs and demonstrates progressive mobility and function  Outcome: Progressing Towards Goal  Goal: *Stroke education continued(Stroke Metric)  Outcome: Progressing Towards Goal     Problem: Ischemic Stroke: Discharge Outcomes  Goal: *Verbalizes anxiety and depression are reduced or absent  Outcome: Progressing Towards Goal  Goal: *Verbalize understanding of risk factor modification(Stroke Metric)  Outcome: Progressing Towards Goal  Goal: *Hemodynamically stable  Outcome: Progressing Towards Goal  Goal: *Absence of aspiration pneumonia  Outcome: Progressing Towards Goal  Goal: *Aware of needed dietary changes  Outcome: Progressing Towards Goal  Goal: *Verbalize understanding of prescribed medications including anti-coagulants, anti-lipid, and/or anti-platelets(Stroke Metric)  Outcome: Progressing Towards Goal  Goal: *Tolerating diet  Outcome: Progressing Towards Goal  Goal: *Aware of follow-up diagnostics related to anticoagulants  Outcome: Progressing Towards Goal  Goal: *Ability to perform ADLs and demonstrates progressive mobility and function  Outcome: Progressing Towards Goal  Goal: *Absence of DVT(Stroke Metric)  Outcome: Progressing Towards Goal  Goal: *Absence of aspiration  Outcome: Progressing Towards Goal  Goal: *Optimal pain control at patient's stated goal  Outcome: Progressing Towards Goal  Goal: *Home safety concerns addressed  Outcome: Progressing Towards Goal  Goal: *Describes available resources and support systems  Outcome: Progressing Towards Goal  Goal: *Verbalizes understanding of activation of EMS(911) for stroke symptoms(Stroke Metric)  Outcome: Progressing Towards Goal  Goal: *Understands and describes signs and symptoms to report to providers(Stroke Metric)  Outcome: Progressing Towards Goal  Goal: *Neurolgocially stable (absence of additional neurological deficits)  Outcome: Progressing Towards Goal  Goal: *Verbalizes importance of follow-up with primary care physician(Stroke Metric)  Outcome: Progressing Towards Goal  Goal: *Smoking cessation discussed,if applicable(Stroke Metric)  Outcome: Progressing Towards Goal  Goal: *Depression screening completed(Stroke Metric)  Outcome: Progressing Towards Goal     Problem: Pressure Injury - Risk of  Goal: *Prevention of pressure injury  Description: Document Mohan Scale and appropriate interventions in the flowsheet. Outcome: Progressing Towards Goal  Note: Pressure Injury Interventions:       Moisture Interventions: Absorbent underpads,Internal/External urinary devices,Limit adult briefs,Minimize layers    Activity Interventions: Increase time out of bed,Pressure redistribution bed/mattress(bed type),PT/OT evaluation    Mobility Interventions: HOB 30 degrees or less,Pressure redistribution bed/mattress (bed type),PT/OT evaluation    Nutrition Interventions: Document food/fluid/supplement intake                     Problem: Patient Education: Go to Patient Education Activity  Goal: Patient/Family Education  Outcome: Progressing Towards Goal     Problem: Falls - Risk of  Goal: *Absence of Falls  Description: Document Omer Fall Risk and appropriate interventions in the flowsheet.   Outcome: Progressing Towards Goal  Note: Fall Risk Interventions:  Mobility Interventions: Bed/chair exit alarm,Communicate number of staff needed for ambulation/transfer,OT consult for ADLs,Patient to call before getting OOB,PT Consult for mobility concerns,PT Consult for assist device competence,Strengthening exercises (ROM-active/passive)         Medication Interventions: Assess postural VS orthostatic hypotension,Bed/chair exit alarm,Evaluate medications/consider consulting pharmacy,Patient to call before getting OOB,Teach patient to arise slowly    Elimination Interventions: Bed/chair exit alarm,Call light in reach,Patient to call for help with toileting needs,Stay With Me (per policy),Toilet paper/wipes in reach,Toileting schedule/hourly rounds              Problem: Patient Education: Go to Patient Education Activity  Goal: Patient/Family Education  Outcome: Progressing Towards Goal     Problem: Patient Education: Go to Patient Education Activity  Goal: Patient/Family Education  Outcome: Progressing Towards Goal     Problem: Patient Education: Go to Patient Education Activity  Goal: Patient/Family Education  Outcome: Progressing Towards Goal

## 2022-06-26 NOTE — PROGRESS NOTES
Problem: TIA/CVA Stroke: Day 2 Until Discharge  Goal: Nutrition/Diet  Outcome: Progressing Towards Goal  Goal: *Absence of aspiration  Outcome: Progressing Towards Goal     Problem: Ischemic Stroke: Discharge Outcomes  Goal: *Hemodynamically stable  Outcome: Progressing Towards Goal

## 2022-06-27 LAB
ANION GAP SERPL CALC-SCNC: 5 MMOL/L (ref 5–15)
BUN SERPL-MCNC: 29 MG/DL (ref 6–20)
BUN/CREAT SERPL: 18 (ref 12–20)
CALCIUM SERPL-MCNC: 10.3 MG/DL (ref 8.5–10.1)
CHLORIDE SERPL-SCNC: 107 MMOL/L (ref 97–108)
CO2 SERPL-SCNC: 29 MMOL/L (ref 21–32)
CREAT SERPL-MCNC: 1.61 MG/DL (ref 0.55–1.02)
ECHO AO ROOT DIAM: 3 CM
ECHO AO ROOT INDEX: 1.46 CM/M2
ECHO AR MAX VEL PISA: 3.6 M/S
ECHO AV AREA PEAK VELOCITY: 1.5 CM2
ECHO AV AREA VTI: 1.5 CM2
ECHO AV AREA/BSA PEAK VELOCITY: 0.7 CM2/M2
ECHO AV AREA/BSA VTI: 0.7 CM2/M2
ECHO AV MEAN GRADIENT: 7 MMHG
ECHO AV MEAN VELOCITY: 1.2 M/S
ECHO AV PEAK GRADIENT: 12 MMHG
ECHO AV PEAK VELOCITY: 1.7 M/S
ECHO AV REGURGITANT PHT: 524.9 MILLISECOND
ECHO AV VELOCITY RATIO: 0.59
ECHO AV VTI: 35.9 CM
ECHO LA DIAMETER INDEX: 1.85 CM/M2
ECHO LA DIAMETER: 3.8 CM
ECHO LA TO AORTIC ROOT RATIO: 1.27
ECHO LA VOL 2C: 92 ML (ref 22–52)
ECHO LA VOL 4C: 74 ML (ref 22–52)
ECHO LA VOLUME AREA LENGTH: 93 ML
ECHO LA VOLUME INDEX A2C: 45 ML/M2 (ref 16–34)
ECHO LA VOLUME INDEX A4C: 36 ML/M2 (ref 16–34)
ECHO LA VOLUME INDEX AREA LENGTH: 45 ML/M2 (ref 16–34)
ECHO LV E' LATERAL VELOCITY: 8 CM/S
ECHO LV E' SEPTAL VELOCITY: 4 CM/S
ECHO LV FRACTIONAL SHORTENING: 34 % (ref 28–44)
ECHO LV INTERNAL DIMENSION DIASTOLE INDEX: 2.15 CM/M2
ECHO LV INTERNAL DIMENSION DIASTOLIC: 4.4 CM (ref 3.9–5.3)
ECHO LV INTERNAL DIMENSION SYSTOLIC INDEX: 1.41 CM/M2
ECHO LV INTERNAL DIMENSION SYSTOLIC: 2.9 CM
ECHO LV IVSD: 1.3 CM (ref 0.6–0.9)
ECHO LV MASS 2D: 215.1 G (ref 67–162)
ECHO LV MASS INDEX 2D: 104.9 G/M2 (ref 43–95)
ECHO LV POSTERIOR WALL DIASTOLIC: 1.3 CM (ref 0.6–0.9)
ECHO LV RELATIVE WALL THICKNESS RATIO: 0.59
ECHO LVOT AREA: 2.5 CM2
ECHO LVOT AV VTI INDEX: 0.6
ECHO LVOT DIAM: 1.8 CM
ECHO LVOT MEAN GRADIENT: 2 MMHG
ECHO LVOT PEAK GRADIENT: 4 MMHG
ECHO LVOT PEAK VELOCITY: 1 M/S
ECHO LVOT STROKE VOLUME INDEX: 26.7 ML/M2
ECHO LVOT SV: 54.7 ML
ECHO LVOT VTI: 21.5 CM
ECHO MV A VELOCITY: 0.62 M/S
ECHO MV AREA PHT: 2.1 CM2
ECHO MV E DECELERATION TIME (DT): 365.2 MS
ECHO MV E VELOCITY: 1.36 M/S
ECHO MV E/A RATIO: 2.19
ECHO MV E/E' LATERAL: 17
ECHO MV E/E' RATIO (AVERAGED): 25.5
ECHO MV E/E' SEPTAL: 34
ECHO MV PRESSURE HALF TIME (PHT): 105.9 MS
ECHO PV MAX VELOCITY: 1 M/S
ECHO PV PEAK GRADIENT: 4 MMHG
ECHO RV TAPSE: 1.8 CM (ref 1.7–?)
ECHO TV REGURGITANT MAX VELOCITY: 2.15 M/S
ECHO TV REGURGITANT PEAK GRADIENT: 18 MMHG
GLUCOSE BLD STRIP.AUTO-MCNC: 140 MG/DL (ref 65–117)
GLUCOSE BLD STRIP.AUTO-MCNC: 172 MG/DL (ref 65–117)
GLUCOSE BLD STRIP.AUTO-MCNC: 191 MG/DL (ref 65–117)
GLUCOSE BLD STRIP.AUTO-MCNC: 238 MG/DL (ref 65–117)
GLUCOSE SERPL-MCNC: 161 MG/DL (ref 65–100)
POTASSIUM SERPL-SCNC: 4.2 MMOL/L (ref 3.5–5.1)
SERVICE CMNT-IMP: ABNORMAL
SODIUM SERPL-SCNC: 141 MMOL/L (ref 136–145)

## 2022-06-27 PROCEDURE — 77030038269 HC DRN EXT URIN PURWCK BARD -A

## 2022-06-27 PROCEDURE — 97116 GAIT TRAINING THERAPY: CPT

## 2022-06-27 PROCEDURE — 74011250637 HC RX REV CODE- 250/637: Performed by: FAMILY MEDICINE

## 2022-06-27 PROCEDURE — 97530 THERAPEUTIC ACTIVITIES: CPT

## 2022-06-27 PROCEDURE — 97112 NEUROMUSCULAR REEDUCATION: CPT

## 2022-06-27 PROCEDURE — 65270000046 HC RM TELEMETRY

## 2022-06-27 PROCEDURE — 74011250636 HC RX REV CODE- 250/636: Performed by: HOSPITALIST

## 2022-06-27 PROCEDURE — 36415 COLL VENOUS BLD VENIPUNCTURE: CPT

## 2022-06-27 PROCEDURE — 74011636637 HC RX REV CODE- 636/637: Performed by: INTERNAL MEDICINE

## 2022-06-27 PROCEDURE — 82962 GLUCOSE BLOOD TEST: CPT

## 2022-06-27 PROCEDURE — 80048 BASIC METABOLIC PNL TOTAL CA: CPT

## 2022-06-27 RX ORDER — SODIUM CHLORIDE 9 MG/ML
100 INJECTION, SOLUTION INTRAVENOUS CONTINUOUS
Status: DISPENSED | OUTPATIENT
Start: 2022-06-27 | End: 2022-06-28

## 2022-06-27 RX ADMIN — ROSUVASTATIN 20 MG: 10 TABLET, FILM COATED ORAL at 22:47

## 2022-06-27 RX ADMIN — FAMOTIDINE 20 MG: 20 TABLET ORAL at 09:02

## 2022-06-27 RX ADMIN — CARVEDILOL 6.25 MG: 3.12 TABLET, FILM COATED ORAL at 17:19

## 2022-06-27 RX ADMIN — Medication 2 UNITS: at 09:00

## 2022-06-27 RX ADMIN — SODIUM CHLORIDE 100 ML/HR: 9 INJECTION, SOLUTION INTRAVENOUS at 17:27

## 2022-06-27 RX ADMIN — CALCITRIOL CAPSULES 0.25 MCG 0.5 MCG: 0.25 CAPSULE ORAL at 09:02

## 2022-06-27 RX ADMIN — VENLAFAXINE 37.5 MG: 37.5 TABLET ORAL at 09:03

## 2022-06-27 RX ADMIN — OXYBUTYNIN CHLORIDE 15 MG: 5 TABLET, EXTENDED RELEASE ORAL at 09:02

## 2022-06-27 RX ADMIN — CARVEDILOL 6.25 MG: 3.12 TABLET, FILM COATED ORAL at 09:02

## 2022-06-27 RX ADMIN — APIXABAN 2.5 MG: 2.5 TABLET, FILM COATED ORAL at 09:02

## 2022-06-27 RX ADMIN — VENLAFAXINE 37.5 MG: 37.5 TABLET ORAL at 17:20

## 2022-06-27 RX ADMIN — ASPIRIN 81 MG: 81 TABLET, COATED ORAL at 09:03

## 2022-06-27 RX ADMIN — Medication 3 UNITS: at 12:08

## 2022-06-27 RX ADMIN — ACETAMINOPHEN 650 MG: 325 TABLET ORAL at 02:57

## 2022-06-27 RX ADMIN — APIXABAN 2.5 MG: 2.5 TABLET, FILM COATED ORAL at 17:20

## 2022-06-27 NOTE — PROGRESS NOTES
Problem: Self Care Deficits Care Plan (Adult)  Goal: *Acute Goals and Plan of Care (Insert Text)  Description: FUNCTIONAL STATUS PRIOR TO ADMISSION: Patient was modified independent using a rollator for functional mobility, was modified independent for basic and instrumental ADLs. HOME SUPPORT: The patient lived alone with a son in the area to check in on her. Occupational Therapy Goals  Initiated 6/25/2022   1. Patient will perform grooming at the sink with supervision/set-up within 7 day(s). 2.  Patient will perform bathing with supervision/set-up within 7 day(s). 3.  Patient will perform upper and lower body dressing with supervision/set-up within 7 day(s). 4.  Patient will perform toilet transfers with supervision/set-up within 7 day(s). 5.  Patient will perform all aspects of toileting with supervision/set-up within 7 day(s). 6.  Patient will participate in upper extremity therapeutic exercise/activities with supervision/set-up within 7 day(s). 7.  Patient will utilize energy conservation techniques during functional activities with verbal and visual cues within 7 day(s). 8.  Patient will complete the [employer] Chatters within 7 day(s). Outcome: Progressing Towards Goal     OCCUPATIONAL THERAPY TREATMENT  Patient: Margi White (15 y.o. female)  Date: 6/27/2022  Diagnosis: CVA (cerebral vascular accident) [facility]) [I63.9] <principal problem not specified>      Precautions: High Fall Risk   Chart, occupational therapy assessment, plan of care, and goals were reviewed. ASSESSMENT  Patient continues with skilled OT services and is progressing towards goals. Patient continues to present with decreased balance, deficits in R visual field, and poor safety awareness s/p R cerebellar infarct. Patient is at high risk for falls as was experiencing falls PTA. Vision assessment performed, patient demonstrates with R field cut with peripheral sparing.  Patient ambulated from chair to hallway with RW and barriers placed to assess awareness of environment. Patient hit 5/8 obstacles throughout ambulation all on R side with verbal cues to attend to R side. Patient educated to scan environment and attend to R side during future ambulation. Session concluded with patient seated in chair and all needs met. Continue to recommend discharge to [facility] as patient was experiencing falls prior to stroke and is at an increased risk following CVA. Current Level of Function Impacting Discharge (ADLs): Impaired vision field, poor environment awareness, high fall risk PTA    Other factors to consider for discharge: Highly motivated, PLOF, lives alone         PLAN :  Patient continues to benefit from skilled intervention to address the above impairments. Continue treatment per established plan of care to address goals. Recommend with staff: Recommend with nursing, ADLs with supervision/setup, OOB to chair 3x/day via rolling walker and toileting via functional mobility to and from bathroom. Thank you for completing as able in order to maintain patient strength, endurance and independence. Recommend next OT session: Continue POC    Recommendation for discharge: (in order for the patient to meet his/her long term goals)  Therapy 3 hours per day 5-7 days per week    This discharge recommendation:  Has been made in collaboration with the attending provider and/or case management    IF patient discharges home will need the following DME: TBD       SUBJECTIVE:   Patient stated I would just fall all of the time\" re: before CVA    OBJECTIVE DATA SUMMARY:   Cognitive/Behavioral Status:  Neurologic State: Alert;Eyes open spontaneously  Orientation Level: Oriented X4  Cognition: Follows commands; Appropriate for age attention/concentration; Appropriate safety awareness; Appropriate decision making             Functional Mobility and Transfers for ADLs:  Bed Mobility:  Supine to Sit: Supervision  Scooting: Supervision    Transfers:  Sit to Stand: Stand-by assistance          Balance:  Sitting: Intact  Standing: Impaired; With support  Standing - Static: Good (w/ and w/o support)  Standing - Dynamic : Good;Fair (w/ and w/o walker)    ADL Intervention:     Obstacle course for safety awareness and fall prevention                                              Pain:  No pain reported    Activity Tolerance:   Good and SpO2 stable on RA    After treatment patient left in no apparent distress:   Sitting in chair, Call bell within reach, Bed / chair alarm activated and Caregiver / family present    COMMUNICATION/COLLABORATION:   The patients plan of care was discussed with: Physician. Patient was educated regarding Her deficit(s) of vision deficits as this relates to Her diagnosis of CVA. She demonstrated Excellent understanding as evidenced by verbal acknowledgement. CK Dave  Time Calculation: 25 mins   Regarding student involvement in patient care:  A student participated in this treatment session. Per CMS Medicare statements and AOTA guidelines I certify that the following was true:  1. I was present and directly observed the entire session. 2. I made all skilled judgments and clinical decisions regarding care. 3. I am the practitioner responsible for assessment, treatment, and documentation.

## 2022-06-27 NOTE — PROGRESS NOTES
Problem: Mobility Impaired (Adult and Pediatric)  Goal: *Acute Goals and Plan of Care (Insert Text)  Description:   FUNCTIONAL STATUS PRIOR TO ADMISSION: Patient was modified independent using a rollator for functional mobility. HOME SUPPORT PRIOR TO ADMISSION: The patient lived alone with daughter in the area to provide assistance. Physical Therapy Goals  Initiated 6/25/2022  1. Patient will move from supine to sit and sit to supine  in bed with modified independence within 7 day(s). 2.  Patient will transfer from bed to chair and chair to bed with supervision/set-up using the least restrictive device within 7 day(s). 3.  Patient will perform sit to stand with supervision/set-up within 7 day(s). 4.  Patient will ambulate with supervision/set-up for 200 feet with the least restrictive device within 7 day(s). 5.  Patient will ascend/descend 4 stairs with 1 handrail(s) with supervision within 7 day(s). 6.  Patient will improve Weiner Balance score by 7 points within 7 days. Outcome: Progressing Towards Goal     PHYSICAL THERAPY TREATMENT  Patient: Srinath Chu (9 y.o. female)  Date: 6/27/2022  Diagnosis: CVA (cerebral vascular accident) [facility]) [I63.9] <principal problem not specified>       Precautions: Fall  Chart, physical therapy assessment, plan of care and goals were reviewed. ASSESSMENT  Patient continues with skilled PT services and is progressing towards goals. Able to progress gait distance with rolling walker and contact guard assist. Her biggest complaint and deficit impacting mobility appears to be her impaired vision in R visual field that she describes as blurry. Patient able to scan environment with minimal cueing during gait to avoid some obstacles in her path. Has impaired balance and mild R sided weakness as well. Lives alone and will need to be more independent prior to discharge home. She is motivated and anticipated to progress well with progression of mobility.  Recommend IPR given prior baseline, living alone, and high motivation. Acute PT will continue to follow and progress as able. Current Level of Function Impacting Discharge (mobility/balance): contact guard A for ambulation    Other factors to consider for discharge: lives alone         PLAN :  Patient continues to benefit from skilled intervention to address the above impairments. Continue treatment per established plan of care. to address goals. Recommendation for discharge: (in order for the patient to meet his/her long term goals)  Therapy 3 hours per day 5-7 days per week    This discharge recommendation:  Has been made in collaboration with the attending provider and/or case management    IF patient discharges home will need the following DME: to be determined (TBD)       SUBJECTIVE:   Patient stated I need to see an eye doctor.     OBJECTIVE DATA SUMMARY:   Critical Behavior:  Neurologic State: Alert  Orientation Level: Oriented X4  Cognition: Follows commands  Safety/Judgement: Awareness of environment  Functional Mobility Training:  Bed Mobility:  Supine to Sit: Supervision  Scooting: Supervision  Transfers:  Sit to Stand: Stand-by assistance;Contact guard assistance  Stand to Sit: Stand-by assistance;Contact guard assistance  Balance:  Sitting: Intact  Standing: Impaired; With support  Standing - Static: Good (w/ and w/o support)  Standing - Dynamic : Good;Fair (w/ and w/o walker)  Ambulation/Gait Training:  Distance (ft): 175 Feet (ft)  Assistive Device: Gait belt;Walker, rollator  Ambulation - Level of Assistance: Contact guard assistance  Gait Abnormalities: Decreased step clearance; Path deviations; Shuffling gait  Speed/Lucy: Pace decreased (<100 feet/min)  Step Length: Left shortened;Right shortened    Therapeutic Exercises:   Standing balance activities - feet together, eyes closed, tandem (only briefly able to hold), attempted single leg stance (unable to maintain), reaching off base of support    Pain Rating:  Did not interfere    Activity Tolerance:   Good    After treatment patient left in no apparent distress:   Sitting in chair, Call bell within reach, and Bed / chair alarm activated    COMMUNICATION/COLLABORATION:   The patients plan of care was discussed with: Registered nurse.      Kassidy Garcia, PT   Time Calculation: 26 mins

## 2022-06-27 NOTE — PROGRESS NOTES
Chart reviewed. Will order a 30 day event monitor for the patient to use at the time of discharge. Follow up with me in several weeks after the event monitor is completed.   Thanks

## 2022-06-27 NOTE — PROGRESS NOTES
[facility] Adult  Hospitalist Group                                                                                          Hospitalist Progress Note  Yuridia Morales MD  Answering service: [phone] OR [phone] from in house phone        Date of Service:  2022  NAME:  Chantal Martinez  :  1940  MRN:  [id number]      Admission Summary:   Chantal Martinez is a 80 y.o. female who presents with slurred speech     History was primarily Obtained from the patient   Patient reports that last night she felt tired and weak, woke up this morning, had slurred speech, some confusion and was brought to the hospital apparently patient was brought by her granddaughter and they were concerned that patient was talking to her \"dead relative\"   Patient with history of ICA aneurysm, follows up with neurology, patient came in as a code stroke and was requested to be admitted to the hospitalist service. Interval history / Subjective: Follow-up for acute stroke. Assumed care of patient today. Patient was sitting in a chair by the bedside working with PT. She has no complaints. She noted a small peripheral defect on the right. Assessment & Plan:     Acute stroke. MRI showed small acute CVA in right cerebellum  Right peripheral field defect. MRI did not report occipital stroke. Past history of CVA  --Continue Eliquis, aspirin and statin. LDL 70. Neurology consult:  Difficult to explain her presentation based on this infarct and cannot exclude underlying metabolic reasons for her symptoms. From a secondary stroke prophylaxis standpoint, she is currently taking aspirin, apixaban and a statin and overall management essentially would not change. Consider extended cardiac monitoring to rule out cardiac dysrhythmia/atrial fibrillation. 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  We may have her follow up with Neuro-Interventional Surgery  as an outpatient for detailed review of imaging. --Echocardiogram with normal EF, negative agitated saline study. Aortic bioprosthetic valve stable and functioning with mild paravalvular leak. -Set up for 30-day event monitor on discharge    H/o TAVR for AS  Cont asa, low dose eliquis    CKD 3, at baseline ~1.3-16  -- Noted bump in creatinine as well as calcium. IV fluid 100 cc/mL overnight, repeat BMP in a.m. Diabetes mellitus type 2, a1c 7.3  SSI        Accelerated hypertension, improving  --Continue carvedilol. Hypomag, repleted      Code status: Full Code  Prophylaxis: Eliquis  Care Plan discussed with: Patient/Family  Anticipated Disposition: IPR  Anticipated Discharge:[place]. In the next 24 hours. Hospital Problems  Date Reviewed: 3/3/2021          Codes Class Noted POA    CVA (cerebral vascular accident) [facility]) ICD-10-CM: I63.9  ICD-9-CM: 434.91  6/24/2022 Unknown              Review of Systems:   Review of Systems   Constitutional: Negative. HENT: Negative. Eyes: Negative. Right peripheral visual field defect   Respiratory: Negative. Cardiovascular: Negative. Gastrointestinal: Negative. Genitourinary: Negative. Musculoskeletal: Negative. Skin: Negative. Neurological: Negative. Endo/Heme/Allergies: Negative. Psychiatric/Behavioral: Negative. Vital Signs:    Last 24hrs VS reviewed since prior progress note.  Most recent are:  Visit Vitals  /63 (BP 1 Location: Right upper arm, BP Patient Position: At rest)   Pulse 77   Temp 98.4 °F (36.9 °C)   Resp 17   Ht 5' 5\" (1.651 m)   Wt 99 kg (218 lb 4.1 oz)   SpO2 98%   BMI 36.32 kg/m²         Intake/Output Summary (Last 24 hours) at 6/27/2022 1709  Last data filed at 6/27/2022 0800  Gross per 24 hour   Intake 240 ml   Output 400 ml   Net -160 ml        Physical Examination:   I had a face to face encounter with this patient and independently examined them on 6/27/2022 as outlined below:  Patient was seated in a chair, working with PT during this exam.  GENERAL:  Alert, oriented, cooperative, no apparent distress  HEENT:  Normocephalic, atraumatic, non icteric sclerae, non pallor conjuctivae, EOMs intact, PERRLA. NECK: Supple, trachea midline, no adenopathy, no thyromegally or tenderness, no carotid bruit and no JVD. LUNGS:   Vesicular breath sounds bilaterally, no added sounds. HEART:   S1 and S2 well heard,RRR,  no murmur, click, rub or gallop. ABDOMEB:   Soft, non-tender. Normoactive bowel sounds. No masses,  No organomegaly. EXTREMETIES:  Atraumatic, acyanotic, no edema  PULSES: 2+ and symmetric all extremities. SKIN:  No rashes or lesions  NEUROLOGY: Mental status: Alert and oriented x4. Cranial nerves: Right first lower visual field impairment although exam is inconsistent exam.  Power symmetrical.  Sensation is intact. Data Review:   I personally reviewed: vitals, labs, imaging results, notes    Labs:     Recent Labs     06/25/22  0407   WBC 8.5   HGB 11.5   HCT 35.9        Recent Labs     06/27/22  0906 06/26/22  0218 06/25/22  0407    135* 139   K 4.2 Sample is hemolyzed (hemoglobin is 4.1    109* 105   CO2 29 23 30   BUN 29* 24* 27*   CREA 1.61* 1.29* 1.35*   * 165* 143*   CA 10.3* 9.3 10.4*   MG  --  2.3 1.5*   PHOS  --   --  2.6     Recent Labs     06/26/22 0218   ALB 2.6*     No results for input(s): INR, PTP, APTT, INREXT, INREXT in the last 72 hours. No results for input(s): FE, TIBC, PSAT, FERR in the last 72 hours. No results found for: FOL, RBCF   No results for input(s): PH, PCO2, PO2 in the last 72 hours. No results for input(s): CPK, CKNDX, TROIQ in the last 72 hours.     No lab exists for component: CPKMB  Lab Results   Component Value Date/Time    Cholesterol, total 159 06/25/2022 04:07 AM    HDL Cholesterol 73 06/25/2022 04:07 AM    LDL, calculated 70 06/25/2022 04:07 AM    Triglyceride 80 06/25/2022 04:07 AM    CHOL/HDL Ratio 2.2 06/25/2022 04:07 AM     Lab Results   Component Value Date/Time    Glucose (POC) 140 (H) 06/27/2022 04:32 PM    Glucose (POC) 238 (H) 06/27/2022 11:54 AM    Glucose (POC) 172 (H) 06/27/2022 06:28 AM    Glucose (POC) 137 (H) 06/26/2022 09:19 PM    Glucose (POC) 119 (H) 06/26/2022 04:57 PM     Lab Results   Component Value Date/Time    Color YELLOW/STRAW 06/24/2022 04:07 PM    Appearance CLEAR 06/24/2022 04:07 PM    Specific gravity 1.011 06/24/2022 04:07 PM    pH (UA) 7.5 06/24/2022 04:07 PM    Protein Negative 06/24/2022 04:07 PM    Glucose Negative 06/24/2022 04:07 PM    Ketone Negative 06/24/2022 04:07 PM    Bilirubin Negative 06/24/2022 04:07 PM    Urobilinogen 0.2 06/24/2022 04:07 PM    Nitrites Negative 06/24/2022 04:07 PM    Leukocyte Esterase Negative 06/24/2022 04:07 PM    Epithelial cells FEW 01/13/2021 10:41 AM    Bacteria Negative 01/13/2021 10:41 AM    WBC 0-4 01/13/2021 10:41 AM    RBC 0-5 01/13/2021 10:41 AM       Medications Reviewed:     Current Facility-Administered Medications   Medication Dose Route Frequency    insulin lispro (HUMALOG) injection   SubCUTAneous AC&HS    glucose chewable tablet 16 g  4 Tablet Oral PRN    glucagon (GLUCAGEN) injection 1 mg  1 mg IntraMUSCular PRN    dextrose 10 % infusion 0-250 mL  0-250 mL IntraVENous PRN    apixaban (ELIQUIS) tablet 2.5 mg  2.5 mg Oral BID    aspirin delayed-release tablet 81 mg  81 mg Oral DAILY    calcitRIOL (ROCALTROL) capsule 0.5 mcg  0.5 mcg Oral DAILY    carvediloL (COREG) tablet 6.25 mg  6.25 mg Oral BID WITH MEALS    oxybutynin chloride XL (DITROPAN XL) tablet 15 mg  15 mg Oral DAILY    rosuvastatin (CRESTOR) tablet 20 mg  20 mg Oral QHS    venlafaxine (EFFEXOR) tablet 37.5 mg  37.5 mg Oral BID    acetaminophen (TYLENOL) tablet 650 mg  650 mg Oral Q4H PRN    Or    acetaminophen (TYLENOL) solution 650 mg  650 mg Per NG tube Q4H PRN    Or    acetaminophen (TYLENOL) suppository 650 mg 650 mg Rectal Q4H PRN    famotidine (PEPCID) tablet 20 mg  20 mg Oral DAILY    hydrALAZINE (APRESOLINE) 20 mg/mL injection 20 mg  20 mg IntraVENous Q6H PRN     ______________________________________________________________________  EXPECTED LENGTH OF STAY: 2d 0h  ACTUAL LENGTH OF STAY:          3                 Zhao Chavez MD

## 2022-06-27 NOTE — PROGRESS NOTES
Transition of Care Plan   RUR- Low 12%   DISPOSITION: IPR - [facility] - pending insurance auth started 6/27   F/U with PCP/Specialist     [facility]family     Emergency contact: Jozef Guevara [phone]    FACUNDO barriers to discharge: insurance auth    [facility] has accepted patient for IPR, plans to start insurance auth today 6/27. CM will continue to follow and assist with MELITA plan. Milka Maldonado M.S.JUSTINA.

## 2022-06-28 ENCOUNTER — APPOINTMENT (OUTPATIENT)
Dept: NON INVASIVE DIAGNOSTICS | Age: 82
DRG: 065 | End: 2022-06-28
Attending: INTERNAL MEDICINE
Payer: MEDICARE

## 2022-06-28 LAB
ALBUMIN SERPL-MCNC: 3 G/DL (ref 3.5–5)
ALBUMIN/GLOB SERPL: 0.8 {RATIO} (ref 1.1–2.2)
ALP SERPL-CCNC: 57 U/L (ref 45–117)
ALT SERPL-CCNC: 20 U/L (ref 12–78)
ANION GAP SERPL CALC-SCNC: 6 MMOL/L (ref 5–15)
AST SERPL-CCNC: 23 U/L (ref 15–37)
BILIRUB SERPL-MCNC: 0.3 MG/DL (ref 0.2–1)
BUN SERPL-MCNC: 39 MG/DL (ref 6–20)
BUN/CREAT SERPL: 26 (ref 12–20)
CALCIUM SERPL-MCNC: 9.9 MG/DL (ref 8.5–10.1)
CHLORIDE SERPL-SCNC: 109 MMOL/L (ref 97–108)
CO2 SERPL-SCNC: 26 MMOL/L (ref 21–32)
CREAT SERPL-MCNC: 1.48 MG/DL (ref 0.55–1.02)
GLOBULIN SER CALC-MCNC: 3.9 G/DL (ref 2–4)
GLUCOSE BLD STRIP.AUTO-MCNC: 100 MG/DL (ref 65–117)
GLUCOSE BLD STRIP.AUTO-MCNC: 142 MG/DL (ref 65–117)
GLUCOSE BLD STRIP.AUTO-MCNC: 147 MG/DL (ref 65–117)
GLUCOSE BLD STRIP.AUTO-MCNC: 200 MG/DL (ref 65–117)
GLUCOSE SERPL-MCNC: 177 MG/DL (ref 65–100)
POTASSIUM SERPL-SCNC: 4.1 MMOL/L (ref 3.5–5.1)
PROT SERPL-MCNC: 6.9 G/DL (ref 6.4–8.2)
SERVICE CMNT-IMP: ABNORMAL
SERVICE CMNT-IMP: NORMAL
SODIUM SERPL-SCNC: 141 MMOL/L (ref 136–145)

## 2022-06-28 PROCEDURE — 65270000046 HC RM TELEMETRY

## 2022-06-28 PROCEDURE — 74011250637 HC RX REV CODE- 250/637: Performed by: FAMILY MEDICINE

## 2022-06-28 PROCEDURE — 93271 ECG/MONITORING AND ANALYSIS: CPT

## 2022-06-28 PROCEDURE — 74011636637 HC RX REV CODE- 636/637: Performed by: INTERNAL MEDICINE

## 2022-06-28 PROCEDURE — 97535 SELF CARE MNGMENT TRAINING: CPT

## 2022-06-28 PROCEDURE — 74011250637 HC RX REV CODE- 250/637: Performed by: NURSE PRACTITIONER

## 2022-06-28 PROCEDURE — 97116 GAIT TRAINING THERAPY: CPT

## 2022-06-28 PROCEDURE — 82962 GLUCOSE BLOOD TEST: CPT

## 2022-06-28 PROCEDURE — 80053 COMPREHEN METABOLIC PANEL: CPT

## 2022-06-28 PROCEDURE — 97530 THERAPEUTIC ACTIVITIES: CPT

## 2022-06-28 PROCEDURE — 36415 COLL VENOUS BLD VENIPUNCTURE: CPT

## 2022-06-28 RX ORDER — DICLOFENAC SODIUM 10 MG/G
2 GEL TOPICAL 4 TIMES DAILY
Status: DISCONTINUED | OUTPATIENT
Start: 2022-06-28 | End: 2022-06-30 | Stop reason: HOSPADM

## 2022-06-28 RX ADMIN — FAMOTIDINE 20 MG: 20 TABLET ORAL at 08:36

## 2022-06-28 RX ADMIN — CARVEDILOL 6.25 MG: 3.12 TABLET, FILM COATED ORAL at 08:34

## 2022-06-28 RX ADMIN — ROSUVASTATIN 20 MG: 10 TABLET, FILM COATED ORAL at 21:21

## 2022-06-28 RX ADMIN — Medication 2 UNITS: at 08:33

## 2022-06-28 RX ADMIN — OXYBUTYNIN CHLORIDE 15 MG: 5 TABLET, EXTENDED RELEASE ORAL at 08:34

## 2022-06-28 RX ADMIN — APIXABAN 2.5 MG: 2.5 TABLET, FILM COATED ORAL at 08:34

## 2022-06-28 RX ADMIN — DICLOFENAC SODIUM 2 G: 10 GEL TOPICAL at 22:02

## 2022-06-28 RX ADMIN — VENLAFAXINE 37.5 MG: 37.5 TABLET ORAL at 17:18

## 2022-06-28 RX ADMIN — APIXABAN 2.5 MG: 2.5 TABLET, FILM COATED ORAL at 17:19

## 2022-06-28 RX ADMIN — Medication 2 UNITS: at 12:01

## 2022-06-28 RX ADMIN — CARVEDILOL 6.25 MG: 3.12 TABLET, FILM COATED ORAL at 17:19

## 2022-06-28 RX ADMIN — VENLAFAXINE 37.5 MG: 37.5 TABLET ORAL at 08:36

## 2022-06-28 RX ADMIN — ASPIRIN 81 MG: 81 TABLET, COATED ORAL at 08:34

## 2022-06-28 RX ADMIN — CALCITRIOL CAPSULES 0.25 MCG 0.5 MCG: 0.25 CAPSULE ORAL at 08:34

## 2022-06-28 NOTE — PROGRESS NOTES
Problem: Self Care Deficits Care Plan (Adult)  Goal: *Acute Goals and Plan of Care (Insert Text)  Description: FUNCTIONAL STATUS PRIOR TO ADMISSION: Patient was modified independent using a rollator for functional mobility, was modified independent for basic and instrumental ADLs. HOME SUPPORT: The patient lived alone with a son in the area to check in on her. Occupational Therapy Goals  Initiated 6/25/2022   1. Patient will perform grooming at the sink with supervision/set-up within 7 day(s). 2.  Patient will perform bathing with supervision/set-up within 7 day(s). 3.  Patient will perform upper and lower body dressing with supervision/set-up within 7 day(s). 4.  Patient will perform toilet transfers with supervision/set-up within 7 day(s). 5.  Patient will perform all aspects of toileting with supervision/set-up within 7 day(s). 6.  Patient will participate in upper extremity therapeutic exercise/activities with supervision/set-up within 7 day(s). 7.  Patient will utilize energy conservation techniques during functional activities with verbal and visual cues within 7 day(s). 8.  Patient will complete the [place] within 7 day(s). Outcome: Progressing Towards Goal     OCCUPATIONAL THERAPY TREATMENT  Patient: Adrienne Betancourt (88 y.o. female)  Date: 6/28/2022  Diagnosis: CVA (cerebral vascular accident) [facility]) [I63.9] <principal problem not specified>       Precautions: Fall  Chart, occupational therapy assessment, plan of care, and goals were reviewed. ASSESSMENT  Patient continues with skilled OT services and is progressing towards goals however continues to be limited by decreased standing balance, safety awareness, activity tolerance, distal lower body access, RUE/RLE AROM & strength (mild), and attention to the R (intermittent) with bathroom mobility, toileting, grooming at the sink, and lower body bathing & dressing tasks this session.  Improved mobility with initial SBA-CGA with rollator support, however continues to require min-mod cues for safety and up to [address] for standing balance with perineal hygiene d/t posterior LOB that she was unable to correct. Fatigue and slight SOB noted upon return to the chair, VSS on RA. Given her high PLOF and that she lives alone, recommend d/c to IPR. Current Level of Function Impacting Discharge (ADLs): SBA-Min A for ADLs and mobility with rollator    Other factors to consider for discharge: fall risk, lives alone, high PLOF         PLAN :  Patient continues to benefit from skilled intervention to address the above impairments. Continue treatment per established plan of care to address goals. Recommend with staff: Recommend with nursing, ADLs with assist, OOB to chair 3x/day via rollator and toileting via functional mobility to and from bathroom. Thank you for completing as able in order to maintain patient strength, endurance and independence. Recommendation for discharge: (in order for the patient to meet his/her long term goals)  Therapy 3 hours per day 5-7 days per week   If unable, recommend SNF    This discharge recommendation:  Has been made in collaboration with the attending provider and/or case management    IF patient discharges home will need the following DME: To be determined (TBD)         SUBJECTIVE:   Patient stated Yeah I did feel myself falling back.     OBJECTIVE DATA SUMMARY:   Cognitive/Behavioral Status:  Neurologic State: Alert  Orientation Level: Oriented X4 (periods of confusion)  Cognition: Decreased attention/concentration; Follows commands;Poor safety awareness  Perception: Cues to attend right visual field (improved from prior session, intermittent)  Perseveration: No perseveration noted  Safety/Judgement: Decreased awareness of environment;Decreased awareness of need for assistance;Decreased awareness of need for safety;Decreased insight into deficits    Functional Mobility and Transfers for ADLs:  Bed Mobility:  Supine to Sit: Modified independent  Scooting: Supervision    Transfers:  Sit to Stand: Stand-by assistance;Contact guard assistance; Adaptive equipment (rollator)  Functional Transfers  Bathroom Mobility: Contact guard assistance  Toilet Transfer : Contact guard assistance  Cues: Verbal cues provided; Tactile cues provided;Visual cues provided       Balance:  Sitting: Impaired  Sitting - Static: Good (unsupported)  Sitting - Dynamic: Good (unsupported)  Standing: Impaired; With support (rollator)  Standing - Static: Fair  Standing - Dynamic : Good    ADL Intervention:       Grooming  Grooming Assistance: Stand-by assistance  Position Performed: Standing (at sink)  Washing Hands: Stand-by assistance (min cues for soap on the R & rollator safety)  Cues: Verbal cues provided;Visual cues provided      Lower Body Bathing  Bathing Assistance: Minimum assistance  Perineal  : Minimum assistance  Position Performed: Standing (posterior LOB, unable to self correct)  Cues: Tactile cues provided;Verbal cues provided;Visual cues provided  Lower Body : Minimum assistance (A for distal reach to R foot)  Position Performed: Seated in chair  Cues: Tactile cues provided;Verbal cues provided;Visual cues provided;Physical assistance         Lower Body Dressing Assistance  Dressing Assistance: Minimum assistance  Protective Undergarmet: Minimum assistance; Compensatory technique training (A for distal don to RLE)  Pants With Elastic Waist: Compensatory technique training  Socks: Minimum assistance (A for R sock)  Leg Crossed Method Used: No  Position Performed: Seated in chair;Standing  Cues: Physical assistance; Tactile cues provided;Verbal cues provided;Visual cues provided    Toileting  Toileting Assistance: Minimum assistance  Bladder Hygiene: Contact guard assistance  Bowel Hygiene: Minimum assistance (posterior LOB with reaching behind, unable to self correct)  Clothing Management: Minimum assistance (A for distal reach to R foot to don clean brief)  Cues: Verbal cues provided;Visual cues provided; Tactile cues provided;Physical assistance for pants up  Adaptive Equipment: Grab bars (rollator)    Cognitive Retraining  Orientation Retraining: Awareness of environment  Problem Solving: Awareness of environment  Executive Functions: Executing cognitive plans  Organizing/Sequencing: Breaking task down  Following Commands: Follows one step commands/directions; Follows two step commands/directions  Safety/Judgement: Decreased awareness of environment;Decreased awareness of need for assistance;Decreased awareness of need for safety;Decreased insight into deficits  Cues: Visual cues provided;Verbal cues provided; Tactile cues provided    Neuro Re-Education:  - Visual scanning in all R VFs with no difficulty with head in midline, however with functional tasks, min cues intermittently for attention to R and objects on R (bathroom door, soap)  - Adaptive lower body dressing d/t decreased RLE hip flexion and ankle dorsiflexion, requiring Min A     Pain:  None    Activity Tolerance:   Good-Fair, fatigue with limited standing tasks    After treatment patient left in no apparent distress:   Sitting in chair, Call bell within reach, and Bed / chair alarm activated    COMMUNICATION/COLLABORATION:   The patients plan of care was discussed with: Physical therapist, Registered nurse, and Case management. Patient was educated regarding Her deficit(s) stated above as this relates to Her diagnosis of CVA. She demonstrated Fair understanding as evidenced by verbal discussion & carryover. Patient and/or family was verbally educated on the BE FAST acronym for signs/symptoms of CVA and TIA. BE FAST was written on patient's communication board  for visual education and reinforcement. All questions answered with patient indicating good understanding.      JARRET Baum, OTR/L  Time Calculation: 26 mins

## 2022-06-28 NOTE — PROGRESS NOTES
Transition of Care Plan   RUR- Med 15%   DISPOSITION: IPR - Referrals pending with [facility] vs SNF (referral pending to [facility])   F/U with PCP/Specialist     Transport: [facility]/family     Emergency contact: patient's grauddaughter Snehal Can [phone], is patient's preferred contact at this time     CM barriers to discharge: insurance auth    Per [facility], patient's insurance is [employer], which is only in network with [facility] facilities, [facility] and [address] [place] [address]. CM verified patient is [place] insured, card is on file. CM attempted to discuss IPR at [address] [place] [address] or [facility], patient currently working with PT. CM will continue to follow and assist with MELITA plan. 11:07am: CM met with patient at bedside to explain IPR network vs going home with home health. Patient prefers to go to rehab at discharge. She is in agreement to have referrals sent to both [address] [place] [address] and [facility]; referrals have been sent in [place]. If they do not have a bed for patient, plan to discuss SNF.     1:32pm: CM met with patient, patient's daughter, and two granddaughters at the bedside regarding discharge plan. CM provided education on [employer] of IPR and SNF options. If [facility] and [facility] do not have a bed for patient, they are in agreement for SNF placement. Open to referral being sent to the [id number] [facility], sent in [place]. It is the family's long term goal for patient to return home and have personal care paid through [facility]. CM provided education on applying for Medicaid for patient and public partnerships/consumer directed services. Message left for [employer] coordinator, to verify in network SNF options. '    3:08pm: [facility] not able to accept patient, they feel patient is too high level for IPR. CM spoke with Annabella of [facility], she plans to review patient and return call. Denison Backer) NATANAEL Moeller.

## 2022-06-28 NOTE — PROGRESS NOTES
Problem: Mobility Impaired (Adult and Pediatric)  Goal: *Acute Goals and Plan of Care (Insert Text)  Description:   FUNCTIONAL STATUS PRIOR TO ADMISSION: Patient was modified independent using a rollator for functional mobility. HOME SUPPORT PRIOR TO ADMISSION: The patient lived alone with daughter in the area to provide assistance. Physical Therapy Goals  Initiated 6/25/2022  1. Patient will move from supine to sit and sit to supine  in bed with modified independence within 7 day(s). 2.  Patient will transfer from bed to chair and chair to bed with supervision/set-up using the least restrictive device within 7 day(s). 3.  Patient will perform sit to stand with supervision/set-up within 7 day(s). 4.  Patient will ambulate with supervision/set-up for 200 feet with the least restrictive device within 7 day(s). 5.  Patient will ascend/descend 4 stairs with 1 handrail(s) with supervision within 7 day(s). 6.  Patient will improve Weiner Balance score by 7 points within 7 days. Outcome: Progressing Towards Goal   PHYSICAL THERAPY TREATMENT  Patient: Mic Husain (37 y.o. female)  Date: 6/28/2022  Diagnosis: CVA (cerebral vascular accident) [facility]) [I63.9] <principal problem not specified>       Precautions: Fall  Chart, physical therapy assessment, plan of care and goals were reviewed. ASSESSMENT  Patient continues with skilled PT services and is progressing towards goals. Patient received in bed and most agreeable to therapy. Initially asking therapist to don her socks for her but then demonstrated good ability to manage herself. Gait is steady today with slow pace, no incident of decreased awareness of right side. Did need verbal cue to lock rollator before sitting  on it to rest.  Overall appears safe for basic up right mobility today. If continues this progress may be able to return home with home health and some added assistance from family vs rehab with  .      Current Level of Function Impacting Discharge (mobility/balance): supervision    Other factors to consider for discharge: lives alone               SUBJECTIVE:   Patient stated I get tired real easy. My legs get tired.       Critical Behavior:  Neurologic State: Alert,Eyes open spontaneously  Orientation Level: Oriented X4  Cognition: Appropriate decision making,Appropriate for age attention/concentration,Appropriate safety awareness,Follows commands  Safety/Judgement: Awareness of environment  Functional Mobility Training:  Bed Mobility:     Supine to Sit: Modified independent     Scooting: Supervision        Transfers:  Sit to Stand: Supervision; Additional time  Stand to Sit: Supervision                             Balance:  Sitting: Impaired; Without support  Sitting - Static: Good (unsupported)  Sitting - Dynamic: Good (unsupported)  Standing: Impaired; With support  Standing - Static: Good  Standing - Dynamic : Good  Ambulation/Gait Training:  Distance (ft): 150 Feet (ft) (x2)  Assistive Device: Gait belt;Walker, rollator  Ambulation - Level of Assistance: Stand-by assistance        Gait Abnormalities: Decreased step clearance              Speed/Lucy: Pace decreased (<100 feet/min)  Step Length: Left shortened;Right shortened            Activity Tolerance:   Good and requires rest breaks    After treatment patient left in no apparent distress:   Sitting in chair, Call bell within reach, and Bed / chair alarm activated    COMMUNICATION/COLLABORATION:   The patients plan of care was discussed with: Occupational therapist, Registered nurse, and Case management.      Rhonda Montejo, PT   Time Calculation: 29 mins

## 2022-06-28 NOTE — PROGRESS NOTES
Problem: Diabetes Self-Management  Goal: *Disease process and treatment process  Description: Define diabetes and identify own type of diabetes; list 3 options for treating diabetes. Outcome: Progressing Towards Goal  Goal: *Incorporating nutritional management into lifestyle  Description: Describe effect of type, amount and timing of food on blood glucose; list 3 methods for planning meals. Outcome: Progressing Towards Goal  Goal: *Incorporating physical activity into lifestyle  Description: State effect of exercise on blood glucose levels. Outcome: Progressing Towards Goal  Goal: *Developing strategies to promote health/change behavior  Description: Define the ABC's of diabetes; identify appropriate screenings, schedule and personal plan for screenings. Outcome: Progressing Towards Goal  Goal: *Using medications safely  Description: State effect of diabetes medications on diabetes; name diabetes medication taking, action and side effects. Outcome: Progressing Towards Goal  Goal: *Monitoring blood glucose, interpreting and using results  Description: Identify recommended blood glucose targets  and personal targets. Outcome: Progressing Towards Goal  Goal: *Prevention, detection, treatment of acute complications  Description: List symptoms of hyper- and hypoglycemia; describe how to treat low blood sugar and actions for lowering  high blood glucose level. Outcome: Progressing Towards Goal  Goal: *Prevention, detection and treatment of chronic complications  Description: Define the natural course of diabetes and describe the relationship of blood glucose levels to long term complications of diabetes.   Outcome: Progressing Towards Goal  Goal: *Developing strategies to address psychosocial issues  Description: Describe feelings about living with diabetes; identify support needed and support network  Outcome: Progressing Towards Goal  Goal: *Insulin pump training  Outcome: Progressing Towards Goal  Goal: *Sick day guidelines  Outcome: Progressing Towards Goal  Goal: *Patient Specific Goal (EDIT GOAL, INSERT TEXT)  Outcome: Progressing Towards Goal     Problem: TIA/CVA Stroke: Day 2 Until Discharge  Goal: Off Pathway (Use only if patient is Off Pathway)  Outcome: Progressing Towards Goal  Goal: Activity/Safety  Outcome: Progressing Towards Goal  Goal: Diagnostic Test/Procedures  Outcome: Progressing Towards Goal  Goal: Nutrition/Diet  Outcome: Progressing Towards Goal  Goal: Discharge Planning  Outcome: Progressing Towards Goal  Goal: Medications  Outcome: Progressing Towards Goal  Goal: Respiratory  Outcome: Progressing Towards Goal  Goal: Treatments/Interventions/Procedures  Outcome: Progressing Towards Goal  Goal: Psychosocial  Outcome: Progressing Towards Goal  Goal: *Verbalizes anxiety and depression are reduced or absent  Outcome: Progressing Towards Goal  Goal: *Absence of aspiration  Outcome: Progressing Towards Goal  Goal: *Absence of deep venous thrombosis signs and symptoms(Stroke Metric)  Outcome: Progressing Towards Goal  Goal: *Optimal pain control at patient's stated goal  Outcome: Progressing Towards Goal  Goal: *Tolerating diet  Outcome: Progressing Towards Goal  Goal: *Ability to perform ADLs and demonstrates progressive mobility and function  Outcome: Progressing Towards Goal  Goal: *Stroke education continued(Stroke Metric)  Outcome: Progressing Towards Goal

## 2022-06-28 NOTE — PROGRESS NOTES
Problem: Discharge Planning  Goal: *Discharge to safe environment  Outcome: Progressing Towards Goal     Problem: TIA/CVA Stroke: Day 2 Until Discharge  Goal: Discharge Planning  Outcome: Progressing Towards Goal  Goal: *Tolerating diet  Outcome: Progressing Towards Goal

## 2022-06-28 NOTE — PROGRESS NOTES
[address] [facility] Adult  Hospitalist Group                                                                                          Hospitalist Progress Note  Joel Ying MD  Answering service: [phone] OR [phone] from in house phone        Date of Service:  2022  NAME:  Misbah Lockwood  :  1940  MRN:  [id number]      Admission Summary:   Misbah Lockwood is a 80 y.o. female who presents with slurred speech     History was primarily Obtained from the patient   Patient reports that last night she felt tired and weak, woke up this morning, had slurred speech, some confusion and was brought to the hospital apparently patient was brought by her granddaughter and they were concerned that patient was talking to her \"dead relative\"   Patient with history of ICA aneurysm, follows up with neurology, patient came in as a code stroke and was requested to be admitted to the hospitalist service. Interval history / Subjective: Follow-up for acute stroke. Seen and examined patient today, her daughter and granddaughter in the room. Her granddaughter is the one who is mainly involved within the care discussions  We discussed that she may be high level for IPR. Family in agreement to consider SNF. Assessment & Plan:     Acute stroke. MRI showed small acute CVA in right cerebellum  Right peripheral field defect. MRI did not report occipital stroke. Past history of left occipital CVA most probable cause for the right peripheral field defect  --Continue Eliquis, aspirin and statin. LDL 70. Neurology consult:  Difficult to explain her presentation based on this infarct and cannot exclude underlying metabolic reasons for her symptoms. From a secondary stroke prophylaxis standpoint, she is currently taking aspirin, apixaban and a statin and overall management essentially would not change. Consider extended cardiac monitoring to rule out cardiac dysrhythmia/atrial fibrillation.  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 We may have her follow up with Neuro-Interventional Surgery  as an outpatient for detailed review of imaging. --Echocardiogram with normal EF, negative agitated saline study. Aortic bioprosthetic valve stable and functioning with mild paravalvular leak. -Set up for 30-day event monitor on discharge    H/o TAVR for AS  Cont asa, low dose eliquis    CKD 3, at baseline ~1.3-16  -- Noted bump in creatinine as well as calcium. IV fluid 100 cc/mL overnight, repeat BMP in a.m. Diabetes mellitus type 2, a1c 7.3  SSI        Accelerated hypertension, improving  --Continue carvedilol. Hypomag, repleted      Code status: Full Code  Prophylaxis: Eliquis  Care Plan discussed with: Patient/Family  Anticipated Disposition: IPR  Anticipated Discharge: If not accepted for IPR, patient and family in agreement for SNF. Hospital Problems  Date Reviewed: 3/3/2021          Codes Class Noted POA    CVA (cerebral vascular accident) [facility]) ICD-10-CM: I63.9  ICD-9-CM: 434.91  6/24/2022 Unknown              Review of Systems:   Review of Systems   Constitutional: Negative. HENT: Negative. Eyes: Negative. Right peripheral visual field defect   Respiratory: Negative. Cardiovascular: Negative. Gastrointestinal: Negative. Genitourinary: Negative. Musculoskeletal: Negative. Skin: Negative. Neurological: Negative. Endo/Heme/Allergies: Negative. Psychiatric/Behavioral: Negative. Vital Signs:    Last 24hrs VS reviewed since prior progress note.  Most recent are:  Visit Vitals  BP (!) 142/54 (BP 1 Location: Left upper arm, BP Patient Position: Sitting)   Pulse 61   Temp 98 °F (36.7 °C)   Resp 20   Ht 5' 5\" (1.651 m)   Wt 91.4 kg (201 lb 6.4 oz)   SpO2 99%   BMI 33.51 kg/m²       No intake or output data in the 24 hours ending 06/28/22 4334     Physical Examination:   I had a face to face encounter with this patient and independently examined them on 6/28/2022 as outlined below:  Patient was seated in a chair, daughter and granddaughter present in the room. GENERAL:  Alert, oriented, cooperative, no apparent distress  HEENT:  Normocephalic, atraumatic, non icteric sclerae, non pallor conjuctivae, EOMs intact, PERRLA. NECK: Supple, trachea midline, no adenopathy, no thyromegally or tenderness, no carotid bruit and no JVD. LUNGS:   Vesicular breath sounds bilaterally, no added sounds. HEART:   S1 and S2 well heard,RRR,  no murmur, click, rub or gallop. ABDOMEB:   Soft, non-tender. Normoactive bowel sounds. No masses,  No organomegaly. EXTREMETIES:  Atraumatic, acyanotic, no edema  PULSES: 2+ and symmetric all extremities. SKIN:  No rashes or lesions  NEUROLOGY: Mental status: Alert and oriented x4. Cranial nerves: Right first lower visual field impairment although exam is inconsistent exam.  Power symmetrical.  Sensation is intact. Data Review:   I personally reviewed: vitals, labs, imaging results, notes    Labs:     No results for input(s): WBC, HGB, HCT, PLT, HGBEXT, HCTEXT, PLTEXT, HGBEXT, HCTEXT, PLTEXT in the last 72 hours. Recent Labs     06/28/22  0203 06/27/22  0906 06/26/22  0218    141 135*   K 4.1 4.2 Sample is hemolyzed (hemoglobin is   * 107 109*   CO2 26 29 23   BUN 39* 29* 24*   CREA 1.48* 1.61* 1.29*   * 161* 165*   CA 9.9 10.3* 9.3   MG  --   --  2.3     Recent Labs     06/28/22  0203 06/26/22  0218   ALT 20  --    AP 57  --    TBILI 0.3  --    TP 6.9  --    ALB 3.0* 2.6*   GLOB 3.9  --      No results for input(s): INR, PTP, APTT, INREXT, INREXT in the last 72 hours. No results for input(s): FE, TIBC, PSAT, FERR in the last 72 hours. No results found for: FOL, RBCF   No results for input(s): PH, PCO2, PO2 in the last 72 hours. No results for input(s): CPK, CKNDX, TROIQ in the last 72 hours.     No lab exists for component: CPKMB  Lab Results   Component Value Date/Time    Cholesterol, total 159 06/25/2022 04:07 AM    HDL Cholesterol 73 06/25/2022 04:07 AM    LDL, calculated 70 06/25/2022 04:07 AM    Triglyceride 80 06/25/2022 04:07 AM    CHOL/HDL Ratio 2.2 06/25/2022 04:07 AM     Lab Results   Component Value Date/Time    Glucose (POC) 100 06/28/2022 04:17 PM    Glucose (POC) 200 (H) 06/28/2022 11:38 AM    Glucose (POC) 147 (H) 06/28/2022 08:25 AM    Glucose (POC) 191 (H) 06/27/2022 10:46 PM    Glucose (POC) 140 (H) 06/27/2022 04:32 PM     Lab Results   Component Value Date/Time    Color YELLOW/STRAW 06/24/2022 04:07 PM    Appearance CLEAR 06/24/2022 04:07 PM    Specific gravity 1.011 06/24/2022 04:07 PM    pH (UA) 7.5 06/24/2022 04:07 PM    Protein Negative 06/24/2022 04:07 PM    Glucose Negative 06/24/2022 04:07 PM    Ketone Negative 06/24/2022 04:07 PM    Bilirubin Negative 06/24/2022 04:07 PM    Urobilinogen 0.2 06/24/2022 04:07 PM    Nitrites Negative 06/24/2022 04:07 PM    Leukocyte Esterase Negative 06/24/2022 04:07 PM    Epithelial cells FEW 01/13/2021 10:41 AM    Bacteria Negative 01/13/2021 10:41 AM    WBC 0-4 01/13/2021 10:41 AM    RBC 0-5 01/13/2021 10:41 AM       Medications Reviewed:     Current Facility-Administered Medications   Medication Dose Route Frequency    insulin lispro (HUMALOG) injection   SubCUTAneous AC&HS    glucose chewable tablet 16 g  4 Tablet Oral PRN    glucagon (GLUCAGEN) injection 1 mg  1 mg IntraMUSCular PRN    dextrose 10 % infusion 0-250 mL  0-250 mL IntraVENous PRN    apixaban (ELIQUIS) tablet 2.5 mg  2.5 mg Oral BID    aspirin delayed-release tablet 81 mg  81 mg Oral DAILY    calcitRIOL (ROCALTROL) capsule 0.5 mcg  0.5 mcg Oral DAILY    carvediloL (COREG) tablet 6.25 mg  6.25 mg Oral BID WITH MEALS    oxybutynin chloride XL (DITROPAN XL) tablet 15 mg  15 mg Oral DAILY    rosuvastatin (CRESTOR) tablet 20 mg  20 mg Oral QHS    venlafaxine (EFFEXOR) tablet 37.5 mg 37.5 mg Oral BID    acetaminophen (TYLENOL) tablet 650 mg  650 mg Oral Q4H PRN    Or    acetaminophen (TYLENOL) solution 650 mg  650 mg Per NG tube Q4H PRN    Or    acetaminophen (TYLENOL) suppository 650 mg  650 mg Rectal Q4H PRN    famotidine (PEPCID) tablet 20 mg  20 mg Oral DAILY    hydrALAZINE (APRESOLINE) 20 mg/mL injection 20 mg  20 mg IntraVENous Q6H PRN     ______________________________________________________________________  EXPECTED LENGTH OF STAY: 2d 0h  ACTUAL LENGTH OF STAY:          4                 Tiffany Sue MD

## 2022-06-29 LAB
COVID-19 RAPID TEST, COVR: NOT DETECTED
GLUCOSE BLD STRIP.AUTO-MCNC: 155 MG/DL (ref 65–117)
GLUCOSE BLD STRIP.AUTO-MCNC: 158 MG/DL (ref 65–117)
GLUCOSE BLD STRIP.AUTO-MCNC: 186 MG/DL (ref 65–117)
GLUCOSE BLD STRIP.AUTO-MCNC: 194 MG/DL (ref 65–117)
SERVICE CMNT-IMP: ABNORMAL
SOURCE, COVRS: NORMAL

## 2022-06-29 PROCEDURE — 97116 GAIT TRAINING THERAPY: CPT

## 2022-06-29 PROCEDURE — 74011636637 HC RX REV CODE- 636/637: Performed by: INTERNAL MEDICINE

## 2022-06-29 PROCEDURE — 87635 SARS-COV-2 COVID-19 AMP PRB: CPT

## 2022-06-29 PROCEDURE — 74011250637 HC RX REV CODE- 250/637: Performed by: FAMILY MEDICINE

## 2022-06-29 PROCEDURE — 82962 GLUCOSE BLOOD TEST: CPT

## 2022-06-29 PROCEDURE — 97530 THERAPEUTIC ACTIVITIES: CPT

## 2022-06-29 PROCEDURE — 65270000046 HC RM TELEMETRY

## 2022-06-29 RX ADMIN — Medication 2 UNITS: at 16:43

## 2022-06-29 RX ADMIN — DICLOFENAC SODIUM 2 G: 10 GEL TOPICAL at 17:00

## 2022-06-29 RX ADMIN — CARVEDILOL 6.25 MG: 3.12 TABLET, FILM COATED ORAL at 16:59

## 2022-06-29 RX ADMIN — APIXABAN 2.5 MG: 2.5 TABLET, FILM COATED ORAL at 17:00

## 2022-06-29 RX ADMIN — DICLOFENAC SODIUM 2 G: 10 GEL TOPICAL at 08:34

## 2022-06-29 RX ADMIN — ROSUVASTATIN 20 MG: 10 TABLET, FILM COATED ORAL at 22:02

## 2022-06-29 RX ADMIN — CARVEDILOL 6.25 MG: 3.12 TABLET, FILM COATED ORAL at 08:32

## 2022-06-29 RX ADMIN — FAMOTIDINE 20 MG: 20 TABLET ORAL at 08:32

## 2022-06-29 RX ADMIN — CALCITRIOL CAPSULES 0.25 MCG 0.5 MCG: 0.25 CAPSULE ORAL at 08:32

## 2022-06-29 RX ADMIN — Medication 2 UNITS: at 12:56

## 2022-06-29 RX ADMIN — APIXABAN 2.5 MG: 2.5 TABLET, FILM COATED ORAL at 08:32

## 2022-06-29 RX ADMIN — ASPIRIN 81 MG: 81 TABLET, COATED ORAL at 08:32

## 2022-06-29 RX ADMIN — VENLAFAXINE 37.5 MG: 37.5 TABLET ORAL at 08:33

## 2022-06-29 RX ADMIN — OXYBUTYNIN CHLORIDE 15 MG: 5 TABLET, EXTENDED RELEASE ORAL at 08:32

## 2022-06-29 RX ADMIN — VENLAFAXINE 37.5 MG: 37.5 TABLET ORAL at 17:00

## 2022-06-29 RX ADMIN — Medication 2 UNITS: at 08:31

## 2022-06-29 NOTE — PROGRESS NOTES
Problem: Mobility Impaired (Adult and Pediatric)  Goal: *Acute Goals and Plan of Care (Insert Text)  Description:   FUNCTIONAL STATUS PRIOR TO ADMISSION: Patient was modified independent using a rollator for functional mobility. HOME SUPPORT PRIOR TO ADMISSION: The patient lived alone with daughter in the area to provide assistance. Physical Therapy Goals  Initiated 6/25/2022  1. Patient will move from supine to sit and sit to supine  in bed with modified independence within 7 day(s). 2.  Patient will transfer from bed to chair and chair to bed with supervision/set-up using the least restrictive device within 7 day(s). 3.  Patient will perform sit to stand with supervision/set-up within 7 day(s). 4.  Patient will ambulate with supervision/set-up for 200 feet with the least restrictive device within 7 day(s). 5.  Patient will ascend/descend 4 stairs with 1 handrail(s) with supervision within 7 day(s). 6.  Patient will improve Weiner Balance score by 7 points within 7 days. Outcome: Progressing Towards Goal     PHYSICAL THERAPY TREATMENT  Patient: Stewart Bailey (6 y.o. female)  Date: 6/29/2022  Diagnosis: CVA (cerebral vascular accident) [facility]) [I63.9] <principal problem not specified>       Precautions: Fall  Chart, physical therapy assessment, plan of care and goals were reviewed. ASSESSMENT  Patient continues with skilled PT services and is progressing towards goals however remains most limited by generalized weakness, impaired balance, impaired gait, and very mild R inattention following admission for CVA. Received pt sitting EOB, very pleasant and agreeable to participation in session. Emphasis on gait progression in room and hallway with RW - demos very slow, shuffled pattern and quick fatigue. Required several brief standing rest breaks throughout 2* SOB. 1-2 moderate LOBs with distractions, turning, or multi level reaching requiring assist to correct from.  Pt left up in recliner at end of session, NAD. At this time, she appears below her baseline level and a high falls risk - recommend discharge to rehab setting to maximize indep and safety prior to retuning home alone. Will follow. Current Level of Function Impacting Discharge (mobility/balance): min A     Other factors to consider for discharge: below baseline; lives alone; very indep         PLAN :  Patient continues to benefit from skilled intervention to address the above impairments. Continue treatment per established plan of care. to address goals. Recommendation for discharge: (in order for the patient to meet his/her long term goals)  To be determined: SNF vs IPR    This discharge recommendation:  A follow-up discussion with the attending provider and/or case management is planned    IF patient discharges home will need the following DME: patient owns DME required for discharge       SUBJECTIVE:   Patient stated Sometimes I just get a little woozy.     OBJECTIVE DATA SUMMARY:   Critical Behavior:  Neurologic State: Alert  Orientation Level: Oriented X4  Cognition: Follows commands  Safety/Judgement: Decreased awareness of environment,Decreased awareness of need for assistance,Decreased awareness of need for safety,Decreased insight into deficits  Functional Mobility Training:  Bed Mobility:       Transfers:  Sit to Stand: Contact guard assistance;Minimum assistance (from low surface)  Stand to Sit: Stand-by assistance    Balance:  Sitting: Intact  Sitting - Static: Good (unsupported)  Sitting - Dynamic: Good (unsupported)  Standing: Impaired  Standing - Static: Good  Standing - Dynamic : Fair    Ambulation/Gait Training:  Distance (ft): 100 Feet (ft)  Assistive Device: Gait belt;Walker, rollator  Ambulation - Level of Assistance: Stand-by assistance  Gait Abnormalities: Decreased step clearance;Shuffling gait;Trunk sway increased  Speed/Lucy: Pace decreased (<100 feet/min)  Step Length: Right shortened;Left shortened    Activity Tolerance:   Good, requires frequent rest breaks, and observed SOB with activity    After treatment patient left in no apparent distress:   Sitting in chair, Call bell within reach, and Bed / chair alarm activated    COMMUNICATION/COLLABORATION:   The patients plan of care was discussed with: Occupational therapist and Registered nurse. Patient was educated regarding Her deficit(s) of R inattention/VF cut as this relates to Her diagnosis of CVA. She demonstrated Good understanding as evidenced by nodding. Patient and/or family was verbally educated on the BE FAST acronym for signs/symptoms of CVA and TIA. BE FAST was written on patient's communication board  for visual education and reinforcement. All questions answered with patient indicating good understanding.      Vannesa Barr, PT, DPT   Time Calculation: 32 mins

## 2022-06-29 NOTE — PROGRESS NOTES
Transition of Care Plan   RUR- Med 15%   DISPOSITION: SNF - [facility] - 6/30    F/U with PCP/Specialist     Transport: [facility] 6/30 - 11am    Emergency contact: patient's grauddaughter Rubia Kinney [phone], is patient's preferred contact at this time     CM barriers to discharge: insurance auth    The [facility] has accepted patient and plans to start insurance auth today 6/29. CM spoke with patient's granddaughter, Rubia Kinney, and she is in agreement with the [facility] for discharge. 4:15pm: Patient has received insurance auth for SNF at [facility]. The [facility] is able to accept patient tomorrow morning. CM informed patient's granddaughter, Rubia Kinney, and she is in agreement with discharge plan. Rapid covid test needed, GUDELIA Sierra informed. [facility] requested for 11am.   Call report #[phone]  Room #104A    Jasmyne Riley NATANAEL Bond.

## 2022-06-29 NOTE — PROGRESS NOTES
Problem: Diabetes Self-Management  Goal: *Disease process and treatment process  Description: Define diabetes and identify own type of diabetes; list 3 options for treating diabetes. Outcome: Progressing Towards Goal  Goal: *Incorporating nutritional management into lifestyle  Description: Describe effect of type, amount and timing of food on blood glucose; list 3 methods for planning meals. Outcome: Progressing Towards Goal  Goal: *Incorporating physical activity into lifestyle  Description: State effect of exercise on blood glucose levels. Outcome: Progressing Towards Goal  Goal: *Developing strategies to promote health/change behavior  Description: Define the ABC's of diabetes; identify appropriate screenings, schedule and personal plan for screenings. Outcome: Progressing Towards Goal  Goal: *Using medications safely  Description: State effect of diabetes medications on diabetes; name diabetes medication taking, action and side effects. Outcome: Progressing Towards Goal  Goal: *Monitoring blood glucose, interpreting and using results  Description: Identify recommended blood glucose targets  and personal targets. Outcome: Progressing Towards Goal  Goal: *Prevention, detection, treatment of acute complications  Description: List symptoms of hyper- and hypoglycemia; describe how to treat low blood sugar and actions for lowering  high blood glucose level. Outcome: Progressing Towards Goal  Goal: *Prevention, detection and treatment of chronic complications  Description: Define the natural course of diabetes and describe the relationship of blood glucose levels to long term complications of diabetes.   Outcome: Progressing Towards Goal  Goal: *Developing strategies to address psychosocial issues  Description: Describe feelings about living with diabetes; identify support needed and support network  Outcome: Progressing Towards Goal  Goal: *Insulin pump training  Outcome: Progressing Towards Goal  Goal: *Sick day guidelines  Outcome: Progressing Towards Goal  Goal: *Patient Specific Goal (EDIT GOAL, INSERT TEXT)  Outcome: Progressing Towards Goal     Problem: Patient Education: Go to Patient Education Activity  Goal: Patient/Family Education  Outcome: Progressing Towards Goal     Problem: Discharge Planning  Goal: *Discharge to safe environment  Outcome: Progressing Towards Goal     Problem: Patient Education: Go to Patient Education Activity  Goal: Patient/Family Education  Outcome: Progressing Towards Goal     Problem: TIA/CVA Stroke: 0-24 hours  Goal: Off Pathway (Use only if patient is Off Pathway)  Outcome: Progressing Towards Goal  Goal: Activity/Safety  Outcome: Progressing Towards Goal  Goal: Consults, if ordered  Outcome: Progressing Towards Goal  Goal: Diagnostic Test/Procedures  Outcome: Progressing Towards Goal  Goal: Nutrition/Diet  Outcome: Progressing Towards Goal  Goal: Discharge Planning  Outcome: Progressing Towards Goal  Goal: Medications  Outcome: Progressing Towards Goal  Goal: Respiratory  Outcome: Progressing Towards Goal  Goal: Treatments/Interventions/Procedures  Outcome: Progressing Towards Goal  Goal: Minimize risk of bleeding post-thrombolytic infusion  Outcome: Progressing Towards Goal  Goal: Monitor for complications post-thrombolytic infusion  Outcome: Progressing Towards Goal  Goal: Psychosocial  Outcome: Progressing Towards Goal  Goal: *Hemodynamically stable  Outcome: Progressing Towards Goal  Goal: *Neurologically stable  Description: Absence of additional neurological deficits    Outcome: Progressing Towards Goal  Goal: *Verbalizes anxiety and depression are reduced or absent  Outcome: Progressing Towards Goal  Goal: *Absence of Signs of Aspiration on Current Diet  Outcome: Progressing Towards Goal  Goal: *Absence of deep venous thrombosis signs and symptoms(Stroke Metric)  Outcome: Progressing Towards Goal  Goal: *Ability to perform ADLs and demonstrates progressive mobility and function  Outcome: Progressing Towards Goal  Goal: *Stroke education started(Stroke Metric)  Outcome: Progressing Towards Goal  Goal: *Dysphagia screen performed(Stroke Metric)  Outcome: Progressing Towards Goal  Goal: *Rehab consulted(Stroke Metric)  Outcome: Progressing Towards Goal     Problem: TIA/CVA Stroke: Day 2 Until Discharge  Goal: Off Pathway (Use only if patient is Off Pathway)  Outcome: Progressing Towards Goal  Goal: Activity/Safety  Outcome: Progressing Towards Goal  Goal: Diagnostic Test/Procedures  Outcome: Progressing Towards Goal  Goal: Nutrition/Diet  Outcome: Progressing Towards Goal  Goal: Discharge Planning  Outcome: Progressing Towards Goal  Goal: Medications  Outcome: Progressing Towards Goal  Goal: Respiratory  Outcome: Progressing Towards Goal  Goal: Treatments/Interventions/Procedures  Outcome: Progressing Towards Goal  Goal: Psychosocial  Outcome: Progressing Towards Goal  Goal: *Verbalizes anxiety and depression are reduced or absent  Outcome: Progressing Towards Goal  Goal: *Absence of aspiration  Outcome: Progressing Towards Goal  Goal: *Absence of deep venous thrombosis signs and symptoms(Stroke Metric)  Outcome: Progressing Towards Goal  Goal: *Optimal pain control at patient's stated goal  Outcome: Progressing Towards Goal  Goal: *Tolerating diet  Outcome: Progressing Towards Goal  Goal: *Ability to perform ADLs and demonstrates progressive mobility and function  Outcome: Progressing Towards Goal  Goal: *Stroke education continued(Stroke Metric)  Outcome: Progressing Towards Goal     Problem: Ischemic Stroke: Discharge Outcomes  Goal: *Verbalizes anxiety and depression are reduced or absent  Outcome: Progressing Towards Goal  Goal: *Verbalize understanding of risk factor modification(Stroke Metric)  Outcome: Progressing Towards Goal  Goal: *Hemodynamically stable  Outcome: Progressing Towards Goal  Goal: *Absence of aspiration pneumonia  Outcome: Progressing Towards Goal  Goal: *Aware of needed dietary changes  Outcome: Progressing Towards Goal  Goal: *Verbalize understanding of prescribed medications including anti-coagulants, anti-lipid, and/or anti-platelets(Stroke Metric)  Outcome: Progressing Towards Goal  Goal: *Tolerating diet  Outcome: Progressing Towards Goal  Goal: *Aware of follow-up diagnostics related to anticoagulants  Outcome: Progressing Towards Goal  Goal: *Ability to perform ADLs and demonstrates progressive mobility and function  Outcome: Progressing Towards Goal  Goal: *Absence of DVT(Stroke Metric)  Outcome: Progressing Towards Goal  Goal: *Absence of aspiration  Outcome: Progressing Towards Goal  Goal: *Optimal pain control at patient's stated goal  Outcome: Progressing Towards Goal  Goal: *Home safety concerns addressed  Outcome: Progressing Towards Goal  Goal: *Describes available resources and support systems  Outcome: Progressing Towards Goal  Goal: *Verbalizes understanding of activation of EMS(911) for stroke symptoms(Stroke Metric)  Outcome: Progressing Towards Goal  Goal: *Understands and describes signs and symptoms to report to providers(Stroke Metric)  Outcome: Progressing Towards Goal  Goal: *Neurolgocially stable (absence of additional neurological deficits)  Outcome: Progressing Towards Goal  Goal: *Verbalizes importance of follow-up with primary care physician(Stroke Metric)  Outcome: Progressing Towards Goal  Goal: *Smoking cessation discussed,if applicable(Stroke Metric)  Outcome: Progressing Towards Goal  Goal: *Depression screening completed(Stroke Metric)  Outcome: Progressing Towards Goal

## 2022-06-29 NOTE — PROGRESS NOTES
Physician Progress Note      Tommy Meek  CSN #:                  [id number]  :                       1940  ADMIT DATE:       2022 1:07 PM  [address] [facility] DATE:  RESPONDING  PROVIDER #:        Linda Gomez MD          QUERY TEXT:    Pt admitted with CVA. Pt noted to have increase in Cr. If possible, please document in the progress notes and discharge summary if you are evaluating and/or treating any of the following: The medical record reflects the following:  Risk Factors: CVA    Clinical Indicators:  Cr  1.53   1.35   1.29  1.61      Treatment:  NS at 75cc/hr    Defined by Kidney Disease Improving Global Outcomes (KDIGO) clinical practice guideline for acute kidney injury:  -Increase in SCr by greater than or equal to 0.3 mg/dl within 48?hours; or  -Increase or decrease in SCr to greater than or equal to 1.5 times baseline, which is known or presumed to have occurred within the prior 7 days; or  -Urine volume < 0.5ml/kg/h for 6 hours      Thank you,  Joanie Mixon RN [facility]  Clinical Documentation  [phone] or via [address] [facility] [address] provided:  -- Acute kidney failure  -- Acute kidney injury  -- Other - I will add my own diagnosis  -- Disagree - Not applicable / Not valid  -- Disagree - Clinically unable to determine / Unknown  -- Refer to Clinical Documentation Reviewer    PROVIDER RESPONSE TEXT:    This patient has an Acute kidney injury.     Query created by: Poncho Greenberg on 2022 1:19 PM      Electronically signed by:  Linda Gomez MD 2022 7:35 AM

## 2022-06-29 NOTE — PROGRESS NOTES
Problem: Diabetes Self-Management  Goal: *Monitoring blood glucose, interpreting and using results  Description: Identify recommended blood glucose targets  and personal targets.   Outcome: Progressing Towards Goal     Problem: TIA/CVA Stroke: Day 2 Until Discharge  Goal: Discharge Planning  Outcome: Progressing Towards Goal  Goal: *Stroke education continued(Stroke Metric)  Outcome: Progressing Towards Goal

## 2022-06-29 NOTE — PROGRESS NOTES
[facility] Adult  Hospitalist Group                                                                                          Hospitalist Progress Note  Amairani Georges MD  Answering service: [phone] OR [phone] from in house phone        Date of Service:  2022  NAME:  Suzie Ware  :  1940  MRN:  [id number]      Admission Summary:   Suzie Ware is a 80 y.o. female who presents with slurred speech     History was primarily Obtained from the patient   Patient reports that last night she felt tired and weak, woke up this morning, had slurred speech, some confusion and was brought to the hospital apparently patient was brought by her granddaughter and they were concerned that patient was talking to her \"dead relative\"   Patient with history of ICA aneurysm, follows up with neurology, patient came in as a code stroke and was requested to be admitted to the hospitalist service. Interval history / Subjective: Follow-up for acute stroke. Sitting in chair. No complaints. Assessment & Plan:     Acute stroke. MRI showed small acute CVA in right cerebellum  Right peripheral field defect. MRI did not report occipital stroke. Past history of left occipital CVA most probable cause for the right peripheral field defect  --Continue Eliquis, aspirin and statin. LDL 70. Neurology consult:  Difficult to explain her presentation based on this infarct and cannot exclude underlying metabolic reasons for her symptoms. From a secondary stroke prophylaxis standpoint, she is currently taking aspirin, apixaban and a statin and overall management essentially would not change. Consider extended cardiac monitoring to rule out cardiac dysrhythmia/atrial fibrillation. 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  We may have her follow up with Neuro-Interventional Surgery  as an outpatient for detailed review of imaging. --Echocardiogram with normal EF, negative agitated saline study. Aortic bioprosthetic valve stable and functioning with mild paravalvular leak. -Set up for 30-day event monitor on discharge    H/o TAVR for AS  Cont asa, low dose eliquis    CKD 3, at baseline ~1.3-16  ASUNCION,improving  -- Noted bump in creatinine as well as calcium. Iv fluids. BMP daily    Diabetes mellitus type 2, a1c 7.3  SSI        Accelerated hypertension, improving  --Continue carvedilol. Hypomag, repleted      Code status: Full Code  Prophylaxis: Eliquis  Care Plan discussed with: Patient/Family  Anticipated Disposition: IPR  Anticipated Discharge: If not accepted for IPR, patient and family in agreement for SNF. Hospital Problems  Date Reviewed: 3/3/2021          Codes Class Noted POA    CVA (cerebral vascular accident) [facility]) ICD-10-CM: I63.9  ICD-9-CM: 434.91  6/24/2022 Unknown              Review of Systems:   Review of Systems   Constitutional: Negative. HENT: Negative. Eyes: Negative. Right peripheral visual field defect   Respiratory: Negative. Cardiovascular: Negative. Gastrointestinal: Negative. Genitourinary: Negative. Musculoskeletal: Negative. Skin: Negative. Neurological: Negative. Endo/Heme/Allergies: Negative. Psychiatric/Behavioral: Negative. Vital Signs:    Last 24hrs VS reviewed since prior progress note.  Most recent are:  Visit Vitals  BP (!) 138/54 (BP 1 Location: Right upper arm, BP Patient Position: Sitting)   Pulse (!) 55   Temp 97.3 °F (36.3 °C)   Resp 20   Ht 5' 5\" (1.651 m)   Wt 90.3 kg (199 lb 1.2 oz)   SpO2 98%   BMI 33.13 kg/m²         Intake/Output Summary (Last 24 hours) at 6/29/2022 1630  Last data filed at 6/29/2022 6188  Gross per 24 hour   Intake --   Output 1750 ml   Net -1750 ml        Physical Examination:   I had a face to face encounter with this patient and independently examined them on 6/29/2022 as outlined below:  Patient was seated in a chair, daughter and granddaughter present in the room. GENERAL:  Alert, oriented, cooperative, no apparent distress  HEENT:  Normocephalic, atraumatic, non icteric sclerae, non pallor conjuctivae, EOMs intact, PERRLA. NECK: Supple, trachea midline, no adenopathy, no thyromegally or tenderness, no carotid bruit and no JVD. LUNGS:   Vesicular breath sounds bilaterally, no added sounds. HEART:   S1 and S2 well heard,RRR,  no murmur, click, rub or gallop. ABDOMEB:   Soft, non-tender. Normoactive bowel sounds. No masses,  No organomegaly. EXTREMETIES:  Atraumatic, acyanotic, no edema  PULSES: 2+ and symmetric all extremities. SKIN:  No rashes or lesions  NEUROLOGY: Mental status: Alert and oriented x4. Cranial nerves: Right first lower visual field impairment although exam is inconsistent exam.  Power symmetrical.  Sensation is intact. Data Review:   I personally reviewed: vitals, labs, imaging results, notes    Labs:     No results for input(s): WBC, HGB, HCT, PLT, HGBEXT, HCTEXT, PLTEXT, HGBEXT, HCTEXT, PLTEXT in the last 72 hours. Recent Labs     06/28/22  0203 06/27/22  0906    141   K 4.1 4.2   * 107   CO2 26 29   BUN 39* 29*   CREA 1.48* 1.61*   * 161*   CA 9.9 10.3*     Recent Labs     06/28/22  0203   ALT 20   AP 57   TBILI 0.3   TP 6.9   ALB 3.0*   GLOB 3.9     No results for input(s): INR, PTP, APTT, INREXT, INREXT in the last 72 hours. No results for input(s): FE, TIBC, PSAT, FERR in the last 72 hours. No results found for: FOL, RBCF   No results for input(s): PH, PCO2, PO2 in the last 72 hours. No results for input(s): CPK, CKNDX, TROIQ in the last 72 hours.     No lab exists for component: CPKMB  Lab Results   Component Value Date/Time    Cholesterol, total 159 06/25/2022 04:07 AM    HDL Cholesterol 73 06/25/2022 04:07 AM    LDL, calculated 70 06/25/2022 04:07 AM    Triglyceride 80 06/25/2022 04:07 AM    CHOL/HDL Ratio 2.2 06/25/2022 04:07 AM     Lab Results   Component Value Date/Time    Glucose (POC) 186 (H) 06/29/2022 11:44 AM    Glucose (POC) 158 (H) 06/29/2022 07:08 AM    Glucose (POC) 142 (H) 06/28/2022 09:24 PM    Glucose (POC) 100 06/28/2022 04:17 PM    Glucose (POC) 200 (H) 06/28/2022 11:38 AM     Lab Results   Component Value Date/Time    Color YELLOW/STRAW 06/24/2022 04:07 PM    Appearance CLEAR 06/24/2022 04:07 PM    Specific gravity 1.011 06/24/2022 04:07 PM    pH (UA) 7.5 06/24/2022 04:07 PM    Protein Negative 06/24/2022 04:07 PM    Glucose Negative 06/24/2022 04:07 PM    Ketone Negative 06/24/2022 04:07 PM    Bilirubin Negative 06/24/2022 04:07 PM    Urobilinogen 0.2 06/24/2022 04:07 PM    Nitrites Negative 06/24/2022 04:07 PM    Leukocyte Esterase Negative 06/24/2022 04:07 PM    Epithelial cells FEW 01/13/2021 10:41 AM    Bacteria Negative 01/13/2021 10:41 AM    WBC 0-4 01/13/2021 10:41 AM    RBC 0-5 01/13/2021 10:41 AM       Medications Reviewed:     Current Facility-Administered Medications   Medication Dose Route Frequency    diclofenac (VOLTAREN) 1 % topical gel 2 g  2 g Topical QID    insulin lispro (HUMALOG) injection   SubCUTAneous AC&HS    glucose chewable tablet 16 g  4 Tablet Oral PRN    glucagon (GLUCAGEN) injection 1 mg  1 mg IntraMUSCular PRN    dextrose 10 % infusion 0-250 mL  0-250 mL IntraVENous PRN    apixaban (ELIQUIS) tablet 2.5 mg  2.5 mg Oral BID    aspirin delayed-release tablet 81 mg  81 mg Oral DAILY    calcitRIOL (ROCALTROL) capsule 0.5 mcg  0.5 mcg Oral DAILY    carvediloL (COREG) tablet 6.25 mg  6.25 mg Oral BID WITH MEALS    oxybutynin chloride XL (DITROPAN XL) tablet 15 mg  15 mg Oral DAILY    rosuvastatin (CRESTOR) tablet 20 mg  20 mg Oral QHS    venlafaxine (EFFEXOR) tablet 37.5 mg  37.5 mg Oral BID    acetaminophen (TYLENOL) tablet 650 mg  650 mg Oral Q4H PRN    Or    acetaminophen (TYLENOL) solution 650 mg  650 mg Per NG tube Q4H PRN    Or    acetaminophen (TYLENOL) suppository 650 mg  650 mg Rectal Q4H PRN    famotidine (PEPCID) tablet 20 mg  20 mg Oral DAILY    hydrALAZINE (APRESOLINE) 20 mg/mL injection 20 mg  20 mg IntraVENous Q6H PRN     ______________________________________________________________________  EXPECTED LENGTH OF STAY: 2d 0h  ACTUAL LENGTH OF STAY:          5                 Brandi Cantrell MD

## 2022-06-30 VITALS
TEMPERATURE: 97.8 F | BODY MASS INDEX: 32.62 KG/M2 | OXYGEN SATURATION: 98 % | HEART RATE: 66 BPM | SYSTOLIC BLOOD PRESSURE: 128 MMHG | WEIGHT: 195.8 LBS | HEIGHT: 65 IN | RESPIRATION RATE: 12 BRPM | DIASTOLIC BLOOD PRESSURE: 58 MMHG

## 2022-06-30 LAB
ANION GAP SERPL CALC-SCNC: 5 MMOL/L (ref 5–15)
BASOPHILS # BLD: 0.1 K/UL (ref 0–0.1)
BASOPHILS NFR BLD: 1 % (ref 0–1)
BUN SERPL-MCNC: 31 MG/DL (ref 6–20)
BUN/CREAT SERPL: 21 (ref 12–20)
CALCIUM SERPL-MCNC: 11 MG/DL (ref 8.5–10.1)
CHLORIDE SERPL-SCNC: 107 MMOL/L (ref 97–108)
CO2 SERPL-SCNC: 30 MMOL/L (ref 21–32)
CREAT SERPL-MCNC: 1.46 MG/DL (ref 0.55–1.02)
DIFFERENTIAL METHOD BLD: NORMAL
EOSINOPHIL # BLD: 0.3 K/UL (ref 0–0.4)
EOSINOPHIL NFR BLD: 4 % (ref 0–7)
ERYTHROCYTE [DISTWIDTH] IN BLOOD BY AUTOMATED COUNT: 14.5 % (ref 11.5–14.5)
GLUCOSE BLD STRIP.AUTO-MCNC: 146 MG/DL (ref 65–117)
GLUCOSE SERPL-MCNC: 168 MG/DL (ref 65–100)
HCT VFR BLD AUTO: 36.6 % (ref 35–47)
HGB BLD-MCNC: 11.7 G/DL (ref 11.5–16)
IMM GRANULOCYTES # BLD AUTO: 0 K/UL (ref 0–0.04)
IMM GRANULOCYTES NFR BLD AUTO: 0 % (ref 0–0.5)
LYMPHOCYTES # BLD: 2 K/UL (ref 0.8–3.5)
LYMPHOCYTES NFR BLD: 32 % (ref 12–49)
MCH RBC QN AUTO: 27.8 PG (ref 26–34)
MCHC RBC AUTO-ENTMCNC: 32 G/DL (ref 30–36.5)
MCV RBC AUTO: 86.9 FL (ref 80–99)
MONOCYTES # BLD: 0.5 K/UL (ref 0–1)
MONOCYTES NFR BLD: 8 % (ref 5–13)
NEUTS SEG # BLD: 3.4 K/UL (ref 1.8–8)
NEUTS SEG NFR BLD: 55 % (ref 32–75)
NRBC # BLD: 0 K/UL (ref 0–0.01)
NRBC BLD-RTO: 0 PER 100 WBC
PLATELET # BLD AUTO: 221 K/UL (ref 150–400)
PMV BLD AUTO: 10.6 FL (ref 8.9–12.9)
POTASSIUM SERPL-SCNC: 4.7 MMOL/L (ref 3.5–5.1)
RBC # BLD AUTO: 4.21 M/UL (ref 3.8–5.2)
SERVICE CMNT-IMP: ABNORMAL
SODIUM SERPL-SCNC: 142 MMOL/L (ref 136–145)
WBC # BLD AUTO: 6.3 K/UL (ref 3.6–11)

## 2022-06-30 PROCEDURE — 36415 COLL VENOUS BLD VENIPUNCTURE: CPT

## 2022-06-30 PROCEDURE — 80048 BASIC METABOLIC PNL TOTAL CA: CPT

## 2022-06-30 PROCEDURE — 74011636637 HC RX REV CODE- 636/637: Performed by: INTERNAL MEDICINE

## 2022-06-30 PROCEDURE — 85025 COMPLETE CBC W/AUTO DIFF WBC: CPT

## 2022-06-30 PROCEDURE — 82962 GLUCOSE BLOOD TEST: CPT

## 2022-06-30 PROCEDURE — 74011250637 HC RX REV CODE- 250/637: Performed by: FAMILY MEDICINE

## 2022-06-30 RX ADMIN — Medication 2 UNITS: at 08:25

## 2022-06-30 RX ADMIN — VENLAFAXINE 37.5 MG: 37.5 TABLET ORAL at 08:25

## 2022-06-30 RX ADMIN — ASPIRIN 81 MG: 81 TABLET, COATED ORAL at 08:25

## 2022-06-30 RX ADMIN — CALCITRIOL CAPSULES 0.25 MCG 0.5 MCG: 0.25 CAPSULE ORAL at 08:25

## 2022-06-30 RX ADMIN — CARVEDILOL 6.25 MG: 3.12 TABLET, FILM COATED ORAL at 08:25

## 2022-06-30 RX ADMIN — APIXABAN 2.5 MG: 2.5 TABLET, FILM COATED ORAL at 08:25

## 2022-06-30 RX ADMIN — DICLOFENAC SODIUM 2 G: 10 GEL TOPICAL at 09:00

## 2022-06-30 RX ADMIN — FAMOTIDINE 20 MG: 20 TABLET ORAL at 08:25

## 2022-06-30 RX ADMIN — OXYBUTYNIN CHLORIDE 15 MG: 5 TABLET, EXTENDED RELEASE ORAL at 08:25

## 2022-06-30 NOTE — DISCHARGE INSTRUCTIONS
Discharge SNF/Rehab Instructions/LTAC       PATIENT ID: Misbah Lockwood  MRN: [id number]   YOB: 1940    DATE OF ADMISSION: 6/24/2022  1:07 PM    DATE OF DISCHARGE: 6/30/2022    PRIMARY CARE PROVIDER: Kathy Fisher MD       ATTENDING PHYSICIAN: Kady Perez MD  DISCHARGING PROVIDER: Joel Ying MD     To contact this individual call [phone] and ask the  to page. If unavailable ask to be transferred the Adult Hospitalist Department. CONSULTATIONS: IP CONSULT TO NEUROLOGY  IP CONSULT TO CARDIOLOGY    PROCEDURES/SURGERIES: * No surgery found *    ADMITTING DIAGNOSES & HOSPITAL COURSE:   Admission Summary:   Misbah Lockwood is a 80 y.o. female  who presented to the ED with slurred speech. She was evaluated and managed as detailed below. 1.  Acute stroke. MRI showed small acute CVA in right cerebellum  2. Past history of left occipital CVA most probable cause for the right peripheral field defect  --Continue Eliquis, aspirin and statin. LDL 70. Neurology consult:  Difficult to explain her presentation based on this infarct and cannot exclude underlying metabolic reasons for her symptoms. From a secondary stroke prophylaxis standpoint, she is currently taking aspirin, apixaban and a statin and overall management essentially would not change. Consider extended cardiac monitoring to rule out cardiac dysrhythmia/atrial fibrillation. 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 We may have her follow up with Neuro-Interventional Surgery  as an outpatient for detailed review of imaging. \"  --Echocardiogram with normal EF, negative agitated saline study. Aortic bioprosthetic valve stable and functioning with mild paravalvular leak. -She is set  up for 30-day event monitor on discharge     H/o TAVR for AS  Continue low-dose aspirin, Eliquis.      CKD 3, at baseline ~1.3-16  ASUNCION,improving  -- Check BMP in 1 week. Diabetes mellitus type 2, a1c 7.3  -Continue metformin. Monitor blood glucose with AC&HS Accu-Cheks. Use Humalog sliding scale per your facility protocol. Accelerated hypertension, improved  --Continue carvedilol. Electrolytes replaced and corrected. Follow-up lab: BMP to check for creatinine and electrolytes        PENDING TEST RESULTS:   At the time of discharge the following test results are still pending: None    FOLLOW UP APPOINTMENTS:    Follow-up Information     Follow up With Specialties Details Why Dianne Gutierrez . [facility]   [address] [place] [address]    Ann-Marie Cabrera MD Internal Medicine Physician   [address] [place] [id number] [phone]             ADDITIONAL CARE RECOMMENDATIONS:     DIET: Regular Diet and Cardiac Diet      OXYGEN / BiPAP SETTINGS: On room air    ACTIVITY: Activity as tolerated      EQUIPMENT needed: To be determined after rehab      DISCHARGE MEDICATIONS:   See Medication Reconciliation Form      NOTIFY YOUR PHYSICIAN FOR ANY OF THE FOLLOWING:   Fever over 101 degrees for 24 hours. Chest pain, shortness of breath, fever, chills, nausea, vomiting, diarrhea, change in mentation, falling, weakness, bleeding. Severe pain or pain not relieved by medications. Or, any other signs or symptoms that you may have questions about. DISPOSITION:    Home With:   OT  PT  HH  RN      x SNF/Inpatient Rehab/LTAC    Independent/assisted living    Hospice    Other:       PATIENT CONDITION AT DISCHARGE:     Functional status    Poor    x Deconditioned     Independent      Cognition    x Lucid     Forgetful     Dementia      Catheters/lines (plus indication)    [place]     PICC     PEG    x None      Code status    x Full code     DNR      PHYSICAL EXAMINATION AT DISCHARGE:  Patient is alert and oriented x4.   GENERAL:  Alert, oriented, cooperative, no apparent distress  HEENT:  Normocephalic, atraumatic, non icteric sclerae, non pallor conjuctivae, EOMs intact, PERRLA. NECK: Supple, trachea midline, no adenopathy, no thyromegally or tenderness, no carotid bruit and no JVD. LUNGS:   Vesicular breath sounds bilaterally, no added sounds. HEART:   S1 and S2 well heard,RRR,  no murmur, click, rub or gallop. ABDOMEB:   Soft, non-tender. Normoactive bowel sounds. No masses,  No organomegaly. EXTREMETIES:  Atraumatic, acyanotic, no edema  PULSES: 2+ and symmetric all extremities. SKIN:  No rashes or lesions  NEUROLOGY: Mental state: Alert and oriented x4. Cranial nerves: Right peripheral visual field defect, chronic. Motor and sensory exam grossly intact.              CHRONIC MEDICAL DIAGNOSES:  Problem List as of 6/30/2022 Date Reviewed: 3/3/2021          Codes Class Noted - Resolved    S/P cardiac cath ICD-10-CM: Z98.890  ICD-9-CM: V45.89  10/13/2020 - Present        CVA (cerebral vascular accident) [facility]) ICD-10-CM: I63.9  ICD-9-CM: 434.91  6/24/2022 - Present        S/P TAVR (transcatheter aortic valve replacement) ICD-10-CM: Z95.2  ICD-9-CM: V43.3  1/25/2021 - Present        Aortic stenosis ICD-10-CM: I35.0  ICD-9-CM: 424.1  1/20/2021 - Present        Nonrheumatic aortic valve stenosis ICD-10-CM: I35.0  ICD-9-CM: 424.1  1/6/2021 - Present        SOB (shortness of breath) ICD-10-CM: R06.02  ICD-9-CM: 786.05  3/18/2019 - Present        NSTEMI (non-ST elevated myocardial infarction) ([facility] ICD-10-CM: I21.4  ICD-9-CM: 410.70  3/2/2019 - Present        Benign essential hypertension ICD-10-CM: I10  ICD-9-CM: 401.1  3/2/2019 - Present        Diabetic peripheral neuropathy ([facility] [address] ICD-10-CM: E11.42  ICD-9-CM: 250.60, 357.2  3/2/2019 - Present        Type II diabetes mellitus, uncontrolled ICD-10-CM: [id number]  ICD-9-CM: 250.02  3/2/2019 - Present        Hyperlipidemia ICD-10-CM: E78.5  ICD-9-CM: 272.4  3/2/2019 - Present        RESOLVED: CHF (congestive heart failure) ([facility] ICD-10-CM: I50.9  ICD-9-CM: 428.0  3/2/2019 - 3/11/2019        RESOLVED: Sepsis ([facility] ICD-10-CM: A41.9  ICD-9-CM: 038.9, 995.91  3/1/2019 - 3/11/2019                    Signed:    Osmani Pool MD  6/30/2022  10:01 AM

## 2022-06-30 NOTE — PROGRESS NOTES
Patient discharged to [facility], transported by [facility].

## 2022-06-30 NOTE — PROGRESS NOTES
Transition of Care Plan to SNF/Rehab    SNF/Rehab Transition:  Patient has been accepted to the [facility] and meets criteria for admission. Patient will transported by [facility] and expected to leave at 1100. Communication to Patient/Family:  Met with patient and Martín Lund, and they are agreeable to the transition plan. Communication to SNF/Rehab:  Bedside RN, Yordan Jacobo, has been notified to update the transition plan to the facility and call report (phone number [phone]). Discharge information has been updated on the AVS.     Discharge instructions to be fax'd to facility at [facility]). Nursing Please include all hard scripts for controlled substances, med rec and dc summary, and AVS in packet. Reviewed and confirmed with facility,[facility], can manage the patient care needs for the following:     Laura Martinez with (X) only those applicable:    Medication:  [x]  Medications will be available at the facility  []  IV Antibiotics   []  Controlled Substance - hard copy to be sent with patient   [x]  Weekly Labs   Documents:  [x] Hard RX  [x] MAR  [x] Kardex  [x] AVS  [x]Transfer Summary  [x]Discharge   Equipment:  []  CPAP/BiPAP  []  Wound Vacuum  []  Nelson or Urinary Device  []  PICC/Central Line  []  Nebulizer  []  Ventilator   Treatment:  []Isolation (for MRSA, VRE, etc.)  []Surgical Drain Management  []Tracheostomy Care  []Dressing Changes  []Dialysis with transportation and chair time   []PEG Care  []Oxygen  []Daily Weights for Heart Failure   Dietary:  []Any diet limitations  []Tube Feedings   []Total Parenteral Management (TPN)   Eligible for Medicaid Long Term Services and Supports  Yes:  [] Eligible for medical assistance or will become eligible within 180 days and UAI completed. [] Provider/Patient and/or support system has requested screening. [] UAI copy provided to patient or responsible party  [] UAI unavailable at discharge will send once processed to SNF provider.   [] UAI unavailable at discharged mailed to patient  No:   [x] Private pay and is not financially eligible for Medicaid within the next 180 days. [] Reside out-of-state. [] A residents of a state owned/operated facility that is licensed  by [address] [employer] [facility] or [facility]  [] Enrollment in [facility] services  [] [address] [place] [address]  [] Patient /Family declines to have screening completed or provide financial information for screening     Financial Resources:  Medicaid    [] Initiated and application pending   [] Full coverage     Advanced Care Plan:  []Surrogate Decision Maker of Care  []POA  []Communicated Code Status FULL   Other    Medicare pt has received, reviewed, and signed 2nd IM letter informing them of their right to appeal the discharge. Signed copy has been placed on pt bedside chart. Sanam Medellin M.S.JUSTINA.

## 2022-06-30 NOTE — PROGRESS NOTES
Pharmacist Discharge Medication Reconciliation    Discharging Provider: Dr. Kajal Eaton PMH:   Past Medical History:   Diagnosis Date    CAD (coronary artery disease)     pulmonary HTN    Diabetes ([facility]     Hypertension      Chief Complaint for this Admission: No chief complaint on file. Allergies: Patient has no known allergies. Discharge Medications:   Discharge Medication List as of 6/30/2022 10:46 AM        CONTINUE these medications which have NOT CHANGED    Details   diclofenac (VOLTAREN) 1 % gel Apply  to affected area four (4) times daily. , Normal, Disp-50 g, R-0      acetaminophen (TYLENOL) 325 mg tablet Take 2 Tabs by mouth every four (4) hours as needed for Pain., OTC      apixaban (Eliquis) 2.5 mg tablet Take 2.5 mg by mouth two (2) times a day., Historical Med      metFORMIN (GLUCOPHAGE) 500 mg tablet Take  by mouth two (2) times daily (with meals). Unknown dosage, Historical Med      aspirin delayed-release 81 mg tablet Take 81 mg by mouth daily. , Historical Med      carvedilol (COREG) 6.25 mg tablet Take 1 Tab by mouth two (2) times daily (with meals). , Print, Disp-60 Tab, R-0      bisacodyl (DULCOLAX) 5 mg EC tablet Take 1 Tab by mouth daily as needed for Constipation. , Print, Disp-10 Tab, R-0      budesonide (PULMICORT) 0.5 mg/2 mL nbsp 2 mL by Nebulization route two (2) times a day., Print, Disp-60 Each, R-1      albuterol (PROVENTIL VENTOLIN) 2.5 mg /3 mL (0.083 %) nebulizer solution 3 mL by Nebulization route every four (4) hours as needed for Wheezing., Print, Disp-60 Each, R-0      rosuvastatin (CRESTOR) 20 mg tablet Take 1 Tab by mouth nightly. , Print, Disp-30 Tab, R-0      oxybutynin chloride XL (DITROPAN XL) 15 mg CR tablet Take 15 mg by mouth daily. , Historical Med      therapeutic multivitamin (THERAGRAN) tablet Take 1 Tab by mouth daily. , Historical Med      venlafaxine (EFFEXOR) 37.5 mg tablet Take 37.5 mg by mouth two (2) times a day., Historical Med      calcitRIOL (ROCALTROL) 0.5 mcg capsule Take 0.5 mcg by mouth daily. , Historical Med           STOP taking these medications       LORazepam (ATIVAN) 0.5 mg tablet Comments:   Reason for Stopping:               The patient's chart, MAR and AVS were reviewed by Rae Babcock.

## 2022-07-01 NOTE — DISCHARGE SUMMARY
Physician Discharge Summary           PATIENT ID: Negin Bosch  MRN: [id number]   YOB: 1940    DATE OF ADMISSION: 6/24/2022  1:07 PM    DATE OF DISCHARGE: 6/30/2022    PRIMARY CARE PROVIDER: Refugio Hutchinson MD       ATTENDING PHYSICIAN: Juliet Tripathi MD  DISCHARGING PROVIDER: Kathrine Garrison MD     To contact this individual call [phone] and ask the  to page. If unavailable ask to be transferred the Adult Hospitalist Department. CONSULTATIONS: IP CONSULT TO NEUROLOGY  IP CONSULT TO CARDIOLOGY    PROCEDURES/SURGERIES: * No surgery found *    ADMITTING DIAGNOSES & HOSPITAL COURSE:   Admission Summary:   Negin Bosch is a 80 y.o. female  who presented to the ED with slurred speech. She was evaluated and managed as detailed below. 1.  Acute stroke. MRI showed small acute CVA in right cerebellum  2. Past history of left occipital CVA most probable cause for the right peripheral field defect  --Continue Eliquis, aspirin and statin. LDL 70. Neurology consult:  Difficult to explain her presentation based on this infarct and cannot exclude underlying metabolic reasons for her symptoms. From a secondary stroke prophylaxis standpoint, she is currently taking aspirin, apixaban and a statin and overall management essentially would not change. Consider extended cardiac monitoring to rule out cardiac dysrhythmia/atrial fibrillation. 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 We may have her follow up with Neuro-Interventional Surgery  as an outpatient for detailed review of imaging. \"  --Echocardiogram with normal EF, negative agitated saline study. Aortic bioprosthetic valve stable and functioning with mild paravalvular leak.   -She is set  up for 30-day event monitor on discharge     H/o TAVR for AS  Continue low-dose aspirin, Eliquis. CKD 3, at baseline ~1.3-16  ASUNCION,improving  -- Check BMP in 1 week. Diabetes mellitus type 2, a1c 7.3  -Continue metformin. Monitor blood glucose with AC&HS Accu-Cheks. Use Humalog sliding scale per your facility protocol. Accelerated hypertension, improved  --Continue carvedilol. Electrolytes replaced and corrected. Follow-up lab: BMP to check for creatinine and electrolytes        PENDING TEST RESULTS:   At the time of discharge the following test results are still pending: None    FOLLOW UP APPOINTMENTS:    Follow-up Information       Follow up With Specialties Details Why Dianne Lirashawanda [address]. Michael Nieto [facility]   Jd ADRIAN Sood [address] [place] [address]    Refugio Hutchinson MD Internal Medicine Physician   [address] [place] [id number] [phone]               ADDITIONAL CARE RECOMMENDATIONS:     DIET: Regular Diet and Cardiac Diet      OXYGEN / BiPAP SETTINGS: On room air    ACTIVITY: Activity as tolerated      EQUIPMENT needed: To be determined after rehab      DISCHARGE MEDICATIONS:   See Medication Reconciliation Form      NOTIFY YOUR PHYSICIAN FOR ANY OF THE FOLLOWING:   Fever over 101 degrees for 24 hours. Chest pain, shortness of breath, fever, chills, nausea, vomiting, diarrhea, change in mentation, falling, weakness, bleeding. Severe pain or pain not relieved by medications. Or, any other signs or symptoms that you may have questions about.     DISPOSITION:    Home With:   OT  PT  HH  RN      x SNF/Inpatient Rehab/LTAC    Independent/assisted living    Hospice    Other:       PATIENT CONDITION AT DISCHARGE:     Functional status    Poor    x Deconditioned     Independent      Cognition    x Lucid     Forgetful     Dementia      Catheters/lines (plus indication)    Nelson     PICC     PEG    x None      Code status    x Full code     DNR      PHYSICAL EXAMINATION AT DISCHARGE:  Patient is alert and oriented x4. GENERAL:  Alert, oriented, cooperative, no apparent distress  HEENT:  Normocephalic, atraumatic, non icteric sclerae, non pallor conjuctivae, EOMs intact, PERRLA. NECK: Supple, trachea midline, no adenopathy, no thyromegally or tenderness, no carotid bruit and no JVD. LUNGS:   Vesicular breath sounds bilaterally, no added sounds. HEART:   S1 and S2 well heard,RRR,  no murmur, click, rub or gallop. ABDOMEB:   Soft, non-tender. Normoactive bowel sounds. No masses,  No organomegaly. EXTREMETIES:  Atraumatic, acyanotic, no edema  PULSES: 2+ and symmetric all extremities. SKIN:  No rashes or lesions  NEUROLOGY: Mental state: Alert and oriented x4. Cranial nerves: Right peripheral visual field defect, chronic. Motor and sensory exam grossly intact.              CHRONIC MEDICAL DIAGNOSES:  Problem List as of 6/30/2022 Date Reviewed: 3/3/2021            Codes Class Noted - Resolved    S/P cardiac cath ICD-10-CM: Z98.890  ICD-9-CM: V45.89  10/13/2020 - Present        CVA (cerebral vascular accident) [facility]) ICD-10-CM: I63.9  ICD-9-CM: 434.91  6/24/2022 - Present        S/P TAVR (transcatheter aortic valve replacement) ICD-10-CM: Z95.2  ICD-9-CM: V43.3  1/25/2021 - Present        Aortic stenosis ICD-10-CM: I35.0  ICD-9-CM: 424.1  1/20/2021 - Present        Nonrheumatic aortic valve stenosis ICD-10-CM: I35.0  ICD-9-CM: 424.1  1/6/2021 - Present        SOB (shortness of breath) ICD-10-CM: R06.02  ICD-9-CM: 786.05  3/18/2019 - Present        NSTEMI (non-ST elevated myocardial infarction) ([facility] ICD-10-CM: I21.4  ICD-9-CM: 410.70  3/2/2019 - Present        Benign essential hypertension ICD-10-CM: I10  ICD-9-CM: 401.1  3/2/2019 - Present        Diabetic peripheral neuropathy ([facility] ICD-10-CM: E11.42  ICD-9-CM: 250.60, 357.2  3/2/2019 - Present        Type II diabetes mellitus, uncontrolled ICD-10-CM: [id number]  ICD-9-CM: 250.02  3/2/2019 - Present        Hyperlipidemia ICD-10-CM: E78.5  ICD-9-CM: 272.4  3/2/2019 - Present        RESOLVED: CHF (congestive heart failure) ([facility] ICD-10-CM: I50.9  ICD-9-CM: 428.0  3/2/2019 - 3/11/2019        RESOLVED: Sepsis ([facility] ICD-10-CM: A41.9  ICD-9-CM: 038.9, 995.91  3/1/2019 - 3/11/2019                    Discharge Medications:   Discharge Medication List as of 6/30/2022 10:46 AM        CONTINUE these medications which have NOT CHANGED    Details   diclofenac (VOLTAREN) 1 % gel Apply  to affected area four (4) times daily. , Normal, Disp-50 g, R-0      acetaminophen (TYLENOL) 325 mg tablet Take 2 Tabs by mouth every four (4) hours as needed for Pain., OTC      apixaban (Eliquis) 2.5 mg tablet Take 2.5 mg by mouth two (2) times a day., Historical Med      metFORMIN (GLUCOPHAGE) 500 mg tablet Take  by mouth two (2) times daily (with meals). Unknown dosage, Historical Med      aspirin delayed-release 81 mg tablet Take 81 mg by mouth daily. , Historical Med      carvedilol (COREG) 6.25 mg tablet Take 1 Tab by mouth two (2) times daily (with meals). , Print, Disp-60 Tab, R-0      bisacodyl (DULCOLAX) 5 mg EC tablet Take 1 Tab by mouth daily as needed for Constipation. , Print, Disp-10 Tab, R-0      budesonide (PULMICORT) 0.5 mg/2 mL nbsp 2 mL by Nebulization route two (2) times a day., Print, Disp-60 Each, R-1      albuterol (PROVENTIL VENTOLIN) 2.5 mg /3 mL (0.083 %) nebulizer solution 3 mL by Nebulization route every four (4) hours as needed for Wheezing., Print, Disp-60 Each, R-0      rosuvastatin (CRESTOR) 20 mg tablet Take 1 Tab by mouth nightly. , Print, Disp-30 Tab, R-0      oxybutynin chloride XL (DITROPAN XL) 15 mg CR tablet Take 15 mg by mouth daily. , Historical Med      therapeutic multivitamin (THERAGRAN) tablet Take 1 Tab by mouth daily. , Historical Med      venlafaxine (EFFEXOR) 37.5 mg tablet Take 37.5 mg by mouth two (2) times a day., Historical Med      calcitRIOL (ROCALTROL) 0.5 mcg capsule Take 0.5 mcg by mouth daily. , Historical Med           STOP taking these medications LORazepam (ATIVAN) 0.5 mg tablet Comments:   Reason for Stopping:               I have spent more than 30 minutes with patient/family on the day of discharge on counseling and discussing the discharge care plan as well as coordination of her care    Signed:   Lenin Ellington MD  7/1/2022  7:17 AM

## 2022-07-29 PROCEDURE — 93272 ECG/REVIEW INTERPRET ONLY: CPT | Performed by: SPECIALIST

## 2023-05-12 NOTE — ADVANCED PRACTICE NURSE
Bedside and Verbal shift change report given to Maria Del Rosario Turk RN (oncoming nurse) by Diane Garcia RN (offgoing nurse). Report included the following information SBAR, Kardex, ED Summary, MAR, Recent Results and Med Rec Status.
Never smoker

## 2023-11-06 ENCOUNTER — ANESTHESIA (OUTPATIENT)
Facility: HOSPITAL | Age: 83
End: 2023-11-06
Payer: MEDICARE

## 2023-11-06 ENCOUNTER — ANESTHESIA EVENT (OUTPATIENT)
Facility: HOSPITAL | Age: 83
End: 2023-11-06
Payer: MEDICARE

## 2023-11-06 ENCOUNTER — HOSPITAL ENCOUNTER (OUTPATIENT)
Facility: HOSPITAL | Age: 83
Discharge: HOME | End: 2023-11-08
Attending: STUDENT IN AN ORGANIZED HEALTH CARE EDUCATION/TRAINING PROGRAM
Payer: MEDICARE

## 2023-11-06 VITALS
WEIGHT: 200 LBS | RESPIRATION RATE: 20 BRPM | SYSTOLIC BLOOD PRESSURE: 100 MMHG | TEMPERATURE: 97.9 F | HEART RATE: 69 BPM | BODY MASS INDEX: 33.28 KG/M2 | OXYGEN SATURATION: 98 % | DIASTOLIC BLOOD PRESSURE: 58 MMHG

## 2023-11-06 DIAGNOSIS — I48.91 ATRIAL FIBRILLATION AND FLUTTER (HCC): ICD-10-CM

## 2023-11-06 DIAGNOSIS — I48.92 ATRIAL FIBRILLATION AND FLUTTER (HCC): ICD-10-CM

## 2023-11-06 LAB
EKG ATRIAL RATE: 67 BPM
EKG DIAGNOSIS: NORMAL
EKG P AXIS: 66 DEGREES
EKG P-R INTERVAL: 244 MS
EKG Q-T INTERVAL: 418 MS
EKG QRS DURATION: 76 MS
EKG QTC CALCULATION (BAZETT): 441 MS
EKG R AXIS: 49 DEGREES
EKG T AXIS: 42 DEGREES
EKG VENTRICULAR RATE: 67 BPM
GLUCOSE BLD STRIP.AUTO-MCNC: 108 MG/DL (ref 65–117)
SERVICE CMNT-IMP: NORMAL

## 2023-11-06 PROCEDURE — 2580000003 HC RX 258: Performed by: NURSE ANESTHETIST, CERTIFIED REGISTERED

## 2023-11-06 PROCEDURE — 93005 ELECTROCARDIOGRAM TRACING: CPT | Performed by: STUDENT IN AN ORGANIZED HEALTH CARE EDUCATION/TRAINING PROGRAM

## 2023-11-06 PROCEDURE — 6360000002 HC RX W HCPCS: Performed by: NURSE ANESTHETIST, CERTIFIED REGISTERED

## 2023-11-06 PROCEDURE — 93010 ELECTROCARDIOGRAM REPORT: CPT | Performed by: INTERNAL MEDICINE

## 2023-11-06 PROCEDURE — 92960 CARDIOVERSION ELECTRIC EXT: CPT

## 2023-11-06 PROCEDURE — 3700000000 HC ANESTHESIA ATTENDED CARE

## 2023-11-06 PROCEDURE — 82962 GLUCOSE BLOOD TEST: CPT

## 2023-11-06 RX ORDER — SODIUM CHLORIDE 9 MG/ML
INJECTION, SOLUTION INTRAVENOUS CONTINUOUS PRN
Status: DISCONTINUED | OUTPATIENT
Start: 2023-11-06 | End: 2023-11-06 | Stop reason: SDUPTHER

## 2023-11-06 RX ORDER — ACETAMINOPHEN 160 MG
2000 TABLET,DISINTEGRATING ORAL DAILY
COMMUNITY

## 2023-11-06 RX ORDER — FINERENONE 10 MG/1
10 TABLET, FILM COATED ORAL DAILY
COMMUNITY

## 2023-11-06 RX ADMIN — PROPOFOL 20 MG: 10 INJECTION, EMULSION INTRAVENOUS at 12:42

## 2023-11-06 RX ADMIN — SODIUM CHLORIDE: 9 INJECTION, SOLUTION INTRAVENOUS at 12:34

## 2023-11-06 RX ADMIN — PROPOFOL 80 MG: 10 INJECTION, EMULSION INTRAVENOUS at 12:41

## 2023-11-06 ASSESSMENT — ENCOUNTER SYMPTOMS: SHORTNESS OF BREATH: 1

## 2023-11-06 NOTE — PROGRESS NOTES
Pt arrives by walker to noninvasive cardiology department accompanied by her granddaughters for direct current cardioversion procedure. All assessments completed and consent was reviewed. Education given was regarding procedure, medications to be given, potential side effects of medications to be given, post-procedure management and follow-up. Opportunity for questions was provided and all questions and concerns were addressed. Patient and patient contact verbalized understanding of education.

## 2023-11-06 NOTE — ANESTHESIA POSTPROCEDURE EVALUATION
Department of Anesthesiology  Postprocedure Note    Patient: Kirstie Harrison  MRN: [id number]  YOB: 1940  Date of evaluation: 11/6/2023      Procedure Summary     Date: 11/06/23 Room / Location: [address] [place] [address] NON-INVASIVE CARDIOLOGY    Anesthesia Start: 1234 Anesthesia Stop: 1245    Procedure: CARDIOVERSION EXTERNAL Diagnosis:       Atrial fibrillation and flutter ([address])      (atrial  fibrillation)    Scheduled Providers: Caitie Mathew MD; Kenny Luis DO Responsible Provider: Kenny Luis DO    Anesthesia Type: MAC ASA Status: 3          Anesthesia Type: MAC    Vladimir Phase I: Vladimir Score: 10    Vladimir Phase II:        Anesthesia Post Evaluation    Patient location during evaluation: PACU  Patient participation: complete - patient participated  Level of consciousness: awake and alert  Pain score: 0  Airway patency: patent  Nausea & Vomiting: no nausea  Complications: no  Cardiovascular status: hemodynamically stable  Respiratory status: acceptable  Hydration status: euvolemic  Comments: Seen, no complaints   Pain management: adequate

## 2023-11-06 NOTE — PROGRESS NOTES
Patient has returned to baseline at arrival for procedure. Pt awake, alert, and oriented. VSS. Pt denies chest pain, SOB, and dizziness. Pt remains in [address] [place] [address] Street in [address]. I discussed AVS and discharge instructions with patient and with her granddaughter, Naomi Stiles, and I provided a copy for her to take home. Pt and granddaughter voiced understanding and denied any questions or need for clarification. IV D/C'd and hemostasis attained. Coban and gauze dressing applied. Transported with:  Thien Lu RN via w/c to vehicle driven by her granddaughter.

## 2024-11-05 ENCOUNTER — OFFICE VISIT (OUTPATIENT)
Age: 84
End: 2024-11-05
Payer: MEDICARE

## 2024-11-05 VITALS
OXYGEN SATURATION: 98 % | SYSTOLIC BLOOD PRESSURE: 126 MMHG | DIASTOLIC BLOOD PRESSURE: 80 MMHG | HEART RATE: 80 BPM | BODY MASS INDEX: 33.28 KG/M2 | RESPIRATION RATE: 17 BRPM | WEIGHT: 200 LBS

## 2024-11-05 DIAGNOSIS — R26.81 UNSTABLE GAIT: ICD-10-CM

## 2024-11-05 DIAGNOSIS — Z86.73 HISTORY OF STROKE: ICD-10-CM

## 2024-11-05 DIAGNOSIS — F01.B0 MODERATE VASCULAR DEMENTIA WITHOUT BEHAVIORAL DISTURBANCE, PSYCHOTIC DISTURBANCE, MOOD DISTURBANCE, OR ANXIETY (HCC): Primary | ICD-10-CM

## 2024-11-05 PROCEDURE — 99215 OFFICE O/P EST HI 40 MIN: CPT

## 2024-11-05 PROCEDURE — 3079F DIAST BP 80-89 MM HG: CPT

## 2024-11-05 PROCEDURE — 1123F ACP DISCUSS/DSCN MKR DOCD: CPT

## 2024-11-05 PROCEDURE — 3074F SYST BP LT 130 MM HG: CPT

## 2024-11-05 PROCEDURE — 1160F RVW MEDS BY RX/DR IN RCRD: CPT

## 2024-11-05 PROCEDURE — 96136 PSYCL/NRPSYC TST PHY/QHP 1ST: CPT

## 2024-11-05 PROCEDURE — 1126F AMNT PAIN NOTED NONE PRSNT: CPT

## 2024-11-05 PROCEDURE — 1159F MED LIST DOCD IN RCRD: CPT

## 2024-11-05 PROCEDURE — 96132 NRPSYC TST EVAL PHYS/QHP 1ST: CPT

## 2024-11-05 RX ORDER — MEMANTINE HYDROCHLORIDE 5 MG/1
5 TABLET ORAL 2 TIMES DAILY
Qty: 180 TABLET | Refills: 1 | Status: SHIPPED | OUTPATIENT
Start: 2024-11-05

## 2024-11-05 ASSESSMENT — ENCOUNTER SYMPTOMS: PHOTOPHOBIA: 0

## 2024-11-05 NOTE — PROGRESS NOTES
artery aneurysm projected medially.      MRI BRAIN WO CONTRAST 03/23/2019    Narrative  EXAM: MRI Brain without contrast.    INDICATION:  Confusion    SEQUENCES:   Sagittal T1, axial T1, T2, GRE and FLAIR; Cor T2; and diffusion  imaging of the brain. No contrast.    FINDINGS:  There are several small foci of T2 hyperintensity in the bilateral cerebral  white matter most compatible with microangiopathy; one of these, a punctate  focus in the right periventricular deep white matter, demonstrates diffusion  restriction possibly representing a ischemia/infarct.    Diffusion imaging is otherwise normal with no large acute infarct.    There is no evidence for mass, hemorrhage, shift, or hydrocephalus.  There is no  extra-axial fluid collection. Craniocervical junction is unremarkable.  Appropriate flow voids are present in the major vertebrobasilar and carotid  arteries.    Impression  IMPRESSION:  1. Likely punctate acute ischemic focus, right cerebral white matter.  2. Chronic microangiopathy.  3. No bleed or shift.            Assessment and Plan   Melvi was seen today for follow-up and memory loss.    Diagnoses and all orders for this visit:    Moderate vascular dementia without behavioral disturbance, psychotic disturbance, mood disturbance, or anxiety ([facility])  -     memantine (NAMENDA) 5 MG tablet; Take 1 tablet by mouth 2 times daily  -     MRI BRAIN WO CONTRAST; Future  -     [facility] - Physical Therapy at the [facility]    Unstable gait  -     MRI BRAIN WO CONTRAST; Future  -     [facility] - Physical Therapy at the [facility]    History of stroke  -     MRI BRAIN WO CONTRAST; Future  -     [facility] - Physical Therapy at the [facility]    84 year old with history of stroke in the past with history of Afib. MRI brain that was completed 2 years ago was reviewed today. Did show the old cerebellum stroke with mild atrophy and microvascular changes. I

## 2025-03-21 DIAGNOSIS — F01.B0 MODERATE VASCULAR DEMENTIA WITHOUT BEHAVIORAL DISTURBANCE, PSYCHOTIC DISTURBANCE, MOOD DISTURBANCE, OR ANXIETY (HCC): ICD-10-CM

## 2025-03-21 RX ORDER — MEMANTINE HYDROCHLORIDE 5 MG/1
5 TABLET ORAL 2 TIMES DAILY
Qty: 180 TABLET | Refills: 1 | Status: SHIPPED | OUTPATIENT
Start: 2025-03-21

## (undated) DEVICE — TR BAND RADIAL ARTERY COMPRESSION DEVICE: Brand: TR BAND

## (undated) DEVICE — CATHETER ANGIO 4FR L100CM S STL NYL JL3.5 3 SEG BRAID SFT

## (undated) DEVICE — GDWIRE COR 0.035INX260CM -- CONFIDA

## (undated) DEVICE — TIDISHIELD TRANSPORT, CONTAINMENT COVER FOR BACK TABLE 4'6" (1.37M) TO 8' (2.43M) IN LENGTH: Brand: TIDISHIELD

## (undated) DEVICE — GUIDEWIRE VASC L150CM DIA0.035IN FLX TIP L7CM PTFE STR FIX

## (undated) DEVICE — ANGIOGRAPHIC CATHETER: Brand: IMPULSE™

## (undated) DEVICE — KIT MED IMAG CNTRST AGNT W/ IOPAMIDOL REUSE

## (undated) DEVICE — PINNACLE INTRODUCER SHEATH: Brand: PINNACLE

## (undated) DEVICE — WRAP SURG W1.31XL1.34M CARD FOR PT 165-172CM THERMOWRP

## (undated) DEVICE — DRAPE XR C ARM 41X74IN LF --

## (undated) DEVICE — GLIDESHEATH SLENDER ACCESS KIT: Brand: GLIDESHEATH SLENDER

## (undated) DEVICE — ANGIOGRAPHY KIT

## (undated) DEVICE — PACK PROCEDURE SURG HRT CATH

## (undated) DEVICE — KIT HND CTRL 3 W STPCOCK ROT END 54IN PREM HI PRSS TBNG AT

## (undated) DEVICE — SPECIAL PROCEDURE DRAPE 32" X 34": Brand: SPECIAL PROCEDURE DRAPE

## (undated) DEVICE — KIT MFLD ISOLATN NACL CNTRST PRT TBNG SPIK W/ PRSS TRNSDUC

## (undated) DEVICE — GUIDEWIRE VASC L260CM DIA0.035IN TIP L3MM STD EXCHG PTFE J

## (undated) DEVICE — RADIFOCUS GLIDEWIRE: Brand: GLIDEWIRE

## (undated) DEVICE — CATH DIAG IMPLS PIG 6FRX100CM -- IMPULSE 16599-40

## (undated) DEVICE — GUIDEWIRE VASC L260CM DIA0.035IN RAD 3MM J TIP L7CM PTFE

## (undated) DEVICE — MICROPUNCTURE INTRODUCER SET SILHOUETTE TRANSITIONLESS WITH STAINLESS STEEL WIRE GUIDE: Brand: MICROPUNCTURE

## (undated) DEVICE — DRAPE PRB US TRNSDCR 6X96IN --

## (undated) DEVICE — AMPLATZ EXTRA STIFF WIRE GUIDE: Brand: AMPLATZ

## (undated) DEVICE — PERCLOSE PROGLIDE™ SUTURE-MEDIATED CLOSURE SYSTEM: Brand: PERCLOSE PROGLIDE™

## (undated) DEVICE — 40418 TRENDELENBURG ONE-STEP ARM PROTECTORS LARGE (1 PAIR): Brand: 40418 TRENDELENBURG ONE-STEP ARM PROTECTORS LARGE (1 PAIR)

## (undated) DEVICE — CATH DIAG FRC4 IMPLS 6FRX100CM -- IMPULSE 16599-02

## (undated) DEVICE — SYR LR LCK 1ML GRAD NSAF 30ML --

## (undated) DEVICE — ANGIOGRAPHIC CATHETER: Brand: EXPO™

## (undated) DEVICE — TOWEL,OR,DSP,ST,BLUE,STD,2/PK,40PK/CS: Brand: MEDLINE

## (undated) DEVICE — Device

## (undated) DEVICE — CO-SET DELIVERY SYSTEM FOR 123 ROOM TEMPATURE INJECTATE: Brand: CO-SET+

## (undated) DEVICE — SENSOR TEMP SKIN DISP

## (undated) DEVICE — GUIDEWIRE VASC L260CM DIA0.035IN COIL L15CM FLX TIP L4CM

## (undated) DEVICE — CHECK-FLO PERFORMER INTRODUCER: Brand: PERFORMER

## (undated) DEVICE — REM POLYHESIVE ADULT PATIENT RETURN ELECTRODE: Brand: VALLEYLAB

## (undated) DEVICE — GUIDEWIRE VASC L80CM OD0.018IN TIP L2CM NIT COR TUNGSTEN

## (undated) DEVICE — CATHETER DIAG AD 4FR L100CM STD NYL JUDKINS R 4 TRULUMEN

## (undated) DEVICE — SYR 50ML LR LCK 1ML GRAD NSAF --

## (undated) DEVICE — CATH DIAGIMPLS MP 5FRX110CM -- IMPULSE 16391-300

## (undated) DEVICE — GUIDEWIRE VASC L150CM DIA0.035IN TIP L3MM PTFE J CRV FIX

## (undated) DEVICE — INTENDED TO STANDARDIZE OR CAMERAS TO ALLOW FOR THE USE OF THE OR CAMERA COVER: Brand: ASPEN® O.R. CAMERA COVER

## (undated) DEVICE — ANGIO-SEAL VIP VASCULAR CLOSURE DEVICE: Brand: ANGIO-SEAL

## (undated) DEVICE — LUER-LOK 360°: Brand: CONNECTA, LUER-LOK

## (undated) DEVICE — INFECTION CONTROL KIT SYS

## (undated) DEVICE — STERILE POLYISOPRENE POWDER-FREE SURGICAL GLOVES WITH EMOLLIENT COATING: Brand: PROTEXIS

## (undated) DEVICE — 6 FOOT DISPOSABLE EXTENSION CABLE WITH SAFE CONNECT / SCREW-DOWN

## (undated) DEVICE — CATHETER DIAG 6FR L110CM INTRO 6FR BLLN DIA9MM 1CC PULM ART

## (undated) DEVICE — 3M™ TEGADERM™ TRANSPARENT FILM DRESSING FRAME STYLE, 1626W, 4 IN X 4-3/4 IN (10 CM X 12 CM), 50/CT 4CT/CASE: Brand: 3M™ TEGADERM™

## (undated) DEVICE — COVER,TABLE,60X90,STERILE: Brand: MEDLINE

## (undated) DEVICE — PINNACLE PRECISION ACCESS SYSTEM INTRODUCER SHEATH: Brand: PINNACLE PRECISION ACCESS SYSTEM

## (undated) DEVICE — GDWIRE ANGIO SUP STF 0.035IN --

## (undated) DEVICE — WASTE KIT - ST MARY: Brand: MEDLINE INDUSTRIES, INC.

## (undated) DEVICE — PREP SKN CHLRAPRP APL 26ML STR --

## (undated) DEVICE — 1000ML,PRESSURE INFUSER W/STOPCOCK: Brand: MEDLINE

## (undated) DEVICE — 6FR THERMODILUTION BALLOON CATHETER SWAN (ARROW)

## (undated) DEVICE — STERILE POLYISOPRENE POWDER-FREE SURGICAL GLOVES: Brand: PROTEXIS